# Patient Record
Sex: FEMALE
[De-identification: names, ages, dates, MRNs, and addresses within clinical notes are randomized per-mention and may not be internally consistent; named-entity substitution may affect disease eponyms.]

---

## 2019-12-19 ENCOUNTER — HOSPITAL ENCOUNTER (EMERGENCY)
Dept: HOSPITAL 97 - ER | Age: 64
Discharge: HOME | End: 2019-12-19
Payer: COMMERCIAL

## 2019-12-19 VITALS — DIASTOLIC BLOOD PRESSURE: 90 MMHG | SYSTOLIC BLOOD PRESSURE: 181 MMHG | OXYGEN SATURATION: 98 %

## 2019-12-19 VITALS — TEMPERATURE: 97.6 F

## 2019-12-19 DIAGNOSIS — I10: ICD-10-CM

## 2019-12-19 DIAGNOSIS — F17.210: ICD-10-CM

## 2019-12-19 DIAGNOSIS — R55: Primary | ICD-10-CM

## 2019-12-19 LAB
ALBUMIN SERPL BCP-MCNC: 4 G/DL (ref 3.4–5)
ALP SERPL-CCNC: 77 U/L (ref 45–117)
ALT SERPL W P-5'-P-CCNC: 34 U/L (ref 12–78)
AST SERPL W P-5'-P-CCNC: 31 U/L (ref 15–37)
BUN BLD-MCNC: 12 MG/DL (ref 7–18)
CKMB CREATINE KINASE MB: 2 NG/ML (ref 0.3–3.6)
GLUCOSE SERPLBLD-MCNC: 96 MG/DL (ref 74–106)
HCT VFR BLD CALC: 44.7 % (ref 36–45)
LIPASE SERPL-CCNC: 158 U/L (ref 73–393)
LYMPHOCYTES # SPEC AUTO: 2.3 K/UL (ref 0.7–4.9)
MAGNESIUM SERPL-MCNC: 2.3 MG/DL (ref 1.8–2.4)
PMV BLD: 7.6 FL (ref 7.6–11.3)
POTASSIUM SERPL-SCNC: 3.5 MMOL/L (ref 3.5–5.1)
RBC # BLD: 4.94 M/UL (ref 3.86–4.86)
TROPONIN (EMERG DEPT USE ONLY): < 0.02 NG/ML (ref 0–0.04)

## 2019-12-19 PROCEDURE — 80048 BASIC METABOLIC PNL TOTAL CA: CPT

## 2019-12-19 PROCEDURE — 36415 COLL VENOUS BLD VENIPUNCTURE: CPT

## 2019-12-19 PROCEDURE — 84484 ASSAY OF TROPONIN QUANT: CPT

## 2019-12-19 PROCEDURE — 85025 COMPLETE CBC W/AUTO DIFF WBC: CPT

## 2019-12-19 PROCEDURE — 82550 ASSAY OF CK (CPK): CPT

## 2019-12-19 PROCEDURE — 83735 ASSAY OF MAGNESIUM: CPT

## 2019-12-19 PROCEDURE — 83690 ASSAY OF LIPASE: CPT

## 2019-12-19 PROCEDURE — 70450 CT HEAD/BRAIN W/O DYE: CPT

## 2019-12-19 PROCEDURE — 80076 HEPATIC FUNCTION PANEL: CPT

## 2019-12-19 PROCEDURE — 82553 CREATINE MB FRACTION: CPT

## 2019-12-19 PROCEDURE — 99284 EMERGENCY DEPT VISIT MOD MDM: CPT

## 2019-12-19 PROCEDURE — 93005 ELECTROCARDIOGRAM TRACING: CPT

## 2019-12-19 NOTE — XMS REPORT
Patient Summary Document

 Created on:2019



Patient:SUKHI ZELAYA

Sex:Female

:1955

External Reference #:[id number]





Demographics







 Address  [address] [place] [address]

 

 Home Phone  [phone]

 

 Work Phone  [phone]

 

 Email Address  [email or web address]

 

 Preferred Language  Unknown

 

 Marital Status  Unknown

 

 Rastafarian Affiliation  Unknown

 

 Race  Unknown

 

 Ethnic Group  Unknown









Author







 Organization  [facility]

 

 Address  [address] [place] [address] [address]



   [place] [address]

 

 Phone  [phone]









Care Team Providers







 Name  Role  Phone

 

 Unavailable  Unavailable  Unavailable









Problems

This patient has no known problems.



Allergies, Adverse Reactions, Alerts

This patient has no known allergies or adverse reactions.



Medications

This patient has no known medications.



Results







 Test Description  Test Time  Test Comments  Text Results  Atomic Results  
Result Comments









 Thyroid Stimulating Hormone  2019 22:27:55    









   Test Item  Value  Reference Range  Comments









 TSH (test code=TSH)  0.445 mIU/mL  0.270-4.200  



Erythrocyte Sedimentation Rate [id number] 22:20:13





 Test Item  Value  Reference Range  Comments

 

 ESR STAT (test code=ESR STAT)  8 mm/hr  0-20  



Comprehensive Metabolic [id number] 21:59:49





 Test Item  Value  Reference Range  Comments

 

 Sodium Level (test code=Sodium Level)  139.0 mmol/L  135.0-145.0  

 

 Potassium Level (test code=Potassium Level)  3.7 mmol/L  3.5-5.1  

 

 Chloride Level (test code=Chloride Level)  98 mmol/L    

 

 CO2 (test code=CO2)  26 mmol/L  22-29  

 

 Anion Gap (test code=Anion Gap)  15 mmol/L  7-16  

 

 BUN (test code=BUN)  14.70 mg/dL  8.00-23.00  

 

 Creatinine Level (test code=Creatinine Level)  0.70 mg/dL  0.50-0.90  

 

 BUN/Creat Ratio (test code=BUN/Creat Ratio)  21    

 

 Glucose Level (test code=Glucose Level)  121 mg/dL    

 

 Calcium Level (test code=Calcium Level)  9.8 mg/dL  8.3-10.5  

 

 Alk Phos (test code=Alk Phos)  98 U/L    

 

 Bilirubin Total (test code=Bilirubin Total)  0.4 mg/dL  0.1-0.9  

 

 Albumin Level (test code=Albumin Level)  4.6 g/dL  3.5-5.2  

 

 Protein Total (test code=Protein Total)  7.4 g/dL  6.4-8.3  

 

 ALT (test code=ALT)  22 U/L  1-33  

 

 AST (test code=AST)  24 U/L  1-32  

 

 Globulin (test code=Globulin)  2.8 g/dL  2.9-3.1  

 

 A/G Ratio (test code=A/G Ratio)  1.6 ratio    



Comprehensive Metabolic [id number] 21:59:49





 Test Item  Value  Reference Range  Comments

 

 Sodium Level (test  139.0 mmol/L  135.0-145.0  



 code=Sodium Level)      

 

 Potassium Level (test  3.7 mmol/L  3.5-5.1  



 code=Potassium Level)      

 

 Chloride Level (test  98 mmol/L    



 code=Chloride Level)      

 

 CO2 (test code=CO2)  26 mmol/L  22-29  

 

 Anion Gap (test  15 mmol/L  7-16  



 code=Anion Gap)      

 

 BUN (test code=BUN)  14.70 mg/dL  8.00-23.00  

 

 Creatinine Level (test  0.70 mg/dL  0.50-0.90  



 code=Creatinine Level)      

 

 BUN/Creat Ratio (test  21    



 code=BUN/Creat Ratio)      

 

 Glucose Level (test  121 mg/dL    



 code=Glucose Level)      

 

 Calcium Level (test  9.8 mg/dL  8.3-10.5  



 code=Calcium Level)      

 

 Alk Phos (test code=Alk  98 U/L    



 Phos)      

 

 Bilirubin Total (test  0.4 mg/dL  0.1-0.9  



 code=Bilirubin Total)      

 

 Albumin Level (test  4.6 g/dL  3.5-5.2  



 code=Albumin Level)      

 

 Protein Total (test  7.4 g/dL  6.4-8.3  



 code=Protein Total)      

 

 ALT (test code=ALT)  22 U/L  1-33  

 

 AST (test code=AST)  24 U/L  1-32  

 

 Globulin (test  2.8 g/dL  2.9-3.1  



 code=Globulin)      

 

 A/G Ratio (test code=A/G  1.6 ratio    



 Ratio)      

 

 eGFR AA (test code=eGFR  >60 mL/min/1.73 m2    eGFR (estimated



 AA)      Glomerular Filtration



       Rate) is an estimated



       value, calculated from



       the patient's serum



       creatinine using the MDRD



       equation. It is NOT the



       patient's actual GFR. The



       eGFR provides a more



       clinically useful measure



       of kidney disease than



       serum creatinine



       alone.***This calculation



       takes sex and race into



       account, if the



       information is provided.



       If the race is not



       provided, and the patient



       is -American,



       multiply by 1.212. If sex



       is not provided, and the



       patient is female,



       multiply by 0.742.



       Results for patients <18



       years of age have not



       been validated by the



       MDRD study and should be



       interpreted with caution.



       eGFR Result



       Interpretation:eGFR >



       or=60 is in the Normal



       RangeeGFR &lt; 60 may



       mean kidney diseaseeGFR <



       15 may mean kidney



       failure*** Ranges



       recommended by the



       National Kidney



       Foundation,



       http://nkdep.nih.gov



Lipid [id number] 21:59:49





 Test Item  Value  Reference Range  Comments

 

 Cholesterol Total (test  294 mg/dL  0-200  RISK OF HEART



 code=Cholesterol Total)      DISEASEPublished by



       American Heart Association



       Analyte  Optimal Borderline



       Increased RiskCHOL  <200



       200-239 >240TRIG  <150



       150-199 >200HDL Male  >60



       <40HDL Female  >60  <50LDL



       <100 130-159 >160LDL  Near



       optimal is 100-129

 

 Triglycerides (test  616 mg/dL  9-200  



 code=Triglycerides)      

 

 HDL (test code=HDL)  52 mg/dL  50-60  

 

 LDL (test code=LDL)  N/A Trig >400  0-130  LDL calculation is



   mg/dL    unreliable when



       Triglyceride is greater



       than 400.

 

 VLDL (test code=VLDL)  N/A Trig >400  5-40  VLDL calculation is



   mg/dL    unreliable when



       Triglyceride is greater



       than 400.

 

 Chol/HDL (test  5.7 ratio  0.0-4.4  



 code=Chol/HDL)      

 

 LDL/HDL Ratio (test  N/A Trig >400    LDL/HDL Ratio calculation



 code=LDL/HDL Ratio)      is unreliable when



       Triglyceride is greater



       than 400.



Comprehensive Metabolic [id number] 21:59:49





 Test Item  Value  Reference Range  Comments

 

 Sodium Level (test  139.0 mmol/L  135.0-145.0  



 code=Sodium Level)      

 

 Potassium Level (test  3.7 mmol/L  3.5-5.1  



 code=Potassium Level)      

 

 Chloride Level (test  98 mmol/L    



 code=Chloride Level)      

 

 CO2 (test code=CO2)  26 mmol/L  22-29  

 

 Anion Gap (test  15 mmol/L  7-16  



 code=Anion Gap)      

 

 BUN (test code=BUN)  14.70 mg/dL  8.00-23.00  

 

 Creatinine Level (test  0.70 mg/dL  0.50-0.90  



 code=Creatinine Level)      

 

 BUN/Creat Ratio (test  21    



 code=BUN/Creat Ratio)      

 

 Glucose Level (test  121 mg/dL    



 code=Glucose Level)      

 

 Calcium Level (test  9.8 mg/dL  8.3-10.5  



 code=Calcium Level)      

 

 Alk Phos (test code=Alk  98 U/L    



 Phos)      

 

 Bilirubin Total (test  0.4 mg/dL  0.1-0.9  



 code=Bilirubin Total)      

 

 Albumin Level (test  4.6 g/dL  3.5-5.2  



 code=Albumin Level)      

 

 Protein Total (test  7.4 g/dL  6.4-8.3  



 code=Protein Total)      

 

 ALT (test code=ALT)  22 U/L  1-33  

 

 AST (test code=AST)  24 U/L  1-32  

 

 Globulin (test  2.8 g/dL  2.9-3.1  



 code=Globulin)      

 

 A/G Ratio (test code=A/G  1.6 ratio    



 Ratio)      

 

 eGFR AA (test code=eGFR  >60 mL/min/1.73 m2    eGFR (estimated



 AA)      Glomerular Filtration



       Rate) is an estimated



       value, calculated from



       the patient's serum



       creatinine using the MDRD



       equation. It is NOT the



       patient's actual GFR. The



       eGFR provides a more



       clinically useful measure



       of kidney disease than



       serum creatinine



       alone.***This calculation



       takes sex and race into



       account, if the



       information is provided.



       If the race is not



       provided, and the patient



       is -American,



       multiply by 1.212. If sex



       is not provided, and the



       patient is female,



       multiply by 0.742.



       Results for patients <18



       years of age have not



       been validated by the



       MDRD study and should be



       interpreted with caution.



       eGFR Result



       Interpretation:eGFR >



       or=60 is in the Normal



       RangeeGFR &lt; 60 may



       mean kidney diseaseeGFR <



       15 may mean kidney



       failure*** Ranges



       recommended by the



       National Kidney



       Foundation,



       http://nkdep.nih.gov

 

 eGFR Non-AA (test  >60.00 mL/min/1.73    eGFR (estimated



 code=eGFR Non-AA)  m2    Glomerular Filtration



       Rate) is an estimated



       value, calculated from



       the patient's serum



       creatinine using the MDRD



       equation. It is NOT the



       patient's actual GFR. The



       eGFR provides a more



       clinically useful measure



       of kidney disease than



       serum creatinine



       alone.***This calculation



       takes sex and race into



       account, if the



       information is provided.



       If the race is not



       provided, and the patient



       is -American,



       multiply by 1.212. If sex



       is not provided, and the



       patient is female,



       multiply by 0.742.



       Results for patients <18



       years of age have not



       been validated by the



       MDRD study and should be



       interpreted with caution.



       eGFR Result



       Interpretation:eGFR >



       or=60 is in the Normal



       RangeeGFR &lt; 60 may



       mean kidney diseaseeGFR <



       15 may mean kidney



       failure*** Ranges



       recommended by the



       National Kidney



       Foundation,



       http://nkdep.nih.gov



Complete Blood Count with [id number] 21:52:58





 Test Item  Value  Reference Range  Comments

 

 WBC (test code=WBC)  9.0 x10  4.4-10.5  

 

 RBC (test code=RBC)  4.76 x10  3.75-5.20  

 

 Hgb (test code=Hgb)  14.4 g/dL  12.2-14.8  

 

 Hct (test code=Hct)  42.4 %  36.5-44.4  

 

 MCV (test code=MCV)  89.10 fL  80..00  

 

 MCHC (test code=MCHC)  34.00 g/dL  32.00-37.50  

 

 MCH (test code=MCH)  30.3 pg  27.0-32.5  

 

 RDW CV (test code=RDW CV)  12.6 %  11.5-14.5  

 

 Platelets (test  340.0 x10  140.0-440.0  



 code=Platelets)      

 

 MPV (test code=MPV)  10.1 fL    

 

 Slide Review (test code=Slide  Auto  Auto  Result created by



 Review)      [id number]

 

 nRBC (test code=nRBC)  0    

 

 NRBC Abs (test code=NRBC Abs)  0.00 x10    

 

 IPF (test code=IPF)  0 %    



Automated [id number] 21:52:58





 Test Item  Value  Reference Range  Comments

 

 Neutro Auto (test code=Neutro Auto)  62.7 %  36.0-70.0  

 

 Lymph Auto (test code=Lymph Auto)  28.3 %  12.0-44.0  

 

 Mono Auto (test code=Mono Auto)  7.6 %  0.0-11.0  

 

 Eos, Auto (test code=Eos, Auto)  0.7 %  0.0-7.0  

 

 Basophil Auto (test code=Basophil Auto)  0.3 %  0.0-2.0  

 

 Neutro Absolute (test code=Neutro Absolute)  5.7 x10  1.6-7.4  

 

 Lymph Absolute (test code=Lymph Absolute)  2.56 x10  .50-4.60  

 

 Mono Absolute (test code=Mono Absolute)  .69 x10  .00-1.20  

 

 Eos Absolute (test code=Eos Absolute)  0.06 x10  0.00-0.74  

 

 Baso Absolute (test code=Baso Absolute)  0.03 x10  0.00-0.21  



Pro B Natriuretic [id number] 21:52:58





 Test Item  Value  Reference Range  Comments

 

 NT-proBNP (test code=NT-proBNP)  80 pg/mL  0-124  



IG [id number] 21:52:58





 Test Item  Value  Reference Range  Comments

 

 IG (test code=IG)  0.4 %  0.0-5.0  

 

 IG Abs (test code=IG Abs)  0 x10

## 2019-12-19 NOTE — EDPHYS
Physician Documentation                                                                           

 [facility]                                                                 

Name: Meaghan Tavares                                                                            

Age: 64 yrs                                                                                       

Sex: Female                                                                                       

: 1955                                                                                   

MRN: [id number]                                                                                   

Arrival Date: 2019                                                                          

Time: 21:18                                                                                       

Account#: [id number]                                                                            

Bed 27                                                                                            

Private MD:                                                                                       

ED Physician Elias Franco                                                                     

HPI:                                                                                              

                                                                                             

21:47 This 64 yrs old  Female presents to ER via Ambulatory with complaints of        kb  

      Probable Seizure.                                                                           

21:47 The patient has experienced syncope, became unresponsive. Onset: The symptoms/episode   kb  

      began/occurred today. Duration: This was a single episode. Context: the episode(s) was      

      witnessed, . Associated injury: The patient did not suffer any apparent         

      associated injury. Associated signs and symptoms: The patient has no apparent               

      associated signs or symptoms. Current symptoms: Currently, the patient is not               

      experiencing any symptoms, the patient feels back to baseline, no decreased level of        

      consciousness, no confusion, no dysphasia, no headache, no paralysis, no visual             

      changes. The patient has experienced a previous episode, many years ago. The patient        

      has not recently seen a physician. Pt reports she was getting her hair done, felt hot       

      and nauseous, then blacked out. Person doing her hair reports she shook a little like       

      she may have had a seizure. EMS were called to the scene. Pt reports she felt fine when     

      she woke up and feels fine now. States this happened once before but she didn't get         

      checked out at the time. Reports the only thing that was consistent on both occasions       

      was that she was drinking alcohol. .                                                        

                                                                                                  

Historical:                                                                                       

- Allergies:                                                                                      

21:34 No Known Allergies;                                                                     aa1 

- Home Meds:                                                                                      

21:34 lisinopril 10 mg Oral tab 1 tab once daily [Active];                                    aa1 

- PMHx:                                                                                           

21:34 Hypertension;                                                                           aa1 

- PSHx:                                                                                           

21:34 None;                                                                                   aa1 

                                                                                                  

- Immunization history:: Flu vaccine is not up to date.                                           

- Social history:: Smoking status: Patient uses tobacco products, smokes one pack                 

  cigarettes per day.                                                                             

- Ebola Screening: : No symptoms or risks identified at this time.                                

                                                                                                  

                                                                                                  

ROS:                                                                                              

21:46 Constitutional: Negative for fever, chills, and weight loss, ENT: Negative for injury,  kb  

      pain, and discharge, Neck: Negative for injury, pain, and swelling, Cardiovascular:         

      Negative for chest pain, palpitations, and edema, Respiratory: Negative for shortness       

      of breath, cough, wheezing, and pleuritic chest pain, Abdomen/GI: Negative for              

      abdominal pain, nausea, vomiting, diarrhea, and constipation, Back: Negative for injury     

      and pain, MS/Extremity: Negative for injury and deformity, Skin: Negative for injury,       

      rash, and discoloration.                                                                    

21:46 Neuro: Positive for seizure activity, syncope.                                              

                                                                                                  

Exam:                                                                                             

21:46 Constitutional:  This is a well developed, well nourished patient who is awake, alert,  kb  

      and in no acute distress. Head/Face:  Normocephalic, atraumatic. ENT:  Nares patent. No     

      nasal discharge, no septal abnormalities noted.  Tympanic membranes are normal and          

      external auditory canals are clear.  Oropharynx with no redness, swelling, or masses,       

      exudates, or evidence of obstruction, uvula midline.  Mucous membranes moist. Neck:         

      Trachea midline, no thyromegaly or masses palpated, and no cervical lymphadenopathy.        

      Supple, full range of motion without nuchal rigidity, or vertebral point tenderness.        

      No Meningismus. Chest/axilla:  Normal chest wall appearance and motion.  Nontender with     

      no deformity.  No lesions are appreciated. Cardiovascular:  Regular rate and rhythm         

      with a normal S1 and S2.  No gallops, murmurs, or rubs.  Normal PMI, no JVD.  No pulse      

      deficits. Respiratory:  Lungs have equal breath sounds bilaterally, clear to                

      auscultation and percussion.  No rales, rhonchi or wheezes noted.  No increased work of     

      breathing, no retractions or nasal flaring. Abdomen/GI:  Soft, non-tender, with normal      

      bowel sounds.  No distension or tympany.  No guarding or rebound.  No evidence of           

      tenderness throughout. Skin:  Warm, dry with normal turgor.  Normal color with no           

      rashes, no lesions, and no evidence of cellulitis. MS/ Extremity:  Pulses equal, no         

      cyanosis.  Neurovascular intact.  Full, normal range of motion. Neuro:  Awake and           

      alert, GCS 15, oriented to person, place, time, and situation.  Cranial nerves II-XII       

      grossly intact.  Motor strength 5/5 in all extremities.  Sensory grossly intact.            

      Cerebellar exam normal.  Normal gait.                                                       

                                                                                                  

Vital Signs:                                                                                      

21:34  / 97; Pulse 75; Resp 16; Temp 97.6; Pulse Ox 100% on R/A; Weight 67.59 kg;       aa1 

      Height 5 ft. 2 in. (157.48 cm); Pain 0/10;                                                  

22:00  / 91; Pulse 75; Resp 18; Pulse Ox 100% ;                                         tr5 

22:30  / 92; Pulse 80; Resp 16; Pulse Ox 99% on R/A;                                    tr5 

23:00  / 90; Pulse 75; Resp 16; Pulse Ox 98% ;                                          tr5 

21:34 Body Mass Index 27.25 (67.59 kg, 157.48 cm)                                             aa1 

                                                                                                  

[place] Coma Score:                                                                               

21:34 Eye Response: spontaneous(4). Verbal Response: oriented(5). Motor Response: obeys       aa1 

      commands(6). Total: 15.                                                                     

                                                                                                  

MDM:                                                                                              

21:24 Patient medically screened.                                                             kb  

21:46 Data reviewed: vital signs, nurses notes. Data interpreted: Pulse oximetry: on room air kb  

      is 100 %. Interpretation: normal.                                                           

23:12 Counseling: I had a detailed discussion with the patient and/or guardian regarding: the kb  

      historical points, exam findings, and any diagnostic results supporting the                 

      discharge/admit diagnosis, lab results, radiology results, the need for outpatient          

      follow up, a family practitioner, to return to the emergency department if symptoms         

      worsen or persist or if there are any questions or concerns that arise at home.             

                                                                                                  

                                                                                             

21:32 Order name: Basic Metabolic Panel; Complete Time: 22:53                                 kb  

                                                                                             

21:32 Order name: CBC with Diff; Complete Time: 22:36                                         kb  

                                                                                             

21:32 Order name: Ckmb; Complete Time: 22:53                                                  kb  

                                                                                             

21:32 Order name: CPK; Complete Time: 22:53                                                   kb  

                                                                                             

21:32 Order name: Hepatic Function; Complete Time: 22:53                                      kb  

                                                                                             

21:32 Order name: Lipase; Complete Time: 22:53                                                kb  

                                                                                             

21:32 Order name: EKG; Complete Time: 21:32                                                   kb  

                                                                                             

21:32 Order name: EKG - Nurse/Tech; Complete Time: 22:34                                      kb  

                                                                                             

21:32 Order name: CT Head Brain wo Cont                                                       kb  

                                                                                             

21:32 Order name: Magnesium; Complete Time: 22:53                                             kb  

                                                                                             

21:32 Order name: Troponin (emerg Dept Use Only); Complete Time: 22:53                        kb  

                                                                                             

21:32 Order name: Cardiac monitoring; Complete Time: 22:34                                    kb  

                                                                                             

21:32 Order name: IV Saline Lock; Complete Time: 22:01                                        kb  

                                                                                             

21:32 Order name: Labs collected and sent; Complete Time: 22:01                               kb  

                                                                                             

21:32 Order name: NPO; Complete Time: 21:38                                                   kb  

                                                                                             

21:32 Order name: O2 Per Protocol; Complete Time: 21:38                                       kb  

                                                                                             

21:32 Order name: O2 Sat Monitoring; Complete Time: 21:38                                     kb  

                                                                                                  

Administered Medications:                                                                         

21:41 Drug: Lisinopril 10 mg Route: PO;                                                       tr5 

                                                                                                  

                                                                                                  

Disposition:                                                                                      

                                                                                             

06:53 Co-signature as Attending Physician, Elias Franco MD I agree with the assessment and tw4 

      plan of care.                                                                               

                                                                                                  

Disposition:                                                                                      

19 23:12 Discharged to Home. Impression: Syncope and collapse.                              

- Condition is Stable.                                                                            

- Discharge Instructions: Syncope, Easy-to-Read.                                                  

                                                                                                  

- Medication Reconciliation Form, Thank You Letter, Antibiotic Education, Prescription            

  Opioid Use form.                                                                                

- Follow up: Emergency Department; When: As needed; Reason: Worsening of condition.               

  Follow up: Private Physician; When: 2 - 3 days; Reason: Recheck today's complaints,             

  Continuance of care, Re-evaluation by your physician.                                           

                                                                                                  

                                                                                                  

                                                                                                  

Signatures:                                                                                       

Dispatcher [employer]                           EDMS                                                 

Mary Tyler, ARUTROP-C                 FNP-CkParis Mejia, RN                  RN   aa1                                                  

Elias Franco MD MD   tw4                                                  

Daniel Gonzalez RN                   RN   tr5                                                  

                                                                                                  

Corrections: (The following items were deleted from the chart)                                    

                                                                                             

23:24 23:12 2019 23:12 Discharged to Home. Impression: Syncope and collapse. Condition  tr5 

      is Stable. Forms are Medication Reconciliation Form, Thank You Letter, Antibiotic           

      Education, Prescription Opioid Use. Follow up: Emergency Department; When: As needed;       

      Reason: Worsening of condition. Follow up: Private Physician; When: 2 - 3 days; Reason:     

      Recheck today's complaints, Continuance of care, Re-evaluation by your physician. kb        

                                                                                                  

**************************************************************************************************

## 2019-12-19 NOTE — ER
Nurse's Notes                                                                                     

 [facility]                                                                 

Name: Meaghan Tavares                                                                            

Age: 64 yrs                                                                                       

Sex: Female                                                                                       

: 1955                                                                                   

MRN: [id number]                                                                                   

Arrival Date: 2019                                                                          

Time: 21:18                                                                                       

Account#: [id number]                                                                            

Bed 27                                                                                            

Private MD:                                                                                       

Diagnosis: Syncope and collapse                                                                   

                                                                                                  

Presentation:                                                                                     

                                                                                             

21:28 Presenting complaint: Patient states: about 30-40 mins PTA she was getting her hair     aa1 

      done and she started to feel lightheaded like she was going to be sick and she blacked      

      out. Her stylist reports she was shaking as if she was having a seizure and was             

      unresponsive for a couple mins. Pt states at this time she feels completely normal and      

      has no symptoms. Reports this has happened once before many years ago but she was not       

      evaluated for it. Transition of care: patient was not received from another setting of      

      care. Onset of symptoms was 2019. Risk Assessment: Do you want to hurt         

      yourself or someone else? Patient reports no desire to harm self or others. Initial         

      Sepsis Screen: Does the patient meet any 2 criteria? No. Patient's initial sepsis           

      screen is negative. Does the patient have a suspected source of infection? No.              

      Patient's initial sepsis screen is negative. Care prior to arrival: None.                   

21:28 Method Of Arrival: Ambulatory                                                           aa1 

21:28 Acuity: ALVIN 3                                                                           aa1 

                                                                                                  

Triage Assessment:                                                                                

21:34 General: Appears in no apparent distress. comfortable, Behavior is calm, cooperative,   aa1 

      appropriate for age. Pain: Denies pain.                                                     

                                                                                                  

Historical:                                                                                       

- Allergies:                                                                                      

21:34 No Known Allergies;                                                                     aa1 

- Home Meds:                                                                                      

21:34 lisinopril 10 mg Oral tab 1 tab once daily [Active];                                    aa1 

- PMHx:                                                                                           

21:34 Hypertension;                                                                           aa1 

- PSHx:                                                                                           

21:34 None;                                                                                   aa1 

                                                                                                  

- Immunization history:: Flu vaccine is not up to date.                                           

- Social history:: Smoking status: Patient uses tobacco products, smokes one pack                 

  cigarettes per day.                                                                             

- Ebola Screening: : No symptoms or risks identified at this time.                                

                                                                                                  

                                                                                                  

Screenin:30 Abuse screen: Denies threats or abuse. Nutritional screening: No deficits noted.        tr5 

      Tuberculosis screening: No symptoms or risk factors identified. Fall Risk None              

      identified.                                                                                 

                                                                                                  

Assessment:                                                                                       

21:30 General: Appears uncomfortable, Behavior is calm, cooperative, appropriate for age.     tr5 

      Pain: Denies pain. Neuro: Seizure activity Seizure lasted approximately 2 minutes.          

      Patient is post-ictal at this time. Cardiovascular: Heart tones present Capillary           

      refill < 3 seconds Pulses are all present. Edema is absent. Respiratory: Airway is          

      patent Respiratory effort is even, unlabored, Respiratory pattern is regular,               

      symmetrical. GI: No signs and/or symptoms were reported involving the gastrointestinal      

      system. : No signs and/or symptoms were reported regarding the genitourinary system.      

      EENT: No signs and/or symptoms were reported regarding the EENT system. Derm: No signs      

      and/or symptoms reported regarding the dermatologic system. Musculoskeletal: No signs       

      and/or symptoms reported regarding the musculoskeletal system.                              

23:00 Reassessment: Patient appears in no apparent distress at this time. Patient and/or      tr5 

      family updated on plan of care and expected duration. Pain level reassessed. Patient is     

      alert, oriented x 3, equal unlabored respirations, skin warm/dry/pink.                      

                                                                                                  

Vital Signs:                                                                                      

21:34  / 97; Pulse 75; Resp 16; Temp 97.6; Pulse Ox 100% on R/A; Weight 67.59 kg;       aa1 

      Height 5 ft. 2 in. (157.48 cm); Pain 0/10;                                                  

22:00  / 91; Pulse 75; Resp 18; Pulse Ox 100% ;                                         tr5 

22:30  / 92; Pulse 80; Resp 16; Pulse Ox 99% on R/A;                                    tr5 

23:00  / 90; Pulse 75; Resp 16; Pulse Ox 98% ;                                          tr5 

21:34 Body Mass Index 27.25 (67.59 kg, 157.48 cm)                                             aa1 

                                                                                                  

Rom Coma Score:                                                                               

21:34 Eye Response: spontaneous(4). Verbal Response: oriented(5). Motor Response: obeys       aa1 

      commands(6). Total: 15.                                                                     

                                                                                                  

ED Course:                                                                                        

21:18 Patient arrived in ED.                                                                  cl3 

21:24 [facility]                                                        kb  

21:24 Elias Franco MD is Attending Physician.                                            kb  

21:30 Bed in low position. Call light in reach. Side rails up X 1.                            tr5 

21:30 Seizure precautions initiated.                                                          tr5 

21:31 Triage completed.                                                                       aa1 

21:34 Arm band placed on right wrist.                                                         aa1 

21:37 Carlos, Daniel, RN is Primary Nurse.                                                 tr5 

21:40 Initial lab(s) drawn, by me, sent to lab. Inserted saline lock: 20 gauge in right       tr5 

      antecubital area, using aseptic technique. Patient did not have IV access during this       

      emergency room visit.                                                                       

21:43 Patient moved to CT via stretcher.                                                      nj  

22:01 CT Head Brain wo Cont In Process Unspecified.                                           EDMS

22:15 EKG done, by ED staff, reviewed by Mary CARUSO.                               jp3 

23:19 No provider procedures requiring assistance completed.                                  tr5 

                                                                                                  

Administered Medications:                                                                         

21:41 Drug: Lisinopril 10 mg Route: PO;                                                       tr5 

                                                                                                  

                                                                                                  

Outcome:                                                                                          

23:12 Discharge ordered by MD.                                                                kirstie  

23:19 Discharged to home ambulatory, with family.                                             tr5 

23:19 Condition: stable                                                                           

23:19 Discharge instructions given to patient, family, Instructed on discharge instructions,      

      follow up and referral plans. Demonstrated understanding of instructions, follow-up         

      care.                                                                                       

23:24 Patient left the ED.                                                                    tr5 

                                                                                                  

Signatures:                                                                                       

Dispatcher [employer]                           EDMary Gutierrez, ZULY MORRISP-Paris Medina, RN                  RN   aa1                                                  

Schuyler Hope Jacob                              jp3                                                  

Daniel Gonzalez, RN                   RN   tr5                                                  

Nani Davidson                                3                                                  

                                                                                                  

**************************************************************************************************

## 2019-12-20 NOTE — RAD REPORT
EXAM DESCRIPTION:  CT - Head Brain Wo Cont - 12/20/2019 2:02 am

 

CLINICAL HISTORY:  64 years   Female   [place]

 

COMPARISON:  None

 

TECHNIQUE:  Images were obtained in axial, sagittal, and coronal planes.

This exam was performed according to our departmental dose-optimization program which includes use of
 Automated Exposure Control, adjustment of the mA and/or kV according to patient size and/or use of i
terative reconstruction technique.

 

FINDINGS:  Ventricular system appears mildly enlarged for the patient's age. Old lacunar infarcts bas
al ganglionic regions bilaterally left greater than right. Decreased attenuation adjacent to anterior
 right frontal horn consistent with more remote ischemic change. Moderate periventricular decreased a
ttenuation consistent with more remote microvascular ischemic change more pronounced on the left.

No abnormal areas of increased attenuation in. No extra-axial fluid collections noted.

No evidence for skull fracture. Symmetric aeration mastoid air cells bilaterally.

 

IMPRESSION:  No acute intracranial abnormality. No evidence for hemorrhage, mass lesion, or large acu
te infarction.

Findings consistent with more remote ischemic change.

 

Electronically signed by:   Cee Nichols MD   12/19/2019 10:20 PM CST Workstation: [id number]

 

 

 

 

Due to temporary technical issues with the PACS/Fluency reporting system, reports are being signed by
 the in house radiologist as a courtesy to ensure prompt reporting. The interpreting radiologist is f
ully responsible for the content of the report.

## 2019-12-20 NOTE — EKG
Test Date:    2019-12-19               Test Time:    22:24:29

Technician:   MARY ELLEN                                    

                                                     

MEASUREMENT RESULTS:                                       

Intervals:                                           

Rate:         76                                     

TX:           152                                    

QRSD:         86                                     

QT:           398                                    

QTc:          447                                    

Axis:                                                

P:            73                                     

TX:           152                                    

QRS:          29                                     

T:            29                                     

                                                     

INTERPRETIVE STATEMENTS:                                       

                                                     

Normal sinus rhythm

Normal ECG

Compared to ECG 09/15/2013 10:19:08

No significant changes



Electronically Signed On 12-20-19 16:34:03 CST by Cameron Farah

## 2021-01-01 ENCOUNTER — HOSPITAL ENCOUNTER (EMERGENCY)
Dept: HOSPITAL 97 - ER | Age: 66
Discharge: HOME | End: 2021-01-01
Payer: COMMERCIAL

## 2021-01-01 VITALS — OXYGEN SATURATION: 98 % | DIASTOLIC BLOOD PRESSURE: 92 MMHG | SYSTOLIC BLOOD PRESSURE: 175 MMHG

## 2021-01-01 VITALS — TEMPERATURE: 98.3 F
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
## 2021-01-01 DIAGNOSIS — I10: ICD-10-CM

## 2021-01-01 DIAGNOSIS — F17.210: ICD-10-CM

## 2021-01-01 DIAGNOSIS — S42.011A: Primary | ICD-10-CM
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
## 2021-01-01 PROCEDURE — 99284 EMERGENCY DEPT VISIT MOD MDM: CPT

## 2021-01-01 PROCEDURE — 72125 CT NECK SPINE W/O DYE: CPT

## 2021-01-01 PROCEDURE — 71045 X-RAY EXAM CHEST 1 VIEW: CPT

## 2021-01-01 NOTE — XMS REPORT
Continuity of Care Document

                           Created on:2021



Patient:SUKHI ZELAYA

Sex:Female

:1955

External Reference #:[id number]





Demographics







                          Address                   [address] [place] [address]

 

                          Home Phone                [phone]

 

                          Preferred Language        en-US

 

                          Marital Status            Unknown

 

                          Christianity Affiliation     Unknown

 

                          Race                      Unknown

 

                          Ethnic Group              Unknown









Author







                          Organization              [facility]









Care Team Providers







                    Name                Role                Phone

 

                    [facility] Unavailable         Un

available









Problems







          Problem   Status    Onset     Classification Date      Comments  Sourc

e



                              Date                Reported            



                                                                      



                                                                      



                                                                      

 

          SUB ACUTE MCA Active    09/15/20                                 [facility]

as



          STROKE              13                                      Medical



                                                                      Center



                                                                      



                                                                      

 

          TIA       Active    09/15/20                                [address]



                                                                      



                                                                      

 

          Arthritis Resolved            Problem   2013           [facility]



                                                                      



                                                                      

 

          Hyperlipidemia Resolved            Problem   2013           [facility] [place]



                                                                      



                                                                      

 

          LASIK     Resolved            Problem   2013           [facility]



                                                                      



                                                                      

 

          Arthritis Resolved            Problem   2020           [facility]



          (disorder)                                                   Neuro



                                                                      



                                                                      

 

          Hyperlipidemia Resolved            Problem   2020           [facility]

[facility]



          (disorder)                                                   Neuro



                                                                      



                                                                      

 

          Laser assisted in Resolved            Problem   2020           [facility]

[facility]



          situ                                                        Neuro



          keratomileusis                                                   



          (procedure)                                                   



                                                                      

 

          TRANS CEREB Active                                            [facility]



                                                                      



                                                                      







Medications







        Medication Details Route   Status  Patient Ordering Order   Source



                                        Instructions Provider Date    



                                                                



                                                                



                                                                

 

        atorvastatin 80 80 mg, 1 tab, PO      Active          [facility]    

 [place]



        mg oral tablet PO, Daily, 30                                 013     Med

ical



                tab,                                            Center



                Substitution                                         



                Allowed, TAB                                         



                                                                



                                                                

 

        aspirin 81 mg 81 mg, 1 tab, PO      Active          [facility]     [place]

[place]



        tablet, enteric PO, Daily, 30                                 013     Me

dical



        coated  tab,                                            Center



                Substitution                                         



                Allowed, ECTAB                                         



                                                                



                                                                

 

        atorvastatin 80 mg, 1 tab, PO      No              [facility]     [place]



                Route: PO, Drug         [place]                       Medical



                form: TAB,         Active                          Center



                Daily, Dosing                                         



                Weight 72.727,                                         



                kg, Start date:                                         



                13                                         



                9:00:00,                                         



                Duration: 30                                         



                day, Stop date:                                         



                10/15/13                                         



                9:00:00                                         



                                                                

 

        aspirin 81 mg, 1 tab, PO      No              [facility]to     [place]



                Route: PO, Drug         [facility]                  013     Medical



                form: ECTAB,         Active                          Center



                Daily, Dosing                                         



                Weight 72.727,                                         



                kg, Start date:                                         



                13                                         



                9:00:00,                                         



                Duration: 30                                         



                day, Stop date:                                         



                10/15/13                                         



                9:00:00                                         



                                                                

 

        heparin 5,000 unit, 1 SUB-Q   No              Jose Alberto     [place]



                mL, Route:         Long[facility]                  013     Medical



                SUB-Q, Drug         Active                          Center



                form: INJ, Q8H,                                         



                Dosing Weight                                         



                72.727, kg,                                         



                Start date:                                         



                13                                         



                8:00:00,                                         



                Duration: 30                                         



                day, Stop date:                                         



                10/16/13                                         



                0:00:00                                         



                                                                

 

        Saline Flush 5 ml, Route: IVP     No              [facility]   

[place]



        0.9%    IVP, Drug Form:         Longer          [facility]  013     Medical



                INJ, Dosing         Active                          Center



                Weight 72.727,                                         



                kg, Q12H, Start                                         



                date: 09/15/13                                         



                21:00:00,                                         



                Duration: 30                                         



                day, Stop date:                                         



                10/15/13                                         



                9:00:00                                         



                                                                

 

        Zantac  Substitution         Active                  09/15/2 [facility]



                Allowed                                 013     Medical



                                                                Center



                                                                



                                                                

 

        Multiple 1 cap, PO, PO      Active                  09/15/2  [place]



        Vitamins oral Daily, 30 cap,                                 [facility]     Med

ical



        capsule Substitution                                         Center



                Allowed,                                         



                Maintenance,                                         



                CAP                                             



                                                                

 

        Saline Flush 5 ml, Route: IVP     No              [facility] 09/15/2 [facility]



        0.9%    IVP, Drug Form:         Longer          treras  013     Medical



                INJ, Dosing         Active                          Center



                Weight 72.727,                                         



                kg, PRN, PRN                                         



                Line Flush,                                         



                Start date:                                         



                09/15/13                                         



                17:09:00,                                         



                Duration: 30                                         



                day, Stop date:                                         



                10/15/13                                         



                17:08:00                                         



                                                                

 

        labetalol 10 mg, 2 mL, IVP     No              [facility] 09/15/2  [facility]

as



                Route: IVP,         Longer          [facility] [place]     Medical



                Drug form: INJ,         Active                          Center



                Q10Min, Dosing                                         



                Weight 72.727,                                         



                kg, PRN                                         



                Hypertension,                                         



                Start date:                                         



                09/15/13                                         



                17:09:00,                                         



                Duration: 30                                         



                day, Stop date:                                         



                10/15/13                                         



                17:08:00, For                                         



                SBP > 180mmHg                                         



                and/or DBP >                                         



                105mmHg                                         



                                                                

 

        Sodium Chloride 1,000 mL, Rate: IV      No              [facility]   09/15/2 

 [place]



        0.9% IV 1,000 100 ml/hr,         [facility]                  013     Medical



        mL      Infuse over: 10         Active                          Center



                hr, Route: IV,                                         



                Dosing Weight                                         



                72.727 kg,                                         



                Total Volume:                                         



                1,000, Start                                         



                date: 09/15/13                                         



                17:09:00, Stop                                         



                date: 10/15/13                                         



                17:08:00                                         



                                                                

 

        Omnipaque 85 mL, Route: IVP     No              [place]    09/15/2 [facility]



        350mg/ml IVP, Drug Form:         Longer                  013     Medical



                SOLN, Dosing         Active                          Center



                Weight 72.727,                                         



                kg, ONCALL,                                         



                STAT, Start                                         



                date: 09/15/13                                         



                14:37:00,                                         



                Duration: 1                                         



                doses or times,                                         



                Dose =                                          



                2.2ml/kg,  Max                                         



                dose = 100ml --                                         



                "To be infused                                         



                by Radiology                                         



                Staff ONLY"Dose                                         



                = 2.2ml/kg,                                         



                Max dose =                                         



                100ml -- "To be                                         



                infused by                                         



                Radiology Staff                                         



                ONLY"                                           



                                                                







Allergies, Adverse Reactions, Alerts







        Substance Category Reaction Severity Reaction Status  Date    Comments S

ource



                                        type            Reported         



                                                                        



                                                                        



                                                                        

 

        No Known Assertion                 Drug                            [facility]

er



        Medication                         allergy                         Neuro



        Allergies                                                         



                                                                        



                                                                        







Immunizations







                                        No Data Provided for This Section







Results







        Order Name Results Value   Reference Date    Interpretation Comments [facility]

rce



                                Range                           



                                                                



                                                                



                                                                

 

        CHEMISTRY LDL Direct 144     <=99       HI               [place]



                                        /2013                   [facility]



                                                                



                                                                



                                                                

 

        CHEMISTRY AGAP    13.1    10.0 -     Normal          [facility]



                                20.0                       [facility]



                                                                



                                                                



                                                                

 

        CHEMISTRY eGFR    96                 NA      <sup>1</sup>Res [facility]

as



                                        /2013           ult Comment: Medical



                                                        The eGFR is Center



                                                        calculated 



                                                        using the 



                                                        CKD-EPI 



                                                        formula. In 



                                                        most young, 



                                                        healthy 



                                                        individuals the 



                                                        eGFR will be 



                                                        >90     



                                                        mL/min/1.73m2. 



                                                        The eGFR 



                                                        declines with 



                                                        age. An eGFR of 



                                                        60-89 may be 



                                                        normal in some 



                                                        populations, 



                                                        particularly 



                                                        the elderly, 



                                                        for whom the 



                                                        CKD-EPI formula 



                                                        has not been 



                                                        extensively 



                                                        validated. Use 



                                                        of the eGFR is 



                                                        not recommended 



                                                        in the  



                                                        following 



                                                        populations:&lt 



                                                        ;br/><br/>Indiv 



                                                        iduals with 



                                                        unstable 



                                                        creatinine 



                                                        concentrations, 



                                                        including 



                                                        pregnant 



                                                        patients and 



                                                        those with 



                                                        serious 



                                                        co-morbid 



                                                        conditions.<br/ 



                                                        ><br/>Patients 



                                                        with extremes 



                                                        in muscle mass 



                                                        or diet. 



                                                        <br/><br/>The 



                                                        data above are 



                                                        obtained from 



                                                        the National 



                                                        Kidney Disease 



                                                        Education 



                                                        Program (NKDEP) 



                                                        which   



                                                        additionally 



                                                        recommends that 



                                                        when the eGFR 



                                                        is used in 



                                                        patients with 



                                                        extremes of 



                                                        body mass index 



                                                        for purposes of 



                                                        drug dosing, 



                                                        the eGFR should 



                                                        be multiplied 



                                                        by the  



                                                        estimated BMI. 



                                                                



                                                                

 

        CHEMISTRY Creatinine 0.7     0.5 - 1.4    Normal          [facility]



                Lvl                     /                   [facility]



                                                                



                                                                



                                                                

 

        CHEMISTRY BUN     18      7 - 22     Normal           [place]



                                        /2013                   [facility]



                                                                



                                                                



                                                                

 

        CHEMISTRY Glucose Lvl 101     70 - 99    HI      <sup>3</sup>Int 

 [place]



                                        /2013           erpretive Data: Medical



                                                        Adult reference Center



                                                        range values 



                                                        reflect the 



                                                        clinical 



                                                        guidelines<br/> 



                                                        of the American 



                                                        Diabetes 



                                                        Association. 



                                                                



                                                                

 

        CHEMISTRY Sodium Lvl 142     135 - 145    Normal           [place]



                                        /2013                   [facility]



                                                                



                                                                



                                                                

 

        CHEMISTRY Calcium Lvl 8.3     8.5 - 10.5    LOW                                 [facility]



                                                                



                                                                



                                                                

 

        CHEMISTRY CO2     23      24 - 32    LOW              [place]



                                        /2013                   [facility]



                                                                



                                                                



                                                                

 

        CHEMISTRY Chloride Lvl 110     95 - 109    [facility] [place]



                                        /2013                   [facility]



                                                                



                                                                



                                                                

 

        CHEMISTRY Potassium Lvl 4.1     3.5 - 5.1    Normal           [place]

as



                                        /2013                   [facility]



                                                                



                                                                



                                                                

 

        CHEMISTRY LDL     See Note mg/dL 5 <=99       NA      <sup>5</sup>R

es [facility]



                        *NA*            /           ult Comment: Medical



                        (2013 03:00:00)                         LDL choles

[facility]



                                                        cannot be 



                                                        calculated due 



                                                        to very high 



                                                        triglycerides 



                                                        (>400 mg/dL). 



                                                        Recommend 



                                                        Direct LDL if 



                                                        clinically 



                                                        indicated. 



                                                                



                                                                

 

        CHEMISTRY CHD Risk 8.45    3.90 -     [facility]



                                5.80    /                   [facility]



                                                                



                                                                



                                                                

 

        CHEMISTRY HDL     29      >=61       LOW              [place]



                                        /2013                   [facility]



                                                                



                                                                



                                                                

 

        CHEMISTRY Trig    531     <=149      [facility] [place]



                                        /2013                   [facility]



                                                                



                                                                



                                                                

 

        CHEMISTRY Chol    245     <=199      [facility] [place]



                                        /2013                   [facility]



                                                                



                                                                



                                                                

 

        HEMATOLOGY Basophils 0.7     0.0 - 1.0    Normal           [place]



                                        /2013                   [facility]



                                                                



                                                                



                                                                

 

        HEMATOLOGY Segs-Bands # 3.6     1.5 - 8.1    Normal           [place]

as



                                        /2013                   [facility]



                                                                



                                                                



                                                                

 

        HEMATOLOGY Lymphocytes # 3.1     1.0 - 5.5    Normal           [place]

                   [facility]



                                                                



                                                                



                                                                

 

        HEMATOLOGY Monocytes # 0.6     0.0 - 0.8    Normal                              [facility]



                                                                



                                                                



                                                                

 

        HEMATOLOGY Eosinophils # 0.1     0.0 - 0.5    Normal           [place]

xa                   [facility]



                                                                



                                                                



                                                                

 

        HEMATOLOGY Basophils # 0.1     0.0 - 0.2    Normal                              [facility]



                                                                



                                                                



                                                                

 

        HEMATOLOGY Eosinophils 1.7     0.0 - 4.0    Normal                              [facility]



                                                                



                                                                



                                                                

 

        HEMATOLOGY Monocytes 8.0     2.0 - 12.0    Normal           [place]



                                        /2013                   [facility]



                                                                



                                                                



                                                                

 

        HEMATOLOGY Segs    48.0    45.0 -     [facility] [place]



                                75.0                       Medical



                                                                Center



                                                                



                                                                



                                                                

 

        HEMATOLOGY Lymphocytes 41.6    20.0 -     [facility] [place]



                                40.0                       Medical



                                                                Center



                                                                



                                                                



                                                                

 

        HEMATOLOGY RBC     3.87    4.20 -     LOW              [place]



                                5.40    /                   [facility]



                                                                



                                                                



                                                                

 

        HEMATOLOGY WBC     7.4     3.7 - 10.4    Normal           [place]



                                        /2013                   [facility]



                                                                



                                                                



                                                                

 

        HEMATOLOGY Hgb     11.9    12.0 -     [facility] [place]



                                16.0    /                   [facility]



                                                                



                                                                



                                                                

 

        HEMATOLOGY Hct     35.2    36.0 -     LOW              [place]



                                48.0    /                   [facility]



                                                                



                                                                



                                                                

 

        HEMATOLOGY MCV     90.8    81.0 -     Normal           [place]



                                99.0    /                   [facility]



                                                                



                                                                



                                                                

 

        HEMATOLOGY MCH     30.7    27.0 -  09   Normal           [place]



                                31.0    /                   [facility]



                                                                



                                                                



                                                                

 

        HEMATOLOGY RDW     13.3    11.5 -     Normal           [place]



                                14.5    /                   [facility]



                                                                



                                                                



                                                                

 

        HEMATOLOGY MCHC    33.9    32.0 -  09   [facility] [place]



                                36.0    /                   [facility]



                                                                



                                                                



                                                                

 

        HEMATOLOGY Platelet 279     133 - 450    [facility] [place]



                                        /2013                   [facility]



                                                                



                                                                



                                                                

 

        HEMATOLOGY MPV     7.5     7.4 - 10.4    Normal           [place]



                                        /2013                   [facility]



                                                                



                                                                



                                                                

 

        CHEMISTRY eGFR    96              09/15   NA      <sup>2</sup>[facility]

as



                                        /2013           ult Comment: Medical



                                                        The eGFR is Center



                                                        calculated 



                                                        using the 



                                                        CKD-EPI 



                                                        formula. In 



                                                        most young, 



                                                        healthy 



                                                        individuals the 



                                                        eGFR will be 



                                                        >90     



                                                        mL/min/1.73m2. 



                                                        The eGFR 



                                                        declines with 



                                                        age. An eGFR of 



                                                        60-89 may be 



                                                        normal in some 



                                                        populations, 



                                                        particularly 



                                                        the elderly, 



                                                        for whom the 



                                                        CKD-EPI formula 



                                                        has not been 



                                                        extensively 



                                                        validated. Use 



                                                        of the eGFR is 



                                                        not recommended 



                                                        in the  



                                                        following 



                                                        populations:&lt 



                                                        ;br/><br/>Indiv 



                                                        iduals with 



                                                        unstable 



                                                        creatinine 



                                                        concentrations, 



                                                        including 



                                                        pregnant 



                                                        patients and 



                                                        those with 



                                                        serious 



                                                        co-morbid 



                                                        conditions.<br/ 



                                                        ><br/>Patients 



                                                        with extremes 



                                                        in muscle mass 



                                                        or diet. 



                                                        <br/><br/>The 



                                                        data above are 



                                                        obtained from 



                                                        the National 



                                                        Kidney Disease 



                                                        Education 



                                                        Program (NKDEP) 



                                                        which   



                                                        additionally 



                                                        recommends that 



                                                        when the eGFR 



                                                        is used in 



                                                        patients with 



                                                        extremes of 



                                                        body mass index 



                                                        for purposes of 



                                                        drug dosing, 



                                                        the eGFR should 



                                                        be multiplied 



                                                        by the  



                                                        estimated BMI. 



                                                                



                                                                

 

        CHEMISTRY Sodium Lvl 140     135 - 145 09/15   Normal           [place]



                                        /2013                   [facility]



                                                                



                                                                



                                                                

 

        CHEMISTRY Potassium Lvl 3.9     3.5 - 5.1 09/15   Normal           [place]

as



                                        /2013                   [facility]



                                                                



                                                                



                                                                

 

        CHEMISTRY Chloride Lvl 105     95 - 109 09/15   Normal           [place]



                                        /2013                   [facility]



                                                                



                                                                



                                                                

 

        CHEMISTRY CO2     28      24 - 32 09/15   [facility] [place]



                                        /2013                   [facility]



                                                                



                                                                



                                                                

 

        CHEMISTRY Glucose Lvl 92      70 - 99 09/15   Normal  <sup>4</sup>Int 

 [place]



                                        /2013           erpretive Data: Medical



                                                        [facility]ult [facility]



                                                        range values 



                                                        reflect the 



                                                        clinical 



                                                        guidelines<br/> 



                                                        of the [employer] 



                                                        Diabetes 



                                                        Association. 



                                                                



                                                                

 

        CHEMISTRY BUN     14      7 - 22  09/15   Normal           [place]



                                        /2013                   [facility]



                                                                



                                                                



                                                                

 

        CHEMISTRY Creatinine 0.7     0.5 - 1.4 09/15   Normal          [facility]



                Lvl                                        [facility]



                                                                



                                                                



                                                                

 

        CHEMISTRY Calcium Lvl 9.1     8.5 - 10.5 09/15   Normal                              [facility]



                                                                



                                                                



                                                                

 

        CHEMISTRY AGAP    10.9    10.0 -  09/15   Normal           [place]



                                20.0                       [facility]



                                                                



                                                                



                                                                

 

        HEMATOLOGY Basophils # 0.1     0.0 - 0.2 09/15   Normal           [place]

                   [facility]



                                                                



                                                                



                                                                

 

        HEMATOLOGY Lymphocytes # 2.8     1.0 - 5.5 09/15   Normal           [place]

[place]



                                                           [facility]



                                                                



                                                                



                                                                

 

        HEMATOLOGY Eosinophils # 0.1     0.0 - 0.5 09/15   Normal           [place]

xa                   [facility]



                                                                



                                                                



                                                                

 

        HEMATOLOGY Monocytes # 0.7     0.0 - 0.8 09/15   Normal           [place]

                   [facility]



                                                                



                                                                



                                                                

 

        HEMATOLOGY Segs-Bands # 4.6     1.5 - 8.1 09/15   Normal           [place]

as



                                        /2013                   [facility]



                                                                



                                                                



                                                                

 

        HEMATOLOGY Basophils 1.0     0.0 - 1.0 09/15   Normal           [place]



                                        /2013                   [facility]



                                                                



                                                                



                                                                

 

        HEMATOLOGY Lymphocytes 34.0    20.0 -  09/15   Normal           [place]



                                40.0    /                   [facility]



                                                                



                                                                



                                                                

 

        HEMATOLOGY Eosinophils 1.3     0.0 - 4.0 09/15   Normal           a

                   [facility]



                                                                



                                                                



                                                                

 

        HEMATOLOGY Monocytes 8.4     2.0 - 12.0 09/15   Normal           [place]



                                        /2013                   [facility]



                                                                



                                                                



                                                                

 

        HEMATOLOGY Segs    55.3    45.0 -  09/15   Normal           [place]



                                75.0    /                   [facility]



                                                                



                                                                



                                                                

 

        HEMATOLOGY PTT     27.3    22.9 -  09/15   Normal  <sup>7</sup>Int  [place]

[place]



                                35.8    /           erpretive Data: Medical



                                                        [facility]



                                                        Therapeutic 



                                                        Range:  57 - 92 



                                                        Seconds 



                                                                

 

        HEMATOLOGY PT      13.3    12.0 -  09/15   Normal           [place]



                                14.7                       [facility]



                                                                



                                                                



                                                                

 

        HEMATOLOGY INR     1.02    0.85 -  09/15   Normal  <sup>6</sup>Int  [place]

[place]



                                1.17               erpretive Data: Medical



                                                        RECOMMENDED Center



                                                        RANGES FOR 



                                                        PROTIME 



                                                        INR:<br/> 



                                                        2.0-3.0 for 



                                                        most medical 



                                                        and surgical 



                                                        thromboembolic 



                                                        states.<br/> 



                                                        2.5-3.5 for 



                                                        artificial 



                                                        heart valves 



                                                        and recurrent 



                                                        embolism.<br/>< 



                                                        br/>INR SHOULD 



                                                        BE USED ONLY 



                                                        FOR PATIENTS ON 



                                                        STABLE  



                                                        ANTICOAGULANT 



                                                        THERAPY. 



                                                                

 

        HEMATOLOGY MPV     7.2     7.4 - 10.4 09/15   LOW              [place]



                                        /2013                   [facility]



                                                                



                                                                



                                                                

 

        HEMATOLOGY MCHC    33.5    32.0 -  09/15   Normal          [facility]



                                36.0                       [facility]



                                                                



                                                                



                                                                

 

        HEMATOLOGY MCH     29.8    27.0 -  09/15   Normal          [facility]



                                31.0                       [facility]



                                                                



                                                                



                                                                

 

        HEMATOLOGY Platelet 305     133 - 450 09/15   Normal           [place]



                                        /                   [facility]



                                                                



                                                                



                                                                

 

        HEMATOLOGY RDW     12.4    11.5 -  09/15   Normal          [facility]



                                14.5                       [facility]



                                                                



                                                                



                                                                

 

        HEMATOLOGY MCV     89.0    81.0 -  09/15   Normal          [facility]



                                99.0    /                   [facility]



                                                                



                                                                



                                                                

 

        HEMATOLOGY Hgb     13.3    12.0 -  09/15   Normal          [facility]



                                16.0                       [facility]



                                                                



                                                                



                                                                

 

        HEMATOLOGY RBC     4.45    4.20 -  09/15   Normal          [facility]



                                5.40    /                   [facility]



                                                                



                                                                



                                                                

 

        HEMATOLOGY Hct     39.6    36.0 -  09/15   Normal          [facility]



                                48.0    /                   [facility]



                                                                



                                                                



                                                                

 

        HEMATOLOGY WBC     8.3     3.7 - 10.4 09/15   Normal          [facility]



                                        /                   [facility]



                                                                



                                                                



                                                                







Pathology Reports







                                        No Data Provided for This Section







Diagnostic Reports







                Report          Value           Date            Source



                                                                

 

                Brain w contrast MRI EXAM: MRI BRAIN WITH AND WITHOUT CONTRAST 0

2013      [facility]



                                DATE: Sep 16, 2013 at 0940 hours                

 



                                                                



                                INDICATION:   Hemiparesis                 



                                                                



                                COMPARISON: MRI brain without contrast dated                  



                                                                



                                TECHNIQUE:                      



                                                                



                                                    Multiplanar imaging of the b

rain was obtained before and after intravenous 

administration of 14 mL of MultiHance gadolinium contrast.                      

     



                                                                



                                FINDINGS:                       



                                                                



                                                    No abnormal enhancement is i

dentified. Redemonstration of the T2 hyperintensity

in the left frontal subcortical white matter without perilesional edema or 
marked mass effect (but absence of volume loss as well).                        

   



                                                                



                                                    An area of diffusion restric

tion along the left periventricular white matter is

once again seen without enhancement, compatible with acute ischemia.            

               



                                                                



                                                    Remote bilateral lacunar isc

hemic changes are present. Chronic white matter 

disease is once again noted.                           



                                                                



                                IMPRESSION:                     



                                                                



                                                    1. Nonenhancing T2 hyperinte

nse lesion in the left frontal subcortical white 

matter. Given the lack of enhancement this is not felt to be a consequence of 
demyelinating disease. Absence of volume loss i                           



                                                    s concerning and the lesion 

differs from others present. The differential 

remains a subacute versus more remote infarct or a low grade neoplasm. Recommend
followup MRI brain with and without contrast in                           



                                                     3 to 6 months to document s

tability. A change in size/morphology in the 

interim could help discriminate between a more recent infarct and neoplasm.     

                      



                                                    2. Periventricular lesion do

es not enhance and, therefore, is consistent with 

recent infarct rather than demyelinating disease.                           



                                                                

 

                Brain wo contrast MRI EXAM: MRI BRAIN 09/15/2013      [facility]



                                DATE: Sep 15, 2013 dated 1839 hours             

    



                                                                



                                INDICATION: Right-sided weakness                

 



                                                                



                                COMPARISON: CT of the head dated 9/15/2013      

           



                                                                



                                                    TECHNIQUE: Multiplanar and m

ultisequence MRI of the brain was performed without

administration of intravenous contrast.                           



                                                                



                                                    FINDINGS: There is tiny area

 of diffusion restriction along the left 

periventricular white matter (series 302, image 120), likely related to recent 
ischemic change.  In a patient where MS is suspected,                           



                                a more acute lesion would possibly have this matilda

earance as well.                 



                                                                



                                                    There is a T2 hyperintense, 

T1 isointense lesion in the left frontal 

subcortical white matter without volume loss or perilesional edema which may 
represent subacute infarct, multiple sclerosis plaque, or low grade neoplasm.   

                        



                                                                



                                                    Central cystic changes in th

e caudate heads are most likely lacunar infarcts. 

Scattered areas of T2 FLAIR abnormality is compatible with advanced for age 
chronic white matter disease.                           



                                                                



                                                    There is no evidence of intr

aparenchymal or extra-axial hemorrhage, mass, mass 

effect, or hydrocephalus. Appropriate flow-voids are present in the vessels at 
the base of the brain.                              



                                                                



                                                    Incidental imaging of the or

bits, paranasal sinuses, mastoid air cells, and 

skull base are unremarkable.                           



                                                                



                                IMPRESSION:                     



                                                    1. Tiny area of diffusion re

striction along the left periventricular white 

matter (series 302, image 120), likely related to an acute subcortical ischemic 
event and less likely secondary to demyelinating disease.                       

    



                                                                



                                                    2. Left frontal white matter

 T2 hyperintense, T1 isointense lesion may 

represent subacute ischemic change, multiple sclerosis plaque, or low grade 
neoplasm. Contrast study may be indicated for further evaluation.               

            



                                                                

 

                Head/Neck CTA   CT ANGIOGRAM OF THE HEAD AND NECK 09/15/2013    

  [facility]



                                DATE: 12/15/2013 at 2:47 p.m.                 



                                                                



                                CLINICAL INFORMATION:  Aphasia, dysarthria.     

            



                                                                



                                                    TECHNIQUE:  Axial images wer

e obtained from the vertex through the upper thorax

at 1.5 mm intervals in the enhanced mode.  3-D maximum intensity projection, MIP
images were also created.                           



                                                                



                                FINDINGS:                       



                                                    There is normal contrast-enh

ancement of the aortic arch and proximal great 

vessels. The right innominate, left common carotid, and left subclavian arteries
arise in normal anatomic configuration. There are no proximal stenoses.         

                  



                                                                



                                                    Minimal calcified atheroscle

rotic plaque is noted within the bilateral carotid 

bulbs without stenosis. There is normal contrast-enhancement of the bilateral 
common, internal, and external carotid arteries.                           



                                                                



                                There is normal contrast-enhancement of the bila

teral vertebral arteries.                 



                                                                



                                                    Intracranially there is norm

al contrast-enhancement of the bilateral 

intracranial internal carotid arteries, the bilateral distal vertebral arteries,
and the basilar artery. There is normal contrast enh                           



                                                    ancement of the the bilatera

l anterior, middle, and posterior cerebral 

arteries. There is normal contrast-enhancement of the superior cerebellar, 
anterior/inferior cerebellar, and posterior inferior cer                        

   



                                ebellar arteries bilaterally. There are no intra

cranial branch occlusions.                 



                                                                



                                There is normal contrast-enhancement of the veno

us structures.                 



                                                                



                                                                



                                                                



                                IMPRESSION:                     



                                                    1. Minimal atherosclerotic p

laque of the carotid bifurcations without stenosis.

                                                    



                                                    2. Normal intracranial CT an

giogram. There are no intracranial branch 

occlusions.                                         



                                                                







Consultation Notes







                                        No Data Provided for This Section







Discharge Summaries







                                        No Data Provided for This Section







History and Physicals







                                        No Data Provided for This Section







Vital Signs







             Vital Sign   Value        Date         Comments     Source



                                                                 

 

             Diastolic (mm Hg) 77           2013                [facility]



                                                                 

 

             Heart Rate   72           2013                [facility]



                                                                 

 

             Respitory Rate 20           2013                [facility] [place]



                                                                 

 

             Temperature Oral (F) 97.0 F       2013                [facility]



                                                                 

 

             Systolic (mm Hg) 156          2013                [facility]



                                                                 

 

             Temperature Oral (F) 96.3 F       2013                [facility] [place] [facility]



                                                                 

 

             Heart Rate   68           2013                [facility]



                                                                 

 

             Systolic (mm Hg) 166          2013                [facility]



                                                                 

 

             Diastolic (mm Hg) 82           2013                [facility]



                                                                 

 

             Respitory Rate 20           2013                [facility] [place]



                                                                 

 

             Heart Rate   62           2013                [facility]



                                                                 

 

             Temperature Oral (F) 97.8 F       2013                [facility] [place] [facility]



                                                                 

 

             Systolic (mm Hg) 126          2013                [facility]



                                                                 

 

             Diastolic (mm Hg) 72           2013                [facility]



                                                                 

 

             Respitory Rate 18           2013                [facility] [place]



                                                                 

 

             Weight       72.727       09/15/2013                [facility]



                                                                 

 

             Height       157.48 cm    09/15/2013                [facility]



                                                                 







Encounters







       Location Location Encounter Encounter Reason Attending ADM    DC     Stat

us Source



              Details Type   Number For    Provider Date   Date          



                                   Visit                              



                                                                      



                                                                      



                                                                      

 

       [facility]        OU     [id number]        [facility] 09/15  09/16  [place]

rg [facility]     /2013  ed     Medical



       Center                                                         Center



                                                                      



                                                                      



                                                                      

 

                     Outpatient [id number]        Sahil           Active 

[facility]



                                          [place]                [place]



                                                                      



                                                                      



                                                                      



                                                                      

 

       [facility]           Ambulatory [id number]        Sahil           

[facility]



       Neurology        Pre-Reg               [place]  /2020         Neuro



       [place]                                                         



                                                                      



                                                                      



                                                                      







Procedures







           Procedure  Code       Date       Perfomer   Comments   Source



                                                                  



                                                                  

 

           LASIK                                                  [facility]



                                                                  

 

           LASIK      [id number]                                   [facility]



                                                                  



                                                                  







Assessment and Plan







                                        No Data Provided for This Section







Plan of Care







                                        No Data Provided for This Section







Social History







                    Social History      Date                Source



                                                            

 

                    No data available for this 2020          [facility]



                    section                                 







Family History







                                        No Data Provided for This Section







Advance Directives







                                        No Data Provided for This Section







Functional Status







                                        No Data Provided for This Section

## 2021-01-01 NOTE — EDPHYS
Physician Documentation                                                                           

 [facility]                                                                 

Name: Meaghan Tavares                                                                            

Age: 65 yrs                                                                                       

Sex: Female                                                                                       

: 1955                                                                                   

MRN: [id number]                                                                                   

Arrival Date: 2021                                                                          

Time: 19:06                                                                                       

Account#: [id number]                                                                            

Bed 8                                                                                             

Private MD:                                                                                       

ED Physician Stan Dominguez                                                                             

HPI:                                                                                              

                                                                                             

21:29 This 65 yrs old  Female presents to ER via Ambulatory with complaints of Fall   kb  

      Injury.                                                                                     

21:29 Details of fall: The patient fell from an upright position, while walking. Onset: The   kb  

      symptoms/episode began/occurred just prior to arrival. Associated injuries: The patient     

      sustained right sternocleidomastoid and right clavicle. Severity of symptoms: At their      

      worst the symptoms were moderate, in the emergency department the symptoms are              

      unchanged. The patient has not experienced similar symptoms in the past. The patient        

      has not recently seen a physician. Pt reports she was walking and slipped on dog's bed      

      causing her to fall onto right side. Reports pain to right clavicle/neck area only.         

      Denies loc.                                                                                 

                                                                                                  

Historical:                                                                                       

- Allergies:                                                                                      

19:19 No Known Allergies;                                                                     ca1 

- Home Meds:                                                                                      

19:19 lisinopril 10 mg Oral tab 1 tab once daily [Active];                                    ca1 

- PMHx:                                                                                           

19:19 Hypertension;                                                                           ca1 

- PSHx:                                                                                           

19:19 None;                                                                                   ca1 

                                                                                                  

- Immunization history:: Flu vaccine is not up to date.                                           

- Social history:: Smoking status: Patient reports the use of cigarette tobacco                   

  products, smokes two packs cigarettes per day.                                                  

- Immunization history: Last tetanus immunization: unknown.                                       

                                                                                                  

                                                                                                  

ROS:                                                                                              

21:32 Constitutional: Negative for fever, chills, and weight loss, Cardiovascular: Negative   kb  

      for chest pain, palpitations, and edema, Respiratory: Negative for shortness of breath,     

      cough, wheezing, and pleuritic chest pain, Abdomen/GI: Negative for abdominal pain,         

      nausea, vomiting, diarrhea, and constipation, Skin: Negative for injury, rash, and          

      discoloration, Neuro: Negative for headache, weakness, numbness, tingling, and seizure.     

21:32 MS/extremity: Positive for injury or acute deformity, decreased range of motion, pain,      

      swelling, tenderness, of the right clavicle.                                                

                                                                                                  

Exam:                                                                                             

21:30 Constitutional:  This is a well developed, well nourished patient who is awake, alert,  kb  

      and in no acute distress. Head/Face:  Normocephalic, atraumatic. Chest/axilla:  Normal      

      chest wall appearance and motion.  Nontender with no deformity.  No lesions are             

      appreciated. Cardiovascular:  Regular rate and rhythm with a normal S1 and S2.  No          

      gallops, murmurs, or rubs.  Normal PMI, no JVD.  No pulse deficits. Respiratory:  Lungs     

      have equal breath sounds bilaterally, clear to auscultation and percussion.  No rales,      

      rhonchi or wheezes noted.  No increased work of breathing, no retractions or nasal          

      flaring. Abdomen/GI:  Soft, non-tender, with normal bowel sounds.  No distension or         

      tympany.  No guarding or rebound.  No evidence of tenderness throughout. Skin:  Warm,       

      dry with normal turgor.  Normal color with no rashes, no lesions, and no evidence of        

      cellulitis. Neuro:  Awake and alert, GCS 15, oriented to person, place, time, and           

      situation.  Cranial nerves II-XII grossly intact.  Motor strength 5/5 in all                

      extremities.  Sensory grossly intact.  Cerebellar exam normal.  Normal gait.                

21:30 Neck: External neck: swelling, that is moderate, of the  right sternocleidomastoid.         

21:30 Musculoskeletal/extremity: Extremities: grossly normal except: noted in the right           

      clavicle: decreased ROM, deformity, pain, swelling, tenderness, ROM: limited active         

      range of motion due to pain, in the right arm, Circulation is intact in all                 

      extremities. Sensation intact.                                                              

                                                                                                  

Vital Signs:                                                                                      

19:16  / 94; Pulse 95; Resp 16 S; Temp 98.3(TE); Pulse Ox 99% on R/A; Weight 69.4 kg    ca1 

      (R); Height 5 ft. 2 in. (157.48 cm) (R); Pain 5/10;                                         

19:50  / 92; Pulse 90; Resp 17; Pulse Ox 99% ;                                          rr5 

20:20  / 135; Pulse 89; Resp 15; Pulse Ox 99% ;                                         rr5 

20:28  / 92;                                                                            mg2 

21:19  / 92; Pulse 80; Resp 17; Pulse Ox 98% ;                                          rr5 

19:16 Body Mass Index 27.98 (69.40 kg, 157.48 cm)                                             ca1 

                                                                                                  

[place] Coma Score:                                                                               

20:27 Eye Response: spontaneous(4). Verbal Response: oriented(5). Motor Response: obeys       mg2 

      commands(6). Total: 15.                                                                     

21:19 Eye Response: spontaneous(4). Verbal Response: oriented(5). Motor Response: obeys       rr5 

      commands(6). Total: 15.                                                                     

                                                                                                  

Trauma Score (Adult):                                                                             

20:27 Eye Response: spontaneous(1); Verbal Response: oriented(1); Motor Response: obeys       mg2 

      commands(2); Systolic BP: > 89 mm Hg(4); Respiratory Rate: 10 to 29 per min(4); [place]     

      Score: 15; Trauma Score: 12                                                                 

21:19 Eye Response: spontaneous(1); Verbal Response: oriented(1); Motor Response: obeys       rr5 

      commands(2); Systolic BP: > 89 mm Hg(4); Respiratory Rate: 10 to 29 per min(4); Rom     

      Score: 15; Trauma Score: 12                                                                 

                                                                                                  

MDM:                                                                                              

19:21 Patient medically screened.                                                             kb  

21:06 Data reviewed: vital signs, nurses notes. Data interpreted: Pulse oximetry: on room air kb  

      is 99 %. Interpretation: normal. Counseling: I had a detailed discussion with the           

      patient and/or guardian regarding: the historical points, exam findings, and any            

      diagnostic results supporting the discharge/admit diagnosis, radiology results, the         

      need for outpatient follow up, a orthopedic surgeon, to return to the emergency             

      department if symptoms worsen or persist or if there are any questions or concerns that     

      arise at home.                                                                              

                                                                                                  

                                                                                             

19:27 Order name: Chest Single View XRAY; Complete Time: 20:22                                kb  

                                                                                             

20:23 Order name: CT C Spine; Complete Time: 21:16                                            kb  

                                                                                             

19:27 Order name: Ice pack; Complete Time: 19:31                                              kb  

                                                                                             

21:07 Order name: Sling; Complete Time: 21:17                                                 kb  

                                                                                                  

Administered Medications:                                                                         

19:31 Drug: Norco (7.5 mg-325 mg) 1 tabs {Note: rass 0.} Route: PO;                           rr5 

20:26 Follow up: Response: No adverse reaction                                                mg2 

                                                                                                  

                                                                                                  

Disposition:                                                                                      

22:01 Co-signature as Attending Physician, Stan Dominguez MD.                                        pkl 

                                                                                                  

Disposition:                                                                                      

21 21:07 Discharged to Home. Impression: Fracture of clavicle, Fall on same level from      

  slipping, tripping and stumbling.                                                               

- Condition is Stable.                                                                            

- Discharge Instructions: Clavicle Fracture, Easy-to-Read.                                        

- Prescriptions for Tylenol- Codeine #3 300-30 mg Oral Tablet - take 2 tablets by ORAL            

  route every 6 hours As needed; 16 tablet.                                                       

- Medication Reconciliation Form, Thank You Letter, Antibiotic Education, Prescription            

  Opioid Use form.                                                                                

- Follow up: Emergency Department; When: As needed; Reason: Worsening of condition.               

  Follow up: Private Physician; When: 2 - 3 days; Reason: Recheck today's complaints,             

  Continuance of care, Re-evaluation by your physician.                                           

                                                                                                  

                                                                                                  

                                                                                                  

Signatures:                                                                                       

Dispatcher [employer]                           EDMS                                                 

Mary Tyler, ZULY FRENCH-Stan Galvez MD MD   pkLars Sims RN                      RN   rr5                                                  

Urban, AJAY Gagnon                        RN   ca1                                                  

Goldy Wilcox RN   mg2                                                  

                                                                                                  

Corrections: (The following items were deleted from the chart)                                    

21:20 21:07 2021 21:07 Discharged to Home. Impression: Fracture of clavicle; Fall on    rr5 

      same level from slipping, tripping and stumbling. Condition is Stable. Forms are            

      Medication Reconciliation Form, Thank You Letter, Antibiotic Education, Prescription        

      Opioid Use. Follow up: Emergency Department; When: As needed; Reason: Worsening of          

      condition. Follow up: Private Physician; When: 2 - 3 days; Reason: Recheck today's          

      complaints, Continuance of care, Re-evaluation by your physician. kb                        

                                                                                                  

**************************************************************************************************

## 2021-01-01 NOTE — RAD REPORT
EXAM DESCRIPTION:  CT - C Spine Wo Con - 1/1/2021 8:34 pm

 

CLINICAL HISTORY:  Fall, right-sided chest pain, right-sided neck pain

 

COMPARISON:  Portable chest same date

 

TECHNIQUE:  Axial 2 mm thick images of the cervical spine were obtained with sagittal and coronal rec
onstruction images generated and reviewed.

 

All CT scans are performed using dose optimization technique as appropriate and may include automated
 exposure control or mA/KV adjustment according to patient size.

 

FINDINGS:  Cervical body height and alignment are normal. No disk space narrowing. No fracture or acu
te bony abnormality.

 

No paraspinal mass or hematoma.

 

Central canal detail is inherently limited on CT imaging.

 

Right clavicle is fractured near the sternoclavicular joint. The clavicle at the AC joint is not disl
ocated. There is anterior displacement of the medial shaft of the clavicle creating bowing deformity 
into the anterior right chest soft tissues. Small amount of hemorrhage or edema surrounds the fractur
e site.

 

IMPRESSION:  Fracture of the right clavicle near the sternoclavicular joint. Anterior displacement of
 the shaft of the clavicle creates an anterior bone deformity to the chest soft tissues. Sternoclavic
ular joint is intact.

 

No cervical spine fracture or acute cervical spine finding.

## 2021-01-01 NOTE — ER
Nurse's Notes                                                                                     

 [facility]                                                                 

Name: Meaghan Tavares                                                                            

Age: 65 yrs                                                                                       

Sex: Female                                                                                       

: 1955                                                                                   

MRN: [id number]                                                                                   

Arrival Date: 2021                                                                          

Time: 19:06                                                                                       

Account#: [id number]                                                                            

Bed 8                                                                                             

Private MD:                                                                                       

Diagnosis: Fracture of clavicle;Fall on same level from slipping, tripping and stumbling          

                                                                                                  

Presentation:                                                                                     

                                                                                             

19:16 Chief complaint: Patient states: Stepped on a puppy pillow and fell sideways on the R   ca1 

      side. reports R shoulder pain. Swelling on R side of the clavicle. Denies difficulty        

      breathing. Coronavirus screen: Client denies travel out of the [place] in the last 14          

      days. At this time, the client does not indicate any symptoms associated with               

      coronavirus-19. Ebola Screen: Patient negative for fever greater than or equal to 101.5     

      degrees Fahrenheit, and additional compatible Ebola Virus Disease symptoms Patient          

      denies exposure to infectious person. Patient denies travel to an Ebola-affected area       

      in the 21 days before illness onset. No symptoms or risks identified at this time.          

      Initial Sepsis Screen: Does the patient meet any 2 criteria? No. Patient's initial          

      sepsis screen is negative. Does the patient have a suspected source of infection? No.       

      Patient's initial sepsis screen is negative. Risk Assessment: Do you want to hurt           

      yourself or someone else? Patient reports no desire to harm self or others. Onset of        

      symptoms was 2021.                                                              

19:16 Method Of Arrival: Ambulatory                                                           ca1 

19:16 Acuity: ALVIN 4                                                                           ca1 

20:27 Care prior to arrival: None. Mechanism of Injury: Fall from standing position. Trauma   mg2 

      event details: Injury occurred in the [facility] [place].                                   

                                                                                                  

Trauma Activation: Not Applicable                                                                 

 Physician: ED Physician; Name: ; Notified At: ; Arrived At:                                      

 Physician: General Surgeon; Name: ; Notified At: ; Arrived At:                                   

 Physician: Radiology; Name: ; Notified At: ; Arrived At:                                         

 Physician: Respiratory; Name: ; Notified At: ; Arrived At:                                       

 Physician: Lab; Name: ; Notified At: ; Arrived At:                                               

                                                                                                  

Historical:                                                                                       

- Allergies:                                                                                      

19:19 No Known Allergies;                                                                     ca1 

- Home Meds:                                                                                      

19:19 lisinopril 10 mg Oral tab 1 tab once daily [Active];                                    ca1 

- PMHx:                                                                                           

19:19 Hypertension;                                                                           ca1 

- PSHx:                                                                                           

19:19 None;                                                                                   ca1 

                                                                                                  

- Immunization history:: Flu vaccine is not up to date.                                           

- Social history:: Smoking status: Patient reports the use of cigarette tobacco                   

  products, smokes two packs cigarettes per day.                                                  

- Immunization history: Last tetanus immunization: unknown.                                       

                                                                                                  

                                                                                                  

Screenin:26 Abuse screen: Denies threats or abuse. Denies injuries from another. Nutritional        mg2 

      screening: No deficits noted. Tuberculosis screening: No symptoms or risk factors           

      identified. Fall risk At risk due to prior history of falls.                                

20:28 Fall Risk Fall in past 12 months (25 points).                                           mg2 

                                                                                                  

Primary Survey:                                                                                   

20:27 NO uncontrolled hemorrhage observed. A: The patient is alert. Breathing/Chest:          mg2 

      Respiratory pattern: regular, Respiratory effort: spontaneous, unlabored. Circulation:      

      Skin color: pink. Disability Alert. Exposure/Environment: All clothing and personal         

      items were removed. Forensic evidence collection is not deemed to be indicated at this      

      time. Items placed in patient belonging bag.                                                

20:50 Reassessment Airway Airway Patent Breathing/Chest Respiratory pattern Regular           rr5 

      Circulation Pulses Palpable.                                                                

                                                                                                  

Assessment:                                                                                       

19:50 General: Appears in no apparent distress. uncomfortable, Behavior is calm, cooperative, rr5 

      appropriate for age. Pain: Complains of pain in right clavicle Pain currently is 5 out      

      of 10 on a pain scale. Quality of pain is described as aching, Pain began suddenly, Is      

      intermittent. Neuro: Level of Consciousness is awake, alert, obeys commands, Oriented       

      to person, place, time, situation. Cardiovascular: Capillary refill < 3 seconds             

      Patient's skin is warm and dry. Respiratory: Airway is patent Respiratory effort is         

      even, unlabored, Respiratory pattern is regular, symmetrical. GI: No signs and/or           

      symptoms were reported involving the gastrointestinal system. : No signs and/or           

      symptoms were reported regarding the genitourinary system. EENT: No signs and/or            

      symptoms were reported regarding the EENT system. Derm: Skin is intact, is healthy with     

      good turgor, Skin temperature is warm. Musculoskeletal: Capillary refill < 3 seconds,       

      Swelling present in right clavicle Reports pain in right clavicle.                          

20:27 Reassessment: Patient appears in no apparent distress at this time. Patient is alert,   rr5 

      oriented x 3, equal unlabored respirations, skin warm/dry/pink. reassess by ED provider     

      ordered for CTscan.                                                                         

21:17 Reassessment: Patient appears in no apparent distress at this time. Patient is alert,   rr5 

      oriented x 3, equal unlabored respirations, skin warm/dry/pink. discharge instruction       

      given and explained without complaints made.                                                

                                                                                                  

Vital Signs:                                                                                      

19:16  / 94; Pulse 95; Resp 16 S; Temp 98.3(TE); Pulse Ox 99% on R/A; Weight 69.4 kg    ca1 

      (R); Height 5 ft. 2 in. (157.48 cm) (R); Pain 5/10;                                         

19:50  / 92; Pulse 90; Resp 17; Pulse Ox 99% ;                                          rr5 

20:20  / 135; Pulse 89; Resp 15; Pulse Ox 99% ;                                         rr5 

20:28  / 92;                                                                            mg2 

21:19  / 92; Pulse 80; Resp 17; Pulse Ox 98% ;                                          rr5 

19:16 Body Mass Index 27.98 (69.40 kg, 157.48 cm)                                             ca1 

                                                                                                  

[place] Coma Score:                                                                               

20:27 Eye Response: spontaneous(4). Verbal Response: oriented(5). Motor Response: obeys       mg2 

      commands(6). Total: 15.                                                                     

21:19 Eye Response: spontaneous(4). Verbal Response: oriented(5). Motor Response: obeys       rr5 

      commands(6). Total: 15.                                                                     

                                                                                                  

Trauma Score (Adult):                                                                             

20:27 Eye Response: spontaneous(1); Verbal Response: oriented(1); Motor Response: obeys       mg2 

      commands(2); Systolic BP: > 89 mm Hg(4); Respiratory Rate: 10 to 29 per min(4); Rom     

      Score: 15; Trauma Score: 12                                                                 

21:19 Eye Response: spontaneous(1); Verbal Response: oriented(1); Motor Response: obeys       rr5 

      commands(2); Systolic BP: > 89 mm Hg(4); Respiratory Rate: 10 to 29 per min(4); Rom     

      Score: 15; Trauma Score: 12                                                                 

                                                                                                  

ED Course:                                                                                        

19:06 Patient arrived in ED.                                                                  es  

19:19 Triage completed.                                                                       ca1 

19:19 Arm band placed on right wrist.                                                         ca1 

19:21 [facility]                                                        kb  

19:21 Stan Dominguez MD is Attending Physician.                                                    kb  

19:30 Lars Wells, RN is Primary Nurse.                                                    rr5 

19:44 Chest Single View XRAY In Process Unspecified.                                          EDMS

19:45 Ice pack to injury.                                                                     rr5 

19:50 Patient has correct armband on for positive identification. Bed in low position. Call   rr5 

      light in reach. Pulse ox on. NIBP on.                                                       

20:26 No provider procedures requiring assistance completed. Patient did not have IV access   mg2 

      during this emergency room visit.                                                           

20:27 Patient has correct armband on for positive identification.                             mg2 

20:28 Patient maintains SpO2 saturation greater than 95% on room air. Thermoregulation: warm  mg2 

      blanket given to patient.                                                                   

20:34 CT C Spine In Process Unspecified.                                                      EDMS

21:17 Clavicle/Shoulder strap applied on right clavicle/shoulder.                             rr5 

                                                                                                  

Administered Medications:                                                                         

19:31 Drug: Norco (7.5 mg-325 mg) 1 tabs {Note: rass 0.} Route: PO;                           rr5 

20:26 Follow up: Response: No adverse reaction                                                mg2 

                                                                                                  

                                                                                                  

Intake:                                                                                           

20:27 PO: 0ml; Total: 0ml.                                                                    mg2 

                                                                                                  

Outcome:                                                                                          

21:07 Discharge ordered by MD.                                                                kb  

21:17 Discharged to home ambulatory.                                                          rr5 

21:17 Condition: stable                                                                           

21:17 Discharge instructions given to patient, Instructed on discharge instructions, follow       

      up and referral plans. medication usage, Demonstrated understanding of instructions,        

      follow-up care, medications, Prescriptions given X 1.                                       

21:19 Patient's length of stay was not longer than 2 hours.                                   rr5 

21:20 Patient left the ED.                                                                    rr5 

                                                                                                  

Signatures:                                                                                       

Dispatcher [employer]                           EDMS                                                 

Mary Tyler, FNP-C                 FNP-Consuelo Kim Michele, RN RN   mg2                                                  

Lars Wells RN                      RN   rr5                                                  

Chelsi Duggan RN                        RN   ca1                                                  

                                                                                                  

**************************************************************************************************

## 2021-01-01 NOTE — RAD REPORT
EXAM DESCRIPTION:  RAD - Chest Single View - 1/1/2021 7:49 pm

 

CLINICAL HISTORY:  fall, clavicle injury, chest pain

 

COMPARISON:  September 2013

 

TECHNIQUE:  AP portable chest image was obtained 1/1/2021 7:49 pm .

 

FINDINGS:  Lung volumes are low. No pulmonary contusion, pneumothorax or acute lung parenchymal proce
ss. Heart and vasculature are normal. No measurable pleural effusion and no pneumothorax. No acute rock
ne finding. Clavicle as a normal appearance on single-view imaging. No acute aortic findings suspecte
d.

 

IMPRESSION:  No clavicle fracture or other acute finding.

## 2021-01-01 NOTE — XMS REPORT
Continuity of Care Document

                           Created on:2021



Patient:SUKHI ZELAYA

Sex:Female

:1955

External Reference #:[id number]





Demographics







                          Address                   [address] [place] [address]

 

                          Home Phone                [phone]

 

                          Work Phone                [phone]

 

                          Mobile Phone              [phone]

 

                          Email Address             [email or web address]

 

                          Preferred Language        English

 

                          Marital Status            Unknown

 

                          Latter-day Affiliation     Unknown

 

                          Race                      Unknown

 

                          Additional Race(s)        Unavailable



                                                    White



                                                    Other



                                                    Unavailable



                                                    Other

 

                          Ethnic Group              Unknown









Author







                          Organization              [facility]

t

 

                          Address                   [address]



                                                    [place] [address]

 

                          Phone                     [phone]









Support







                Name            Relationship    Address         Phone

 

                Aurora Dillard  Other           Unavailable     [phone]









Care Team Providers







                    Name                Role                Phone

 

                    Lab, Adc Fam Pob I  Attending Clinician Unavailable

 

                    Jorge Jones     Attending Clinician [phone]









Problems







       Condition Condition Condition Status Onset  Resolution Last   Treating Co

mments 

Source



       Name   Details Category        Date   Date   Treatment Clinician        



                                                 Date                 

 

       SUB ACUTE        Diagnosis Active 2013-0        2013-09-15               

[facility]



       MCA STROKE                      9-15          18:02:00               l



                SUB                00:00:                             Eduardo



              ACUTE MCA               00                                 



              STROKE                                                  



                                                                      



                                                                      



              Active                                                  



                                                                      



                                                                      



              09/15/2013                                                  



                                                                      



                                                                      



                                                                      



                                                                      



                                                                      



                                                                      



                                                                      



                                                                      



                     [facility]                                                  



                                                                      



                                                                      

 

       TIA           Diagnosis Active 2013               Mem

oria



                                   9-15          12:05:00               l



                TIA                00:00:                             Eduardo



                                   00                                 



                                                                      



              Active                                                  



                                                                      



                                                                      



              09/15/2013                                                  



                                                                      



                                                                      



                                                                      



                                                                      



                                                                      



                                                                      



                                                                      



                                                                      



                     [facility]                                                  



                                                                      



                                                                      

 

       Arthritis        Problem Resolve               2013               M

[facility]



                            d                    21:14:51               l



                                                                      Robbins



              Arthritis                                                  



                                                                      



                                                                      



              Resolved                                                  



                                                                      



                                                                      



                                                                      



                                                                      



               Problem                                                  



                                                                      



                                                                      



              2013                                                  



                                                                      



                                                                      



                                                                      



                                                                      



                 [facility]                                                  



                                                                      



                                                                      

 

       Hyperlipid        Problem Resolve               2013               

[facility]



       emia                 d                    21:14:51               l



                                                                      Eduardo



              Hyperlipid                                                  



              emia                                                    



                                                                      



                                                                      



              Resolved                                                  



                                                                      



                                                                      



                                                                      



                                                                      



               Problem                                                  



                                                                      



                                                                      



              2013                                                  



                                                                      



                                                                      



                                                                      



                                                                      



                 [facility]                                                  



                                                                      



                                                                      

 

       LASIK         Problem Resolve               2013               Ariel

vashti



                            d                    21:14:51               l



                LASIK                                                  Eduardo



                                                                      



                                                                      



              Resolved                                                  



                                                                      



                                                                      



                                                                      



                                                                      



               Problem                                                  



                                                                      



                                                                      



              2013                                                  



                                                                      



                                                                      



                                                                      



                                                                      



                 [facility]                                                  



                                                                      



                                                                      

 

       Arthritis        Problem Resolve               2020               M

[facility]



       (disorder)               d                    22:45:28               l



                                                                      Eduardo



              Arthritis                                                  



              (disorder)                                                  



                                                                      



                                                                      



               Resolved                                                  



                                                                      



                                                                      



                                                                      



                                                                      



                Problem                                                  



                                                                      



                                                                      



              2020                                                  



                                                                      



                                                                      



                                                                      



                                                                      



                 [facility]                                                  



              Neuro                                                   



                                                                      



                                                                      

 

       Hyperlipid        Problem Resolve               2020               

[facility]



       emia                 d                    22:45:28               l



       (disorder)                                                         Romaine

n



              Hyperlipid                                                  



              emia                                                    



              (disorder)                                                  



                                                                      



                                                                      



               Resolved                                                  



                                                                      



                                                                      



                                                                      



                                                                      



                Problem                                                  



                                                                      



                                                                      



              2020                                                  



                                                                      



                                                                      



                                                                      



                                                                      



                 [facility]                                                  



              Neuro                                                   



                                                                      



                                                                      

 

       Laser         Problem Resolve               2020               Ariel

vashti



       assisted               d                    22:45:28               l



       in situ   Laser                                                  [facility]bins



       keratomile assisted                                                  



       usis   in situ                                                  



       (procedure keratomile                                                  



       )      usis                                                    



              (procedure                                                  



              )                                                       



                                                                      



                                                                      



              Resolved                                                  



                                                                      



                                                                      



                                                                      



                                                                      



               Problem                                                  



                                                                      



                                                                      



              2020                                                  



                                                                      



                                                                      



                                                                      



                                                                      



                 [facility]                                                  



              Neuro                                                   



                                                                      



                                                                      

 

       TRANS         Diagnosis Active               2013               [facility]

[facility]



       CEREB                                     12:05:00               l



       ISCHEMIA   TRANS                                                  [facility]



       NEC    CEREB                                                   



              ISCHEMIA                                                  



              NEC                                                     



                                                                      



                                                                      



              Active                                                  



                                                                      



                                                                      



                                                                      



                                                                      



                                                                      



                                                                      



                                                                      



                                                                      



                                                                      



                                                                      



                   [facility]                                                  



                                                                      



                                                                      







Allergies, Adverse Reactions, Alerts







       Allergy Allergy Status Severity Reaction(s) Onset  Inactive Treating Comm

ents 

Source



       Name   Type                        Date   Date   Clinician        

 

       No Known No Known Active                                           [facility]

a



       Medicati Medicati                                                  l



       on     on                                                      Eduardo



       Allergie Allergie                                                  



       s      s                                                       







Social History







           Social Habit Start Date Stop Date  Quantity   Comments   Source

 

           Social History 2020                       [facility]



                      19:45:00   19:45:00                         







Medications







       Ordered Filled Start  Stop   Current Ordering Indication Dosage Frequency

 Signature

                    Comments            Components          Source



     Medication Medication Date Date Medication? Clinician                (SIG) 

          



     Name Name                                                   

 

     atorvastati            Yes  Mary                80 mg, 1          

 [facility]



     n 80 mg                 Camryn                tab, PO,           l



     oral tablet      18:23:           Brown                Daily, 30           

[facility]



               46                                 tab,           



                                                  Substituti           



                                                  on             



                                                  Allowed,           



                                                  TAB            

 

     aspirin 81            Yes  Mary                81 mg, 1           

[facility]



     mg tablet,                 Camryn                tab, PO,           l



     enteric      18:23:           Brown                Daily, 30           [facility]

marco antonio



     coated      38                                 tab,           



                                                  Substituti           



                                                  on             



                                                  Allowed,           



                                                  ECTAB           

 

     atorvastati            No   Kimberly                80 mg, 1           M

[facility]



     n         -16           Deni                tab,           l



               14:00:           Jose Alberto                Route: PO,           [facility]

n



               00                                 Drug form:           



                                                  TAB,           



                                                  Daily,           



                                                  Dosing           



                                                  Weight           



                                                  72.727,           



                                                  kg, Start           



                                                  date:           



                                                  13           



                                                  9:00:00,           



                                                  Duration:           



                                                  30 day,           



                                                  Stop date:           



                                                  10/15/13           



                                                  9:00:00           

 

     aspirin            No   Kimberly                81 mg, 1           [facility]

ia



               -16           Deni                tab,           l



               14:00:           Jose Alberto                Route: PO,           [facility]

n



               00                                 Drug form:           



                                                  ECTAB,           



                                                  Daily,           



                                                  Dosing           



                                                  Weight           



                                                  72.727,           



                                                  kg, Start           



                                                  date:           



                                                  13           



                                                  9:00:00,           



                                                  Duration:           



                                                  30 day,           



                                                  Stop date:           



                                                  10/15/13           



                                                  9:00:00           

 

     heparin            No   Kimberly                5,000           [facility]



               -16           Deni                unit, 1           l



               13:00:           Jose Alberto                mL, Route:           [facility]

n



               00                                 SUB-Q,           



                                                  Drug form:           



                                                  INJ, Q8H,           



                                                  Dosing           



                                                  Weight           



                                                  72.727,           



                                                  kg, Start           



                                                  date:           



                                                  13           



                                                  8:00:00,           



                                                  Duration:           



                                                  30 day,           



                                                  Stop date:           



                                                  10/16/13           



                                                  0:00:00           

 

     Saline      -      No                   5 ml,           [facility]



     Flush 0.9%                 Yoshii-Con                Route:           l



               02:00:           treras                IVP, Drug           Romaine

n



               00                                 Form: INJ,           



                                                  Dosing           



                                                  Weight           



                                                  72.727,           



                                                  kg, Q12H,           



                                                  Start           



                                                  date:           



                                                  09/15/13           



                                                  21:00:00,           



                                                  Duration:           



                                                  30 day,           



                                                  Stop date:           



                                                  10/15/13           



                                                  9:00:00           

 

     Zantac            Yes                      Substituti           [facility]

ia



               9-15                               on Allowed           l



               22:49:                                              [place]



               00                                                

 

     Multiple            Yes                      1 cap, PO,           [facility]

[facility]



     Vitamins      9-15                               Daily, 30           l



     oral      22:48:                               cap,           [facility]



     capsule      47                                 Substituti           



                                                  on             



                                                  Allowed,           



                                                  Maintenanc           



                                                  e, CAP           

 

     Saline            No                   5 ml,           [facility]



     Flush 0.9%      9-15           Yoshii-Con                Route:           l



               22:09:           treras                IVP, Drug           [facility]

n



               00                                 Form: INJ,           



                                                  Dosing           



                                                  Weight           



                                                  72.727,           



                                                  kg, PRN,           



                                                  PRN Line           



                                                  Flush,           



                                                  Start           



                                                  date:           



                                                  09/15/13           



                                                  17:09:00,           



                                                  Duration:           



                                                  30 day,           



                                                  Stop date:           



                                                  10/15/13           



                                                  17:08:00           

 

     labetalol            No                   10 mg, 2           [facility]

vashti



               -15           Yoshii-Con                mL, Route:           l



               22:09:           treras                IVP, Drug           [facility]

n



               00                                 form: INJ,           



                                                  Q10Min,           



                                                  Dosing           



                                                  Weight           



                                                  72.727,           



                                                  kg, PRN           



                                                  Hypertensi           



                                                  on, Start           



                                                  date:           



                                                  09/15/13           



                                                  17:09:00,           



                                                  Duration:           



                                                  30 day,           



                                                  Stop date:           



                                                  10/15/13           



                                                  17:08:00,           



                                                  For SBP >           



                                                  180mmHg           



                                                  and/or DBP           



                                                  > 105mmHg           

 

     Sodium            No   Mary                1,000 mL,           [facility]

[facility]



     Chloride      9-15           Camryn                Rate: 100           l



     0.9% IV      22:09:           Brown                ml/hr,           [facility]



     1,000 mL      00                                 Infuse           



                                                  over: 10           



                                                  hr, Route:           



                                                  IV, Dosing           



                                                  Weight           



                                                  72.727 kg,           



                                                  Total           



                                                  Volume:           



                                                  1,000,           



                                                  Start           



                                                  date:           



                                                  09/15/13           



                                                  17:09:00,           



                                                  Stop date:           



                                                  10/15/13           



                                                  17:08:00           

 

     Omnipaque      -      No   Jennifer                85 mL,           [facility]

vashti



     350mg/ml      9-15           Jefferson                Route:           l



               19:37:                               IVP, Drug           [facility]



               00                                 Form:           



                                                  SOLN,           



                                                  Dosing           



                                                  Weight           



                                                  72.727,           



                                                  kg,            



                                                  ONCALL,           



                                                  STAT,           



                                                  Start           



                                                  date:           



                                                  09/15/13           



                                                  14:37:00,           



                                                  Duration:           



                                                  1 doses or           



                                                  times,           



                                                  Dose =           



                                                  2.2ml/kg,           



                                                  Max dose =           



                                                  100ml --           



                                                  "To be           



                                                  infused by           



                                                  Radiology           



                                                  Staff           



                                                  ONLY"Dose           



                                                  =              



                                                  2.2ml/kg,           



                                                  Max dose =           



                                                  100ml --           



                                                  "To be           



                                                  infused by           



                                                  Radiology           



                                                  Staff           



                                                  ONLY"           







Vital Signs







             Vital Name   Observation Time Observation Value Comments     Source

 

             Diastolic (mm Hg) 2013 17:00:00                           [facility]

[facility]

 

             Heart Rate   2013 17:00:00                           [facility]

 [facility]

 

             Respitory Rate 2013 17:00:00                           [facility]

al [facility]

 

             Temperature Oral (F) 2013 17:00:00 97.0 F                    

[facility]

 

             Systolic (mm Hg) 2013 17:00:00                           [facility]

[facility]

 

             Temperature Oral (F) 2013 12:00:00 96.3 F                    

[facility]

 

             Heart Rate   2013 12:00:00                           [facility]

 [facility]

 

             Systolic (mm Hg) 2013 12:00:00                           [facility]

[facility]

 

             Diastolic (mm Hg) 2013 12:00:00                           [facility]

[facility]

 

             Respitory Rate 2013 12:00:00                           [facility]

[facility] [place]

 

             Heart Rate   2013 08:51:00                           [facility]

 [facility]

 

             Temperature Oral (F) 2013 08:51:00 97.8 F                    

[facility]

 

             Systolic (mm Hg) 2013 08:51:00                           [facility]

[facility] [place]

 

             Diastolic (mm Hg) 2013 08:51:00                           [facility]

[facility]

 

             Respitory Rate 2013 00:41:00                           [facility]

[facility]

 

             Weight       2013-09-15 17:49:00                           [facility]

 [place]

 

             Height       2013-09-15 17:49:00 157.48 cm                 [facility]

 [facility]







Procedures







                Procedure       Date / Time Performed Performing Clinician Sourc

e

 

                LASIK                                           [facility] anton[facility] PENALOZA                                           [facility]







Encounters







        Start   End     Encounter Admission Attending Care    Care    Encounter 

Source



        Date/Time Date/Time Type    Type    Clinicians Facility Department ID   

   

 

        2020 Laboratory         Lab, [facility]    [id number]

[id number] 



        09:23:20 09:43:20 Only            [facility]  [id number]         



                                                [place] [id number]         



                                                [place] [phone]         



                                                nal     [address]             



                                                Office                  



                                                Building                 



                                                One                     

 

        2020 [id number]

[id number] 



        13:45:00 13:45:00                 Sahil                   00      



                                        Jorge                         







Results







           Test Description Test Time  Test Comments Results    Result Comments 

Source









                    Thyroid Stimulating Hormone 2019 22:27:55 









                      Test Item  Value      Reference Range Interpretation Comme

nts









             TSH (test code = TSH) 0.445 mIU/mL 0.270-4.200               



Erythrocyte Sedimentation Rate [id number] 22:20:13





             Test Item    Value        Reference Range Interpretation Comments

 

             ESR STAT (test code = ESR STAT) 8 mm/hr      0-20                  

    



Comprehensive Metabolic [id number] 21:59:49





             Test Item    Value        Reference Range Interpretation Comments

 

             Sodium Level (test code = Sodium 139.0 mmol/L 135.0-145.0          

     



             Level)                                              

 

             Potassium Level (test code = 3.7 mmol/L   3.5-5.1                  

 



             Potassium Level)                                        

 

             Chloride Level (test code = 98 mmol/L                        



             Chloride Level)                                        

 

             CO2 (test code = CO2) 26 mmol/L    22-29                     

 

             Anion Gap (test code = Anion 15 mmol/L    7-16                     

 



             Gap)                                                

 

             BUN (test code = BUN) 14.70 mg/dL  8.00-23.00                

 

             Creatinine Level (test code = 0.70 mg/dL   0.50-0.90               

  



             Creatinine Level)                                        

 

             BUN/Creat Ratio (test code = 21                        N           

 



             BUN/Creat Ratio)                                        

 

             Glucose Level (test code = 121 mg/dL           H            



             Glucose Level)                                        

 

             Calcium Level (test code = 9.8 mg/dL    8.3-10.5                  



             Calcium Level)                                        

 

             Alk Phos (test code = Alk Phos) 98 U/L                       

    

 

             Bilirubin Total (test code = 0.4 mg/dL    0.1-0.9                  

 



             Bilirubin Total)                                        

 

             Albumin Level (test code = 4.6 g/dL     3.5-5.2                   



             Albumin Level)                                        

 

             Protein Total (test code = 7.4 g/dL     6.4-8.3                   



             Protein Total)                                        

 

             ALT (test code = ALT) 22 U/L       1-33                      

 

             AST (test code = AST) 24 U/L       1-32                      

 

             Globulin (test code = Globulin) 2.8 g/dL     2.9-3.1      L        

    

 

             A/G Ratio (test code = A/G 1.6 ratio                 N            



             Ratio)                                              



Comprehensive Metabolic [id number] 21:59:49





             Test Item    Value        Reference Range Interpretation Comments

 

             Sodium Level (test 139.0 mmol/L 135.0-145.0               



             code = Sodium Level)                                        

 

             Potassium Level 3.7 mmol/L   3.5-5.1                   



             (test code =                                        



             Potassium Level)                                        

 

             Chloride Level (test 98 mmol/L                        



             code = Chloride                                        



             Level)                                              

 

             CO2 (test code = 26 mmol/L    22-29                     



             CO2)                                                

 

             Anion Gap (test code 15 mmol/L    7-16                      



             = Anion Gap)                                        

 

             BUN (test code = 14.70 mg/dL  8.00-23.00                



             BUN)                                                

 

             Creatinine Level 0.70 mg/dL   0.50-0.90                 



             (test code =                                        



             Creatinine Level)                                        

 

             BUN/Creat Ratio 21                        N            



             (test code =                                        



             BUN/Creat Ratio)                                        

 

             Glucose Level (test 121 mg/dL           H            



             code = Glucose                                        



             Level)                                              

 

             Calcium Level (test 9.8 mg/dL    8.3-10.5                  



             code = Calcium                                        



             Level)                                              

 

             Alk Phos (test code 98 U/L                           



             = Alk Phos)                                         

 

             Bilirubin Total 0.4 mg/dL    0.1-0.9                   



             (test code =                                        



             Bilirubin Total)                                        

 

             Albumin Level (test 4.6 g/dL     3.5-5.2                   



             code = Albumin                                        



             Level)                                              

 

             Protein Total (test 7.4 g/dL     6.4-8.3                   



             code = Protein                                        



             Total)                                              

 

             ALT (test code = 22 U/L       1-33                      



             ALT)                                                

 

             AST (test code = 24 U/L       1-32                      



             AST)                                                

 

             Globulin (test code 2.8 g/dL     2.9-3.1      L            



             = Globulin)                                         

 

             A/G Ratio (test code 1.6 ratio                 N            



             = A/G Ratio)                                        

 

             eGFR AA (test code = >60                       N            eGFR (e

stimated



             eGFR AA)     mL/min/1.73 m2                           Glomerular



                                                                 Filtration Rate

) is



                                                                 an estimated va

lue,



                                                                 calculated from

 the



                                                                 patient's serum



                                                                 creatinine usin

g the



                                                                 MDRD equation. 

It is



                                                                 NOT the patient

's



                                                                 actual GFR. The

 eGFR



                                                                 provides a more



                                                                 clinically usef

ul



                                                                 measure of kidn

ey



                                                                 disease than se

rum



                                                                 creatinine



                                                                 alone.***This



                                                                 calculation neelima

es



                                                                 sex and race in

to



                                                                 account, if the



                                                                 information is



                                                                 provided. If th

e



                                                                 race is not



                                                                 provided, and t

he



                                                                 patient is



                                                                 -Carole

n,



                                                                 multiply by 1.2

12.



                                                                 If sex is not



                                                                 provided, and t

he



                                                                 patient is fema

le,



                                                                 multiply by 0.7

42.



                                                                 Results for pat

ients



                                                                 <18 years of ag

e



                                                                 have not been



                                                                 validated by th

e



                                                                 MDRD study and



                                                                 should be



                                                                 interpreted wit

h



                                                                 caution. eGFR R

esult



                                                                 Interpretation:

eGFR



                                                                 > or = 60 is in

 the



                                                                 Normal RangeeGF

R <



                                                                 60 may mean kid

eneru



                                                                 diseaseeGFR < 1

5 may



                                                                 mean kidney



                                                                 failure*** Rang

es



                                                                 recommended by 

the



                                                                 [employer]



                                                                 Foundation,



                                                                 http://nkdep.ni

h.gov



Lipid [id number] 21:59:49





             Test Item    Value        Reference Range Interpretation Comments

 

             Cholesterol Total 294 mg/dL    0-200        H            RISK OF HE

ART



             (test code =                                        DISEASEPublishe

d by



             Cholesterol Total)                                        American 

Heart



                                                                 Association Harriet

lyte



                                                                 Optimal Borderl

ine



                                                                 Increased RiskC

HOL



                                                                 <200 200-239 >2

40TRIG



                                                                 <150 150-199 >2

00HDL



                                                                 Male  >60  <40H

DL



                                                                 Female  >60  <5

0LDL



                                                                 <100 130-159 >1

60LDL



                                                                 Near optimal is



                                                                 100-129

 

             Triglycerides (test 616 mg/dL    9-200        H            



             code =                                              



             Triglycerides)                                        

 

             HDL (test code = 52 mg/dL     50-60                     



             HDL)                                                

 

             LDL (test code = N/A Trig     0-130        N            LDL calcula

tion is



             LDL)         >400 mg/dL                             unreliable when



                                                                 Triglyceride is



                                                                 greater than 40

0.

 

             VLDL (test code = N/A Trig     5-40         N            VLDL calcu

lation is



             VLDL)        >400 mg/dL                             unreliable when



                                                                 Triglyceride is



                                                                 greater than 40

0.

 

             Chol/HDL (test code 5.7 ratio    0.0-4.4      H            



             = Chol/HDL)                                         

 

             LDL/HDL Ratio (test N/A Trig                  N            LDL/HDL 

Ratio



             code = LDL/HDL >400                                   calculation i

s



             Ratio)                                              unreliable when



                                                                 Triglyceride is



                                                                 greater than 40

0.



Comprehensive Metabolic [id number] 21:59:49





             Test Item    Value        Reference Range Interpretation Comments

 

             Sodium Level (test 139.0 mmol/L 135.0-145.0               



             code = Sodium Level)                                        

 

             Potassium Level 3.7 mmol/L   3.5-5.1                   



             (test code =                                        



             Potassium Level)                                        

 

             Chloride Level (test 98 mmol/L                        



             code = Chloride                                        



             Level)                                              

 

             CO2 (test code = 26 mmol/L    22-29                     



             CO2)                                                

 

             Anion Gap (test code 15 mmol/L    7-16                      



             = Anion Gap)                                        

 

             BUN (test code = 14.70 mg/dL  8.00-23.00                



             BUN)                                                

 

             Creatinine Level 0.70 mg/dL   0.50-0.90                 



             (test code =                                        



             Creatinine Level)                                        

 

             BUN/Creat Ratio 21                        N            



             (test code =                                        



             BUN/Creat Ratio)                                        

 

             Glucose Level (test 121 mg/dL           H            



             code = Glucose                                        



             Level)                                              

 

             Calcium Level (test 9.8 mg/dL    8.3-10.5                  



             code = Calcium                                        



             Level)                                              

 

             Alk Phos (test code 98 U/L                           



             = Alk Phos)                                         

 

             Bilirubin Total 0.4 mg/dL    0.1-0.9                   



             (test code =                                        



             Bilirubin Total)                                        

 

             Albumin Level (test 4.6 g/dL     3.5-5.2                   



             code = Albumin                                        



             Level)                                              

 

             Protein Total (test 7.4 g/dL     6.4-8.3                   



             code = Protein                                        



             Total)                                              

 

             ALT (test code = 22 U/L       1-33                      



             ALT)                                                

 

             AST (test code = 24 U/L       1-32                      



             AST)                                                

 

             Globulin (test code 2.8 g/dL     2.9-3.1      L            



             = Globulin)                                         

 

             A/G Ratio (test code 1.6 ratio                 N            



             = A/G Ratio)                                        

 

             eGFR AA (test code = >60                       N            eGFR (e

stimated



             eGFR AA)     mL/min/1.73 m2                           Glomerular



                                                                 Filtration Rate

) is



                                                                 an estimated va

lue,



                                                                 calculated from

 the



                                                                 patient's serum



                                                                 creatinine usin

g the



                                                                 MDRD equation. 

It is



                                                                 NOT the patient

's



                                                                 actual GFR. The

 eGFR



                                                                 provides a more



                                                                 clinically usef

ul



                                                                 measure of kidn

ey



                                                                 disease than se

rum



                                                                 creatinine



                                                                 alone.***This



                                                                 calculation neelima

es



                                                                 sex and race in

to



                                                                 account, if the



                                                                 information is



                                                                 provided. If th

e



                                                                 race is not



                                                                 provided, and t

he



                                                                 patient is



                                                                 -Carole

n,



                                                                 multiply by 1.2

12.



                                                                 If sex is not



                                                                 provided, and t

he



                                                                 patient is fema

le,



                                                                 multiply by 0.7

42.



                                                                 Results for pat

ients



                                                                 <18 years of ag

e



                                                                 have not been



                                                                 validated by th

e



                                                                 MDRD study and



                                                                 should be



                                                                 interpreted wit

h



                                                                 caution. eGFR R

esult



                                                                 Interpretation:

eGFR



                                                                 > or = 60 is in

 the



                                                                 Normal RangeeGF

R <



                                                                 60 may mean kid

neeru



                                                                 diseaseeGFR < 1

5 may



                                                                 mean kidney



                                                                 failure*** Rang

es



                                                                 recommended by 

the



                                                                 [employer]



                                                                 Foundation,



                                                                 http://nkdep.ni

h.gov

 

             eGFR Non-AA (test >60.00                    N            eGFR (hailey

mated



             code = eGFR Non-AA) mL/min/1.73 m2                           Glomer

ular



                                                                 Filtration Rate

) is



                                                                 an estimated va

lue,



                                                                 calculated from

 the



                                                                 patient's serum



                                                                 creatinine usin

g the



                                                                 MDRD equation. 

It is



                                                                 NOT the patient

's



                                                                 actual GFR. The

 eGFR



                                                                 provides a more



                                                                 clinically usef

ul



                                                                 measure of kidn

ey



                                                                 disease than se

rum



                                                                 creatinine



                                                                 alone.***This



                                                                 calculation neelima

es



                                                                 sex and race in

to



                                                                 account, if the



                                                                 information is



                                                                 provided. If th

e



                                                                 race is not



                                                                 provided, and t

he



                                                                 patient is



                                                                 -Carole

n,



                                                                 multiply by 1.2

12.



                                                                 If sex is not



                                                                 provided, and t

he



                                                                 patient is fema

le,



                                                                 multiply by 0.7

42.



                                                                 Results for pat

ients



                                                                 <18 years of ag

e



                                                                 have not been



                                                                 validated by th

e



                                                                 MDRD study and



                                                                 should be



                                                                 interpreted wit

h



                                                                 caution. eGFR R

esult



                                                                 Interpretation:

eGFR



                                                                 > or = 60 is in

 the



                                                                 Normal RangeeGF

R <



                                                                 60 may mean kid

neeru



                                                                 diseaseeGFR < 1

5 may



                                                                 mean kidney



                                                                 failure*** Rang

es



                                                                 recommended by 

the



                                                                 [employer]



                                                                 Foundation,



                                                                 http://nkdep.ni

h.gov



Complete Blood Count with Indyvydedhev0314-06-30 21:52:58





             Test Item    Value        Reference Range Interpretation Comments

 

             WBC (test code = WBC) 9.0 x10      4.4-10.5                  

 

             RBC (test code = RBC) 4.76 x10     3.75-5.20                 

 

             Hgb (test code = Hgb) 14.4 g/dL    12.2-14.8                 

 

             Hct (test code = Hct) 42.4 %       36.5-44.4                 

 

             MCV (test code = MCV) 89.10 fL     80..00              

 

             MCHC (test code = 34.00 g/dL   32.00-37.50               



             MCHC)                                               

 

             MCH (test code = MCH) 30.3 pg      27.0-32.5                 

 

             RDW CV (test code = 12.6 %       11.5-14.5                 



             RDW CV)                                             

 

             Platelets (test code = 340.0 x10    140.0-440.0               



             Platelets)                                          

 

             MPV (test code = MPV) 10.1 fL                   N            

 

             Slide Review (test Auto         Auto                      Result cr

eated by



             code = Slide Review)                                        [id number]_

SLIDE_REV_AUTO

 

             nRBC (test code = 0                         N            



             nRBC)                                               

 

             NRBC Abs (test code = 0.00 x10                  N            



             NRBC Abs)                                           

 

             IPF (test code = IPF) 0 %                       N            



Automated Xmnpqswzojxv0890-06-51 21:52:58





             Test Item    Value        Reference Range Interpretation Comments

 

             Neutro Auto (test code = Neutro 62.7 %       36.0-70.0             

    



             Auto)                                               

 

             Lymph Auto (test code = Lymph Auto) 28.3 %       12.0-44.0         

        

 

             Mono Auto (test code = Mono Auto) 7.6 %        0.0-11.0            

      

 

             Eos, Auto (test code = Eos, Auto) 0.7 %        0.0-7.0             

      

 

             Basophil Auto (test code = Basophil 0.3 %        0.0-2.0           

        



             Auto)                                               

 

             Neutro Absolute (test code = Neutro 5.7 x10      1.6-7.4           

        



             Absolute)                                           

 

             Lymph Absolute (test code = Lymph 2.56 x10     .50-4.60            

      



             Absolute)                                           

 

             Mono Absolute (test code = Mono .69 x10      .00-1.20              

    



             Absolute)                                           

 

             Eos Absolute (test code = Eos 0.06 x10     0.00-0.74               

  



             Absolute)                                           

 

             Baso Absolute (test code = Baso 0.03 x10     0.00-0.21             

    



             Absolute)                                           



Pro B Natriuretic Deqoqgq5005-35-89 21:52:58





             Test Item    Value        Reference Range Interpretation Comments

 

             NT-proBNP (test code = NT-proBNP) 80 pg/mL     0-124               

      



IG Eqztg0795-62-84 21:52:58





             Test Item    Value        Reference Range Interpretation Comments

 

             IG (test code = IG) 0.4 %        0.0-5.0                   

 

             IG Abs (test code = IG Abs) 0 x10                     N            



GKKJMNFYQ5509-14-62 12:35:24886Dcmssbdw RvftwasMOILMXGWE9029-53-89 08:00:0013.1
[facility] PuyabubMUHHVINNJ7246-74-11 08:00:0096[facility]
2013 08:00:000.7[facility] PbhbasbAZCLJWLZU0347-65-25 08:00:0018[facility] 
JasxmgcVSSAAENJG8764-65-09 08:00:85832Lihhsard IqhpkmgTBMMAPTSZ0004-75-15 
08:00:72490Gumppefj AjxvomyBMOAQJORM7331-66-95 08:00:008.3[facility]
EDYYHNKVA0580-19-44 08:00:0023[facility] LqnuctkMYGEYAPDI0470-18-90 08:00:68758
[facility] RbosofzQQPYZQXDY2006-57-53 08:00:004.1[facility]ann[facility]
2013 08:00:00See Note mg/dL 5*NA*(2013 03:00:00)[facility]
OYZFRUXAV0283-79-27 08:00:008.45[facility] PwwqvqgFLVHUKISR3115-51-49 08:00:0029
[facility] HokqjfwIURWDWDUF2419-85-60 08:00:07920Ogijjvwi HermannCHEMISTRY
2013 08:00:08929Cfernwrf ZbpwoshDHVPWVZSWI7666-81-92 08:00:000.7[facility] 
RclrbilGUPXWHXOXW4868-43-42 08:00:003.6[facility] QptwvriJHBVCFXCMZ7510-52-62 
08:00:003.1[facility] BknwpjqVBLYTEYRXQ8461-07-81 08:00:000.6[facility]
OCCFJTIZCJ5555-27-09 08:00:000.1[facility] LstwgoxLFEXGIPDAN5144-87-04 08:00:000.1
[facility] WwcvkffSPOROLACND9313-42-25 08:00:001.7[facility]ann[facility]MATOLOG[facility]
2013 08:00:008.0[facility] YxtwwqrKOCYSZONIW7822-31-87 08:00:0048.0[facility] 
UbjyheyJKIIGQNAUU7029-98-87 08:00:0041.6[facility] ImeqylcYZQUYSEKKS7311-88-08 
08:00:003.87[facility] YziqpycXALZUOCCGH5966-05-62 08:00:007.4[facility]
HNZVFYUGVZ7629-01-02 08:00:0011.9[facility] AvtusjgOHOURIAQRS4681-73-23 08:00:00
35.2[facility] KhkkggfIAHBAGBXDC3363-25-89 08:00:0090.8[facility]
2013 08:00:00





             Test Item    Value        Reference Range Interpretation Comments

 

             MCH (test code = MCH) 30.7 pg      27.0-31.0    N            



[facility] SanfvouAZIFJXXUFJ1501-80-92 08:00:0013.3[facility]annHEMATOLOGY
2013 08:00:0033.9[facility] LmjhgeiUJDTDKRSSS0314-53-84 08:00:39771Lyffmxci 
ZfwuspxYHLEBONEVD9262-76-64 08:00:007.5[facility] UtowvxpXEJOBGDAW8641-49-58 
18:53:0096[facility] EmojmllAVOXBCKBS4124-74-10 18:53:53469Hcwwbkcb [facility]bins
GZDOLHDNC5535-76-39 18:53:003.9[facility] VyiohefYOCRKQQNV5308-38-46 18:53:24831
[facility] YnnknddZBMAZABJK5470-80-72 18:53:0028[facility]ann[facility]MI[facility]
2013-09-15 18:53:0092[facility] MrbbanqLRENMWQKY9889-09-19 18:53:0014[facility] 
LrnsmstCHVVQSKOX7496-17-32 18:53:000.7[facility] ZluralwKIVMSTLZW9806-76-93 
18:53:009.1[facility] OwgherxWMKFEBTIU5463-34-25 18:53:0010.9[facility]
UGTJGGZSSB6950-11-36 18:53:000.1[facility]annHEMATOLOGY2013-09-15 18:53:002.8
[facility] OhrnxjqZSZQURFPRR5776-21-88 18:53:000.1[facility]
2013-09-15 18:53:000.7[facility] YsdejzvFHFOUAQJQH7006-65-54 18:53:004.6[facility] 
KutupizPACSNVPMZX6880-29-99 18:53:001.0[facility] TbhuymbWTADEFWWDU0939-54-97 
18:53:0034.0[facility] TeyhyxbYEILGZAQAP8332-47-95 18:53:001.3[facility]
XTGEOCDTBR3572-61-97 18:53:008.4[facility] BtjiivzGMRQHPHRDA5043-43-70 18:53:00
55.3[facility] OenfefdJRXVKHNVPJ9823-46-71 18:53:00





             Test Item    Value        Reference Range Interpretation Comments

 

             PTT (test code = PTT) 27.3 s       22.9-35.8    N            



[facility]dd[facility]DFMGYWGSZI9284-93-69 18:53:00





             Test Item    Value        Reference Range Interpretation Comments

 

             PT (test code = PT) 13.3 s       12.0-14.7    N            



[facility] JgnihorGFOYPJLANT1148-54-52 18:53:001.02[facility]MAT[facility]
2013-09-15 18:53:007.2[facility] DiwytpgKXJNVPUMUV9271-51-90 18:53:0033.5[facility] 
BnvbumwDYPYRFEKOF3653-67-50 18:53:00





             Test Item    Value        Reference Range Interpretation Comments

 

             MCH (test code = MCH) 29.8 pg      27.0-31.0    N            



[facility] HospitalSdpiislMBCSMAHTXI3606-36-80 18:53:04734Yqbthycy HermannHEMATOLOGY
2013-09-15 18:53:0012.4[facility] SyylofrAHRNDVPQGF2396-67-44 18:53:0089.0[facility]
MejbrdsNCXXZUHOZN2030-66-06 18:53:0013.3[facility] WiientmMPXRSDKWFE3587-16-72 
18:53:004.45[facility] MctmqxtZAZWQZXMPC5096-11-39 18:53:0039.6[facility]
RSAMNWMUXZ6723-74-01 18:53:008.3[facility]

## 2022-05-03 ENCOUNTER — HOSPITAL ENCOUNTER (INPATIENT)
Dept: HOSPITAL 97 - ER | Age: 67
LOS: 2 days | Discharge: NURSING, REHAB OR LONG-TERM CARE | DRG: 66 | End: 2022-05-05
Attending: HOSPITALIST | Admitting: HOSPITALIST
Payer: COMMERCIAL

## 2022-05-03 VITALS — BODY MASS INDEX: 26.9 KG/M2

## 2022-05-03 DIAGNOSIS — Z82.0: ICD-10-CM

## 2022-05-03 DIAGNOSIS — Z20.822: ICD-10-CM

## 2022-05-03 DIAGNOSIS — Z83.3: ICD-10-CM

## 2022-05-03 DIAGNOSIS — I10: ICD-10-CM

## 2022-05-03 DIAGNOSIS — F32.A: ICD-10-CM

## 2022-05-03 DIAGNOSIS — E78.00: ICD-10-CM

## 2022-05-03 DIAGNOSIS — R26.81: ICD-10-CM

## 2022-05-03 DIAGNOSIS — I63.9: Primary | ICD-10-CM

## 2022-05-03 DIAGNOSIS — Z87.891: ICD-10-CM

## 2022-05-03 DIAGNOSIS — Z80.0: ICD-10-CM

## 2022-05-03 LAB
ALBUMIN SERPL BCP-MCNC: 3.8 G/DL (ref 3.4–5)
ALP SERPL-CCNC: 100 U/L (ref 45–117)
ALT SERPL W P-5'-P-CCNC: 26 U/L (ref 12–78)
AST SERPL W P-5'-P-CCNC: 19 U/L (ref 15–37)
BUN BLD-MCNC: 16 MG/DL (ref 7–18)
GLUCOSE SERPLBLD-MCNC: 103 MG/DL (ref 74–106)
HCT VFR BLD CALC: 42.5 % (ref 36–45)
INR BLD: 1.03
LYMPHOCYTES # SPEC AUTO: 1.9 K/UL (ref 0.7–4.9)
MAGNESIUM SERPL-MCNC: 2.2 MG/DL (ref 1.8–2.4)
PMV BLD: 7.3 FL (ref 7.6–11.3)
POTASSIUM SERPL-SCNC: 4 MMOL/L (ref 3.5–5.1)
RBC # BLD: 4.92 M/UL (ref 3.86–4.86)

## 2022-05-03 PROCEDURE — 86146 BETA-2 GLYCOPROTEIN ANTIBODY: CPT

## 2022-05-03 PROCEDURE — 85652 RBC SED RATE AUTOMATED: CPT

## 2022-05-03 PROCEDURE — 84165 PROTEIN E-PHORESIS SERUM: CPT

## 2022-05-03 PROCEDURE — 86021 WBC ANTIBODY IDENTIFICATION: CPT

## 2022-05-03 PROCEDURE — 83090 ASSAY OF HOMOCYSTEINE: CPT

## 2022-05-03 PROCEDURE — 93005 ELECTROCARDIOGRAM TRACING: CPT

## 2022-05-03 PROCEDURE — 36415 COLL VENOUS BLD VENIPUNCTURE: CPT

## 2022-05-03 PROCEDURE — 96365 THER/PROPH/DIAG IV INF INIT: CPT

## 2022-05-03 PROCEDURE — 85305 CLOT INHIBIT PROT S TOTAL: CPT

## 2022-05-03 PROCEDURE — 80061 LIPID PANEL: CPT

## 2022-05-03 PROCEDURE — 70450 CT HEAD/BRAIN W/O DYE: CPT

## 2022-05-03 PROCEDURE — 81240 F2 GENE: CPT

## 2022-05-03 PROCEDURE — 81015 MICROSCOPIC EXAM OF URINE: CPT

## 2022-05-03 PROCEDURE — 85302 CLOT INHIBIT PROT C ANTIGEN: CPT

## 2022-05-03 PROCEDURE — 84100 ASSAY OF PHOSPHORUS: CPT

## 2022-05-03 PROCEDURE — 80048 BASIC METABOLIC PNL TOTAL CA: CPT

## 2022-05-03 PROCEDURE — 81241 F5 GENE: CPT

## 2022-05-03 PROCEDURE — 86148 ANTI-PHOSPHOLIPID ANTIBODY: CPT

## 2022-05-03 PROCEDURE — 86592 SYPHILIS TEST NON-TREP QUAL: CPT

## 2022-05-03 PROCEDURE — 85300 ANTITHROMBIN III ACTIVITY: CPT

## 2022-05-03 PROCEDURE — 85730 THROMBOPLASTIN TIME PARTIAL: CPT

## 2022-05-03 PROCEDURE — 82746 ASSAY OF FOLIC ACID SERUM: CPT

## 2022-05-03 PROCEDURE — 80053 COMPREHEN METABOLIC PANEL: CPT

## 2022-05-03 PROCEDURE — 85610 PROTHROMBIN TIME: CPT

## 2022-05-03 PROCEDURE — 85025 COMPLETE CBC W/AUTO DIFF WBC: CPT

## 2022-05-03 PROCEDURE — 97530 THERAPEUTIC ACTIVITIES: CPT

## 2022-05-03 PROCEDURE — 99285 EMERGENCY DEPT VISIT HI MDM: CPT

## 2022-05-03 PROCEDURE — 82607 VITAMIN B-12: CPT

## 2022-05-03 PROCEDURE — 72148 MRI LUMBAR SPINE W/O DYE: CPT

## 2022-05-03 PROCEDURE — 93306 TTE W/DOPPLER COMPLETE: CPT

## 2022-05-03 PROCEDURE — 81003 URINALYSIS AUTO W/O SCOPE: CPT

## 2022-05-03 PROCEDURE — 97116 GAIT TRAINING THERAPY: CPT

## 2022-05-03 PROCEDURE — 86147 CARDIOLIPIN ANTIBODY EA IG: CPT

## 2022-05-03 PROCEDURE — 85306 CLOT INHIBIT PROT S FREE: CPT

## 2022-05-03 PROCEDURE — 82306 VITAMIN D 25 HYDROXY: CPT

## 2022-05-03 PROCEDURE — 83735 ASSAY OF MAGNESIUM: CPT

## 2022-05-03 PROCEDURE — 84443 ASSAY THYROID STIM HORMONE: CPT

## 2022-05-03 PROCEDURE — 97161 PT EVAL LOW COMPLEX 20 MIN: CPT

## 2022-05-03 PROCEDURE — 71045 X-RAY EXAM CHEST 1 VIEW: CPT

## 2022-05-03 PROCEDURE — 70551 MRI BRAIN STEM W/O DYE: CPT

## 2022-05-03 RX ADMIN — Medication SCH ML: at 20:59

## 2022-05-03 RX ADMIN — ATORVASTATIN CALCIUM SCH MG: 40 TABLET, FILM COATED ORAL at 20:05

## 2022-05-03 RX ADMIN — SODIUM CHLORIDE SCH MLS: 0.9 INJECTION, SOLUTION INTRAVENOUS at 20:06

## 2022-05-03 NOTE — RAD REPORT
EXAM DESCRIPTION:  CT - Head Brain Wo Cont - 5/3/2022 10:42 am

 

CLINICAL HISTORY:  difficulty walking, bowel/bladder incontinence

Headache, drowsiness, CVA symptomology

 

COMPARISON:  Head Brain Wo Cont dated 12/19/2019; HEAD BRAIN W O CONTRAST dated 9/15/2013

 

TECHNIQUE:  All CT scans are performed using dose optimization technique as appropriate and may inclu
de automated exposure control or mA/KV adjustment according to patient size.

 

FINDINGS:  No intracranial hemorrhage, hydrocephalus or extra-axial fluid collection.Moderate periven
tricular and deep white matter chronic microvascular ischemic changes are present.No areas of brain e
leela or evidence of midline shift.

 

The paranasal sinuses and mastoids are clear. The calvarium is intact.

 

IMPRESSION:  No acute intracranial abnormality.

## 2022-05-03 NOTE — ER
Nurse's Notes                                                                                     

 [facility]                                                                 

Name: Meaghan Tavares                                                                            

Age: 66 yrs                                                                                       

Sex: Female                                                                                       

: 1955                                                                                   

MRN: [id number]                                                                                   

Arrival Date: 2022                                                                          

Time: 09:44                                                                                       

Account#: [id number]                                                                            

Bed 4                                                                                             

Private MD:                                                                                       

Diagnosis: Shuffling gait;Cerebral infarction, unspecified                                        

                                                                                                  

Presentation:                                                                                     

                                                                                             

09:50 Chief complaint: Patient states: Trouble walking (shuffling gait) getting progressively ll1 

      worse for 2 weeks. Symptoms got way worse yesterday. 3 episodes of loss of BM since         

      yesterday, abnormal for her. Coronavirus screen: Vaccine status: Patient reports being      

      unvaccinated. Client denies travel out of the [place] in the last 14 days. At this time,       

      the client does not indicate any symptoms associated with coronavirus-19. Ebola Screen:     

      Patient denies travel to an Ebola-affected area in the 21 days before illness onset.        

      Initial Sepsis Screen: Does the patient meet any 2 criteria? No. Patient's initial          

      sepsis screen is negative. Does the patient have a suspected source of infection? No.       

      Patient's initial sepsis screen is negative. Risk Assessment: Do you want to hurt           

      yourself or someone else? Patient reports no desire to harm self or others. Onset of        

      symptoms was 2022.                                                                

09:50 Method Of Arrival: Ambulatory                                                           ll1 

09:50 Acuity: ALVIN 3                                                                           ll1 

                                                                                                  

Historical:                                                                                       

- Allergies:                                                                                      

09:56 No Known Allergies;                                                                     ll1 

- PMHx:                                                                                           

09:56 Hypertension;                                                                           ll1 

- PSHx:                                                                                           

09:56 None;                                                                                   ll1 

                                                                                                  

- Immunization history:: Client reports having NOT received the Covid vaccine.                    

- Social history:: Smoking status: Patient reports the use of cigarette tobacco                   

  products, smokes one pack cigarettes per day.                                                   

- Family history:: not pertinent.                                                                 

- Hospitalizations: : No recent hospitalization is reported.                                      

                                                                                                  

                                                                                                  

Screening:                                                                                        

10:32 Abuse screen: Denies threats or abuse. Nutritional screening: No deficits noted.        vg1 

      Tuberculosis screening: No symptoms or risk factors identified. Fall Risk No fall in        

      past 12 months (0 pts). No secondary diagnosis (0 pts). IV access (20 points).              

      Ambulatory Aid- None/Bed Rest/Nurse Assist (0 pts). Gait- Normal/Bed Rest/Wheelchair (0     

      pts) Mental Status- Oriented to own ability (0 pts). Total Botello Fall Scale indicates       

      No Risk (0-24 pts).                                                                         

                                                                                                  

Assessment:                                                                                       

10:05 General: Appears in no apparent distress. uncomfortable, Behavior is calm, cooperative. vg1 

      Pain: Denies pain. Neuro: Malone Agitation-Sedation Scale (RASS): 0 - Alert and Calm      

      Level of Consciousness is awake, alert, obeys commands, Oriented to person, place,          

      time, situation, Denies paresthesias in right leg and left leg numbness.                    

      Cardiovascular: Patient's skin is warm and dry. Respiratory: Airway is patent               

      Respiratory effort is even, unlabored. GI: Reports diarrhea, incontinence, Patient          

      currently denies nausea, vomiting. : Reports incontinence. EENT: Derm: Skin is            

      intact, is healthy with good turgor. Musculoskeletal: Circulation, motion, and              

      sensation intact.                                                                           

10:33 Reassessment: Pt transported to CT/MRI via wheelchair.                                  vg1 

11:52 Reassessment: Patient appears in no apparent distress at this time. No changes from     vg1 

      previously documented assessment. Patient and/or family updated on plan of care and         

      expected duration. Pain level reassessed. Patient is alert, oriented x 3, equal             

      unlabored respirations, skin warm/dry/pink.                                                 

13:30 Reassessment: Patient appears in no apparent distress at this time. No changes from     vg1 

      previously documented assessment. Patient and/or family updated on plan of care and         

      expected duration. Pain level reassessed. Patient is alert, oriented x 3, equal             

      unlabored respirations, skin warm/dry/pink.                                                 

14:30 Reassessment: Patient appears in no apparent distress at this time. Patient and/or      vg1 

      family updated on plan of care and expected duration. Pain level reassessed. Patient is     

      alert, oriented x 3, equal unlabored respirations, skin warm/dry/pink.                      

15:30 Reassessment: pt resting with eyes closed.                                              vg1 

16:30 Reassessment: Patient appears in no apparent distress at this time. Patient and/or      vg1 

      family updated on plan of care and expected duration. Pain level reassessed. Patient is     

      alert, oriented x 3, equal unlabored respirations, skin warm/dry/pink. Patient denies       

      pain at this time.                                                                          

17:55 Reassessment: attempted to call report.                                                 vg1 

18:05 Reassessment: Patient appears in no apparent distress at this time. No changes from     vg1 

      previously documented assessment. Patient and/or family updated on plan of care and         

      expected duration. Pain level reassessed. Patient is alert, oriented x 3, equal             

      unlabored respirations, skin warm/dry/pink.                                                 

                                                                                                  

Vital Signs:                                                                                      

09:50  / 85; Pulse 83; Resp 17; Temp 98.2; Pulse Ox 100% ; Weight 66.68 kg; Height 5    ll1 

      ft. 2 in. (157.48 cm); Pain 0/10;                                                           

10:15  / 93; Pulse 90; Resp 16; Pulse Ox 100% on R/A;                                   vg1 

11:00  / 88; Pulse 86; Resp 18; Pulse Ox 99% on R/A;                                    vg1 

12:00  / 100; Pulse 86; Resp 19; Pulse Ox 98% on R/A;                                   vg1 

13:20  / 75; Pulse 97; Resp 20; Pulse Ox 99% on R/A;                                    vg1 

14:30  / 78; Pulse 99; Resp 16; Pulse Ox 98% on R/A;                                    vg1 

15:30  / 87; Pulse 88; Resp 16; Pulse Ox 97% on R/A;                                    vg1 

16:30  / 103; Pulse 100; Resp 16; Pulse Ox 100% on R/A;                                 vg1 

17:30  / 92; Pulse 99; Resp 20; Pulse Ox 97% on R/A;                                    vg1 

09:50 Body Mass Index 26.89 (66.68 kg, 157.48 cm)                                             ll1 

                                                                                                  

ED Course:                                                                                        

09:44 Patient arrived in ED.                                                                  ds1 

09:56 Triage completed.                                                                       ll1 

09:57 Arm band placed on Patient placed in an exam room, on a stretcher.                      ll1 

09:58 Jc Gomez MD is Attending Physician.                                                rn  

10:00 Gem Jimenez, AJAY is Primary Nurse.                                                  vg1 

10:15 Inserted saline lock: 20 gauge in right antecubital area, using aseptic technique.      vg1 

      ,using aseptic technique. completed by  ED Tech Blood collected.                          

10:32 Patient has correct armband on for positive identification. Placed in gown. Bed in low  vg1 

      position. Call light in reach. Side rails up X 1. Adult w/ patient. Client placed on        

      continuous cardiac and pulse oximetry monitoring. NIBP monitoring applied. Cardiac          

      monitor on.                                                                                 

10:44 Head Brain Wo Cont In Process Unspecified.                                              EDMS

11:17 Brain Wo Cont MRI In Process Unspecified.                                               EDMS

11:17 MRI Lumbar Spine wo Con In Process Unspecified.                                         EDMS

11:54 Art De La Torre is Hospitalizing Provider.                                               rn  

18:33 No provider procedures requiring assistance completed. Patient admitted, IV remains in  vg1 

      place.                                                                                      

                                                                                                  

Administered Medications:                                                                         

12:05 Drug: Aspirin 325 mg Route: PO;                                                         vg1 

12:52 Follow up: Response: No adverse reaction                                                vg1 

12:05 Drug: foLIC Acid 1 mg Route: IVPB; Site: right antecubital;                             vg1 

12:52 Follow up: Response: No adverse reaction; IV Status: Completed infusion                 vg1 

12:50 Drug: Nicoderm CQ Patch 21 mg/24 hr 21 mg {Note: Right upper arm.} Route: Transdermal;  vg1 

      Site: affected area;                                                                        

14:00 Follow up: Response: No adverse reaction                                                vg1 

                                                                                                  

                                                                                                  

Outcome:                                                                                          

11:55 Decision to Hospitalize by Provider.                                                    rn  

18:32 Admitted to Tele accompanied by tech, via wheelchair, room 207, with chart, Report      vg1 

      called to  Ele MOSS                                                                           

18:32 Condition: good                                                                             

18:32 Instructed on the need for admit.                                                           

18:33 Patient left the ED.                                                                    vg1 

                                                                                                  

Signatures:                                                                                       

Dispatcher [employer]                           EDMS                                                 

Laura Chavira                                ds1                                                  

Jc Gomez MD MD rn Garcia, Victoria RN                    RN   vg1                                                  

Thuan Davidson, AJAY                       RN   ll1                                                  

                                                                                                  

Corrections: (The following items were deleted from the chart)                                    

09:58 09:50 Pulse 83bpm; Resp 17bpm; Pulse Ox 100%; Temp 98.2F; 66.68 kg; Height 5 ft. 2 in.; ll1 

      BMI: 26.8; Pain 0/10; ll1                                                                   

                                                                                                  

**************************************************************************************************

## 2022-05-03 NOTE — RAD REPORT
EXAM DESCRIPTION:  RAD - Chest Single View - 5/3/2022 7:35 pm

 

CLINICAL HISTORY:  Strokeprotocol chest film

 

COMPARISON:  Portable 01/01/2021

 

TECHNIQUE:  AP portable chest image was obtained 5/3/2022 7:35 pm .

 

FINDINGS:  Lung volumes are low. No acute lung parenchymal process. Heart and vasculature are normal.
 No measurable pleural effusion and no pneumothorax. No acute bony abnormality seen. No acute aortic 
findings suspected.

 

IMPRESSION:  No acute cardiopulmonary process.

 

 No significant change from comparison study.

## 2022-05-03 NOTE — RAD REPORT
EXAM DESCRIPTION:  MRI - Lumbar Spine Wo Con- 5/3/2022 11:25 am

 

CLINICAL HISTORY:  bowel or bladder incontinence

Back pain radiculopathy.

 

COMPARISON:  No comparisons

 

FINDINGS:  Vertebral body heights are within normal limits.

 

No aggressive marrow pattern is observed. No fracture is suspected.

 

The conus medullaris terminates at a normal level. No thickening of the cauda equina or clumping of n
erve roots seen.

 

L1-2 level: Asymmetric bulge of disc material to the left is seen with moderate left-sided facet hype
rtrophy. There is slight attenuation left lateral recess.

 

L2-3 level: Minimal posterior disc bulge is present.

 

L3-4 level: Mild posterior disc bulge with mild facet and ligamentum flavum hypertrophy.

 

L4-5 level: Mild to moderate posterior disc bulge with moderate facet and ligamentum flavum hypertrop
hy. Moderate central canal stenosis.

 

L5-S1 level: No significant findings.

 

 

IMPRESSION:  Moderate spondylosis L4-5 is noted.

## 2022-05-03 NOTE — P.HP
Certification for Inpatient


Patient admitted to: Observation


With expected LOS: <2 Midnights


Practitioner: I am a practitioner with admitting privileges, knowledge of 

patient current condition, hospital course, and medical plan of care.


Services: Services provided to patient in accordance with Admission requirements

found in Title 42 Section 412.3 of the Code of Federal Regulations





Patient History


Date of Service: 05/03/22


Reason for admission: Unsteady gait


History of Present Illness: 


66-year-old man with a history of hypertension presented to the emergency 

department with a complaint of unsteady gait of several weeks duration which 

became worse over the last 4 days.  She saw her PCP who prescribed her Viibryd 

for possible depression.  Patient reports worsening wobbly gait denies any 

facial deviation or speech problem or visual problems.  She also denied any 

tremors.  There was concern for acute CVA.  CT head negative, MRI of the brain 

showed acute pontine CVA.  Patient unsteady gait likely secondary to acute 

stroke.  She is hospitalized for further management.





Allergies





No Known Allergies Allergy (Unverified 05/03/22 10:14)


   





Home medications list reviewed: Yes





- Past Medical/Surgical History


Diabetic: No


-: Hypertension





- Family History


  ** Father


-: Other (see notes) (Parkinson's disease, diabetes)





  ** Mother


-: Cancer (Stomach cancer), Other (see notes)





- Social History


Smoking Status: Former smoker


Alcohol use: No


Place of Residence: Home





Review of Systems


Other: 


Except as documented, all other systems reviewed and negative.








Physical Examination





- Physical Exam


General: Alert, In no apparent distress, Oriented x3


HEENT: Atraumatic, Normocephalic, PERRLA, Mucous membr. moist/pink, EOMI, 

Sclerae nonicteric


Neck: Supple, 2+ carotid pulse no bruit, JVD not distended, No Thyromegaly


Respiratory: Clear to auscultation bilaterally, Normal air movement


Cardiovascular: No edema, Regular rate/rhythm, Normal S1 S2, No murmurs


Capillary refill: <2 Seconds


Gastrointestinal: Normal bowel sounds, Soft and benign, Non-distended, No 

tenderness


Musculoskeletal: No swelling, No tenderness


Integumentary: No rashes, No erythema, No cyanosis


Neurological: Normal strength at 5/5 x4 extr, Cranial nerves 3-12 intact, Other 

(Coordination intact.)


Lymphatics: No axilla or inguinal lymphadenopathy





- Studies


Laboratory Data (last 24 hrs)





05/03/22 10:19: Sodium 138, Potassium 4.0, BUN 16, Creatinine 0.90, Glucose 103,

Magnesium 2.2, Total Bilirubin 0.3, AST 19, ALT 26, Alkaline Phosphatase 100


05/03/22 10:19: PT 11.3, INR 1.03, APTT 31.0


05/03/22 10:19: WBC 8.1, Hgb 14.5, Hct 42.5, Plt Count 289








Assessment and Plan





- Problems (Diagnosis)


(1) Acute CVA (cerebrovascular accident)


Current Visit: Yes   Status: Acute   





(2) Hypertension


Current Visit: Yes   Status: Acute   





(3) Unsteady gait


Current Visit: Yes   Status: Acute   





- Plan


Place patient under observation.


Continue stroke protocol initiated in the ED.


We will start aspirin and Plavix.


Obtain carotid Doppler, echocardiogram


Start statins.


Check lipid profile.


PT and OT evaluation


Speech consult.


Permissive hypertension, keep systolic blood pressure between 160-1 80.


Neurology consult.








- Advance Directives


Does patient have a Living Will: No


Does patient have a Durable POA for Healthcare: No

## 2022-05-03 NOTE — EDPHYS
Physician Documentation                                                                           

 [facility]                                                                 

Name: Meaghan Tavares                                                                            

Age: 66 yrs                                                                                       

Sex: Female                                                                                       

: 1955                                                                                   

MRN: [id number]                                                                                   

Arrival Date: 2022                                                                          

Time: 09:44                                                                                       

Account#: [id number]                                                                            

Bed 4                                                                                             

Private MD:                                                                                       

ED Physician Jc Gomez                                                                         

HPI:                                                                                              

                                                                                             

10:30 This 66 yrs old  Female presents to ER via Ambulatory with complaints of Diff   rn  

      Walking.                                                                                    

10:30 The patient presents to the emergency department with difficult walking, Difficulty     rn  

      walking, bowel and bladder incontinence. Onset: The symptoms/episode began/occurred 1       

      month(s) ago. Context: occurred at an unknown location, occurred while the patient was      

      at rest.                                                                                    

11:50 Associated signs and symptoms: Pertinent positives: shuffling gait, difficulty          rn  

      controlling/bowel/bladder, Pertinent negatives: altered mental status, fever, headache,     

      neck stiffness, seizure, syncope, blurred vision, double vision, visual field changes,      

      loss of vision. Severity of symptoms: At their worst the symptoms were moderate in the      

      emergency department the symptoms are unchanged. Current symptoms: difficulty walking.      

      The patient has not experienced similar symptoms in the past. The patient has not           

      recently seen a physician. Partner reports patient having difficulty walking with           

      shuffling gait but in the last week developed worsening symptoms, a feeling is worse,       

      and now reports incontinence of bowel and bladder but cannot specify if due to length       

      of time to arrive in bathroom or true incontinence. No trauma. Seen by PCP and MRIs         

      ordered but not obtained yet. Last known normal was last month but partner states           

      noticed a change in symptoms last week. Patient active smoker.                              

                                                                                                  

Historical:                                                                                       

- Allergies:                                                                                      

09:56 No Known Allergies;                                                                     ll1 

- PMHx:                                                                                           

09:56 Hypertension;                                                                           ll1 

- PSHx:                                                                                           

09:56 None;                                                                                   ll1 

                                                                                                  

- Immunization history:: Client reports having NOT received the Covid vaccine.                    

- Social history:: Smoking status: Patient reports the use of cigarette tobacco                   

  products, smokes one pack cigarettes per day.                                                   

- Family history:: not pertinent.                                                                 

- Hospitalizations: : No recent hospitalization is reported.                                      

                                                                                                  

                                                                                                  

ROS:                                                                                              

11:50 Constitutional: Negative for fever, chills, and weight loss, Eyes: Negative for injury, rn  

      pain, redness, and discharge, ENT: Negative for injury, pain, and discharge, Neck:          

      Negative for injury, pain, and swelling, Cardiovascular: Negative for chest pain,           

      palpitations, and edema, Respiratory: Negative for shortness of breath, cough,              

      wheezing, and pleuritic chest pain, Abdomen/GI: Negative for abdominal pain, nausea,        

      vomiting, diarrhea, and constipation, Back: Negative for injury and pain, : Negative      

      for injury, bleeding, discharge, and swelling, MS/Extremity: Negative for injury and        

      deformity, Skin: Negative for injury, rash, and discoloration, Neuro: Negative for          

      headache, weakness, numbness, tingling, and seizure.                                        

                                                                                                  

Exam:                                                                                             

11:50 Constitutional:  This is a well developed, well nourished patient who is awake, alert,  rn  

      and in no acute distress.  Ambulatory to room with a shuffling slow gait Head/Face:         

      Normocephalic, atraumatic. Eyes:  Pupils equal round and reactive to light,                 

      extra-ocular motions intact.  Lids and lashes normal.  Conjunctiva and sclera are           

      non-icteric and not injected.  Cornea within normal limits.  Periorbital areas with no      

      swelling, redness, or edema. Neck:  Trachea midline, no thyromegaly or masses palpated,     

      and no cervical lymphadenopathy.  Supple, full range of motion without nuchal rigidity,     

      or vertebral point tenderness.  No Meningismus. Cardiovascular:  Regular rate and           

      rhythm.  No pulse deficits. Respiratory:  No increased work of breathing, no                

      retractions or nasal flaring. Abdomen/GI:  Soft, non-tender Back:  No spinal                

      tenderness.  No costovertebral tenderness.  Full range of motion. Skin:  Warm, dry with     

      normal turgor.  Normal color with no rashes, no lesions, and no evidence of cellulitis.     

      MS/ Extremity:  Pulses equal, no cyanosis.  Neurovascular intact.  Full, normal range       

      of motion.  Equal circumference. Neuro:  Awake and alert, GCS 15, oriented to person,       

      place, time, and situation.  Cranial nerves II-XII grossly intact.  Motor strength 5/5      

      in all extremities.  Sensory grossly intact.  Shuffling gait                                

                                                                                                  

Vital Signs:                                                                                      

09:50  / 85; Pulse 83; Resp 17; Temp 98.2; Pulse Ox 100% ; Weight 66.68 kg; Height 5    ll1 

      ft. 2 in. (157.48 cm); Pain 0/10;                                                           

10:15  / 93; Pulse 90; Resp 16; Pulse Ox 100% on R/A;                                   vg1 

11:00  / 88; Pulse 86; Resp 18; Pulse Ox 99% on R/A;                                    vg1 

12:00  / 100; Pulse 86; Resp 19; Pulse Ox 98% on R/A;                                   vg1 

13:20  / 75; Pulse 97; Resp 20; Pulse Ox 99% on R/A;                                    vg1 

14:30  / 78; Pulse 99; Resp 16; Pulse Ox 98% on R/A;                                    vg1 

15:30  / 87; Pulse 88; Resp 16; Pulse Ox 97% on R/A;                                    vg1 

16:30  / 103; Pulse 100; Resp 16; Pulse Ox 100% on R/A;                                 vg1 

17:30  / 92; Pulse 99; Resp 20; Pulse Ox 97% on R/A;                                    vg1 

09:50 Body Mass Index 26.89 (66.68 kg, 157.48 cm)                                             ll1 

                                                                                                  

MDM:                                                                                              

09:58 Patient medically screened.                                                             rn  

10:44 ED course: Pt ambulatory to bathroom, tech in bathroom with her, no incontinence and    rn  

      able to urinate on her own in specimen cup without difficulty. MRI department unable to     

      fit in all MRIs ordered so canceled thoracic and cspine, kept brain and lumbar to rule      

      out emergency..                                                                             

11:50 Data reviewed: vital signs, nurses notes, lab test result(s), EKG, radiologic studies,  rn  

      CT scan, MRI, and as a result, I will admit patient. Counseling: I had a detailed           

      discussion with the patient and/or guardian regarding: the historical points, exam          

      findings, and any diagnostic results supporting the discharge/admit diagnosis, lab          

      results, radiology results, the need for further work-up and treatment in the hospital.     

      Response to treatment: There is no appreciated change of the patient's symptoms at this     

      time, and as a result, I will admit patient. Admission orders: after a detailed             

      discussion of the patient's condition and case, the admit orders are written by me.         

                                                                                                  

                                                                                             

10:10 Order name: CBC with Diff; Complete Time: 10:42                                         rn  

                                                                                             

10:10 Order name: Protime (+inr); Complete Time: 10:42                                        rn  

                                                                                             

10:10 Order name: Ptt, Activated; Complete Time: 10:42                                        rn  

                                                                                             

10:10 Order name: Urine Microscopic Only; Complete Time: 11:36                                rn  

                                                                                             

10:10 Order name: Magnesium; Complete Time: 11:36                                             rn  

                                                                                             

10:10 Order name: CMP; Complete Time: 11:36                                                   rn  

                                                                                             

10:10 Order name: CT Head Brain wo Cont                                                       rn  

                                                                                             

10:10 Order name: Brain Wo Cont MRI; Complete Time: 11:36                                     rn  

                                                                                             

10:10 Order name: MRI Lumbar Spine wo Con; Complete Time: 11:55                               rn  

                                                                                             

10:14 Order name: Head Brain Wo Cont; Complete Time: 11:36                                    EDMS

                                                                                             

10:38 Order name: Urine Dipstick-Ancillary; Complete Time: 10:42                              EDMS

                                                                                             

11:56 Order name: COVID-19 SARS RT PCR (Document "Date of Onset" if Symptomatic)              bd  

                                                                                             

10:10 Order name: IV Start; Complete Time: 10:28                                              rn  

                                                                                             

10:10 Order name: Urine Dipstick-Ancillary (obtain specimen); Complete Time: 12:30            rn  

                                                                                                  

Administered Medications:                                                                         

12:05 Drug: Aspirin 325 mg Route: PO;                                                         vg1 

12:52 Follow up: Response: No adverse reaction                                                vg1 

12:05 Drug: foLIC Acid 1 mg Route: IVPB; Site: right antecubital;                             vg1 

12:52 Follow up: Response: No adverse reaction; IV Status: Completed infusion                 vg1 

12:50 Drug: Nicoderm CQ Patch 21 mg/24 hr 21 mg {Note: Right upper arm.} Route: Transdermal;  vg1 

      Site: affected area;                                                                        

14:00 Follow up: Response: No adverse reaction                                                vg1 

                                                                                                  

                                                                                                  

Disposition Summary:                                                                              

22 11:55                                                                                    

Hospitalization Ordered                                                                           

      Hospitalization Status: Inpatient Admission                                             rn  

      Provider: Art De La Torre                                                                rn  

      Condition: Stable                                                                       rn  

      Problem: new                                                                            rn  

      Symptoms: are unchanged                                                                 rn  

      Bed/Room Type: Standard                                                                 rn  

      Location: Telemetry/MedSurg (Inpatient)(22 17:10)                                 bd  

      Room Assignment: 207(22 17:10)                                                    bd  

      Diagnosis                                                                                   

        - Shuffling gait                                                                      rn  

        - Cerebral infarction, unspecified                                                    rn  

      Forms:                                                                                      

        - Medication Reconciliation Form                                                      rn  

        - SBAR form                                                                           rn  

Signatures:                                                                                       

Dispatcher [employer]                           EDMS                                                 

Rut Rivera                              bd                                                   

Jc Gomez MD MD rn Garcia, Victoria, RN RN   vg1                                                  

Thuan Davidson RN                       RN   1                                                  

                                                                                                  

Corrections: (The following items were deleted from the chart)                                    

10:44 10:20 CT-CERVICAL SPINE W/O CONTRAST ordered. EDMS                                      EDMS

10:44 10:33 CT-CERVICAL SPINE W/O CONTRAST ordered. EDMS                                      EDMS

11:48 10:20 Thoracic Spine Wo Contr ordered. EDMS                                             EDMS

:48 10:30 C Spine Wo Cont ordered. EDMS                                                     EDMS

16:38 11:55 Telemetry/MedSurg (Inpatient) rn                                                  bd  

16:38 11:55 rn                                                                                bd  

17:10 16:38 [facility] ER HOLD bd                                                                   bd  

17:10 16:38 ERHOLD- bd                                                                        bd  

                                                                                                  

**************************************************************************************************

## 2022-05-03 NOTE — XMS REPORT
Continuity of Care Document

                             Created on:May 3, 2022



Patient:SUKHI ZELAYA

Sex:Female

:1955

External Reference #:[id number]





Demographics







                          Address                   [address] [place] [address]

 

                          Home Phone                [phone]

 

                          Work Phone                [phone]

 

                          Mobile Phone              [phone]

 

                          Email Address             [email or web address]

 

                          Preferred Language        English

 

                          Marital Status            Unknown

 

                          Congregation Affiliation     Unknown

 

                          Race                      Unknown

 

                          Additional Race(s)        White



                                                    Unavailable

 

                          Ethnic Group              Unknown









Author







                          Organization              [facility]

t

 

                          Address                   [address]



                                                    [place] [address]

 

                          Phone                     [phone]









Support







                Name            Relationship    Address         Phone

 

                Aurora Dillard  Other           Unavailable     [phone]









Care Team Providers







                    Name                Role                Phone

 

                    Lab, [facility]  Attending Clinician Unavailable

 

                    Mellissa MORRISP           Attending Clinician [phone]









Payers







           Payer Name Policy Type Policy Number Effective Date Expiration Date S

ource







Problems

This patient has no known problems.



Allergies, Adverse Reactions, Alerts







       Allergy Allergy Status Severity Reaction(s) Onset  Inactive Treating Comm

ents 

Source



       Name   Type                        Date   Date   Clinician        

 

       NO KNOWN Drug   Active                                           [facility]



       ALLERGIE Class                                                   ity of



       S                                                              [facility]







Social History







           Social Habit Start Date Stop Date  Quantity   Comments   Source

 

           Sex Assigned At                                             [facility]

y of



           Birth                                                  [facility]

 

           Exposure to                       Yes                   [facility] of



           SARS-CoV-2                                             [facility]



           (event)                                                Branch

 

           Alcohol intake 2015 Current               University

 of



                      00:00:00   00:00:00   non-drinker of            [place] [facility]

song



                                            alcohol               Branch



                                            (finding)             









                Smoking Status  Start Date      Stop Date       Source

 

                Current every day smoker 2015 00:00:00                 Uni

versity of [facility]







Medications







       Ordered Filled Start  Stop   Current Ordering Indication Dosage Frequency

 Signature

                    Comments            Components          Source



     Medication Medication Date Date Medication? Clinician                (SIG) 

          



     Name Name                                                   

 

     CRESTOR 2015      Yes            20mg      Take 20 mg           U

nivers



     mg tablet      0-26                               by mouth           ity of



               00:00:                               at             [place]



               00                                 bedtime.           Medical



                                                                 Branch







Procedures

This patient has no known procedures.



Encounters







        Start   End     Encounter Admission Attending Care    Care    Encounter 

Source



        Date/Time Date/Time Type    Type    Clinicians Facility Department ID   

   

 

        2020 Laboratory         Lab, [facility]    [id number]

[id number] 



        09:23:20 09:43:20 Only            [facility]  [id number]         



                                                [place] [id number]         



                                                [facility] [phone]         



                                                nal     [facility]             



                                                Office                  



                                                Building                 



                                                One                     

 

        2020 Laboratory         Lab, [facility]    1.2.

[id number] 

Univers



        09:23:20 09:43:20 Only            [facility], [facility]  [id number]    

     ity of



                                                [place] [id number]         [place]

as



                                                [facility] [phone]         Me

dical



                                                [place] [id number]             Branch



                                                Office                  



                                                Building                 



                                                One                     

 

        2020 Outpatient R               [facility]    [id number]

[id number] [facility]



        09:00:00 09:00:00                                                 [facility]







Results







           Test Description Test Time  Test Comments Results    Result Comments 

Source









                    Thyroid Stimulating Hormone 2019 22:27:55 









                      Test Item  Value      Reference Range Interpretation Comme

nts









             TSH (test code = TSH) 0.445 mIU/mL 0.270-4.200               



Erythrocyte Sedimentation Rate [id number] 22:20:13





             Test Item    Value        Reference Range Interpretation Comments

 

             ESR STAT (test code = ESR STAT) 8 mm/hr      0-20                  

    



Comprehensive Metabolic [id number] 21:59:49





             Test Item    Value        Reference Range Interpretation Comments

 

             Sodium Level (test code = Sodium 139.0 mmol/L 135.0-145.0          

     



             Level)                                              

 

             Potassium Level (test code = 3.7 mmol/L   3.5-5.1                  

 



             Potassium Level)                                        

 

             Chloride Level (test code = 98 mmol/L                        



             Chloride Level)                                        

 

             CO2 (test code = CO2) 26 mmol/L    22-29                     

 

             Anion Gap (test code = Anion 15 mmol/L    7-16                     

 



             Gap)                                                

 

             BUN (test code = BUN) 14.70 mg/dL  8.00-23.00                

 

             Creatinine Level (test code = 0.70 mg/dL   0.50-0.90               

  



             Creatinine Level)                                        

 

             BUN/Creat Ratio (test code = 21                        N           

 



             BUN/Creat Ratio)                                        

 

             Glucose Level (test code = 121 mg/dL           H            



             Glucose Level)                                        

 

             Calcium Level (test code = 9.8 mg/dL    8.3-10.5                  



             Calcium Level)                                        

 

             Alk Phos (test code = Alk Phos) 98 U/L                       

    

 

             Bilirubin Total (test code = 0.4 mg/dL    0.1-0.9                  

 



             Bilirubin Total)                                        

 

             Albumin Level (test code = 4.6 g/dL     3.5-5.2                   



             Albumin Level)                                        

 

             Protein Total (test code = 7.4 g/dL     6.4-8.3                   



             Protein Total)                                        

 

             ALT (test code = ALT) 22 U/L       1-33                      

 

             AST (test code = AST) 24 U/L       1-32                      

 

             Globulin (test code = Globulin) 2.8 g/dL     2.9-3.1      L        

    

 

             A/G Ratio (test code = A/G 1.6 ratio                 N            



             Ratio)                                              



Comprehensive Metabolic [id number] 21:59:49





             Test Item    Value        Reference Range Interpretation Comments

 

             Sodium Level (test 139.0 mmol/L 135.0-145.0               



             code = Sodium Level)                                        

 

             Potassium Level 3.7 mmol/L   3.5-5.1                   



             (test code =                                        



             Potassium Level)                                        

 

             Chloride Level (test 98 mmol/L                        



             code = Chloride                                        



             Level)                                              

 

             CO2 (test code = 26 mmol/L    22-29                     



             CO2)                                                

 

             Anion Gap (test code 15 mmol/L    7-16                      



             = Anion Gap)                                        

 

             BUN (test code = 14.70 mg/dL  8.00-23.00                



             BUN)                                                

 

             Creatinine Level 0.70 mg/dL   0.50-0.90                 



             (test code =                                        



             Creatinine Level)                                        

 

             BUN/Creat Ratio 21                        N            



             (test code =                                        



             BUN/Creat Ratio)                                        

 

             Glucose Level (test 121 mg/dL           H            



             code = Glucose                                        



             Level)                                              

 

             Calcium Level (test 9.8 mg/dL    8.3-10.5                  



             code = Calcium                                        



             Level)                                              

 

             Alk Phos (test code 98 U/L                           



             = Alk Phos)                                         

 

             Bilirubin Total 0.4 mg/dL    0.1-0.9                   



             (test code =                                        



             Bilirubin Total)                                        

 

             Albumin Level (test 4.6 g/dL     3.5-5.2                   



             code = Albumin                                        



             Level)                                              

 

             Protein Total (test 7.4 g/dL     6.4-8.3                   



             code = Protein                                        



             Total)                                              

 

             ALT (test code = 22 U/L       1-33                      



             ALT)                                                

 

             AST (test code = 24 U/L       1-32                      



             AST)                                                

 

             Globulin (test code 2.8 g/dL     2.9-3.1      L            



             = Globulin)                                         

 

             A/G Ratio (test code 1.6 ratio                 N            



             = A/G Ratio)                                        

 

             eGFR AA (test code = >60                       N            eGFR (e

stimated



             eGFR AA)     mL/min/1.73 m2                           Glomerular



                                                                 Filtration Rate

) is



                                                                 an estimated va

lue,



                                                                 calculated from

 the



                                                                 patient's serum



                                                                 creatinine usin

g the



                                                                 MDRD equation. 

It is



                                                                 NOT the patient

's



                                                                 actual GFR. The

 eGFR



                                                                 provides a more



                                                                 clinically usef

ul



                                                                 measure of kidn

ey



                                                                 disease than se

rum



                                                                 creatinine



                                                                 alone.***This



                                                                 calculation neelima

es



                                                                 sex and race in

to



                                                                 account, if the



                                                                 information is



                                                                 provided. If th

e



                                                                 race is not



                                                                 provided, and t

he



                                                                 patient is



                                                                 -Carole

n,



                                                                 multiply by 1.2

12.



                                                                 If sex is not



                                                                 provided, and t

he



                                                                 patient is fema

le,



                                                                 multiply by 0.7

42.



                                                                 Results for pat

ients



                                                                 <18 years of ag

e



                                                                 have not been



                                                                 validated by th

e



                                                                 MDRD study and



                                                                 should be



                                                                 interpreted wit

h



                                                                 caution. eGFR R

esult



                                                                 Interpretation:

eGFR



                                                                 > or = 60 is in

 the



                                                                 Normal RangeeGF

R <



                                                                 60 may mean kid

neeru



                                                                 diseaseeGFR < 1

5 may



                                                                 mean kidney



                                                                 failure*** Rang

es



                                                                 recommended by 

the



                                                                 National Kid[employer]



                                                                 Foundation,



                                                                 http://nkdep.ni

h.gov



Lipid [id number] 21:59:49





             Test Item    Value        Reference Range Interpretation Comments

 

             Cholesterol Total 294 mg/dL    0-200        H            RISK OF HE

ART



             (test code =                                        DISEASEPublishe

d by



             Cholesterol Total)                                        American 

Heart



                                                                 Association Harriet

lyte



                                                                 Optimal Borderl

ine



                                                                 Increased RiskC

HOL



                                                                 <200 200-239 >2

40TRIG



                                                                 <150 150-199 >2

00HDL



                                                                 Male  >60  <40H

DL



                                                                 Female  >60  <5

0LDL



                                                                 <100 130-159 >1

60LDL



                                                                 Near optimal is



                                                                 100-129

 

             Triglycerides (test 616 mg/dL    9-200        H            



             code =                                              



             Triglycerides)                                        

 

             HDL (test code = 52 mg/dL     50-60                     



             HDL)                                                

 

             LDL (test code = N/A Trig     0-130        N            LDL calcula

tion is



             LDL)         >400 mg/dL                             unreliable when



                                                                 Triglyceride is



                                                                 greater than 40

0.

 

             VLDL (test code = N/A Trig     5-40         N            VLDL calcu

lation is



             VLDL)        >400 mg/dL                             unreliable when



                                                                 Triglyceride is



                                                                 greater than 40

0.

 

             Chol/HDL (test code 5.7 ratio    0.0-4.4      H            



             = Chol/HDL)                                         

 

             LDL/HDL Ratio (test N/A Trig                  N            LDL/HDL 

Ratio



             code = LDL/HDL >400                                   calculation i

s



             Ratio)                                              unreliable when



                                                                 Triglyceride is



                                                                 greater than 40

0.



Comprehensive Metabolic [id number] 21:59:49





             Test Item    Value        Reference Range Interpretation Comments

 

             Sodium Level (test 139.0 mmol/L 135.0-145.0               



             code = Sodium Level)                                        

 

             Potassium Level 3.7 mmol/L   3.5-5.1                   



             (test code =                                        



             Potassium Level)                                        

 

             Chloride Level (test 98 mmol/L                        



             code = Chloride                                        



             Level)                                              

 

             CO2 (test code = 26 mmol/L    22-29                     



             CO2)                                                

 

             Anion Gap (test code 15 mmol/L    7-16                      



             = Anion Gap)                                        

 

             BUN (test code = 14.70 mg/dL  8.00-23.00                



             BUN)                                                

 

             Creatinine Level 0.70 mg/dL   0.50-0.90                 



             (test code =                                        



             Creatinine Level)                                        

 

             BUN/Creat Ratio 21                        N            



             (test code =                                        



             BUN/Creat Ratio)                                        

 

             Glucose Level (test 121 mg/dL           H            



             code = Glucose                                        



             Level)                                              

 

             Calcium Level (test 9.8 mg/dL    8.3-10.5                  



             code = Calcium                                        



             Level)                                              

 

             Alk Phos (test code 98 U/L                           



             = Alk Phos)                                         

 

             Bilirubin Total 0.4 mg/dL    0.1-0.9                   



             (test code =                                        



             Bilirubin Total)                                        

 

             Albumin Level (test 4.6 g/dL     3.5-5.2                   



             code = Albumin                                        



             Level)                                              

 

             Protein Total (test 7.4 g/dL     6.4-8.3                   



             code = Protein                                        



             Total)                                              

 

             ALT (test code = 22 U/L       1-33                      



             ALT)                                                

 

             AST (test code = 24 U/L       1-32                      



             AST)                                                

 

             Globulin (test code 2.8 g/dL     2.9-3.1      L            



             = Globulin)                                         

 

             A/G Ratio (test code 1.6 ratio                 N            



             = A/G Ratio)                                        

 

             eGFR AA (test code = >60                       N            eGFR (e

stimated



             eGFR AA)     mL/min/1.73 m2                           Glomerular



                                                                 Filtration Rate

) is



                                                                 an estimated va

lue,



                                                                 calculated from

 the



                                                                 patient's serum



                                                                 creatinine usin

g the



                                                                 MDRD equation. 

It is



                                                                 NOT the patient

's



                                                                 actual GFR. The

 eGFR



                                                                 provides a more



                                                                 clinically usef

ul



                                                                 measure of kidn

ey



                                                                 disease than se

rum



                                                                 creatinine



                                                                 alone.***This



                                                                 calculation neelima

es



                                                                 sex and race in

to



                                                                 account, if the



                                                                 information is



                                                                 provided. If th

e



                                                                 race is not



                                                                 provided, and t

he



                                                                 patient is



                                                                 -Carole

n,



                                                                 multiply by 1.2

12.



                                                                 If sex is not



                                                                 provided, and t

he



                                                                 patient is fema

le,



                                                                 multiply by 0.7

42.



                                                                 Results for pat

ients



                                                                 <18 years of ag

e



                                                                 have not been



                                                                 validated by [facility]



                                                                 MDRD study and



                                                                 should be



                                                                 interpreted wit

h



                                                                 caution. eGFR R

esult



                                                                 Interpretation:

eGFR



                                                                 > or = 60 is in

 the



                                                                 Normal RangeeGF

R <



                                                                 60 may mean kid

neeru



                                                                 diseaseeGFR < 1

5 may



                                                                 mean kidney



                                                                 failure*** Rang

es



                                                                 recommended by 

the



                                                                 [employer]



                                                                 Foundation,



                                                                 http://nkdep.ni

h.gov

 

             eGFR Non-AA (test >60.00                    N            eGFR (hailey

mated



             code = eGFR Non-AA) mL/min/1.73 m2                           Glomer

ular



                                                                 Filtration Rate

) is



                                                                 an estimated va

lue,



                                                                 calculated from

 the



                                                                 patient's serum



                                                                 creatinine usin

g the



                                                                 MDRD equation. 

It is



                                                                 NOT the patient

's



                                                                 actual GFR. The

 eGFR



                                                                 provides a more



                                                                 clinically usef

ul



                                                                 measure of kidn

ey



                                                                 disease than se

rum



                                                                 creatinine



                                                                 alone.***This



                                                                 calculation neelima

es



                                                                 sex and race in

to



                                                                 account, if the



                                                                 information is



                                                                 provided. If th

e



                                                                 race is not



                                                                 provided, and t

he



                                                                 patient is



                                                                 -Carole

n,



                                                                 multiply by 1.2

12.



                                                                 If sex is not



                                                                 provided, and t

he



                                                                 patient is fema

le,



                                                                 multiply by 0.7

42.



                                                                 Results for pat

ients



                                                                 <18 years of ag

e



                                                                 have not been



                                                                 validated by [facility]



                                                                 MDRD study and



                                                                 should be



                                                                 interpreted wit

h



                                                                 caution. eGFR R

esult



                                                                 Interpretation:

eGFR



                                                                 > or = 60 is in

 the



                                                                 Normal RangeeGF

R <



                                                                 60 may mean kid

neeru



                                                                 diseaseeGFR < 1

5 may



                                                                 mean kidney



                                                                 failure*** Rang

es



                                                                 recommended by 

the



                                                                 [employer]



                                                                 Foundation,



                                                                 http://nkdep.ni

h.gov



Complete Blood Count with [id number] 21:52:58





             Test Item    Value        Reference Range Interpretation Comments

 

             WBC (test code = WBC) 9.0 x10      4.4-10.5                  

 

             RBC (test code = RBC) 4.76 x10     3.75-5.20                 

 

             Hgb (test code = Hgb) 14.4 g/dL    12.2-14.8                 

 

             Hct (test code = Hct) 42.4 %       36.5-44.4                 

 

             MCV (test code = MCV) 89.10 fL     80..00              

 

             MCHC (test code = 34.00 g/dL   32.00-37.50               



             MCHC)                                               

 

             MCH (test code = MCH) 30.3 pg      27.0-32.5                 

 

             RDW CV (test code = 12.6 %       11.5-14.5                 



             RDW CV)                                             

 

             Platelets (test code = 340.0 x10    140.0-440.0               



             Platelets)                                          

 

             MPV (test code = MPV) 10.1 fL                   N            

 

             Slide Review (test Auto         Auto                      Result cr

eated by



             code = Slide Review)                                        GL_SJM_

SLIDE_REV_AUTO

 

             nRBC (test code = 0                         N            



             nRBC)                                               

 

             NRBC Abs (test code = 0.00 x10                  N            



             NRBC Abs)                                           

 

             IPF (test code = IPF) 0 %                       N            



Automated [id number] 21:52:58





             Test Item    Value        Reference Range Interpretation Comments

 

             Neutro Auto (test code = Neutro 62.7 %       36.0-70.0             

    



             Auto)                                               

 

             Lymph Auto (test code = Lymph Auto) 28.3 %       12.0-44.0         

        

 

             Mono Auto (test code = Mono Auto) 7.6 %        0.0-11.0            

      

 

             Eos, Auto (test code = Eos, Auto) 0.7 %        0.0-7.0             

      

 

             Basophil Auto (test code = Basophil 0.3 %        0.0-2.0           

        



             Auto)                                               

 

             Neutro Absolute (test code = Neutro 5.7 x10      1.6-7.4           

        



             Absolute)                                           

 

             Lymph Absolute (test code = Lymph 2.56 x10     .50-4.60            

      



             Absolute)                                           

 

             Mono Absolute (test code = Mono .69 x10      .00-1.20              

    



             Absolute)                                           

 

             Eos Absolute (test code = Eos 0.06 x10     0.00-0.74               

  



             Absolute)                                           

 

             Baso Absolute (test code = Baso 0.03 x10     0.00-0.21             

    



             Absolute)                                           



Pro B Natriuretic [id number] 21:52:58





             Test Item    Value        Reference Range Interpretation Comments

 

             NT-proBNP (test code = NT-proBNP) 80 pg/mL     0-124               

      



IG [id number] 21:52:58





             Test Item    Value        Reference Range Interpretation Comments

 

             IG (test code = IG) 0.4 %        0.0-5.0                   

 

             IG Abs (test code = IG Abs) 0 x10                     N

## 2022-05-03 NOTE — RAD REPORT
EXAM DESCRIPTION:  MRI - Brain Wo Cont - 5/3/2022 11:20 am

 

CLINICAL HISTORY:  bowel/bladder incontinence

Headache, drowsiness

 

COMPARISON:  Head Brain Wo Cont dated 5/3/2022

 

TECHNIQUE:  Multi-sequence, multiplanar MR imaging of the brain was performed without contrast.

 

FINDINGS:  No intracranial hemorrhage, hydrocephalus or extra-axial fluid collections.Moderate perive
ntricular and deep white matter chronic microvascular ischemic changes. No edema or shift of midline 
structures. No findings to suspect brain mass. 5 mm acute CVA is seen right aspect of the sandee. No he
morrhage is present.

 

Midline structures are normally formed.

 

Mastoid air cells and paranasal sinuses are clear.

 

IMPRESSION:  5 mm acute right-sided pontine CVA.

## 2022-05-04 VITALS — OXYGEN SATURATION: 98 %

## 2022-05-04 LAB
BUN BLD-MCNC: 18 MG/DL (ref 7–18)
GLUCOSE SERPLBLD-MCNC: 126 MG/DL (ref 74–106)
HCT VFR BLD CALC: 38.8 % (ref 36–45)
HDLC SERPL-MCNC: 50 MG/DL (ref 40–60)
LDLC SERPL CALC-MCNC: 153 MG/DL (ref ?–130)
LYMPHOCYTES # SPEC AUTO: 1.3 K/UL (ref 0.7–4.9)
MAGNESIUM SERPL-MCNC: 2 MG/DL (ref 1.8–2.4)
PMV BLD: 7.1 FL (ref 7.6–11.3)
POTASSIUM SERPL-SCNC: 3.7 MMOL/L (ref 3.5–5.1)
RBC # BLD: 4.58 M/UL (ref 3.86–4.86)
TSH SERPL DL<=0.05 MIU/L-ACNC: 0.28 UIU/ML (ref 0.36–3.74)

## 2022-05-04 RX ADMIN — ENOXAPARIN SODIUM SCH MG: 40 INJECTION SUBCUTANEOUS at 08:01

## 2022-05-04 RX ADMIN — POTASSIUM & SODIUM PHOSPHATES POWDER PACK 280-160-250 MG SCH PKT: 280-160-250 PACK at 06:31

## 2022-05-04 RX ADMIN — ASPIRIN SCH MG: 81 TABLET, COATED ORAL at 08:01

## 2022-05-04 RX ADMIN — NICOTINE SCH MG: 21 PATCH TRANSDERMAL at 18:02

## 2022-05-04 RX ADMIN — ONDANSETRON PRN MG: 2 INJECTION INTRAMUSCULAR; INTRAVENOUS at 02:06

## 2022-05-04 RX ADMIN — Medication SCH: at 08:02

## 2022-05-04 RX ADMIN — CLOPIDOGREL BISULFATE SCH MG: 75 TABLET, FILM COATED ORAL at 08:02

## 2022-05-04 RX ADMIN — ATORVASTATIN CALCIUM SCH MG: 40 TABLET, FILM COATED ORAL at 20:15

## 2022-05-04 RX ADMIN — POTASSIUM & SODIUM PHOSPHATES POWDER PACK 280-160-250 MG SCH PKT: 280-160-250 PACK at 08:01

## 2022-05-04 RX ADMIN — HYDRALAZINE HYDROCHLORIDE PRN MG: 20 INJECTION INTRAMUSCULAR; INTRAVENOUS at 00:24

## 2022-05-04 RX ADMIN — SODIUM CHLORIDE SCH MLS: 0.9 INJECTION, SOLUTION INTRAVENOUS at 10:21

## 2022-05-04 RX ADMIN — POTASSIUM & SODIUM PHOSPHATES POWDER PACK 280-160-250 MG SCH PKT: 280-160-250 PACK at 07:02

## 2022-05-04 RX ADMIN — HYDRALAZINE HYDROCHLORIDE PRN MG: 20 INJECTION INTRAMUSCULAR; INTRAVENOUS at 20:15

## 2022-05-04 RX ADMIN — Medication SCH ML: at 20:15

## 2022-05-04 NOTE — ECHO
HEIGHT: 5 ft 2 in   WEIGHT: 147 lb 6 oz   DATE OF STUDY: 05/04/2022   REFER DR: 
junaid cuevas

2-DIMENSIONAL: YES

     M.MODE: YES

 DOPPLER: YES

COLOR FLOW: YES



                    TDS:  NO

PORTABLE: YES

 DEFINITY:  NO

BUBBLE STUDY: NO





DIAGNOSIS:  STROKE



CARDIAC HISTORY:  

CATHERIZATION: 

SURGERY: 

PROSTHETIC VALVE: 

PACEMAKER: 





MEASUREMENTS (cm)

    DIASTOLIC (NORMALS)                 SYSTOLIC (NORMALS)

IVSd                 0.9 (0.6-1.2)                    LA Diam 3.2 (1.9-4.0)     LVEF       
  55%  

LVIDd               3.1 (3.5-5.7)                        LVIDs      2.3 (2.0-3.5)     %FS  
        27%

LVPWd             1.0 (0.6-1.2)

Ao Diam           2.4 (2.0-3.7)



2 DIMENSIONAL ASSESSMENT:

RIGHT ATRIUM:                   NORMAL

LEFT ATRIUM:       NORMAL



RIGHT VENTRICLE:            NORMAL

LEFT VENTRICLE: NORMAL



TRICUSPID VALVE:             NORMAL

MITRAL VALVE:     NORMAL



PULMONIC VALVE:             NORMAL

AORTIC VALVE:     NORMAL



PERICARDIAL EFFUSION: NONE

AORTIC ROOT:      NORMAL





LEFT VENTRICULAR WALL MOTION:     NORMAL



DOPPLER/COLOR FLOW:     NORMAL



COMMENTS:      NORMAL 2D ECHOCARDIOGRAM WITH DOPPLER. NO VEGETATION. NO THROMBUS.



TECHNOLOGIST:   MARIANGEL SCHWAB

## 2022-05-04 NOTE — EKG
Test Date:    2022-05-03               Test Time:    10:25:08

Technician:   TYESHA                                     

                                                     

MEASUREMENT RESULTS:                                       

Intervals:                                           

Rate:         87                                     

MI:           152                                    

QRSD:         92                                     

QT:           360                                    

QTc:          433                                    

Axis:                                                

P:            68                                     

MI:           152                                    

QRS:          22                                     

T:            30                                     

                                                     

INTERPRETIVE STATEMENTS:                                       

                                                     

Normal sinus rhythm

Normal ECG

Compared to ECG 12/19/2019 22:24:29

No significant changes



Electronically Signed On 05-04-22 12:52:19 CDT by Cameron Farah

## 2022-05-05 ENCOUNTER — HOSPITAL ENCOUNTER (INPATIENT)
Dept: HOSPITAL 97 - 5TH | Age: 67
LOS: 8 days | Discharge: HOME | DRG: 57 | End: 2022-05-13
Attending: PSYCHIATRY & NEUROLOGY | Admitting: PSYCHIATRY & NEUROLOGY
Payer: COMMERCIAL

## 2022-05-05 VITALS — DIASTOLIC BLOOD PRESSURE: 68 MMHG | SYSTOLIC BLOOD PRESSURE: 142 MMHG

## 2022-05-05 VITALS — TEMPERATURE: 98.2 F

## 2022-05-05 VITALS — BODY MASS INDEX: 27.1 KG/M2

## 2022-05-05 DIAGNOSIS — F32.A: ICD-10-CM

## 2022-05-05 DIAGNOSIS — Z91.81: ICD-10-CM

## 2022-05-05 DIAGNOSIS — I10: ICD-10-CM

## 2022-05-05 DIAGNOSIS — I69.354: Primary | ICD-10-CM

## 2022-05-05 DIAGNOSIS — Z20.822: ICD-10-CM

## 2022-05-05 PROCEDURE — 97110 THERAPEUTIC EXERCISES: CPT

## 2022-05-05 PROCEDURE — 92523 SPEECH SOUND LANG COMPREHEN: CPT

## 2022-05-05 PROCEDURE — 36415 COLL VENOUS BLD VENIPUNCTURE: CPT

## 2022-05-05 PROCEDURE — 97112 NEUROMUSCULAR REEDUCATION: CPT

## 2022-05-05 PROCEDURE — 82040 ASSAY OF SERUM ALBUMIN: CPT

## 2022-05-05 PROCEDURE — 97116 GAIT TRAINING THERAPY: CPT

## 2022-05-05 PROCEDURE — 97165 OT EVAL LOW COMPLEX 30 MIN: CPT

## 2022-05-05 PROCEDURE — 97530 THERAPEUTIC ACTIVITIES: CPT

## 2022-05-05 PROCEDURE — 84134 ASSAY OF PREALBUMIN: CPT

## 2022-05-05 PROCEDURE — 81015 MICROSCOPIC EXAM OF URINE: CPT

## 2022-05-05 PROCEDURE — 97542 WHEELCHAIR MNGMENT TRAINING: CPT

## 2022-05-05 PROCEDURE — 80048 BASIC METABOLIC PNL TOTAL CA: CPT

## 2022-05-05 PROCEDURE — 81003 URINALYSIS AUTO W/O SCOPE: CPT

## 2022-05-05 PROCEDURE — 97161 PT EVAL LOW COMPLEX 20 MIN: CPT

## 2022-05-05 PROCEDURE — 87088 URINE BACTERIA CULTURE: CPT

## 2022-05-05 PROCEDURE — 87086 URINE CULTURE/COLONY COUNT: CPT

## 2022-05-05 PROCEDURE — 83735 ASSAY OF MAGNESIUM: CPT

## 2022-05-05 PROCEDURE — 85025 COMPLETE CBC W/AUTO DIFF WBC: CPT

## 2022-05-05 RX ADMIN — ONDANSETRON PRN MG: 2 INJECTION INTRAMUSCULAR; INTRAVENOUS at 01:08

## 2022-05-05 RX ADMIN — ASPIRIN SCH MG: 81 TABLET, COATED ORAL at 11:18

## 2022-05-05 RX ADMIN — ATORVASTATIN CALCIUM SCH MG: 40 TABLET, FILM COATED ORAL at 20:06

## 2022-05-05 RX ADMIN — HYDRALAZINE HYDROCHLORIDE PRN MG: 20 INJECTION INTRAMUSCULAR; INTRAVENOUS at 11:18

## 2022-05-05 RX ADMIN — CLOPIDOGREL BISULFATE SCH MG: 75 TABLET, FILM COATED ORAL at 11:18

## 2022-05-05 RX ADMIN — SODIUM CHLORIDE SCH MLS: 0.9 INJECTION, SOLUTION INTRAVENOUS at 01:08

## 2022-05-05 RX ADMIN — ENOXAPARIN SODIUM SCH MG: 40 INJECTION SUBCUTANEOUS at 11:19

## 2022-05-05 RX ADMIN — NICOTINE SCH MG: 21 PATCH TRANSDERMAL at 11:19

## 2022-05-05 RX ADMIN — Medication SCH ML: at 11:20

## 2022-05-05 NOTE — PAPE
POST ADMISSION PHYSICIAN EVALUATION



PATIENT:



[facility] 



MR#



[id number] 



ACCT#



[id number] 



REFERRING DOCTOR



Art De La Torre



PRIMARY CARE PHYSICIAN



arthur Franco



EVALUATION DATE AND TIME



05/05/2022 18:16 (CDT) 



NAME



SUKHI ZELAYA 



DATE OF BIRTH



1955 



AGE



66 



PHONE



[phone] 



SSN#



[id number] 



GENDER



female 



EVALUATING PHYSICIAN



Dr. Cristhian Penaloza M.D. 



ADMISSION DIAGNOSIS:



Acute 5 mm right pontine CVA



ONSET DATE



05/01/2022



POST-ADMISSION FUNCTIONAL/MEDICAL STATUS:



- Bladder

Same accident frequency: 7-Ind - No accidents in the past 7 days

- Bowel

Same accident frequency: 7-Ind - No accidents in the past 7 days

- Walking

Same score based on distance walked: 3(>=150ft)

- Wheelchair

Same 



STATUS CHANGE EVALUATION:



No change in Functional or Medical Status is identified compared with Pre-Admission screening.



PATIENT NEEDS CLOSE MEDICAL SUPERVISION BY A REHABILITATION PHYSICIAN FOR:



Coordination of Treatment Team

DVT Management

Bowel and Bladder Management

Medical and Co-Morbidity Management

Pain Management



PATIENT REQUIRES 24X7 REHAB NURSING FOR MEDICAL AND FUNCTIONAL MGT. OF THE FOLLOWING DEFICITS:



Disease Management

Patient requires 24x7 Rehabilitation Nursing for: Pain Issues, Identifying and preventing risk factor
s, Monitoring and reporting current medical conditions, Assisting with ambulation and transfer, Shakeel
ting with all ADL-s, Teaching patients about disease process and medications, Family teaching, Provid
ing safe environment, Bowel and Bladder Issues, Skin Integrity, and Medication Management



PATIENT REQUIRES INTENSIVE, COORDINATED INTERDISCIPLINARY APPROACH TO REHAB:



Arranging Home Equipment/Services

Discharge Planning

Family Intervention/Training

Patient needs Dietary and Nutrition Services for: Adequate Nutrition, Nutritional Supplements, and Nu
tritional Education

Patient needs  and/or Case Management for: Discharge Planning, Arranging Home Equipmen
t or Services, and Family Interventions

/Case Management



LIST OF IDENTIFIED AND POTENTIAL PROBLEMS:



Alteration in leisure activities

Bladder, Incontinence

Bowel, Incontinence

Falls, Actual or Potential

Infection, Actual or Potential

Mobility Impaired

Pain, Alteration in Comfort

Self Care Deficit

Skin Integrity, Actual or Potential

Urinary Tract Infection (UTI), Actual or Potential



RISK FOR COMPLICATIONS



- Skin Breakdown

Encourage ambulation as tolerated. Repositioning q 2 hours. Use of pillows or foam wedges while in be
d. 



- DVT

Active and Passive ROM exercises. Assist patient with frequent position changes. Elevate BLE. 



- Falls

Maintain call light within patient reach for easy access to nursing assistance. Provide assistive dev
ices. Provide assistance getting out of bed and with ambulation. Assess for medication side effects. 




- Stroke

Monitor and maintain patient pain level. Monitor patient blood pressure. 



- Pain

Assist patient with frequent position changes at least every 2 hours. 



- CVA

pt has history of CVA.  Will monitor blood pressure and manage with medication. 



INTERVENTIONS



- Hypertension

Monitor and treat with prescribed medications to maintainsystolic blood pressure between 160-180 as p
er physician recommendation. Increase physical activity. 



- Depression

Monitor and treat. Administer medications as indicated. 



- CVA

Monitor Neuro Signs regularly. Provide aggressive OT, PT, and Speech services if warranted to address
 functional deficits. 



PATIENT COULD BE AT RISK FOR COMPLICATIONS FROM ADVERSE MEDICAL CONDITIONS DUE TO HIS/HER COMORBIDITI
ES AND THE RIGORS OF THE INTENSIVE REHABILLITATION PROGRAM. METHODS OR INTERVENTIONS TO AVOID COMPLIC
ATIONS INCLUDE:



- Deep Vein Thrombosis (DVT)

Prophylaxis therapy for prevention _________________________. Sequential Compression Device (SCD). TE
D Hose. 



- Bleeding

Stroke patients assessed for lethargy or change in status. 



- Infection

Clinical staff to assess and manage the signs and symptoms of infection including fever, redness, war
mth, etc. 



- Urinary Tract Infection





- Aspiration

Clinical staff will assess and manage coughing, drooling, congestion. 



- Falls

Patient will be evaluated for Fall Precautions and will be placed on Fall Precautions as indicated pe
r protocol. 



- Skin Breakdown

Nursing will assess skin daily using assessment tool and will place on Skin Breakdown Precautions as 
indicated per protocol. 



- Pain

Clinical staff may employ non-medication methods such as massage, distraction, decrease stimulus, etc
. as needed. Clinical staff will assess patient's pain level every shift per protocol to assess and e
nsure pain management effectiveness. Medications will be given and the pain level re-assessed. 



PRELIMINARY PLAN OF CARE:



- Physical Therapy

Patient needs Physical Therapy for a daily minimum of 1.5 hours at least 5 out of 7 days, to improve:
 Mobility, Strengthening, Transfers, Stretching, ROM, Endurance, Ability to manage stairs, Gait, and 
Balance. 



- Speech Therapy

Patient needs Speech Therapy for a daily minimum of 0.5 hours at least 5 out of 7 days, to improve: S
wallowing, Cognition, Language Skills, and Compensatory Strategies. 



- Rehabilitation Nursing

Patient requires 24x7 Rehabilitation Nursing for: Pain Issues, Identifying and preventing risk factor
s, Monitoring and reporting current medical conditions, Assisting with ambulation and transfer, Shakeel
ting with all ADL-s, Teaching patients about disease process and medications, Family teaching, Provid
ing safe environment, Bowel and Bladder Issues, Skin Integrity, and Medication Management. 





Patient needs  and/or Case Management for: Discharge Planning, Arranging Home Equipmen
t or Services, and Family Interventions. 



- Dietary and Nutrition Services

Patient needs Dietary and Nutrition Services for: Adequate Nutrition, Nutritional Supplements, and Nu
tritional Education. 



- Occupational Therapy

Patient needs Occupational Therapy for a daily minimum of 1.5 hours at least 5 out of 7 days, to impr
ove Activities of Daily Living, including: Eating, Grooming, Bathing, Dressing, Toileting, Toilet Tra
nsfers, Community Reintegration, Higher functional activities, Adaptive Equipment, Splinting, Househo
ld Tasks, and Other activities as determined. 



QI SCORES:



- Self-Care

A. Eating  05-Setup or clean-up assistance  

B. Oral hygiene  05-Setup or clean-up assistance  

C. Toileting hygiene  03-Partial/moderate assistance  

E. Shower/bathe self  03-Partial/moderate assistance  

F. Upper body dressing  04-Supervision or touching assistance  

G. Lower body dressing  03-Partial/moderate assistance  

H. Putting on/taking off footwear  03-Partial/moderate assistance  

- Mobility

A. Roll left and right  03-Partial/moderate assistance  

B. Sit to lying  03-Partial/moderate assistance  

C. Lying to sitting on side of bed  03-Partial/moderate assistance  

D. Sit to stand  03-Partial/moderate assistance  

E. Chair/bed-to-chair transfer  03-Partial/moderate assistance  

F. Toilet transfer  03-Partial/moderate assistance  

G. Car transfer  03-Partial/moderate assistance  

I. Walk 10 feet  03-Partial/moderate assistance  

J. Walk 50 feet with two turns  03-Partial/moderate assistance  

K. Walk 150 feet  88-Not attempted due to medical condition or safety concerns  

L. Walking 10 feet on uneven surfaces  88-Not attempted due to medical condition or safety concerns  


M. 1 step (curb)  88-Not attempted due to medical condition or safety concerns  

N. 4 steps  88-Not attempted due to medical condition or safety concerns  

O. 12 steps  88-Not attempted due to medical condition or safety concerns  

P. Picking up object  88-Not attempted due to medical condition or safety concerns  

R. Wheel 50 feet with two turns      

S. Wheel 150 feet      

- Bladder and Bowel

Bladder continence  2-Incontinent less than daily  

Bowel continence  0-Always continent  

- Endurance

Poor

- Balance

Poor

- Safety Awareness

Fair



POTENTIAL FUNCTIONAL GOALS FOR PATIENT TO ACHIEVE BY DISCHARGE:



- Safety Precaution

Patient will remain free from falls or injury at time of discharge. 



- Bed Mobility

Patient will perform bed mobility at 4-Nany level of assistance. 



- Transfers

Patient will complete transfers from bed to chair at 4-Nany level of assistance. 



- Mobility

Patient will ambulate 150 ft with 4-Nany level of assistance with RW. 



PATIENT REHAB POTENTIAL



RACHEL ZELAYA is able and expected to receive 3 hours of individualized therapy daily on at least 5 of e
very 7 days

RACHEL Flores prognosis for significant practical improvement within a reasonable period of time appea
rs Good

Expected level of measurable improvement will be of a practical value to RACHEL ZELAYA's functional capa
city or adaptations to impairments

Has a viable Discharge Plan

Medically appropriate; condition is sufficiently stable to participate in intensive rehab program



DISCHARGE PLAN:



- Estimated Length of Stay (days)

17. 



- Consensus on plan

Discharge plan has been discussed with primary caregiver. Patient/Family is in agreement with the khushi
n. Primary caregiver is in agreement with the plan. 



- Patient/Family Goals

Return home independently. 



- Planned Living Setting Upon Discharge

Home, to live with Other. Transitional Living. 



CONCLUSION ON REHABILITATION NECESSITY:



I have evaluated patient's pre-admission functional status and, comparing it to the patient's post-ad
mission functional status now, I conclude that the pre-admission assessment was accurate. Patient's c
ondition on admission supports the medical necessity of admission to IRF. It is safe to proceed with 
patient's therapy program.



SIGNATURE PANEL:



Electronically signed by Dr. Cristhian Penaloza M.D. on 05/05/2022 at 18:17 (CDT)

## 2022-05-05 NOTE — R.HP
HISTORY AND PHYSICAL



FACILITY:



[facility]



ENCOUNTER DATE AND TIME:



05/05/2022 18:11 (CDT)



MR#:



[id number]



ACCT#:



[id number]



NAME



USKHI ZELAYA



ADDRESS:



[address] [place] [address]:



[place]



ZIP



[id number]



PHONE:



[phone]



YOB: 1955



AGE:



66



SSN#



[id number]



GENDER:



Female



DEXTERITY



Unknown dexterity



MARITAL STATUS



With a Domestic Partner



RACE



Unknown race



PRE-HOSPITAL LIVING SETTING



01 - Home (private home/apt. board/care, assisted living, group home, transitional living) 



PRE-HOSPITAL LIVING WITH



Other 



ENCOUNTER PHYSICIAN:



Dr. Cristhian Penaloza M.D.



REFERRING DOCTOR:



Art De La Torre



DATE OF ADMISSION:



05/05/2022 18:12 (CDT)



REFERRING FACILITY



[facility] 



PRIMARY CARE PHYSICIAN



Jorge Franco 



HOME TYPE AND DETAILS:



Type of home: single family house

# of levels in the residence: 1

# of steps within the residence: 0

# of steps to enter the residence: 1



ONSET DATE:



05/01/2022



PRIMARY DIAGNOSIS-RELATED SURGERIES:



No surgeries related to the primary diagnosis were performed.



HISTORY OF PRESENT ILLNESS (HPI):



Pt. is a 65 yo female of unknown race.

On 05/03/2022 she was admitted to [facility] with diagnosis Acute 5 mm right pontine CVA.

Her impairment category is Stroke 01 -  Other Stroke (01.9).

Pre-morbidly, Pt. was independent/mod-I in Self-Care, Communication, and Locomotion; and she had good
 Balance, Transfers Control, Endurance, and Safety Awareness.

Currently, she has deficits of Locomotion, Safety Awareness, Transfers Control, Endurance, and Self-C
are.

Pt. is now referred to [facility] for acute in-patient rehabilitation in order
 to maximize patient's functional independence in activities of daily living, strength, ROM, and mobi
lity.

Patient has realistic goal of being discharged at assistance level 7-Ind to reside at Home with  Ot
r.





MEDICATION ALLERGIES:



No Known Drug Allergies (NKDA)



ENVIRONMENTAL ALLERGIES:



- Substance Allergies

None Known

- Other Allergies

None Known



PAST MEDICAL HISTORY:



Hypertension

Depression

Falls



PAST SURGICAL HISTORY:



None



REVIEW OF SYSTEMS:



- Gen

No Chills

Fatigue

No Fever

- Eyes

No Double Vision

No itchiness

- ENMT

Difficulty Swallowing

- CVS

No Chest Discomfort

No Chest Pain

Fatigue

No Weight Gain

- Resp

No Cough

No Shortness of Breath

- GI

Continent

No Abdominal Pain

No Constipation

No Diarrhea

- 

Continent

No Kidney Pain

No Painful Urination

No Urinary Urgency

- MSK

No Joint Pain

Muscle Cramps

Stiffness

- Skin

No Itching

No Rash

No Suspicious Lesions

- Neuro

Coordination Difficulty

No Difficulty with Concentration

No Memory Loss

No Seizures

Weakness

- Psych

No Anxiety

No Depression



No HIV Exposure

No Persistent Infections

No Seasonal Allergies

- Endo

No Cold/Heat Intolerance

No Excessive Hunger

No Excessive Thirst

No Excessive Urination



PHYSICAL EXAM



- Gen

Alert and awake

Lying in bed

No apparent distress

Oriented to: person, time, and place

- Skin

No skin breakdown.



No abnormalities

- Eyes

No abnormalities

- ENMT

No abnormalities

- Neck

No abnormalities



No cervical adenopathy

- CVS

RRR

- Chest

No abnormalities

- Resp

Clear to auscultation

- Abd

+ bowel sounds

- GI

Soft



Deferred

- 

No abnormalities

- Ext

Mild left lower extremity edema.

- MSK

4+/5 weakness in left upper and lower extremity

- Neuro

4/5 strength left upper and lower extremities.

- Psych

Mild depression.



VITAL SIGNS



Temperature: 97.8 F

SBP/DBP: 159/76

Pulse: 100

Resp: 16



NURSING:



- Shower

allowing shower

- Bladder

care per protocol

- Skin

care per protocol



PRECAUTIONS:



- Fall Precaution

Bed alarm

TABS alarm

Wheel chair alarm

- DVT Risk

due to restricted mobility and age

- Skin Breakdown Risk

due to restricted mobility and age



ACTIVITIES



OOB only with supervision



QI SCORES:



- Self-Care

A. Eating  05-Setup or clean-up assistance  

B. Oral hygiene  05-Setup or clean-up assistance  

C. Toileting hygiene  03-Partial/moderate assistance  

E. Shower/bathe self  03-Partial/moderate assistance  

F. Upper body dressing  04-Supervision or touching assistance  

G. Lower body dressing  03-Partial/moderate assistance  

H. Putting on/taking off footwear  03-Partial/moderate assistance  

- Mobility

A. Roll left and right  03-Partial/moderate assistance  

B. Sit to lying  03-Partial/moderate assistance  

C. Lying to sitting on side of bed  03-Partial/moderate assistance  

D. Sit to stand  03-Partial/moderate assistance  

E. Chair/bed-to-chair transfer  03-Partial/moderate assistance  

F. Toilet transfer  03-Partial/moderate assistance  

G. Car transfer  03-Partial/moderate assistance  

I. Walk 10 feet  03-Partial/moderate assistance  

J. Walk 50 feet with two turns  03-Partial/moderate assistance  

K. Walk 150 feet  88-Not attempted due to medical condition or safety concerns  

L. Walking 10 feet on uneven surfaces  88-Not attempted due to medical condition or safety concerns  


M. 1 step (curb)  88-Not attempted due to medical condition or safety concerns  

N. 4 steps  88-Not attempted due to medical condition or safety concerns  

O. 12 steps  88-Not attempted due to medical condition or safety concerns  

P. Picking up object  88-Not attempted due to medical condition or safety concerns  

R. Wheel 50 feet with two turns      

S. Wheel 150 feet      

- Bladder and Bowel

Bladder continence  2-Incontinent less than daily  

Bowel continence  0-Always continent  

- Endurance

Poor

- Balance

Poor

- Safety Awareness

Fair



CURRENT FUNC. DEFICITS:



Self-Care, Mobility, Endurance, Balance, and Safety Awareness



MEDICATIONS:



- Other

See attached MAR (Medication Administration Record)



ASSESSMENT:



Pt. is a 65 yo female of unknown race.On 05/03/2022 she was admitted to [facility] with di
agnosis Acute 5 mm right pontine CVA.Her impairment category is Stroke 01 -  Other Stroke (01.9).Pre-
morbidly, Pt. was independent/mod-I in Self-Care, Communication, and Locomotion; and she had good Bal
ance, Transfers Control, Endurance, and Safety Awareness.Currently, she has deficits of Locomotion, S
afety Awareness, Transfers Control, Endurance, and Self-Care.Pt. is now referred to [facility] for acute in-patient rehabilitation in order to maximize patient's functional indepe
ndence in activities of daily living, strength, ROM, and mobility.- Rehab Goal

Patient has realistic goal of being discharged at assistance level 7-Ind to reside at Home with  Othe
r.







for Dementia, TBI, Stroke, or others

- Physical Therapy

 Weakness - to improve, our physical therapists will perform initial evaluation of pt's status upon a
dmission and devise an individualized program for Aquatic Therapy, Neuromuscular Reeducation, and Str
engthening

 Inability to transfer - to improve, our physical therapists will perform initial evaluation of pt's 
status upon admission and devise an individualized program for Bed mobility

 Need in caregiver upon discharge - to improve, our physical therapists will perform initial evaluati
on of pt's status upon admission and devise an individualized program for Caregiver Training

 Poor endurance - to improve, our physical therapists will perform initial evaluation of pt's status 
upon admission and devise an individualized program for Endurance Training

 Gait dysfunction - to improve, our physical therapists will perform initial evaluation of pt's statu
s upon admission and devise an individualized program for Gait Training, and Wheel Chair mobility

 Need for home safety evaluation - to improve, our physical therapists will perform initial evaluatio
n of pt's status upon admission and devise an individualized program for Home Evaluation

 New precaution - to improve, our physical therapists will perform initial evaluation of pt's status 
upon admission and devise an individualized program for Patient precaution education

 Edema - to improve, our physical therapists will perform initial evaluation of pt's status upon admi
ssion and devise an individualized program for Elevation Training, and Lymphedema Therapy

- Occupational Therapy

 Weakness - to improve, our occupation therapists will perform initial evaluation of pt's status upon
 admission and devise an individualized program for Aquatic Therapy, Balance, Endurance, UE ROM, and 
UE strengthening

 ADL deficits - to improve, our occupation therapists will perform initial evaluation of pt's status 
upon admission and devise an individualized program for Bathing, Bed mobility, Community Reintegratio
n, Cooking, Dressing, Eating, Fine Motor Skills, Grooming, Homemaking, Kitchen Mobility, Laundry, Pat
ient Education, Safety Awareness, Splinting - Positioning, Transfers(Toilet, Tub, Shower), and Wheel 
Chair Management

 Need for care giver - to improve, our occupation therapists will perform initial evaluation of pt's 
status upon admission and devise an individualized program for Caregiver Training

- Balance

for Weakness

- Bed mobility

for ADL deficits

- Cognition - orientation

for Dementia

- Dressing

Status: 

for ADL deficits

- Eating

for ADL deficits

- Grooming

Status: 

for ADL deficits

- Toilet Transfer

for ADL deficits

Status: 

- Bed to Chair Transfer squat pivot transfer

Status: 

for ADL deficits

- Tub Transfer

for ADL deficits

Status: 

- Hygiene

for ADL deficits

- Shower Transfer

for ADL deficits

Status: 

- Wheel Chair to Bed Transfer

Status: 

for ADL deficits



MEDICAL PLAN:



- Diet Type

Start Regular

- Diet - Liquid Texture

Start Regular

- Tube Feed

Start N/A

- Bladder

care per protocol

- DVT Risk

 due to restricted mobility and age

- Skin Breakdown Risk

 due to restricted mobility and age

- Fall Precaution

Bed alarm

TABS alarm

Wheel chair alarm

- Skin

care per protocol

- Other

See attached MAR (Medication Administration Record)

- Diet - Solid Texture

Regular

- Shower

 shower



DISCHARGE PLAN:



- Estimated Length of Stay (days)

17. 



- Consensus on plan

Discharge plan has been discussed with primary caregiver. Patient/Family is in agreement with the khushi
n. Primary caregiver is in agreement with the plan. 



- Patient/Family Goals

Return home independently. 



- Planned Living Setting Upon Discharge

Home, to live with Other. Transitional Living. 



SIGNATURE PANEL:



Electronically signed by Dr. Cristhian Penaloza M.D. on 05/05/2022 at 18:16 (CDT)

## 2022-05-05 NOTE — XMS REPORT
Continuity of Care Document

                             Created on:May 5, 2022



Patient:SUKHI ZELAYA

Sex:Female

:1955

External Reference #:[id number]





Demographics







                          Address                   [address] [place] [address]

 

                          Home Phone                [phone]

 

                          Work Phone                [phone]

 

                          Mobile Phone              [phone]

 

                          Email Address             [email or web address]

 

                          Preferred Language        English

 

                          Marital Status            Unknown

 

                          Jew Affiliation     Unknown

 

                          Race                      Unknown

 

                          Additional Race(s)        White



                                                    Unavailable

 

                          Ethnic Group              Unknown









Author







                          Organization              [facility]

t

 

                          Address                   [address]



                                                    [place] [address]

 

                          Phone                     [phone]









Support







                Name            Relationship    Address         Phone

 

                Aurora iDllard  Other           Unavailable     [phone]









Care Team Providers







                    Name                Role                Phone

 

                    Lab, [facility]  Attending Clinician Unavailable

 

                    Mellissa MORRISP           Attending Clinician [phone]









Payers







           Payer Name Policy Type Policy Number Effective Date Expiration Date S

ource







Problems

This patient has no known problems.



Allergies, Adverse Reactions, Alerts







       Allergy Allergy Status Severity Reaction(s) Onset  Inactive Treating Comm

ents 

Source



       Name   Type                        Date   Date   Clinician        

 

       NO KNOWN Drug   Active                                           [facility]



       ALLERGIE Class                                                   ity of



       S                                                              [facility]







Social History







           Social Habit Start Date Stop Date  Quantity   Comments   Source

 

           Sex Assigned At                                             [facility]

y of



           Birth                                                  [facility]

 

           Exposure to                       Yes                   [facility] of



           SARS-CoV-2                                             [facility]



           (event)                                                Branch

 

           Alcohol intake 2015 Current               University

 of



                      00:00:00   00:00:00   non-drinker of            [place] [facility]

song



                                            alcohol               Branch



                                            (finding)             









                Smoking Status  Start Date      Stop Date       Source

 

                Current every day smoker 2015 00:00:00                 Uni

versity of [facility]







Medications







       Ordered Filled Start  Stop   Current Ordering Indication Dosage Frequency

 Signature

                    Comments            Components          Source



     Medication Medication Date Date Medication? Clinician                (SIG) 

          



     Name Name                                                   

 

     CRESTOR 2015      Yes            20mg      Take 20 mg           U

nivers



     mg tablet      0-26                               by mouth           ity of



               00:00:                               at             [place]



               00                                 bedtime.           Medical



                                                                 Branch







Procedures

This patient has no known procedures.



Encounters







        Start   End     Encounter Admission Attending Care    Care    Encounter 

Source



        Date/Time Date/Time Type    Type    Clinicians Facility Department ID   

   

 

        2020 Laboratory         Lab, [facility]    [id number]

[id number] 



        09:23:20 09:43:20 Only            [facility]  [id number]         



                                                [place] [id number]         



                                                [facility] [phone]         



                                                nal     [facility]             



                                                Office                  



                                                Building                 



                                                One                     

 

        2020 Laboratory         Lab, [facility]    1.2.

[id number] 

Univers



        09:23:20 09:43:20 Only            [facility], [facility]  [id number]    

     ity of



                                                [place] [id number]         [place]

as



                                                [facility] [phone]         Me

dical



                                                [place] [address]             Branch



                                                Office                  



                                                Building                 



                                                One                     

 

        2020 Outpatient R               [facility]    [id number]

[id number] [facility]



        09:00:00 09:00:00                                                 [facility]







Results







           Test Description Test Time  Test Comments Results    Result Comments 

Source









                    Thyroid Stimulating Hormone 2019 22:27:55 









                      Test Item  Value      Reference Range Interpretation Comme

nts









             TSH (test code = TSH) 0.445 mIU/mL 0.270-4.200               



Erythrocyte Sedimentation Rate [id number] 22:20:13





             Test Item    Value        Reference Range Interpretation Comments

 

             ESR STAT (test code = ESR STAT) 8 mm/hr      0-20                  

    



Comprehensive Metabolic [id number] 21:59:49





             Test Item    Value        Reference Range Interpretation Comments

 

             Sodium Level (test code = Sodium 139.0 mmol/L 135.0-145.0          

     



             Level)                                              

 

             Potassium Level (test code = 3.7 mmol/L   3.5-5.1                  

 



             Potassium Level)                                        

 

             Chloride Level (test code = 98 mmol/L                        



             Chloride Level)                                        

 

             CO2 (test code = CO2) 26 mmol/L    22-29                     

 

             Anion Gap (test code = Anion 15 mmol/L    7-16                     

 



             Gap)                                                

 

             BUN (test code = BUN) 14.70 mg/dL  8.00-23.00                

 

             Creatinine Level (test code = 0.70 mg/dL   0.50-0.90               

  



             Creatinine Level)                                        

 

             BUN/Creat Ratio (test code = 21                        N           

 



             BUN/Creat Ratio)                                        

 

             Glucose Level (test code = 121 mg/dL           H            



             Glucose Level)                                        

 

             Calcium Level (test code = 9.8 mg/dL    8.3-10.5                  



             Calcium Level)                                        

 

             Alk Phos (test code = Alk Phos) 98 U/L                       

    

 

             Bilirubin Total (test code = 0.4 mg/dL    0.1-0.9                  

 



             Bilirubin Total)                                        

 

             Albumin Level (test code = 4.6 g/dL     3.5-5.2                   



             Albumin Level)                                        

 

             Protein Total (test code = 7.4 g/dL     6.4-8.3                   



             Protein Total)                                        

 

             ALT (test code = ALT) 22 U/L       1-33                      

 

             AST (test code = AST) 24 U/L       1-32                      

 

             Globulin (test code = Globulin) 2.8 g/dL     2.9-3.1      L        

    

 

             A/G Ratio (test code = A/G 1.6 ratio                 N            



             Ratio)                                              



Comprehensive Metabolic [id number] 21:59:49





             Test Item    Value        Reference Range Interpretation Comments

 

             Sodium Level (test 139.0 mmol/L 135.0-145.0               



             code = Sodium Level)                                        

 

             Potassium Level 3.7 mmol/L   3.5-5.1                   



             (test code =                                        



             Potassium Level)                                        

 

             Chloride Level (test 98 mmol/L                        



             code = Chloride                                        



             Level)                                              

 

             CO2 (test code = 26 mmol/L    22-29                     



             CO2)                                                

 

             Anion Gap (test code 15 mmol/L    7-16                      



             = Anion Gap)                                        

 

             BUN (test code = 14.70 mg/dL  8.00-23.00                



             BUN)                                                

 

             Creatinine Level 0.70 mg/dL   0.50-0.90                 



             (test code =                                        



             Creatinine Level)                                        

 

             BUN/Creat Ratio 21                        N            



             (test code =                                        



             BUN/Creat Ratio)                                        

 

             Glucose Level (test 121 mg/dL           H            



             code = Glucose                                        



             Level)                                              

 

             Calcium Level (test 9.8 mg/dL    8.3-10.5                  



             code = Calcium                                        



             Level)                                              

 

             Alk Phos (test code 98 U/L                           



             = Alk Phos)                                         

 

             Bilirubin Total 0.4 mg/dL    0.1-0.9                   



             (test code =                                        



             Bilirubin Total)                                        

 

             Albumin Level (test 4.6 g/dL     3.5-5.2                   



             code = Albumin                                        



             Level)                                              

 

             Protein Total (test 7.4 g/dL     6.4-8.3                   



             code = Protein                                        



             Total)                                              

 

             ALT (test code = 22 U/L       1-33                      



             ALT)                                                

 

             AST (test code = 24 U/L       1-32                      



             AST)                                                

 

             Globulin (test code 2.8 g/dL     2.9-3.1      L            



             = Globulin)                                         

 

             A/G Ratio (test code 1.6 ratio                 N            



             = A/G Ratio)                                        

 

             eGFR AA (test code = >60                       N            eGFR (e

stimated



             eGFR AA)     mL/min/1.73 m2                           Glomerular



                                                                 Filtration Rate

) is



                                                                 an estimated va

lue,



                                                                 calculated from

 the



                                                                 patient's serum



                                                                 creatinine usin

g the



                                                                 MDRD equation. 

It is



                                                                 NOT the patient

's



                                                                 actual GFR. The

 eGFR



                                                                 provides a more



                                                                 clinically usef

ul



                                                                 measure of kidn

ey



                                                                 disease than se

rum



                                                                 creatinine



                                                                 alone.***This



                                                                 calculation neelima

es



                                                                 sex and race in

to



                                                                 account, if the



                                                                 information is



                                                                 provided. If th

e



                                                                 race is not



                                                                 provided, and t

he



                                                                 patient is



                                                                 -Carole

n,



                                                                 multiply by 1.2

12.



                                                                 If sex is not



                                                                 provided, and t

he



                                                                 patient is fema

le,



                                                                 multiply by 0.7

42.



                                                                 Results for pat

ients



                                                                 <18 years of ag

e



                                                                 have not been



                                                                 validated by th

e



                                                                 MDRD study and



                                                                 should be



                                                                 interpreted wit

h



                                                                 caution. eGFR R

esult



                                                                 Interpretation:

eGFR



                                                                 > or = 60 is in

 the



                                                                 Normal RangeeGF

R <



                                                                 60 may mean kid

neeru



                                                                 diseaseeGFR < 1

5 may



                                                                 mean kidney



                                                                 failure*** Rang

es



                                                                 recommended by 

the



                                                                 National Kid[employer]



                                                                 Foundation,



                                                                 http://nkdep.ni

h.gov



Lipid [id number] 21:59:49





             Test Item    Value        Reference Range Interpretation Comments

 

             Cholesterol Total 294 mg/dL    0-200        H            RISK OF HE

ART



             (test code =                                        DISEASEPublishe

d by



             Cholesterol Total)                                        American 

Heart



                                                                 Association Harriet

lyte



                                                                 Optimal Borderl

ine



                                                                 Increased RiskC

HOL



                                                                 <200 200-239 >2

40TRIG



                                                                 <150 150-199 >2

00HDL



                                                                 Male  >60  <40H

DL



                                                                 Female  >60  <5

0LDL



                                                                 <100 130-159 >1

60LDL



                                                                 Near optimal is



                                                                 100-129

 

             Triglycerides (test 616 mg/dL    9-200        H            



             code =                                              



             Triglycerides)                                        

 

             HDL (test code = 52 mg/dL     50-60                     



             HDL)                                                

 

             LDL (test code = N/A Trig     0-130        N            LDL calcula

tion is



             LDL)         >400 mg/dL                             unreliable when



                                                                 Triglyceride is



                                                                 greater than 40

0.

 

             VLDL (test code = N/A Trig     5-40         N            VLDL calcu

lation is



             VLDL)        >400 mg/dL                             unreliable when



                                                                 Triglyceride is



                                                                 greater than 40

0.

 

             Chol/HDL (test code 5.7 ratio    0.0-4.4      H            



             = Chol/HDL)                                         

 

             LDL/HDL Ratio (test N/A Trig                  N            LDL/HDL 

Ratio



             code = LDL/HDL >400                                   calculation i

s



             Ratio)                                              unreliable when



                                                                 Triglyceride is



                                                                 greater than 40

0.



Comprehensive Metabolic [id number] 21:59:49





             Test Item    Value        Reference Range Interpretation Comments

 

             Sodium Level (test 139.0 mmol/L 135.0-145.0               



             code = Sodium Level)                                        

 

             Potassium Level 3.7 mmol/L   3.5-5.1                   



             (test code =                                        



             Potassium Level)                                        

 

             Chloride Level (test 98 mmol/L                        



             code = Chloride                                        



             Level)                                              

 

             CO2 (test code = 26 mmol/L    22-29                     



             CO2)                                                

 

             Anion Gap (test code 15 mmol/L    7-16                      



             = Anion Gap)                                        

 

             BUN (test code = 14.70 mg/dL  8.00-23.00                



             BUN)                                                

 

             Creatinine Level 0.70 mg/dL   0.50-0.90                 



             (test code =                                        



             Creatinine Level)                                        

 

             BUN/Creat Ratio 21                        N            



             (test code =                                        



             BUN/Creat Ratio)                                        

 

             Glucose Level (test 121 mg/dL           H            



             code = Glucose                                        



             Level)                                              

 

             Calcium Level (test 9.8 mg/dL    8.3-10.5                  



             code = Calcium                                        



             Level)                                              

 

             Alk Phos (test code 98 U/L                           



             = Alk Phos)                                         

 

             Bilirubin Total 0.4 mg/dL    0.1-0.9                   



             (test code =                                        



             Bilirubin Total)                                        

 

             Albumin Level (test 4.6 g/dL     3.5-5.2                   



             code = Albumin                                        



             Level)                                              

 

             Protein Total (test 7.4 g/dL     6.4-8.3                   



             code = Protein                                        



             Total)                                              

 

             ALT (test code = 22 U/L       1-33                      



             ALT)                                                

 

             AST (test code = 24 U/L       1-32                      



             AST)                                                

 

             Globulin (test code 2.8 g/dL     2.9-3.1      L            



             = Globulin)                                         

 

             A/G Ratio (test code 1.6 ratio                 N            



             = A/G Ratio)                                        

 

             eGFR AA (test code = >60                       N            eGFR (e

stimated



             eGFR AA)     mL/min/1.73 m2                           Glomerular



                                                                 Filtration Rate

) is



                                                                 an estimated va

lue,



                                                                 calculated from

 the



                                                                 patient's serum



                                                                 creatinine usin

g the



                                                                 MDRD equation. 

It is



                                                                 NOT the patient

's



                                                                 actual GFR. The

 eGFR



                                                                 provides a more



                                                                 clinically usef

ul



                                                                 measure of kidn

ey



                                                                 disease than se

rum



                                                                 creatinine



                                                                 alone.***This



                                                                 calculation neelima

es



                                                                 sex and race in

to



                                                                 account, if the



                                                                 information is



                                                                 provided. If th

e



                                                                 race is not



                                                                 provided, and t

he



                                                                 patient is



                                                                 -Carole

n,



                                                                 multiply by 1.2

12.



                                                                 If sex is not



                                                                 provided, and t

he



                                                                 patient is fema

le,



                                                                 multiply by 0.7

42.



                                                                 Results for pat

ients



                                                                 <18 years of ag

e



                                                                 have not been



                                                                 validated by [facility]



                                                                 MDRD study and



                                                                 should be



                                                                 interpreted wit

h



                                                                 caution. eGFR R

esult



                                                                 Interpretation:

eGFR



                                                                 > or = 60 is in

 the



                                                                 Normal RangeeGF

R <



                                                                 60 may mean kid

neeru



                                                                 diseaseeGFR < 1

5 may



                                                                 mean kidney



                                                                 failure*** Rang

es



                                                                 recommended by 

the



                                                                 [employer]



                                                                 Foundation,



                                                                 http://nkdep.ni

h.gov

 

             eGFR Non-AA (test >60.00                    N            eGFR (hailey

mated



             code = eGFR Non-AA) mL/min/1.73 m2                           Glomer

ular



                                                                 Filtration Rate

) is



                                                                 an estimated va

lue,



                                                                 calculated from

 the



                                                                 patient's serum



                                                                 creatinine usin

g the



                                                                 MDRD equation. 

It is



                                                                 NOT the patient

's



                                                                 actual GFR. The

 eGFR



                                                                 provides a more



                                                                 clinically usef

ul



                                                                 measure of kidn

ey



                                                                 disease than se

rum



                                                                 creatinine



                                                                 alone.***This



                                                                 calculation neelima

es



                                                                 sex and race in

to



                                                                 account, if the



                                                                 information is



                                                                 provided. If th

e



                                                                 race is not



                                                                 provided, and t

he



                                                                 patient is



                                                                 -Carole

n,



                                                                 multiply by 1.2

12.



                                                                 If sex is not



                                                                 provided, and t

he



                                                                 patient is fema

le,



                                                                 multiply by 0.7

42.



                                                                 Results for pat

ients



                                                                 <18 years of ag

e



                                                                 have not been



                                                                 validated by [facility]



                                                                 MDRD study and



                                                                 should be



                                                                 interpreted wit

h



                                                                 caution. eGFR R

esult



                                                                 Interpretation:

eGFR



                                                                 > or = 60 is in

 the



                                                                 Normal RangeeGF

R <



                                                                 60 may mean kid

neeru



                                                                 diseaseeGFR < 1

5 may



                                                                 mean kidney



                                                                 failure*** Rang

es



                                                                 recommended by 

the



                                                                 [employer]



                                                                 Foundation,



                                                                 http://nkdep.ni

h.gov



Complete Blood Count with [id number] 21:52:58





             Test Item    Value        Reference Range Interpretation Comments

 

             WBC (test code = WBC) 9.0 x10      4.4-10.5                  

 

             RBC (test code = RBC) 4.76 x10     3.75-5.20                 

 

             Hgb (test code = Hgb) 14.4 g/dL    12.2-14.8                 

 

             Hct (test code = Hct) 42.4 %       36.5-44.4                 

 

             MCV (test code = MCV) 89.10 fL     80..00              

 

             MCHC (test code = 34.00 g/dL   32.00-37.50               



             MCHC)                                               

 

             MCH (test code = MCH) 30.3 pg      27.0-32.5                 

 

             RDW CV (test code = 12.6 %       11.5-14.5                 



             RDW CV)                                             

 

             Platelets (test code = 340.0 x10    140.0-440.0               



             Platelets)                                          

 

             MPV (test code = MPV) 10.1 fL                   N            

 

             Slide Review (test Auto         Auto                      Result cr

eated by



             code = Slide Review)                                        [email or web address]_

SLIDE_REV_AUTO

 

             nRBC (test code = 0                         N            



             nRBC)                                               

 

             NRBC Abs (test code = 0.00 x10                  N            



             NRBC Abs)                                           

 

             IPF (test code = IPF) 0 %                       N            



Automated [id number] 21:52:58





             Test Item    Value        Reference Range Interpretation Comments

 

             Neutro Auto (test code = Neutro 62.7 %       36.0-70.0             

    



             Auto)                                               

 

             Lymph Auto (test code = Lymph Auto) 28.3 %       12.0-44.0         

        

 

             Mono Auto (test code = Mono Auto) 7.6 %        0.0-11.0            

      

 

             Eos, Auto (test code = Eos, Auto) 0.7 %        0.0-7.0             

      

 

             Basophil Auto (test code = Basophil 0.3 %        0.0-2.0           

        



             Auto)                                               

 

             Neutro Absolute (test code = Neutro 5.7 x10      1.6-7.4           

        



             Absolute)                                           

 

             Lymph Absolute (test code = Lymph 2.56 x10     .50-4.60            

      



             Absolute)                                           

 

             Mono Absolute (test code = Mono .69 x10      .00-1.20              

    



             Absolute)                                           

 

             Eos Absolute (test code = Eos 0.06 x10     0.00-0.74               

  



             Absolute)                                           

 

             Baso Absolute (test code = Baso 0.03 x10     0.00-0.21             

    



             Absolute)                                           



Pro B Natriuretic [id number] 21:52:58





             Test Item    Value        Reference Range Interpretation Comments

 

             NT-proBNP (test code = NT-proBNP) 80 pg/mL     0-124               

      



IG [id number] 21:52:58





             Test Item    Value        Reference Range Interpretation Comments

 

             IG (test code = IG) 0.4 %        0.0-5.0                   

 

             IG Abs (test code = IG Abs) 0 x10                     N

## 2022-05-05 NOTE — CON
Reason For Consultation:  Consultation called because of stroke.



History Of Present Illness:  Ms. Tavares is a 66-year-old patient with hypertension who comes to [facility] with worsening gait, perhaps of 2-3 weeks' duration, but she still ambulated well unt
il Sunday when she had a significant change with much more left leg and arm weakness and could not am
bulate.  Her gait was described as "wobbly."  She had no difficulty with face movements, speech, visi
on, or other issues.  Head CT scan was negative.  She is not a candidate for tPA as she did not arriv
e within 4.5 hours and had a complete workup prior to the decision to be made for tPA.  Brain MRI did
 show an acute 5 mm infarct in the right sandee and she was admitted for further evaluation and managem
ent.  Her echocardiogram showed an ejection fraction of 55% and was a normal study without vegetation
.  Her electrocardiogram showed a normal sinus rhythm and is a normal study.  Her chest x-ray showed 
no acute cardiothoracic processes.  She did have a lumbar spine MRI entertaining the possibility of a
 nerve compression of the perhaps L4-L5 region.  The study did show moderate central canal stenosis, 
however, no mention of any nerve root stenosis.  The patient was evaluated by Physical Therapy and wa
s able to ambulate 100 feet once then 60 feet and then 40 feet with minimum assistance, taking severa
l rest breaks.  Maximum verbal cues were required for the patient to steer her foot and not trip and 
fall.



Past Medical History:  As noted.



Allergies:  NO KNOWN DRUG ALLERGIES.



Family History:  Parkinson's and diabetes in father.  Cancer in mother.



Social History:  Smoked in the past.  No recent tobacco use.  No alcohol use.  The patient lives at [facility] with family.



Review of Systems:

Aside from mentioned where she has the gait difficulties for a few weeks and poor balance and incoord
ination, now more left-sided weakness along with the right-sided weakness, she has no nausea or vomit
ing.  No fevers or chills.  No myalgias or arthralgias.  No rash.  No headaches.  No weight changes.



Physical Examination:

Vital Signs:  Blood pressure 136 up to 161/74-84, pulse 73-89, temperature 98.3, oxygen saturation 99
% on room air.  Weight 147 pounds, height 5 feet 2 inches, BMI 27. 

General:  Ms. Tavares is resting in bed.  She is in no significant distress. 

HEENT:  She is normocephalic, atraumatic.  Sclerae anicteric.  Oropharynx is moist. 

Neck:  Supple. 

Chest:  Clear. 

Heart:  Regular. 

Extremities:  No significant edema, cyanosis, or clubbing.  

Neurologic:  Cranial nerves show no focal deficits.  The left lower extremity has some mild-to-modera
te weakness around 3 to 4/5 proximally and distally in the left upper extremity, 4+/5 weakness.  Righ
t upper and lower extremity, 5-/5 weakness.  Sensory examination slightly decreased to light touch an
d temperature in the left upper and lower extremity compared to the right side.  Reflexes are symmetr
ic.  Coordination intact in upper and lower extremities.  Gait, she does require moderate assistance 
using a 2 wheeled walker.



Laboratory Studies:  Complete blood count with differential is normal.  INR 1.03.  She has a pending 
stroke workup including protein C, protein S, antithrombin 3, Factor V Leiden.  Her cholesterol panel
 shows elevated cholesterol of 239, LDL elevated at 153.  Vitamin D level is very low at 16.8.  TSH v
aaliyah low at 0.277.  Liver function studies normal.  Chemistry is unremarkable except slightly elevated
 chloride of 110.  Her magnesium is normal at 2.0.  Glucose ranged from 103-126.  Urinalysis is unrem
arkable.  She also has pending studies including antiphospholipid antibody, anticardiolipin antibody,
 antiproteinase 3, antimyeloperoxidase antibody.  RPR test is pending.  COVID-19 test is negative.



Assessment:  Ms. 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
ion and lipohyalinosis.  She has had difficulty with coordination predating her more recent stroke.  
She has a stroke workup pending.  Also she is on aspirin, Plavix, Lipitor, deep vein thrombosis proph
ylaxis with Lovenox and blood pressure management with apresoline.  Those medications are to be skylar
nued.  She is currently being evaluated for inpatient rehabilitation transfer for aggressive physical
, occupational, and speech therapy.  The patient was informed that she would recover best after her s
troke, if she has aggressive therapy immediately following the stroke, which is what the patient agre
ed to.





HAMMAD/KACEY

DD:  05/04/2022 18:13:42Voice ID:  [id number]

DT:  05/04/2022 23:57:22Report ID:  [id number]

## 2022-05-05 NOTE — P.DS
Admission Date: 05/04/22


Discharge Date: 05/05/22


Disposition: TRANSFER TO INPATIENT REHAB


Discharge Condition: FAIR


Reason for Admission: Unsteady gait





- Problems


(1) Acute CVA (cerebrovascular accident)


Current Visit: Yes   Status: Acute   





(2) Hypertension


Current Visit: Yes   Status: Resolved   





(3) Unsteady gait


Current Visit: Yes   Status: Acute   


Brief History of Present Illness: 


66-year-old man with a history of hypertension presented to the emergency 

department with a complaint of unsteady gait of several weeks duration which 

became worse over the last 4 days.  She saw her PCP who prescribed her Viibryd 

for possible depression.  Patient reports worsening wobbly gait denies any 

facial deviation or speech problem or visual problems.  She also denied any 

tremors.  There was concern for acute CVA.  CT head negative, MRI of the brain 

showed acute pontine CVA.  Patient unsteady gait likely secondary to acute 

stroke.  She was hospitalized for further management.





Hospital Course: 


Admitted to the medical floor.  Stroke work-up initiated in the ED was 

continued.  Echocardiogram done unremarkable.  Patient placed on aspirin and 

Plavix, seen by neurology, lipid profile checked which showed elevated LDL.  

Patient started on high-dose statin.  She continues to have unsteady gait.  She 

had no trouble with swallowing or speech. She was seen by physical therapy.  

Neurology and physical therapy recommending inpatient rehab.  Patient has been 

accepted to rehab.  She is deemed stable for discharge.





Vital Signs/Physical Exam: 














Temp Pulse Resp BP Pulse Ox


 


 97.0 F   83   18   180/88 H  98 


 


 05/05/22 08:00  05/05/22 08:00  05/05/22 08:00  05/05/22 08:00  05/05/22 08:00








General: Alert, In no apparent distress, Oriented x3


HEENT: Mucous membr. moist/pink


Neck: JVD not distended


Respiratory: Clear to auscultation bilaterally, Normal air movement


Cardiovascular: No edema, Regular rate/rhythm, No murmurs


Gastrointestinal: Soft and benign, Non-distended, No tenderness


Musculoskeletal: No swelling


Integumentary: No rashes


Neurological: Normal strength at 5/5 x4 extr, Cranial nerves 3-12 intact


Laboratory Data at Discharge: 














WBC  7.9 K/uL (4.3-10.9)   05/04/22  03:46    


 


Hgb  13.5 g/dL (12.0-15.0)   05/04/22  03:46    


 


Hct  38.8 % (36.0-45.0)   05/04/22  03:46    


 


Plt Count  284 K/uL (152-406)   05/04/22  03:46    


 


PT  11.3 SECONDS (9.5-12.5)   05/03/22  10:19    


 


INR  1.03   05/03/22  10:19    


 


APTT  31.0 SECONDS (24.3-36.9)   05/03/22  10:19    


 


Sodium  139 mmol/L (136-145)   05/04/22  03:46    


 


Potassium  3.7 mmol/L (3.5-5.1)   05/04/22  03:46    


 


BUN  18 mg/dL (7-18)   05/04/22  03:46    


 


Creatinine  0.88 mg/dL (0.55-1.3)   05/04/22  03:46    


 


Glucose  126 mg/dL ()  H  05/04/22  03:46    


 


Phosphorus  1.6 mg/dL (2.5-4.9)  L  05/04/22  03:46    


 


Magnesium  2.0 mg/dL (1.8-2.4)   05/04/22  03:46    


 


Total Bilirubin  0.3 mg/dL (0.2-1.0)   05/03/22  10:19    


 


AST  19 U/L (15-37)   05/03/22  10:19    


 


ALT  26 U/L (12-78)   05/03/22  10:19    


 


Alkaline Phosphatase  100 U/L ()   05/03/22  10:19    


 


Triglycerides  280 mg/dL (<150)  H  05/04/22  03:46    


 


Cholesterol  259 mg/dL (<200)  H  05/04/22  03:46    


 


HDL Cholesterol  50 mg/dL (40-60)   05/04/22  03:46    


 


Cholesterol/HDL Ratio  5.18   05/04/22  03:46    








Home Medications: 








Lisinopril [Zestril] 1 tab PO DAILY 05/03/22 


Vilazodone HCl [Viibryd] 1 each PO DAILY 05/03/22 


Aspirin [Aspirin EC 81 MG] 81 mg PO DAILY #30 tablet. 05/05/22 


Atorvastatin Calcium [Lipitor] 40 mg PO BEDTIME  tab 05/05/22 


Clopidogrel Bisulfate [Plavix*] 75 mg PO DAILY  tablet 05/05/22 


Nicotine [Nicoderm*] 21 mg TD DAILY  patch.td24 05/05/22 





New Medications: 


Aspirin [Aspirin EC 81 MG] 81 mg PO DAILY #30 tablet.


Diet: AHA


Activity: Fall precautions


Followup: 


Jorge Franco MD [Primary Care Provider] -  (Call to schedule appointment)


Time spent managing pt's care (in minutes): 34

## 2022-05-05 NOTE — R.PREADM
PRE-ADMISSION SCREENING FORM



SCREENING DATE AND TIME



2022 09:59 (CDT) 



ANTICIPATED REHAB ADMISSION DATE



2022 



REFERRING FACILITY



[facility] 



REFERRAL DATE AND TIME



2022 09:59 (CDT) 



REFERRAL ROOM#



207/A 



ACUTE ADMIT DATE



2022 





Previous Rehabilitation(s):





No.



ACUTE /DC PLANNER



Robyn Gomez 



REFERRING PHYSICIAN



Art De La Torre 



PRIMARY CARE PHYSICIAN



Jorge Franco



REHAB FACILITY



[facility] 



CLINICAL LIAISON



Maria Guadalupe Hood 



PHYSICIAN REVIEWER



Dr. Cristhian Penaloza M.D. 



MR#



[id number] 



ACCT#



[id number] 



NAME



SUKHI ZELAYA 



ADDRESS



[address] [place] [address] 



PHONE



[phone] 



[facility]



[id number] 



DATE OF BIRTH



1955 



AGE



66 



SSN#



[id number] 



GENDER



female 



MARITAL STATUS



with a Domestic Partner 



RACE



unknown race 



ADMIT FROM



02 - [facility] 



PRE-HOSPITAL LIVING SETTING



01 - Home (private home/apt. board/care, assisted living, group home, transitional living) 



HOME TYPE AND DETAILS



Type of home: single family house

# of levels in the residence: 1

# of steps within the residence: 0

# of steps to enter the residence: 1



PRE-HOSPITAL LIVING WITH



Other 



FAMILY SUPPORT



Yes 



PRIMARY FAMILY CONTACT NAME



Aurora Dillard 



PRIMARY FAMILY CONTACT PHONE



[phone] 



PRIMARY FAMILY CONTACT RELATIONSHIP



Partner 



PHONE PRIMARY FAMILY CONTACT ON ADM.?



no 



IS PRIMARY FAMILY CONTACT AUTH. REP.?



no 



1ST EMERGENCY CONTACT



Aurora Dillard 



1ST CONTACT PHONE



[phone] 



1ST CONTACT RELATIONSHIP



Partner 



PHONE 1ST CONTACT ON ADM.



no 



IS 1ST CONTACT AUTH. REP.?



no 



PHONE 2ND CONTACT ON ADM.?



no 



PATIENT EMPLOYMENT STATUS



Retired (for age) 



PATIENT EMPLOYER



[employer] Lafle[employer] 



PAYOR INFORMATION:



1ST PAYOR NAME



[employer] 



1ST PAYOR PHONE



[phone] 



1ST PAYOR POLICY ID



[id number] 



INJURY/ILLNESS DUE TO ACCIDENT?



No 



ANOTHER PARTY RESPONSIBLE?



No 



PRIMARY REHAB/ACUTE DIAGNOSIS:



Acute Pontine CVA



ONSET DATE



2022



REHAB IMPAIRMENT CATEGORY (JUAN ALBERTO):



01 Stroke (STR) MEETS 60% rule



PRIMARY DIAGNOSIS-RELATED SURGERIES:



No surgeries related to the primary diagnosis were performed.



INTERVENTIONS:



- Hypertension

Monitor and treat with prescribed medications to maintainsystolic blood pressure between 160-180 as p
er physician recommendation

Increase physical activity

- Depression

Monitor and treat

Administer medications as indicated

- CVA

Monitor Neuro Signs regularly

Provide aggressive OT, PT, and Speech services if warranted to address functional deficits



RISK FOR COMPLICATIONS:



- Skin Breakdown

Encourage ambulation as tolerated

Repositioning q 2 hours

Use of pillows or foam wedges while in bed

- DVT

Active and Passive ROM exercises

Assist patient with frequent position changes

Elevate BLE

- Falls

Maintain call light within patient reach for easy access to nursing assistance

Provide assistive devices

Provide assistance getting out of bed and with ambulation

Assess for medication side effects

- Stroke

Monitor and maintain patient pain level

Monitor patient blood pressure

- Pain

Assist patient with frequent position changes at least every 2 hours

- CVA

pt has history of CVA.  Will monitor blood pressure and manage with medication.



SUMMARY OF ACUTE HOSPITALIZATION:



Pt. is a 65 yo female of unknown race.

On 2022 she was admitted to [facility] with diagnosis Acute Pontine CVA.

Her impairment category is Stroke 01 -  Other Stroke (01.9).

Pre-morbidly, Pt. was independent/mod-I in Self-Care, Communication, and Locomotion; and she had good
 Balance, Transfers Control, Endurance, and Safety Awareness.

Currently, she has deficits of Locomotion, Safety Awareness, Transfers Control, Endurance, and Self-C
are.

Pt. is now referred to [facility] for acute in-patient rehabilitation in order
 to maximize patient's functional independence in activities of daily living, strength, ROM, and mobi
lity.

Patient has realistic goal of being discharged at assistance level 7-Ind to reside at Home with  jerri
rShantel





PAST MEDICAL HISTORY



Hypertension

Depression

Falls



PAST SURGICAL HISTORY:



None



MEDICATION ALLERGIES:



No Known Drug Allergies (NKDA)



ENVIRONMENTAL ALLERGIES:



- Substance Allergies

None Known

- Other Allergies

None Known



CODE STATUS:



Full code



WEIGHT/HEIGHT/BMI:



WEIGHT



147 lbs 



HEIGHT



5' 2" 



BMI



26.9 



DIET:



- Diet Type

Regular

- Diet - Solid Texture

Regular

- Diet - Liquid Texture

Regular

- Tube Feed

N/A







none



REVIEW OF SYSTEMS:



- Gen

Alert and awake

Lying in bed

No apparent distress

Oriented to: person, time, and place

- Vital Signs

Temperature: 98.0 F

SBP/DBP: 166/74

Pulse: 98

Resp: 18

Vital signs stable, afebrile

- CVS

RRR



VITAL SIGNS



Temperature: 98.0 F

SBP/DBP: 166/74

Pulse: 98

Resp: 18

Vital signs stable, afebrile



MEDICATIONS/TREATMENT:



Other-  See attached MAR (Medication Administration Record).



CURRENT SPHINCTER CONTROL:



Pre-hospital bladder status: unspecified

# of bladder accidents in the last 7 days prior to screenin

Pre-hospital bowel status: unspecified

# of bowel accidents in the last 7 days prior to screenin

Last Bowel Movement Date: 2022



CURRENT LOCOMOTION STATUS:



distance walked 100  feet



DETAILED CURRENT FUNCTIONAL STATUS:



- Bladder

accident frequency: 7-Ind - No accidents in the past 7 days

- Bowel

accident frequency: 7-Ind - No accidents in the past 7 days

- Walking

score based on distance walked: 3(>=150ft)



QI SCORES:



- Self-Care

A. Eating  05-Setup or clean-up assistance  

B. Oral hygiene  05-Setup or clean-up assistance  

C. Toileting hygiene  03-Partial/moderate assistance  

E. Shower/bathe self  03-Partial/moderate assistance  

F. Upper body dressing  04-Supervision or touching assistance  

G. Lower body dressing  03-Partial/moderate assistance  

H. Putting on/taking off footwear  03-Partial/moderate assistance  

- Mobility

A. Roll left and right  03-Partial/moderate assistance  

B. Sit to lying  03-Partial/moderate assistance  

C. Lying to sitting on side of bed  03-Partial/moderate assistance  

D. Sit to stand  03-Partial/moderate assistance  

E. Chair/bed-to-chair transfer  03-Partial/moderate assistance  

F. Toilet transfer  03-Partial/moderate assistance  

G. Car transfer  03-Partial/moderate assistance  

I. Walk 10 feet  03-Partial/moderate assistance  

J. Walk 50 feet with two turns  03-Partial/moderate assistance  

K. Walk 150 feet  88-Not attempted due to medical condition or safety concerns  

L. Walking 10 feet on uneven surfaces  88-Not attempted due to medical condition or safety concerns  


M. 1 step (curb)  88-Not attempted due to medical condition or safety concerns  

N. 4 steps  88-Not attempted due to medical condition or safety concerns  

O. 12 steps  88-Not attempted due to medical condition or safety concerns  

P. Picking up object  88-Not attempted due to medical condition or safety concerns  

R. Wheel 50 feet with two turns      

S. Wheel 150 feet      

- Bladder and Bowel

Bladder continence  2-Incontinent less than daily  

Bowel continence  0-Always continent  

- Endurance

Poor

- Balance

Poor

- Safety Awareness

Fair



CURRENT FUNC. DEFICITS:



Self-Care, Mobility, Endurance, Balance, and Safety Awareness



CURRENT / PREVIOUS ASSISTIVE DEVICES:



Rolling Walker





HISTORY OF FALLS. HAS THE PATIENT HAD TWO OR MORE FALLS IN THE PAST YEAR OR ANY FALL WITH INJURY IN T
HE PAST YEAR?:



Yes 



PRIOR SURGERY. DID THE PATIENT HAVE MAJOR SURGERY DURING  DAYS PRIOR TO ADMISSION?:



No 



THERAPY NOTES FROM ACUTE CARE:



Attached.



SPECIAL NEEDS:



- Safety Concerns

Skin breakdown and Fall precautions needed due to skin breakdown risk, Poor balance, Fall history, an
d High fall risk



PRECAUTIONS:



- Fall Precaution

Bed alarm

TABS alarm

Wheel chair alarm

- DVT Risk

due to restricted mobility and age

- Skin Breakdown Risk

due to restricted mobility and age



PATIENT NEEDS ACTIVE AND ONGOING THERAPEUTIC INTERVENTION OF MULTIPLE THERAPY DISCIPLINES, INCLUDING:




- Dietary and Nutrition

Adequate Nutrition. Nutritional Education. Nutritional Supplements. 



- Occupational Therapy

Cognitive Retraining. Evaluate and Treat. Patient needs Occupational Therapy for a daily minimum of 1
.5 hours at least 5 out of 7 days, to improve Activities of Daily Living, including: Bathing, Toilet 
Transfers, Community Reintegration, Higher functional activities, Adaptive Equipment, Household Tasks
, and Other activities as determined. Visual Perceptual Training. 



- Speech Therapy

Cognitive Training. Evaluate and Treat. Expressive Language Skills. Memory Strategies. Receptive Lang
uage Skills. Speech Intelligibility Training. 



- Physical Therapy

Patient needs Physical Therapy for a daily minimum of 1.5 hours at least 5 out of 7 days, to improve:
 Mobility, Strengthening, Transfers, Stretching, ROM, Endurance, Ability to manage stairs, Gait, and 
Balance. Safety Awareness. Medical Equipment Assessment and Evaluation. 



PATIENT NEEDS CLOSE MEDICAL SUPERVISION BY A REHABILITATION PHYSICIAN FOR:



Coordination of Treatment Team

DVT Management

Bowel and Bladder Management

Medical and Co-Morbidity Management

Pain Management



PATIENT REQUIRES 24X7 REHAB NURSING FOR MEDICAL AND FUNCTIONAL MGT. OF THE FOLLOWING DEFICITS:



Disease Management

Patient requires 24x7 Rehabilitation Nursing for: Pain Issues, Identifying and preventing risk factor
s, Monitoring and reporting current medical conditions, Assisting with ambulation and transfer, Shakeel
ting with all ADL-s, Teaching patients about disease process and medications, Family teaching, Provid
ing safe environment, Bowel and Bladder Issues, Skin Integrity, and Medication Management



PATIENT REQUIRES INTENSIVE, COORDINATED INTERDISCIPLINARY APPROACH TO REHAB:



Arranging Home Equipment/Services

Discharge Planning

Family Intervention/Training

Patient needs Dietary and Nutrition Services for: Adequate Nutrition, Nutritional Supplements, and Nu
tritional Education

Patient needs  and/or Case Management for: Discharge Planning, Arranging Home Equipmen
t or Services, and Family Interventions

/Case Management



PATIENT REHAB POTENTIAL:



RACHEL ZELAYA is able and expected to receive 3 hours of individualized therapy daily on at least 5 of e
very 7 days

RACHEL ZELAYA's prognosis for significant practical improvement within a reasonable period of time appea
rs Good

Expected level of measurable improvement will be of a practical value to RACHEL ZELAYA's functional capa
city or adaptations to impairments

Has a viable Discharge Plan

Medically appropriate; condition is sufficiently stable to participate in intensive rehab program



DISCHARGE PLAN:



- Estimated Length of Stay (days)

17. 



- Consensus on plan

Discharge plan has been discussed with primary caregiver. Patient/Family is in agreement with the khushi
n. Primary caregiver is in agreement with the plan. 



- Patient/Family Goals

Return home independently. 



- Planned Living Setting Upon Discharge

Home, to live with Other. Transitional Living. 



RECOMMENDED CARE LEVEL:



IRF



RECOMMENDATION DETAILS:



Recommended Admission to Comprehensive Rehabilitation Program to Increase Functional [place]



SCREENER'S COMPLETENESS CONFIRMATION:



- Screening Confirmation

The patient data collection on this preadmission screening form is finished



PHYSICIANS REVIEW AND ADMISSION DETERMINATION



Admit - Based on my review of the Pre-Admission Screening results, in my medical judgment and experie
nce, I concur with the findings and recommend admission to [facility], as this
 patient requires an IRF level of care.



SIGNATURE PANEL:



Clinical Liaison - [electronically] signed by Maria Guadalupe Hood on 2022 at 10:33 (CDT)

 - [electronically] signed by Héctor Santiago PT on 2022 at 10:52 (CDT)

Physician Reviewer - [electronically] signed by Dr. Cristhian Penaloza M.D. on 2022 at 12:03 (CDT
)

## 2022-05-06 LAB
ALBUMIN SERPL BCP-MCNC: 3 G/DL (ref 3.4–5)
BUN BLD-MCNC: 18 MG/DL (ref 7–18)
GLUCOSE SERPLBLD-MCNC: 103 MG/DL (ref 74–106)
HCT VFR BLD CALC: 36.4 % (ref 36–45)
LYMPHOCYTES # SPEC AUTO: 2.7 K/UL (ref 0.7–4.9)
MAGNESIUM SERPL-MCNC: 2.1 MG/DL (ref 1.8–2.4)
PMV BLD: 7.2 FL (ref 7.6–11.3)
POTASSIUM SERPL-SCNC: 4.1 MMOL/L (ref 3.5–5.1)
PREALB SERPL-MCNC: 18.5 MG/DL (ref 20–40)
RBC # BLD: 4.16 M/UL (ref 3.86–4.86)
RPR SER QL: (no result)
UA DIPSTICK W REFLEX MICRO PNL UR: (no result)

## 2022-05-06 RX ADMIN — ATORVASTATIN CALCIUM SCH MG: 40 TABLET, FILM COATED ORAL at 19:28

## 2022-05-06 RX ADMIN — ENOXAPARIN SODIUM SCH MG: 40 INJECTION SUBCUTANEOUS at 07:29

## 2022-05-06 RX ADMIN — SENNOSIDES AND DOCUSATE SODIUM SCH TAB: 8.6; 5 TABLET ORAL at 14:53

## 2022-05-06 RX ADMIN — ASPIRIN SCH MG: 81 TABLET, COATED ORAL at 07:31

## 2022-05-06 RX ADMIN — NICOTINE SCH MG: 21 PATCH TRANSDERMAL at 07:29

## 2022-05-06 RX ADMIN — SENNOSIDES AND DOCUSATE SODIUM SCH TAB: 8.6; 5 TABLET ORAL at 19:29

## 2022-05-06 RX ADMIN — CLOPIDOGREL BISULFATE SCH MG: 75 TABLET, FILM COATED ORAL at 07:31

## 2022-05-06 RX ADMIN — Medication SCH MG: at 07:32

## 2022-05-06 NOTE — P.RH.PN
Estimated Length of Stay: 14


Expected Discharge Date: 05/19/22


Discharge Disposition Plan: Home


Family Support: Yes


Long Term Goal: Mobility, Transfers, Self Care


Vital Signs: 


                                Last Vital Signs











Temp  97.8 F   05/06/22 07:32


 


Pulse  79   05/06/22 07:32


 


Resp  18   05/06/22 07:32


 


BP  130/68   05/06/22 07:32


 


Pulse Ox  95   05/06/22 07:32











Laboratory: 


                             Laboratory Last Values











WBC  7.2 K/uL (4.3-10.9)   05/06/22  04:22    


 


RBC  4.16 M/uL (3.86-4.86)   05/06/22  04:22    


 


Hgb  12.4 g/dL (12.0-15.0)   05/06/22  04:22    


 


Hct  36.4 % (36.0-45.0)   05/06/22  04:22    


 


MCV  87.6 fL ()   05/06/22  04:22    


 


MCH  29.9 pg (27.0-35.0)   05/06/22  04:22    


 


MCHC  34.1 g/dL (32.0-36.0)   05/06/22  04:22    


 


RDW  13.9 % (12.1-15.2)   05/06/22  04:22    


 


Plt Count  259 K/uL (152-406)   05/06/22  04:22    


 


MPV  7.2 fL (7.6-11.3)  L  05/06/22  04:22    


 


Neutrophils %  49.9 % (41.7-73.7)   05/06/22  04:22    


 


Lymphocytes %  37.6 % (15.3-44.8)   05/06/22  04:22    


 


Monocytes %  10.7 % (3.3-12.3)   05/06/22  04:22    


 


Eosinophils %  1.3 % (0-4.4)   05/06/22  04:22    


 


Basophils %  0.5 % (0-1.3)   05/06/22  04:22    


 


Absolute Neutrophils  3.6 K/uL (1.8-8.0)   05/06/22  04:22    


 


Absolute Lymphocytes  2.7 K/uL (0.7-4.9)   05/06/22  04:22    


 


Absolute Monocytes  0.8 K/uL (0.1-1.3)   05/06/22  04:22    


 


Absolute Eosinophils  0.1 K/uL (0-0.5)   05/06/22  04:22    


 


Absolute Basophils  0.0 K/uL (0-0.5)   05/06/22  04:22    


 


Sodium  137 mmol/L (136-145)   05/06/22  04:22    


 


Potassium  4.1 mmol/L (3.5-5.1)   05/06/22  04:22    


 


Chloride  111 mmol/L ()  H  05/06/22  04:22    


 


Carbon Dioxide  21 mmol/L (21-32)   05/06/22  04:22    


 


Anion Gap  9.1 mEq/L (5.0-15.0)  D 05/06/22  04:22    


 


BUN  18 mg/dL (7-18)   05/06/22  04:22    


 


Creatinine  0.83 mg/dL (0.55-1.3)   05/06/22  04:22    


 


Estimated GFR  69 mL/min (=/>90)  L  05/06/22  04:22    


 


Glucose  103 mg/dL ()   05/06/22  04:22    


 


Calcium  8.8 mg/dL (8.5-10.1)   05/06/22  04:22    


 


Magnesium  2.1 mg/dL (1.8-2.4)   05/06/22  04:22    


 


Albumin  3.0 g/dL (3.4-5.0)  L  05/06/22  04:22    


 


Prealbumin  18.5 mg/dL (20-40)  L  05/06/22  04:22    











Weight: 148 lb 1.6 oz


Physician Update: Bed mobility supervision, transfers at CGA, gait 150' with 

queing crossing her leg. Fatigues easily. Not coordinated to use a single prong 

cane. May have features of Parkinsonism. CGA to min assistance for transfers, 

bathing. Standby for grooming. Poor right hand dexterity.


Comment: No skin breakdown


Summary: Patient's care plan and long term goals have been reviewed and revised 

as necessary. Please see the Rehabilitation Signature page for all necessary 

signatures.

## 2022-05-07 LAB
ALBUMIN SERPL ELPH-MCNC: 4 G/DL (ref 3.8–4.8)
PROT PATTERN SERPL ELPH-IMP: (no result)

## 2022-05-07 RX ADMIN — ATORVASTATIN CALCIUM SCH MG: 40 TABLET, FILM COATED ORAL at 19:32

## 2022-05-07 RX ADMIN — CLOPIDOGREL BISULFATE SCH MG: 75 TABLET, FILM COATED ORAL at 07:42

## 2022-05-07 RX ADMIN — NICOTINE SCH MG: 21 PATCH TRANSDERMAL at 07:42

## 2022-05-07 RX ADMIN — ASPIRIN SCH MG: 81 TABLET, COATED ORAL at 07:42

## 2022-05-07 RX ADMIN — Medication SCH MG: at 09:51

## 2022-05-07 RX ADMIN — ENOXAPARIN SODIUM SCH MG: 40 INJECTION SUBCUTANEOUS at 07:16

## 2022-05-07 NOTE — R.PN
PROGRESS NOTES



ENCOUNTER DATE AND TIME:



05/07/2022 16:44 (CDT)



NAME



SUKHI ZELAYA



YOB: 1955



DATE OF ADMISSION:



05/05/2022 18:12 (CDT)



Acute 5 mm right pontine CVACHIEF COMPLAINT:



Left sided weakness after right pontine stroke.



SUBJECTIVE:



Pt denied any depression.

Pt denied any Shortness of Breath.

CBC with differential is normal, prealbumin 18.5.

Ambulated 750' with a rolling walker and contact guard assistance.



VITAL SIGNS



Temperature: 97.8 F

SBP/DBP: 159/76

Pulse: 100

Resp: 16



MEDICATION ALLERGIES:



No Known Drug Allergies (NKDA)



ENVIRONMENTAL ALLERGIES:



- Substance Allergies

None Known

- Other Allergies

None Known



NURSING:



- Shower

allowing shower

- Bladder

care per protocol

- Skin

care per protocol



PRECAUTIONS:



- Fall Precaution

Bed alarm

TABS alarm

Wheel chair alarm

- DVT Risk

due to restricted mobility and age

- Skin Breakdown Risk

due to restricted mobility and age



ACTIVITIES



OOB only with supervision



THERAPIES:



- Dietary and Nutrition

Adequate Nutrition. Nutritional Education. Nutritional Supplements. 



- Occupational Therapy

Cognitive Retraining. Evaluate and Treat. Patient needs Occupational Therapy for a daily minimum of 1
.5 hours at least 5 out of 7 days, to improve Activities of Daily Living, including: Bathing, Toilet 
Transfers, Community Reintegration, Higher functional activities, Adaptive Equipment, Household Tasks
, and Other activities as determined. Visual Perceptual Training. 



- Speech Therapy

Cognitive Training. Evaluate and Treat. Expressive Language Skills. Memory Strategies. Receptive Lang
uage Skills. Speech Intelligibility Training. 



- Physical Therapy

Patient needs Physical Therapy for a daily minimum of 1.5 hours at least 5 out of 7 days, to improve:
 Mobility, Strengthening, Transfers, Stretching, ROM, Endurance, Ability to manage stairs, Gait, and 
Balance. Safety Awareness. Medical Equipment Assessment and Evaluation. 



PHYSICAL EXAM



- Gen

Alert and awake

Lying in bed

No apparent distress

Oriented to: person, time, and place

- Skin

No skin breakdown.



No abnormalities

- Eyes

No abnormalities

- ENMT

No abnormalities

- Neck

No abnormalities



No cervical adenopathy

- CVS

RRR

- Chest

No abnormalities

- Resp

Clear to auscultation

- Abd

+ bowel sounds

- GI

Soft



Deferred

- 

No abnormalities

- Ext

Mild left lower extremity edema.

- MSK

4+/5 weakness in left upper and lower extremity

- Neuro

4/5 strength left upper and lower extremities.

- Psych

Mild depression.



ASSESSMENT:



Pt. is a 65 yo female of unknown race.On 05/03/2022 she was admitted to [facility] with di
agnosis Acute 5 mm right pontine CVA.Her impairment category is Stroke 01 -  Other Stroke (01.9).Pre-
morbidly, Pt. was independent/mod-I in Self-Care, Communication, and Locomotion; and she had good Bal
ance, Transfers Control, Endurance, and Safety Awareness.Currently, she has deficits of Locomotion, S
afety Awareness, Transfers Control, Endurance, and Self-Care.Pt. is now referred to [facility] for acute in-patient rehabilitation in order to maximize patient's functional indepe
ndence in activities of daily living, strength, ROM, and mobility.- Rehab Goal

Patient has realistic goal of being discharged at assistance level 7-Ind to reside at Home with  Renny
dylon

MDM/PLAN:



- Physical Therapy

Weakness - to improve, our physical therapists will perform initial evaluation of pt's status upon ad
mission and devise an individualized program for Aquatic Therapy, Neuromuscular Reeducation, and Stre
ngthening

Inability to transfer - to improve, our physical therapists will perform initial evaluation of pt's s
tatus upon admission and devise an individualized program for Bed mobility

Need in caregiver upon discharge - to improve, our physical therapists will perform initial evaluatio
n of pt's status upon admission and devise an individualized program for Caregiver Training

Poor endurance - to improve, our physical therapists will perform initial evaluation of pt's status u
chanell admission and devise an individualized program for Endurance Training

Gait dysfunction - to improve, our physical therapists will perform initial evaluation of pt's status
 upon admission and devise an individualized program for Gait Training, and Wheel Chair mobility

Need for home safety evaluation - to improve, our physical therapists will perform initial evaluation
 of pt's status upon admission and devise an individualized program for Home Evaluation

New precaution - to improve, our physical therapists will perform initial evaluation of pt's status u
chanell admission and devise an individualized program for Patient precaution education

Edema - to improve, our physical therapists will perform initial evaluation of pt's status upon admis
brian and devise an individualized program for Elevation Training, and Lymphedema Therapy

- Occupational Therapy

Weakness - to improve, our occupation therapists will perform initial evaluation of pt's status upon 
admission and devise an individualized program for Aquatic Therapy, Balance, Endurance, UE ROM, and U
E strengthening

ADL deficits - to improve, our occupation therapists will perform initial evaluation of pt's status u
chanell admission and devise an individualized program for Bathing, Bed mobility, Community Reintegration
, Cooking, Dressing, Eating, Fine Motor Skills, Grooming, Homemaking, Kitchen Mobility, Laundry, Liya
ent Education, Safety Awareness, Splinting - Positioning, Transfers(Toilet, Tub, Shower), and Wheel C
hair Management

Need for care giver - to improve, our occupation therapists will perform initial evaluation of pt's s
tatus upon admission and devise an individualized program for Caregiver Training

- Other

 See attached MAR (Medication Administration Record)

- Diet Type

Continue Regular

- Diet - Liquid Texture

Continue Regular

- Tube Feed

Continue N/A

- Bladder

 care per protocol

- DVT Risk

due to restricted mobility and age

- Skin Breakdown Risk

due to restricted mobility and age

- Fall Precaution

 Bed alarm

 TABS alarm

 Wheel chair alarm

- Skin

 care per protocol

- Diet - Solid Texture

Continue Regular

- Shower

 allowing shower



 for Dementia, TBI, Stroke, or others

- Balance

 for Weakness

- Bed mobility

 for ADL deficits

- Cognition - orientation

 for Dementia

- Dressing

 Status: indep

 for ADL deficits

- Eating

 for ADL deficits

- Grooming

 Status: indep

 for ADL deficits

- Toilet Transfer

 for ADL deficits

 Status: indep

- Bed to Chair Transfer squat pivot transfer

 Status: indep

 for ADL deficits

- Tub Transfer

 for ADL deficits

 Status: indep

- Hygiene

 for ADL deficits

- Shower Transfer

 for ADL deficits

 Status: indep

- Wheel Chair to Bed Transfer

 Status: indep

 for ADL deficits



FUNCTIONAL STATUS: UPDATED AT WEEKLY TEAM CONFERENCE



- Bladder

Same accident frequency: 7-Ind - No accidents in the past 7 days

- Bowel

Same accident frequency: 7-Ind - No accidents in the past 7 days

- Walking

Same score based on distance walked: 3(>=150ft)

- Wheelchair

Same 



FUNCTIONAL STATUS:



- Self-Care

A. Eating    Ind   

B. Grooming    sup   

C. Bathing    sup   

D. Dressing - Upper     sup   

E. Dressing - Lower     sup   

F. Toileting    Marisa   

- Sphincter Control

G. Bladder control     Marisa   

H. Bowel control     Marisa   

- Transfers Control

I. Bed/Chair/Wheelchair     sup   

J. Toilet    Ind   

K. Tub/Shower    Nany   

- Locomotion

L. Walk/Wheelchair (B)     Nany   

M. Stairs    maxA   

- Communication

N. Comprehension (B)     Marisa   

O. Expression (B)     Marisa   

- Social Cognition

P. Social Interaction    Marisa   

Q. Problem Solving    Marisa   

R. Memory    Ind   

- Endurance

Good

- Balance

Good

- Safety Awareness

Good



QI SCORES:



- Self-Care

A. Eating  05-Setup or clean-up assistance  

B. Oral hygiene  05-Setup or clean-up assistance  

C. Toileting hygiene  03-Partial/moderate assistance  

E. Shower/bathe self  03-Partial/moderate assistance  

F. Upper body dressing  04-Supervision or touching assistance  

G. Lower body dressing  03-Partial/moderate assistance  

H. Putting on/taking off footwear  03-Partial/moderate assistance  

- Mobility

A. Roll left and right  03-Partial/moderate assistance  

B. Sit to lying  03-Partial/moderate assistance  

C. Lying to sitting on side of bed  03-Partial/moderate assistance  

D. Sit to stand  03-Partial/moderate assistance  

E. Chair/bed-to-chair transfer  03-Partial/moderate assistance  

F. Toilet transfer  03-Partial/moderate assistance  

G. Car transfer  03-Partial/moderate assistance  

I. Walk 10 feet  03-Partial/moderate assistance  

J. Walk 50 feet with two turns  03-Partial/moderate assistance  

K. Walk 150 feet  88-Not attempted due to medical condition or safety concerns  

L. Walking 10 feet on uneven surfaces  88-Not attempted due to medical condition or safety concerns  


M. 1 step (curb)  88-Not attempted due to medical condition or safety concerns  

N. 4 steps  88-Not attempted due to medical condition or safety concerns  

O. 12 steps  88-Not attempted due to medical condition or safety concerns  

P. Picking up object  88-Not attempted due to medical condition or safety concerns  

R. Wheel 50 feet with two turns      

S. Wheel 150 feet      

- Bladder and Bowel

Bladder continence  2-Incontinent less than daily  

Bowel continence  0-Always continent  

- Endurance

Poor

- Balance

Poor

- Safety Awareness

Fair



CURRENT FUNC. DEFICITS:



Self-Care, Mobility, Endurance, Balance, and Safety Awareness



SIGNATURE PANEL:



Electronically signed by Dr. Cristhian Penaloza M.D. on 05/07/2022 at 16:53 (CDT)

## 2022-05-08 LAB
CARDIOLIPIN IGA SER IA-ACNC: <2 APL-U/ML (ref ?–20)
Lab: 13 U/ML (ref ?–10)
Lab: <20 U/ML (ref ?–20)
Lab: <25 U/ML (ref ?–25)

## 2022-05-08 RX ADMIN — CLOPIDOGREL BISULFATE SCH MG: 75 TABLET, FILM COATED ORAL at 08:23

## 2022-05-08 RX ADMIN — SENNOSIDES AND DOCUSATE SODIUM SCH: 8.6; 5 TABLET ORAL at 19:59

## 2022-05-08 RX ADMIN — ATORVASTATIN CALCIUM SCH MG: 40 TABLET, FILM COATED ORAL at 19:54

## 2022-05-08 RX ADMIN — ENOXAPARIN SODIUM SCH MG: 40 INJECTION SUBCUTANEOUS at 07:38

## 2022-05-08 RX ADMIN — Medication SCH MG: at 08:18

## 2022-05-08 RX ADMIN — NICOTINE SCH MG: 21 PATCH TRANSDERMAL at 08:23

## 2022-05-08 RX ADMIN — SENNOSIDES AND DOCUSATE SODIUM SCH TAB: 8.6; 5 TABLET ORAL at 19:54

## 2022-05-08 RX ADMIN — ASPIRIN SCH MG: 81 TABLET, COATED ORAL at 08:22

## 2022-05-09 RX ADMIN — CLOPIDOGREL BISULFATE SCH MG: 75 TABLET, FILM COATED ORAL at 07:41

## 2022-05-09 RX ADMIN — NICOTINE SCH MG: 21 PATCH TRANSDERMAL at 07:41

## 2022-05-09 RX ADMIN — ENOXAPARIN SODIUM SCH MG: 40 INJECTION SUBCUTANEOUS at 07:41

## 2022-05-09 RX ADMIN — ASPIRIN SCH MG: 81 TABLET, COATED ORAL at 07:41

## 2022-05-09 RX ADMIN — SENNOSIDES AND DOCUSATE SODIUM SCH: 8.6; 5 TABLET ORAL at 21:00

## 2022-05-09 RX ADMIN — ATORVASTATIN CALCIUM SCH MG: 40 TABLET, FILM COATED ORAL at 21:04

## 2022-05-09 RX ADMIN — Medication SCH MG: at 07:41

## 2022-05-09 NOTE — R.PN
PROGRESS NOTES



ENCOUNTER DATE AND TIME:



05/09/2022 18:54 (CDT)



NAME



SUKHI ZELAAY



YOB: 1955



DATE OF ADMISSION:



05/05/2022 18:12 (CDT)



Acute 5 mm right pontine CVACHIEF COMPLAINT:



Left sided weakness after right pontine stroke.



SUBJECTIVE:



Pt denied any depression.

Pt denied any Shortness of Breath.

CBC with differential is normal, prealbumin 18.5.

Ambulated without an assistive device with contact guard assistance.



VITAL SIGNS



Temperature: 97.2 F

SBP/DBP: 170/81

Pulse: 89

Resp: 16



MEDICATION ALLERGIES:



No Known Drug Allergies (NKDA)



ENVIRONMENTAL ALLERGIES:



- Substance Allergies

None Known

- Other Allergies

None Known



NURSING:



- Shower

allowing shower

- Bladder

care per protocol

- Skin

care per protocol



PRECAUTIONS:



- Fall Precaution

Bed alarm

TABS alarm

Wheel chair alarm

- DVT Risk

due to restricted mobility and age

- Skin Breakdown Risk

due to restricted mobility and age



ACTIVITIES



OOB only with supervision



THERAPIES:



- Dietary and Nutrition

Adequate Nutrition. Nutritional Education. Nutritional Supplements. 



- Occupational Therapy

Cognitive Retraining. Evaluate and Treat. Patient needs Occupational Therapy for a daily minimum of 1
.5 hours at least 5 out of 7 days, to improve Activities of Daily Living, including: Bathing, Toilet 
Transfers, Community Reintegration, Higher functional activities, Adaptive Equipment, Household Tasks
, and Other activities as determined. Visual Perceptual Training. 



- Speech Therapy

Cognitive Training. Evaluate and Treat. Expressive Language Skills. Memory Strategies. Receptive Lang
uage Skills. Speech Intelligibility Training. 



- Physical Therapy

Patient needs Physical Therapy for a daily minimum of 1.5 hours at least 5 out of 7 days, to improve:
 Mobility, Strengthening, Transfers, Stretching, ROM, Endurance, Ability to manage stairs, Gait, and 
Balance. Safety Awareness. Medical Equipment Assessment and Evaluation. 



PHYSICAL EXAM



- Gen

Alert and awake

Lying in bed

No apparent distress

Oriented to: person, time, and place

- Skin

No skin breakdown.



No abnormalities

- Eyes

No abnormalities

- ENMT

No abnormalities

- Neck

No abnormalities



No cervical adenopathy

- CVS

RRR

- Chest

No abnormalities

- Resp

Clear to auscultation

- Abd

+ bowel sounds

- GI

Soft



Deferred

- 

No abnormalities

- Ext

Mild left lower extremity edema.

- MSK

4+/5 weakness in left upper and lower extremity

- Neuro

4/5 strength left upper and lower extremities.

- Psych

Mild depression.



ASSESSMENT:



Pt. is a 65 yo female of unknown race.On 05/03/2022 she was admitted to [facility] with di
agnosis Acute 5 mm right pontine CVA.Her impairment category is Stroke 01 -  Other Stroke (01.9).Pre-
morbidly, Pt. was independent/mod-I in Self-Care, Communication, and Locomotion; and she had good Bal
ance, Transfers Control, Endurance, and Safety Awareness.Currently, she has deficits of Locomotion, S
afety Awareness, Transfers Control, Endurance, and Self-Care.Pt. is now referred to [facility] for acute in-patient rehabilitation in order to maximize patient's functional indepe
ndence in activities of daily living, strength, ROM, and mobility.- Rehab Goal

Patient has realistic goal of being discharged at assistance level 7-Ind to reside at Home with  Renny matthewsShantel

MDM/PLAN:



- Physical Therapy

 Weakness - to improve, our physical therapists will perform initial evaluation of pt's status upon a
dmission and devise an individualized program for Aquatic Therapy, Neuromuscular Reeducation, and Str
engthening

 Inability to transfer - to improve, our physical therapists will perform initial evaluation of pt's 
status upon admission and devise an individualized program for Bed mobility

 Need in caregiver upon discharge - to improve, our physical therapists will perform initial evaluati
on of pt's status upon admission and devise an individualized program for Caregiver Training

 Poor endurance - to improve, our physical therapists will perform initial evaluation of pt's status 
upon admission and devise an individualized program for Endurance Training

 Gait dysfunction - to improve, our physical therapists will perform initial evaluation of pt's statu
s upon admission and devise an individualized program for Gait Training, and Wheel Chair mobility

 Need for home safety evaluation - to improve, our physical therapists will perform initial evaluatio
n of pt's status upon admission and devise an individualized program for Home Evaluation

 New precaution - to improve, our physical therapists will perform initial evaluation of pt's status 
upon admission and devise an individualized program for Patient precaution education

 Edema - to improve, our physical therapists will perform initial evaluation of pt's status upon admi
ssion and devise an individualized program for Elevation Training, and Lymphedema Therapy

- Occupational Therapy

 Weakness - to improve, our occupation therapists will perform initial evaluation of pt's status upon
 admission and devise an individualized program for Aquatic Therapy, Balance, Endurance, UE ROM, and 
UE strengthening

 ADL deficits - to improve, our occupation therapists will perform initial evaluation of pt's status 
upon admission and devise an individualized program for Bathing, Bed mobility, Community Reintegratio
n, Cooking, Dressing, Eating, Fine Motor Skills, Grooming, Homemaking, Kitchen Mobility, Laundry, Pat
ient Education, Safety Awareness, Splinting - Positioning, Transfers(Toilet, Tub, Shower), and Wheel 
Chair Management

 Need for care giver - to improve, our occupation therapists will perform initial evaluation of pt's 
status upon admission and devise an individualized program for Caregiver Training

- Other

 See attached MAR (Medication Administration Record)

- Diet Type

Continue Regular

- Diet - Liquid Texture

Continue Regular

- Tube Feed

Continue N/A

- Bladder

 care per protocol

- DVT Risk

 due to restricted mobility and age

- Skin Breakdown Risk

 due to restricted mobility and age

- Fall Precaution

 Bed alarm

 TABS alarm

 Wheel chair alarm

- Skin

 care per protocol

- Diet - Solid Texture

Continue Regular

- Shower

 allowing shower



 for Dementia, TBI, Stroke, or others

- Balance

 for Weakness

- Bed mobility

 for ADL deficits

- Cognition - orientation

 for Dementia

- Dressing

 Status: indep

 for ADL deficits

- Eating

 for ADL deficits

- Grooming

 Status: indep

 for ADL deficits

- Toilet Transfer

 for ADL deficits

 Status: indep

- Bed to Chair Transfer squat pivot transfer

 Status: indep

 for ADL deficits

- Tub Transfer

 for ADL deficits

 Status: indep

- Hygiene

 for ADL deficits

- Shower Transfer

 for ADL deficits

 Status: indep

- Wheel Chair to Bed Transfer

 Status: indep

 for ADL deficits



FUNCTIONAL STATUS: UPDATED AT WEEKLY TEAM CONFERENCE



- Bladder

Same accident frequency: 7-Ind - No accidents in the past 7 days

- Bowel

Same accident frequency: 7-Ind - No accidents in the past 7 days

- Walking

Same score based on distance walked: 3(>=150ft)

- Wheelchair

Same 



FUNCTIONAL STATUS:



- Self-Care

A. Eating    Ind   

B. Grooming    sup   

C. Bathing    sup   

D. Dressing - Upper     sup   

E. Dressing - Lower     sup   

F. Toileting    Marisa   

- Sphincter Control

G. Bladder control     Marisa   

H. Bowel control     Marisa   

- Transfers Control

I. Bed/Chair/Wheelchair     sup   

J. Toilet    Ind   

K. Tub/Shower    Nany   

- Locomotion

L. Walk/Wheelchair (B)     Nany   

M. Stairs    maxA   

- Communication

N. Comprehension (B)     Marisa   

O. Expression (B)     Marisa   

- Social Cognition

P. Social Interaction    Marisa   

Q. Problem Solving    Marisa   

R. Memory    Ind   

- Endurance

Good

- Balance

Good

- Safety Awareness

Good



QI SCORES:



- Self-Care

A. Eating  05-Setup or clean-up assistance  

B. Oral hygiene  05-Setup or clean-up assistance  

C. Toileting hygiene  03-Partial/moderate assistance  

E. Shower/bathe self  03-Partial/moderate assistance  

F. Upper body dressing  04-Supervision or touching assistance  

G. Lower body dressing  03-Partial/moderate assistance  

H. Putting on/taking off footwear  03-Partial/moderate assistance  

- Mobility

A. Roll left and right  03-Partial/moderate assistance  

B. Sit to lying  03-Partial/moderate assistance  

C. Lying to sitting on side of bed  03-Partial/moderate assistance  

D. Sit to stand  03-Partial/moderate assistance  

E. Chair/bed-to-chair transfer  03-Partial/moderate assistance  

F. Toilet transfer  03-Partial/moderate assistance  

G. Car transfer  03-Partial/moderate assistance  

I. Walk 10 feet  03-Partial/moderate assistance  

J. Walk 50 feet with two turns  03-Partial/moderate assistance  

K. Walk 150 feet  88-Not attempted due to medical condition or safety concerns  

L. Walking 10 feet on uneven surfaces  88-Not attempted due to medical condition or safety concerns  


M. 1 step (curb)  88-Not attempted due to medical condition or safety concerns  

N. 4 steps  88-Not attempted due to medical condition or safety concerns  

O. 12 steps  88-Not attempted due to medical condition or safety concerns  

P. Picking up object  88-Not attempted due to medical condition or safety concerns  

R. Wheel 50 feet with two turns      

S. Wheel 150 feet      

- Bladder and Bowel

Bladder continence  2-Incontinent less than daily  

Bowel continence  0-Always continent  

- Endurance

Poor

- Balance

Poor

- Safety Awareness

Fair



CURRENT FUNC. DEFICITS:



Self-Care, Mobility, Endurance, Balance, and Safety Awareness



SIGNATURE PANEL:



Electronically signed by Dr. Cristhian Penaloza M.D. on 05/09/2022 at 18:56 (CDT)

## 2022-05-10 RX ADMIN — ASPIRIN SCH MG: 81 TABLET, COATED ORAL at 07:23

## 2022-05-10 RX ADMIN — NICOTINE SCH MG: 21 PATCH TRANSDERMAL at 07:24

## 2022-05-10 RX ADMIN — Medication SCH MG: at 07:23

## 2022-05-10 RX ADMIN — CLOPIDOGREL BISULFATE SCH MG: 75 TABLET, FILM COATED ORAL at 07:23

## 2022-05-10 RX ADMIN — SENNOSIDES AND DOCUSATE SODIUM SCH: 8.6; 5 TABLET ORAL at 19:41

## 2022-05-10 RX ADMIN — ENOXAPARIN SODIUM SCH MG: 40 INJECTION SUBCUTANEOUS at 07:24

## 2022-05-10 RX ADMIN — ATORVASTATIN CALCIUM SCH MG: 40 TABLET, FILM COATED ORAL at 19:41

## 2022-05-10 NOTE — R.PN
PROGRESS NOTES



ENCOUNTER DATE AND TIME:



05/10/2022 16:44 (CDT)



NAME



SUKHI ZELAYA



YOB: 1955



DATE OF ADMISSION:



05/05/2022 18:12 (CDT)



Acute 5 mm right pontine CVACHIEF COMPLAINT:



Left sided weakness after right pontine stroke.



SUBJECTIVE:



Pt denied any depression.

Pt denied any Shortness of Breath.

CBC with differential is normal, prealbumin 18.5.

Ambulated 250' with a SPC cane and contact guard assistance. Ambulated 1000' with a rolling walker an
d supervision.

Covid-19 test is pending.



VITAL SIGNS



Temperature: 97.9 F

SBP/DBP: 168/84

Pulse: 84

Resp: 16



MEDICATION ALLERGIES:



No Known Drug Allergies (NKDA)



ENVIRONMENTAL ALLERGIES:



- Substance Allergies

None Known

- Other Allergies

None Known



NURSING:



- Shower

allowing shower

- Bladder

care per protocol

- Skin

care per protocol



PRECAUTIONS:



- Fall Precaution

Bed alarm

TABS alarm

Wheel chair alarm

- DVT Risk

due to restricted mobility and age

- Skin Breakdown Risk

due to restricted mobility and age



ACTIVITIES



OOB only with supervision



THERAPIES:



- Dietary and Nutrition

Adequate Nutrition. Nutritional Education. Nutritional Supplements. 



- Occupational Therapy

Cognitive Retraining. Evaluate and Treat. Patient needs Occupational Therapy for a daily minimum of 1
.5 hours at least 5 out of 7 days, to improve Activities of Daily Living, including: Bathing, Toilet 
Transfers, Community Reintegration, Higher functional activities, Adaptive Equipment, Household Tasks
, and Other activities as determined. Visual Perceptual Training. 



- Speech Therapy

Cognitive Training. Evaluate and Treat. Expressive Language Skills. Memory Strategies. Receptive Lang
uage Skills. Speech Intelligibility Training. 



- Physical Therapy

Patient needs Physical Therapy for a daily minimum of 1.5 hours at least 5 out of 7 days, to improve:
 Mobility, Strengthening, Transfers, Stretching, ROM, Endurance, Ability to manage stairs, Gait, and 
Balance. Safety Awareness. Medical Equipment Assessment and Evaluation. 



PHYSICAL EXAM



- Gen

Alert and awake

Lying in bed

No apparent distress

Oriented to: person, time, and place

- Skin

No skin breakdown.



No abnormalities

- Eyes

No abnormalities

- ENMT

No abnormalities

- Neck

No abnormalities



No cervical adenopathy

- CVS

RRR

- Chest

No abnormalities

- Resp

Clear to auscultation

- Abd

+ bowel sounds

- GI

Soft



Deferred

- 

No abnormalities

- Ext

Mild left lower extremity edema.

- MSK

4+/5 weakness in left upper and lower extremity

- Neuro

4/5 strength left upper and lower extremities.

- Psych

Mild depression.



ASSESSMENT:



Pt. is a 67 yo female of unknown race.On 05/03/2022 she was admitted to [facility] with di
agnosis Acute 5 mm right pontine CVA.Her impairment category is Stroke 01 -  Other Stroke (01.9).Pre-
morbidly, Pt. was independent/mod-I in Self-Care, Communication, and Locomotion; and she had good Bal
ance, Transfers Control, Endurance, and Safety Awareness.Currently, she has deficits of Locomotion, S
afety Awareness, Transfers Control, Endurance, and Self-Care.Pt. is now referred to [facility] for acute in-patient rehabilitation in order to maximize patient's functional indepe
ndence in activities of daily living, strength, ROM, and mobility.- Rehab Goal

Patient has realistic goal of being discharged at assistance level 7-Ind to reside at Home with  Renny osborne

MDM/PLAN:



- Physical Therapy

 Weakness - to improve, our physical therapists will perform initial evaluation of pt's status upon a
dmission and devise an individualized program for Aquatic Therapy, Neuromuscular Reeducation, and Str
engthening

 Inability to transfer - to improve, our physical therapists will perform initial evaluation of pt's 
status upon admission and devise an individualized program for Bed mobility

 Need in caregiver upon discharge - to improve, our physical therapists will perform initial evaluati
on of pt's status upon admission and devise an individualized program for Caregiver Training

 Poor endurance - to improve, our physical therapists will perform initial evaluation of pt's status 
upon admission and devise an individualized program for Endurance Training

 Gait dysfunction - to improve, our physical therapists will perform initial evaluation of pt's statu
s upon admission and devise an individualized program for Gait Training, and Wheel Chair mobility

 Need for home safety evaluation - to improve, our physical therapists will perform initial evaluatio
n of pt's status upon admission and devise an individualized program for Home Evaluation

 New precaution - to improve, our physical therapists will perform initial evaluation of pt's status 
upon admission and devise an individualized program for Patient precaution education

 Edema - to improve, our physical therapists will perform initial evaluation of pt's status upon admi
ssion and devise an individualized program for Elevation Training, and Lymphedema Therapy

- Occupational Therapy

 Weakness - to improve, our occupation therapists will perform initial evaluation of pt's status upon
 admission and devise an individualized program for Aquatic Therapy, Balance, Endurance, UE ROM, and 
UE strengthening

 ADL deficits - to improve, our occupation therapists will perform initial evaluation of pt's status 
upon admission and devise an individualized program for Bathing, Bed mobility, Community Reintegratio
n, Cooking, Dressing, Eating, Fine Motor Skills, Grooming, Homemaking, Kitchen Mobility, Laundry, Pat
ient Education, Safety Awareness, Splinting - Positioning, Transfers(Toilet, Tub, Shower), and Wheel 
Chair Management

 Need for care giver - to improve, our occupation therapists will perform initial evaluation of pt's 
status upon admission and devise an individualized program for Caregiver Training

- Other

 See attached MAR (Medication Administration Record)

- Diet Type

Continue Regular

- Diet - Liquid Texture

Continue Regular

- Tube Feed

Continue N/A

- Bladder

 care per protocol

- DVT Risk

 due to restricted mobility and age

- Skin Breakdown Risk

 due to restricted mobility and age

- Fall Precaution

 Bed alarm

 TABS alarm

 Wheel chair alarm

- Skin

 care per protocol

- Diet - Solid Texture

Continue Regular

- Shower

 allowing shower



 for Dementia, TBI, Stroke, or others

- Balance

 for Weakness

- Bed mobility

 for ADL deficits

- Cognition - orientation

 for Dementia

- Dressing

 Status: indep

 for ADL deficits

- Eating

 for ADL deficits

- Grooming

 Status: indep

 for ADL deficits

- Toilet Transfer

 for ADL deficits

 Status: indep

- Bed to Chair Transfer squat pivot transfer

 Status: indep

 for ADL deficits

- Tub Transfer

 for ADL deficits

 Status: indep

- Hygiene

 for ADL deficits

- Shower Transfer

 for ADL deficits

 Status: indep

- Wheel Chair to Bed Transfer

 Status: indep

 for ADL deficits



FUNCTIONAL STATUS: UPDATED AT WEEKLY TEAM CONFERENCE



- Bladder

Same accident frequency: 7-Ind - No accidents in the past 7 days

- Bowel

Same accident frequency: 7-Ind - No accidents in the past 7 days

- Walking

Same score based on distance walked: 3(>=150ft)

- Wheelchair

Same 



FUNCTIONAL STATUS:



- Self-Care

A. Eating    Ind   

B. Grooming    sup   

C. Bathing    sup   

D. Dressing - Upper     sup   

E. Dressing - Lower     sup   

F. Toileting    Marisa   

- Sphincter Control

G. Bladder control     Marisa   

H. Bowel control     Marisa   

- Transfers Control

I. Bed/Chair/Wheelchair     sup   

J. Toilet    Ind   

K. Tub/Shower    Nany   

- Locomotion

L. Walk/Wheelchair (B)     Nany   

M. Stairs    maxA   

- Communication

N. Comprehension (B)     Marisa   

O. Expression (B)     Marisa   

- Social Cognition

P. Social Interaction    Marisa   

Q. Problem Solving    Marisa   

R. Memory    Ind   

- Endurance

Good

- Balance

Good

- Safety Awareness

Good



QI SCORES:



- Self-Care

A. Eating  05-Setup or clean-up assistance  

B. Oral hygiene  05-Setup or clean-up assistance  

C. Toileting hygiene  03-Partial/moderate assistance  

E. Shower/bathe self  03-Partial/moderate assistance  

F. Upper body dressing  04-Supervision or touching assistance  

G. Lower body dressing  03-Partial/moderate assistance  

H. Putting on/taking off footwear  03-Partial/moderate assistance  

- Mobility

A. Roll left and right  03-Partial/moderate assistance  

B. Sit to lying  03-Partial/moderate assistance  

C. Lying to sitting on side of bed  03-Partial/moderate assistance  

D. Sit to stand  03-Partial/moderate assistance  

E. Chair/bed-to-chair transfer  03-Partial/moderate assistance  

F. Toilet transfer  03-Partial/moderate assistance  

G. Car transfer  03-Partial/moderate assistance  

I. Walk 10 feet  03-Partial/moderate assistance  

J. Walk 50 feet with two turns  03-Partial/moderate assistance  

K. Walk 150 feet  88-Not attempted due to medical condition or safety concerns  

L. Walking 10 feet on uneven surfaces  88-Not attempted due to medical condition or safety concerns  


M. 1 step (curb)  88-Not attempted due to medical condition or safety concerns  

N. 4 steps  88-Not attempted due to medical condition or safety concerns  

O. 12 steps  88-Not attempted due to medical condition or safety concerns  

P. Picking up object  88-Not attempted due to medical condition or safety concerns  

R. Wheel 50 feet with two turns      

S. Wheel 150 feet      

- Bladder and Bowel

Bladder continence  2-Incontinent less than daily  

Bowel continence  0-Always continent  

- Endurance

Poor

- Balance

Poor

- Safety Awareness

Fair



CURRENT FUNC. DEFICITS:



Self-Care, Mobility, Endurance, Balance, and Safety Awareness



SIGNATURE PANEL:



Electronically signed by Dr. Cristhian Penaloza M.D. on 05/10/2022 at 16:56 (CDT)

## 2022-05-11 RX ADMIN — APIXABAN SCH MG: 2.5 TABLET, FILM COATED ORAL at 19:09

## 2022-05-11 RX ADMIN — CLOPIDOGREL BISULFATE SCH MG: 75 TABLET, FILM COATED ORAL at 08:22

## 2022-05-11 RX ADMIN — SENNOSIDES AND DOCUSATE SODIUM SCH TAB: 8.6; 5 TABLET ORAL at 19:09

## 2022-05-11 RX ADMIN — ASPIRIN SCH MG: 81 TABLET, COATED ORAL at 08:22

## 2022-05-11 RX ADMIN — ATORVASTATIN CALCIUM SCH MG: 40 TABLET, FILM COATED ORAL at 19:09

## 2022-05-11 RX ADMIN — Medication SCH MG: at 08:23

## 2022-05-11 RX ADMIN — ENOXAPARIN SODIUM SCH MG: 40 INJECTION SUBCUTANEOUS at 06:59

## 2022-05-11 RX ADMIN — NICOTINE SCH MG: 21 PATCH TRANSDERMAL at 14:23

## 2022-05-11 NOTE — R.PN
PROGRESS NOTES



ENCOUNTER DATE AND TIME:



05/11/2022 17:12 (CDT)



NAME



SUKHI ZELAYA



YOB: 1955



DATE OF ADMISSION:



05/05/2022 18:12 (CDT)



Acute 5 mm right pontine CVACHIEF COMPLAINT:



Left sided weakness after right pontine stroke.



SUBJECTIVE:



Pt denied any depression.

Pt denied any Shortness of Breath.

CBC with differential is normal, prealbumin 18.5.

Ambulated 520' with a rolling walker and standby assistance. Ambulated 260' with no assistive device 
nad contact guard assistance.

Covid-19 test is pending.



VITAL SIGNS



Temperature: 97.5 F

SBP/DBP: 171/79

Pulse: 82

Resp: 16



MEDICATION ALLERGIES:



No Known Drug Allergies (NKDA)



ENVIRONMENTAL ALLERGIES:



- Substance Allergies

None Known

- Other Allergies

None Known



NURSING:



- Shower

allowing shower

- Bladder

care per protocol

- Skin

care per protocol



PRECAUTIONS:



- Fall Precaution

Bed alarm

TABS alarm

Wheel chair alarm

- DVT Risk

due to restricted mobility and age

- Skin Breakdown Risk

due to restricted mobility and age



ACTIVITIES



OOB only with supervision



THERAPIES:



- Dietary and Nutrition

Adequate Nutrition. Nutritional Education. Nutritional Supplements. 



- Occupational Therapy

Cognitive Retraining. Evaluate and Treat. Patient needs Occupational Therapy for a daily minimum of 1
.5 hours at least 5 out of 7 days, to improve Activities of Daily Living, including: Bathing, Toilet 
Transfers, Community Reintegration, Higher functional activities, Adaptive Equipment, Household Tasks
, and Other activities as determined. Visual Perceptual Training. 



- Speech Therapy

Cognitive Training. Evaluate and Treat. Expressive Language Skills. Memory Strategies. Receptive Lang
uage Skills. Speech Intelligibility Training. 



- Physical Therapy

Patient needs Physical Therapy for a daily minimum of 1.5 hours at least 5 out of 7 days, to improve:
 Mobility, Strengthening, Transfers, Stretching, ROM, Endurance, Ability to manage stairs, Gait, and 
Balance. Safety Awareness. Medical Equipment Assessment and Evaluation. 



PHYSICAL EXAM



- Gen

Alert and awake

Lying in bed

No apparent distress

Oriented to: person, time, and place

- Skin

No skin breakdown.



No abnormalities

- Eyes

No abnormalities

- ENMT

No abnormalities

- Neck

No abnormalities



No cervical adenopathy

- CVS

RRR

- Chest

No abnormalities

- Resp

Clear to auscultation

- Abd

+ bowel sounds

- GI

Soft



Deferred

- 

No abnormalities

- Ext

Mild left lower extremity edema.

- MSK

4+/5 weakness in left upper and lower extremity

- Neuro

4/5 strength left upper and lower extremities.

- Psych

Mild depression.



ASSESSMENT:



Pt. is a 65 yo female of unknown race.On 05/03/2022 she was admitted to [facility] with di
agnosis Acute 5 mm right pontine CVA.Her impairment category is Stroke 01 -  Other Stroke (01.9).Pre-
morbidly, Pt. was independent/mod-I in Self-Care, Communication, and Locomotion; and she had good Bal
ance, Transfers Control, Endurance, and Safety Awareness.Currently, she has deficits of Locomotion, S
afety Awareness, Transfers Control, Endurance, and Self-Care.Pt. is now referred to [facility] for acute in-patient rehabilitation in order to maximize patient's functional indepe
ndence in activities of daily living, strength, ROM, and mobility.- Rehab Goal

Patient has realistic goal of being discharged at assistance level 7-Ind to reside at Home with  Renny osborne

MDM/PLAN:



- Physical Therapy

 Weakness - to improve, our physical therapists will perform initial evaluation of pt's status upon a
dmission and devise an individualized program for Aquatic Therapy, Neuromuscular Reeducation, and Str
engthening

 Inability to transfer - to improve, our physical therapists will perform initial evaluation of pt's 
status upon admission and devise an individualized program for Bed mobility

 Need in caregiver upon discharge - to improve, our physical therapists will perform initial evaluati
on of pt's status upon admission and devise an individualized program for Caregiver Training

 Poor endurance - to improve, our physical therapists will perform initial evaluation of pt's status 
upon admission and devise an individualized program for Endurance Training

 Gait dysfunction - to improve, our physical therapists will perform initial evaluation of pt's statu
s upon admission and devise an individualized program for Gait Training, and Wheel Chair mobility

 Need for home safety evaluation - to improve, our physical therapists will perform initial evaluatio
n of pt's status upon admission and devise an individualized program for Home Evaluation

 New precaution - to improve, our physical therapists will perform initial evaluation of pt's status 
upon admission and devise an individualized program for Patient precaution education

 Edema - to improve, our physical therapists will perform initial evaluation of pt's status upon admi
ssion and devise an individualized program for Elevation Training, and Lymphedema Therapy

- Occupational Therapy

 Weakness - to improve, our occupation therapists will perform initial evaluation of pt's status upon
 admission and devise an individualized program for Aquatic Therapy, Balance, Endurance, UE ROM, and 
UE strengthening

 ADL deficits - to improve, our occupation therapists will perform initial evaluation of pt's status 
upon admission and devise an individualized program for Bathing, Bed mobility, Community Reintegratio
n, Cooking, Dressing, Eating, Fine Motor Skills, Grooming, Homemaking, Kitchen Mobility, Laundry, Pat
ient Education, Safety Awareness, Splinting - Positioning, Transfers(Toilet, Tub, Shower), and Wheel 
Chair Management

 Need for care giver - to improve, our occupation therapists will perform initial evaluation of pt's 
status upon admission and devise an individualized program for Caregiver Training

- Other

 See attached MAR (Medication Administration Record)

- Diet Type

Continue Regular

- Diet - Liquid Texture

Continue Regular

- Tube Feed

Continue N/A

- Bladder

 care per protocol

- DVT Risk

 due to restricted mobility and age

- Skin Breakdown Risk

 due to restricted mobility and age

- Fall Precaution

 Bed alarm

 TABS alarm

 Wheel chair alarm

- Skin

 care per protocol

- Diet - Solid Texture

Continue Regular

- Shower

 allowing shower



 for Dementia, TBI, Stroke, or others

- Balance

 for Weakness

- Bed mobility

 for ADL deficits

- Cognition - orientation

 for Dementia

- Dressing

 Status: indep

 for ADL deficits

- Eating

 for ADL deficits

- Grooming

 Status: indep

 for ADL deficits

- Toilet Transfer

 for ADL deficits

 Status: indep

- Bed to Chair Transfer squat pivot transfer

 Status: indep

 for ADL deficits

- Tub Transfer

 for ADL deficits

 Status: indep

- Hygiene

 for ADL deficits

- Shower Transfer

 for ADL deficits

 Status: indep

- Wheel Chair to Bed Transfer

 Status: indep

 for ADL deficits



FUNCTIONAL STATUS: UPDATED AT WEEKLY TEAM CONFERENCE



- Bladder

Same accident frequency: 7-Ind - No accidents in the past 7 days

- Bowel

Same accident frequency: 7-Ind - No accidents in the past 7 days

- Walking

Same score based on distance walked: 3(>=150ft)

- Wheelchair

Same 



FUNCTIONAL STATUS:



- Self-Care

A. Eating    Ind   

B. Grooming    sup   

C. Bathing    sup   

D. Dressing - Upper     sup   

E. Dressing - Lower     sup   

F. Toileting    Marisa   

- Sphincter Control

G. Bladder control     Marisa   

H. Bowel control     Marisa   

- Transfers Control

I. Bed/Chair/Wheelchair     sup   

J. Toilet    Ind   

K. Tub/Shower    Nany   

- Locomotion

L. Walk/Wheelchair (B)     Nany   

M. Stairs    maxA   

- Communication

N. Comprehension (B)     Mraisa   

O. Expression (B)     Marisa   

- Social Cognition

P. Social Interaction    Marisa   

Q. Problem Solving    Marisa   

R. Memory    Ind   

- Endurance

Good

- Balance

Good

- Safety Awareness

Good



QI SCORES:



- Self-Care

A. Eating  05-Setup or clean-up assistance  

B. Oral hygiene  05-Setup or clean-up assistance  

C. Toileting hygiene  03-Partial/moderate assistance  

E. Shower/bathe self  03-Partial/moderate assistance  

F. Upper body dressing  04-Supervision or touching assistance  

G. Lower body dressing  03-Partial/moderate assistance  

H. Putting on/taking off footwear  03-Partial/moderate assistance  

- Mobility

A. Roll left and right  03-Partial/moderate assistance  

B. Sit to lying  03-Partial/moderate assistance  

C. Lying to sitting on side of bed  03-Partial/moderate assistance  

D. Sit to stand  03-Partial/moderate assistance  

E. Chair/bed-to-chair transfer  03-Partial/moderate assistance  

F. Toilet transfer  03-Partial/moderate assistance  

G. Car transfer  03-Partial/moderate assistance  

I. Walk 10 feet  03-Partial/moderate assistance  

J. Walk 50 feet with two turns  03-Partial/moderate assistance  

K. Walk 150 feet  88-Not attempted due to medical condition or safety concerns  

L. Walking 10 feet on uneven surfaces  88-Not attempted due to medical condition or safety concerns  


M. 1 step (curb)  88-Not attempted due to medical condition or safety concerns  

N. 4 steps  88-Not attempted due to medical condition or safety concerns  

O. 12 steps  88-Not attempted due to medical condition or safety concerns  

P. Picking up object  88-Not attempted due to medical condition or safety concerns  

R. Wheel 50 feet with two turns      

S. Wheel 150 feet      

- Bladder and Bowel

Bladder continence  2-Incontinent less than daily  

Bowel continence  0-Always continent  

- Endurance

Poor

- Balance

Poor

- Safety Awareness

Fair



CURRENT FUNC. DEFICITS:



Self-Care, Mobility, Endurance, Balance, and Safety Awareness



SIGNATURE PANEL:



Electronically signed by Dr. Cristhian Penaloza M.D. on 05/11/2022 at 17:15 (CDT)

## 2022-05-12 LAB
ALBUMIN SERPL BCP-MCNC: 3.3 G/DL (ref 3.4–5)
BUN BLD-MCNC: 14 MG/DL (ref 7–18)
GLUCOSE SERPLBLD-MCNC: 100 MG/DL (ref 74–106)
HCT VFR BLD CALC: 38.5 % (ref 36–45)
LYMPHOCYTES # SPEC AUTO: 2.2 K/UL (ref 0.7–4.9)
MAGNESIUM SERPL-MCNC: 2.1 MG/DL (ref 1.8–2.4)
PMV BLD: 7.3 FL (ref 7.6–11.3)
POTASSIUM SERPL-SCNC: 3.8 MMOL/L (ref 3.5–5.1)
PREALB SERPL-MCNC: 27.8 MG/DL (ref 20–40)
RBC # BLD: 4.43 M/UL (ref 3.86–4.86)

## 2022-05-12 RX ADMIN — NICOTINE SCH MG: 21 PATCH TRANSDERMAL at 08:05

## 2022-05-12 RX ADMIN — SENNOSIDES AND DOCUSATE SODIUM SCH TAB: 8.6; 5 TABLET ORAL at 20:04

## 2022-05-12 RX ADMIN — Medication SCH MG: at 08:05

## 2022-05-12 RX ADMIN — ATORVASTATIN CALCIUM SCH MG: 40 TABLET, FILM COATED ORAL at 20:04

## 2022-05-12 RX ADMIN — ASPIRIN SCH MG: 81 TABLET, COATED ORAL at 08:05

## 2022-05-12 RX ADMIN — CLOPIDOGREL BISULFATE SCH MG: 75 TABLET, FILM COATED ORAL at 08:05

## 2022-05-12 RX ADMIN — APIXABAN SCH MG: 2.5 TABLET, FILM COATED ORAL at 20:04

## 2022-05-12 RX ADMIN — APIXABAN SCH MG: 2.5 TABLET, FILM COATED ORAL at 08:05

## 2022-05-12 RX ADMIN — SENNOSIDES AND DOCUSATE SODIUM SCH: 8.6; 5 TABLET ORAL at 21:00

## 2022-05-12 NOTE — R.PN
PROGRESS NOTES



ENCOUNTER DATE AND TIME:



05/12/2022 18:00 (CDT)



NAME



SUKHI ZELAYA



YOB: 1955



DATE OF ADMISSION:



05/05/2022 18:12 (CDT)



Acute 5 mm right pontine CVACHIEF COMPLAINT:



Left sided weakness after right pontine stroke.



SUBJECTIVE:



Pt denied any depression.

Pt denied any Shortness of Breath.

CBC with differential is normal, prealbumin 27.8

Ambulated 1000' with a rolling walker and modified independence. Up and down 15 steps with modified i
ndependence.

Covid-19 test is pending.



VITAL SIGNS



Temperature: 98.0 F

SBP/DBP: 123/70

Pulse: 76

Resp: 16



MEDICATION ALLERGIES:



No Known Drug Allergies (NKDA)



ENVIRONMENTAL ALLERGIES:



- Substance Allergies

None Known

- Other Allergies

None Known



NURSING:



- Shower

allowing shower

- Bladder

care per protocol

- Skin

care per protocol



PRECAUTIONS:



- Fall Precaution

Bed alarm

TABS alarm

Wheel chair alarm

- DVT Risk

due to restricted mobility and age

- Skin Breakdown Risk

due to restricted mobility and age



ACTIVITIES



OOB only with supervision



THERAPIES:



- Dietary and Nutrition

Adequate Nutrition. Nutritional Education. Nutritional Supplements. 



- Occupational Therapy

Cognitive Retraining. Evaluate and Treat. Patient needs Occupational Therapy for a daily minimum of 1
.5 hours at least 5 out of 7 days, to improve Activities of Daily Living, including: Bathing, Toilet 
Transfers, Community Reintegration, Higher functional activities, Adaptive Equipment, Household Tasks
, and Other activities as determined. Visual Perceptual Training. 



- Speech Therapy

Cognitive Training. Evaluate and Treat. Expressive Language Skills. Memory Strategies. Receptive Lang
uage Skills. Speech Intelligibility Training. 



- Physical Therapy

Patient needs Physical Therapy for a daily minimum of 1.5 hours at least 5 out of 7 days, to improve:
 Mobility, Strengthening, Transfers, Stretching, ROM, Endurance, Ability to manage stairs, Gait, and 
Balance. Safety Awareness. Medical Equipment Assessment and Evaluation. 



PHYSICAL EXAM



- Gen

Alert and awake

Lying in bed

No apparent distress

Oriented to: person, time, and place

- Skin

No skin breakdown.



No abnormalities

- Eyes

No abnormalities

- ENMT

No abnormalities

- Neck

No abnormalities



No cervical adenopathy

- CVS

RRR

- Chest

No abnormalities

- Resp

Clear to auscultation

- Abd

+ bowel sounds

- GI

Soft



Deferred

- 

No abnormalities

- Ext

Mild left lower extremity edema.

- MSK

4+/5 weakness in left upper and lower extremity

- Neuro

4/5 strength left upper and lower extremities.

- Psych

Mild depression.



ASSESSMENT:



Pt. is a 65 yo female of unknown race.On 05/03/2022 [facility]
agnosis Acute 5 mm right pontine CVA.Her impairment category is Stroke 01 -  Other Stroke (01.9).Pre-
morbidly, Pt. was independent/mod-I in Self-Care, Communication, and Locomotion; and she had good Bal
ance, Transfers Control, Endurance, and Safety Awareness.Currently, she has deficits of Locomotion, S
afety Awareness, Transfers Control, Endurance, and Self-Care.Pt. is now referred to [facility] for acute in-patient rehabilitation in order to maximize patient's functional indepe
ndence in activities of daily living, strength, ROM, and mobility.- Rehab Goal

Patient has realistic goal of being discharged at assistance level 7-Ind to reside at Home with  Renny osborne

MDM/PLAN:



- Physical Therapy

 Weakness - to improve, our physical therapists will perform initial evaluation of pt's status upon a
dmission and devise an individualized program for Aquatic Therapy, Neuromuscular Reeducation, and Str
engthening

 Inability to transfer - to improve, our physical therapists will perform initial evaluation of pt's 
status upon admission and devise an individualized program for Bed mobility

 Need in caregiver upon discharge - to improve, our physical therapists will perform initial evaluati
on of pt's status upon admission and devise an individualized program for Caregiver Training

 Poor endurance - to improve, our physical therapists will perform initial evaluation of pt's status 
upon admission and devise an individualized program for Endurance Training

 Gait dysfunction - to improve, our physical therapists will perform initial evaluation of pt's statu
s upon admission and devise an individualized program for Gait Training, and Wheel Chair mobility

 Need for home safety evaluation - to improve, our physical therapists will perform initial evaluatio
n of pt's status upon admission and devise an individualized program for Home Evaluation

 New precaution - to improve, our physical therapists will perform initial evaluation of pt's status 
upon admission and devise an individualized program for Patient precaution education

 Edema - to improve, our physical therapists will perform initial evaluation of pt's status upon admi
ssion and devise an individualized program for Elevation Training, and Lymphedema Therapy

- Occupational Therapy

 Weakness - to improve, our occupation therapists will perform initial evaluation of pt's status upon
 admission and devise an individualized program for Aquatic Therapy, Balance, Endurance, UE ROM, and 
UE strengthening

 ADL deficits - to improve, our occupation therapists will perform initial evaluation of pt's status 
upon admission and devise an individualized program for Bathing, Bed mobility, Community Reintegratio
n, Cooking, Dressing, Eating, Fine Motor Skills, Grooming, Homemaking, Kitchen Mobility, Laundry, Pat
ient Education, Safety Awareness, Splinting - Positioning, Transfers(Toilet, Tub, Shower), and Wheel 
Chair Management

 Need for care giver - to improve, our occupation therapists will perform initial evaluation of pt's 
status upon admission and devise an individualized program for Caregiver Training

- Other

 See attached MAR (Medication Administration Record)

- Diet Type

Continue Regular

- Diet - Liquid Texture

Continue Regular

- Tube Feed

Continue N/A

- Bladder

 care per protocol

- DVT Risk

 due to restricted mobility and age

- Skin Breakdown Risk

 due to restricted mobility and age

- Fall Precaution

 Bed alarm

 TABS alarm

 Wheel chair alarm

- Skin

 care per protocol

- Diet - Solid Texture

Continue Regular

- Shower

 allowing shower



 for Dementia, TBI, Stroke, or others

- Balance

 for Weakness

- Bed mobility

 for ADL deficits

- Cognition - orientation

 for Dementia

- Dressing

 Status: indep

 for ADL deficits

- Eating

 for ADL deficits

- Grooming

 Status: indep

 for ADL deficits

- Toilet Transfer

 for ADL deficits

 Status: indep

- Bed to Chair Transfer squat pivot transfer

 Status: indep

 for ADL deficits

- Tub Transfer

 for ADL deficits

 Status: indep

- Hygiene

 for ADL deficits

- Shower Transfer

 for ADL deficits

 Status: indep

- Wheel Chair to Bed Transfer

 Status: indep

 for ADL deficits



FUNCTIONAL STATUS: UPDATED AT WEEKLY TEAM CONFERENCE



- Bladder

Same accident frequency: 7-Ind - No accidents in the past 7 days

- Bowel

Same accident frequency: 7-Ind - No accidents in the past 7 days

- Walking

Same score based on distance walked: 3(>=150ft)

- Wheelchair

Same 



FUNCTIONAL STATUS:



- Self-Care

A. Eating    Ind   

B. Grooming    sup   

C. Bathing    sup   

D. Dressing - Upper     sup   

E. Dressing - Lower     sup   

F. Toileting    Marisa   

- Sphincter Control

G. Bladder control     Marisa   

H. Bowel control     Marisa   

- Transfers Control

I. Bed/Chair/Wheelchair     sup   

J. Toilet    Ind   

K. Tub/Shower    Nany   

- Locomotion

L. Walk/Wheelchair (B)     Nany   

M. Stairs    maxA   

- Communication

N. Comprehension (B)     Marisa   

O. Expression (B)     Marisa   

- Social Cognition

P. Social Interaction    Marisa   

Q. Problem Solving    Marisa   

R. Memory    Ind   

- Endurance

Good

- Balance

Good

- Safety Awareness

Good



QI SCORES:



- Self-Care

A. Eating  05-Setup or clean-up assistance  

B. Oral hygiene  05-Setup or clean-up assistance  

C. Toileting hygiene  03-Partial/moderate assistance  

E. Shower/bathe self  03-Partial/moderate assistance  

F. Upper body dressing  04-Supervision or touching assistance  

G. Lower body dressing  03-Partial/moderate assistance  

H. Putting on/taking off footwear  03-Partial/moderate assistance  

- Mobility

A. Roll left and right  03-Partial/moderate assistance  

B. Sit to lying  03-Partial/moderate assistance  

C. Lying to sitting on side of bed  03-Partial/moderate assistance  

D. Sit to stand  03-Partial/moderate assistance  

E. Chair/bed-to-chair transfer  03-Partial/moderate assistance  

F. Toilet transfer  03-Partial/moderate assistance  

G. Car transfer  03-Partial/moderate assistance  

I. Walk 10 feet  03-Partial/moderate assistance  

J. Walk 50 feet with two turns  03-Partial/moderate assistance  

K. Walk 150 feet  88-Not attempted due to medical condition or safety concerns  

L. Walking 10 feet on uneven surfaces  88-Not attempted due to medical condition or safety concerns  


M. 1 step (curb)  88-Not attempted due to medical condition or safety concerns  

N. 4 steps  88-Not attempted due to medical condition or safety concerns  

O. 12 steps  88-Not attempted due to medical condition or safety concerns  

P. Picking up object  88-Not attempted due to medical condition or safety concerns  

R. Wheel 50 feet with two turns      

S. Wheel 150 feet      

- Bladder and Bowel

Bladder continence  2-Incontinent less than daily  

Bowel continence  0-Always continent  

- Endurance

Poor

- Balance

Poor

- Safety Awareness

Fair



CURRENT FUNC. DEFICITS:



Self-Care, Mobility, Endurance, Balance, and Safety Awareness



SIGNATURE PANEL:



Electronically signed by Dr. Cristhian Penaloza M.D. on 05/12/2022 at 18:05 (CDT)

## 2022-05-13 VITALS — DIASTOLIC BLOOD PRESSURE: 72 MMHG | SYSTOLIC BLOOD PRESSURE: 149 MMHG

## 2022-05-13 VITALS — TEMPERATURE: 96.8 F

## 2022-05-13 RX ADMIN — AMLODIPINE BESYLATE SCH MG: 2.5 TABLET ORAL at 12:46

## 2022-05-13 RX ADMIN — AMLODIPINE BESYLATE SCH MG: 2.5 TABLET ORAL at 18:29

## 2022-05-13 RX ADMIN — APIXABAN SCH MG: 2.5 TABLET, FILM COATED ORAL at 18:29

## 2022-05-13 RX ADMIN — ASPIRIN SCH MG: 81 TABLET, COATED ORAL at 08:08

## 2022-05-13 RX ADMIN — CLOPIDOGREL BISULFATE SCH MG: 75 TABLET, FILM COATED ORAL at 08:09

## 2022-05-13 RX ADMIN — APIXABAN SCH MG: 2.5 TABLET, FILM COATED ORAL at 08:08

## 2022-05-13 RX ADMIN — NICOTINE SCH MG: 21 PATCH TRANSDERMAL at 12:48

## 2022-05-13 RX ADMIN — Medication SCH MG: at 08:08

## 2022-05-13 RX ADMIN — SENNOSIDES AND DOCUSATE SODIUM SCH: 8.6; 5 TABLET ORAL at 18:29

## 2022-05-13 RX ADMIN — ATORVASTATIN CALCIUM SCH MG: 40 TABLET, FILM COATED ORAL at 18:29

## 2022-05-13 NOTE — P.RH.PN
Estimated Length of Stay: 11


Expected Discharge Date: 05/15/22


Discharge Disposition Plan: Home


Vital Signs: 


                                Last Vital Signs











Temp  96.8 F   05/13/22 07:13


 


Pulse  90   05/13/22 08:14


 


Resp  20   05/13/22 07:13


 


BP  155/90 H  05/13/22 08:14


 


Pulse Ox  97   05/13/22 07:13











Laboratory: 


                             Laboratory Last Values











WBC  7.4 K/uL (4.3-10.9)   05/12/22  05:35    


 


RBC  4.43 M/uL (3.86-4.86)   05/12/22  05:35    


 


Hgb  12.9 g/dL (12.0-15.0)   05/12/22  05:35    


 


Hct  38.5 % (36.0-45.0)   05/12/22  05:35    


 


MCV  86.9 fL ()   05/12/22  05:35    


 


MCH  29.2 pg (27.0-35.0)   05/12/22  05:35    


 


MCHC  33.6 g/dL (32.0-36.0)   05/12/22  05:35    


 


RDW  13.3 % (12.1-15.2)   05/12/22  05:35    


 


Plt Count  282 K/uL (152-406)   05/12/22  05:35    


 


MPV  7.3 fL (7.6-11.3)  L  05/12/22  05:35    


 


Neutrophils %  57.8 % (41.7-73.7)   05/12/22  05:35    


 


Lymphocytes %  30.4 % (15.3-44.8)   05/12/22  05:35    


 


Monocytes %  9.6 % (3.3-12.3)   05/12/22  05:35    


 


Eosinophils %  1.7 % (0-4.4)   05/12/22  05:35    


 


Basophils %  0.5 % (0-1.3)   05/12/22  05:35    


 


Absolute Neutrophils  4.3 K/uL (1.8-8.0)   05/12/22  05:35    


 


Absolute Lymphocytes  2.2 K/uL (0.7-4.9)   05/12/22  05:35    


 


Absolute Monocytes  0.7 K/uL (0.1-1.3)   05/12/22  05:35    


 


Absolute Eosinophils  0.1 K/uL (0-0.5)   05/12/22  05:35    


 


Absolute Basophils  0.0 K/uL (0-0.5)   05/12/22  05:35    


 


Sodium  138 mmol/L (136-145)   05/12/22  05:35    


 


Potassium  3.8 mmol/L (3.5-5.1)   05/12/22  05:35    


 


Chloride  109 mmol/L ()  H  05/12/22  05:35    


 


Carbon Dioxide  22 mmol/L (21-32)   05/12/22  05:35    


 


Anion Gap  10.8 mEq/L (5.0-15.0)   05/12/22  05:35    


 


BUN  14 mg/dL (7-18)   05/12/22  05:35    


 


Creatinine  0.78 mg/dL (0.55-1.3)   05/12/22  05:35    


 


Estimated GFR  69 mL/min (=/>90)  L  05/06/22  04:22    


 


Est GFR (CKD-EPI)  84 ml/min (=/>90)  L  05/12/22  05:35    


 


Glucose  100 mg/dL ()   05/12/22  05:35    


 


Calcium  8.9 mg/dL (8.5-10.1)   05/12/22  05:35    


 


Magnesium  2.1 mg/dL (1.8-2.4)   05/12/22  05:35    


 


Albumin  3.3 g/dL (3.4-5.0)  L  05/12/22  05:35    


 


Prealbumin  27.8 mg/dL (20-40)   05/12/22  05:35    


 


Urine Color  Yellow  (Yellow)   05/06/22  17:40    


 


Urine Appearance  Clear  (Clear)   05/06/22  17:40    


 


Urine pH  5.5  (5.0-7.0)   05/06/22  17:40    


 


Ur Specific Gravity  1.015  (1.005-1.030)   05/06/22  17:40    


 


Glucose (UA)(Auto)  Negative  (Negative)   05/06/22  17:40    


 


Urine Ketones  Negative  (Negative)   05/06/22  17:40    


 


Urine Blood  Negative  (Negative)   05/06/22  17:40    


 


Urine Nitrite  Negative  (Negative)   05/06/22  17:40    


 


Urine Bilirubin  Negative  (Negative)   05/06/22  17:40    


 


Urine Urobilinogen  0.2 mg/dL (0.2-1.0)   05/06/22  17:40    


 


Ur Leukocyte Esterase  Trace  (Negative)  H  05/06/22  17:40    


 


Urine RBC  <5 /HPF (NONE SEEN)   05/06/22  17:40    


 


Urine WBC  <5 /HPF (<5)   05/06/22  17:40    


 


Ur Squamous Epith Cells  <5 /HPF (NONE SEEN)   05/06/22  17:40    


 


Ur Urothelial Cells  Cancelled   05/05/22  00:30    


 


Calcium Oxalate Crystal  Cancelled   05/05/22  00:30    


 


Uric Acid Crystals  Cancelled   05/05/22  00:30    


 


Triple Phos Crystals  Cancelled   05/05/22  00:30    


 


Other Crystals  Cancelled   05/05/22  00:30    


 


Amorphous Sediment  Cancelled   05/05/22  00:30    


 


Glitter Cells  Cancelled   05/05/22  00:30    


 


Urine Bacteria  <20 /HPF (<20)   05/06/22  17:40    


 


Hyaline Casts  Cancelled   05/05/22  00:30    


 


Fine Granular Casts  Cancelled   05/05/22  00:30    


 


Coarse Granular Casts  Cancelled   05/05/22  00:30    


 


Waxy Casts  Cancelled   05/05/22  00:30    


 


RBC Casts  Cancelled   05/05/22  00:30    


 


WBC Casts  Cancelled   05/05/22  00:30    


 


Urine Mucus  Cancelled   05/05/22  00:30    


 


Urine Other  Cancelled   05/05/22  00:30    


 


Urine Trichomonas  Cancelled   05/05/22  00:30    


 


Urine Yeast  Cancelled   05/05/22  00:30    


 


Ur Yeast w Hyphae  Cancelled   05/05/22  00:30    


 


Urine Yeast (Budding)  Cancelled   05/05/22  00:30    


 


Urine Sperm  Cancelled   05/05/22  00:30    


 


Urine Culture Reflexed  Not needed   05/06/22  17:40    


 


Urine Total Volume  Cancelled   05/05/22  00:30    


 


Urine Total Protein  Negative  (Negative)   05/06/22  17:40    


 


SARS-CoV-2 Rap RNA(RT-PCR)  Negative  (NEGATIVE)   05/10/22  15:05    











Weight: 148 lb 1.6 oz


Wound Present: No


Closed Surgical Incision Present: No


Negative Pressure Wound Therapy Present: No


Physician Update: Independent to mod I with walker and standby assistance 

without an assistive device. Up and down 15 steps with mod I. Her BP is elevated

in am and pm. Will add amlodipine to lisinopril. Will DC with out patient 

physical therapy. She has walker and transfer tub bench for home.


Comment: No skin breakdown


Functional Improvement: Patient has met all short-term and long-term goals, w/ 

the exception of a car transfer.  Patient to complete car transfer tomorrow.


Summary: Patient's care plan and long term goals have been reviewed and revised 

as necessary. Please see the Rehabilitation Signature page for all necessary 

signatures.

## 2022-12-14 ENCOUNTER — HOSPITAL ENCOUNTER (EMERGENCY)
Dept: HOSPITAL 97 - ER | Age: 67
Discharge: HOME | End: 2022-12-14
Payer: COMMERCIAL

## 2022-12-14 VITALS — OXYGEN SATURATION: 100 % | TEMPERATURE: 97.8 F

## 2022-12-14 VITALS — SYSTOLIC BLOOD PRESSURE: 138 MMHG | DIASTOLIC BLOOD PRESSURE: 90 MMHG

## 2022-12-14 DIAGNOSIS — Z20.822: ICD-10-CM

## 2022-12-14 DIAGNOSIS — I10: ICD-10-CM

## 2022-12-14 DIAGNOSIS — F17.210: ICD-10-CM

## 2022-12-14 DIAGNOSIS — S22.080A: Primary | ICD-10-CM

## 2022-12-14 LAB
ALBUMIN SERPL BCP-MCNC: 4.1 G/DL (ref 3.4–5)
ALP SERPL-CCNC: 164 U/L (ref 45–117)
ALT SERPL W P-5'-P-CCNC: 30 U/L (ref 13–56)
AST SERPL W P-5'-P-CCNC: 26 U/L (ref 15–37)
BUN BLD-MCNC: 20 MG/DL (ref 7–18)
GLUCOSE SERPLBLD-MCNC: 87 MG/DL (ref 74–106)
HCT VFR BLD CALC: 43.3 % (ref 36–45)
INR BLD: 1.14
LIPASE SERPL-CCNC: 181 U/L (ref 73–393)
LYMPHOCYTES # SPEC AUTO: 2.1 K/UL (ref 0.7–4.9)
MAGNESIUM SERPL-MCNC: 2.3 MG/DL (ref 1.6–2.4)
MCV RBC: 87.7 FL (ref 80–100)
NT-PROBNP SERPL-MCNC: 43 PG/ML (ref ?–125)
PMV BLD: 7 FL (ref 7.6–11.3)
POTASSIUM SERPL-SCNC: 3.8 MMOL/L (ref 3.5–5.1)
RBC # BLD: 4.93 M/UL (ref 3.86–4.86)
SARS-COV-2 RNA RESP QL NAA+PROBE: NEGATIVE
TROPONIN I SERPL HS-MCNC: 8.3 PG/ML (ref ?–58.9)

## 2022-12-14 PROCEDURE — 74175 CTA ABDOMEN W/CONTRAST: CPT

## 2022-12-14 PROCEDURE — 84484 ASSAY OF TROPONIN QUANT: CPT

## 2022-12-14 PROCEDURE — 85610 PROTHROMBIN TIME: CPT

## 2022-12-14 PROCEDURE — 83735 ASSAY OF MAGNESIUM: CPT

## 2022-12-14 PROCEDURE — 80076 HEPATIC FUNCTION PANEL: CPT

## 2022-12-14 PROCEDURE — 83690 ASSAY OF LIPASE: CPT

## 2022-12-14 PROCEDURE — 83880 ASSAY OF NATRIURETIC PEPTIDE: CPT

## 2022-12-14 PROCEDURE — 80048 BASIC METABOLIC PNL TOTAL CA: CPT

## 2022-12-14 PROCEDURE — 93005 ELECTROCARDIOGRAM TRACING: CPT

## 2022-12-14 PROCEDURE — 81015 MICROSCOPIC EXAM OF URINE: CPT

## 2022-12-14 PROCEDURE — 36415 COLL VENOUS BLD VENIPUNCTURE: CPT

## 2022-12-14 PROCEDURE — 71275 CT ANGIOGRAPHY CHEST: CPT

## 2022-12-14 PROCEDURE — 70450 CT HEAD/BRAIN W/O DYE: CPT

## 2022-12-14 PROCEDURE — 81003 URINALYSIS AUTO W/O SCOPE: CPT

## 2022-12-14 PROCEDURE — 71045 X-RAY EXAM CHEST 1 VIEW: CPT

## 2022-12-14 PROCEDURE — 0240U: CPT

## 2022-12-14 PROCEDURE — 85025 COMPLETE CBC W/AUTO DIFF WBC: CPT

## 2022-12-14 NOTE — RAD REPORT
EXAM DESCRIPTION:  CT - Head Brain Wo Cont - 12/14/2022 8:28 pm

 

CLINICAL HISTORY:  Alteration of awareness/confusion

 

COMPARISON:  May 2022

 

TECHNIQUE:  Computed axial tomography of the head was obtained. IV contrast was not requested.

 

All CT scans are performed using dose optimization technique as appropriate and may include automated
 exposure control or mA/KV adjustment according to patient size.

 

FINDINGS:  An intracranial  bleed is not seen .

 

The ventricles are normal in caliber.

 

No extra-axial fluid collection is noted.

 

Old lacunar infarctions involve the basal ganglia and thalami

 

Mild to moderate low-density areas within periventricular, deep and subcortical white matter likely r
epresent ischemic changes secondary to small vessel disease.

 

Fluid within the sinuses/ mastoids is not seen.

 

IMPRESSION:  No acute intracranial abnormality is seen. If patient's symptoms persist  MRI of the bra
in would be recommended.

## 2022-12-14 NOTE — RAD REPORT
EXAM DESCRIPTION:  CT - Angio  Aorta For Dissection - 12/14/2022 8:28 pm

 

CLINICAL HISTORY:  . Chest and abd pain

 

COMPARISON:  None

 

TECHNIQUE:  Computed tomography angiography of the chest, abdomen pelvis were obtained. 100 cc Isovue
 370 was administered intravenously. Coronal and sagittal reconstruction were performed.

 

MIP 3D reconstruction was performed

 

All CT scans are performed using dose optimization technique as appropriate and may include automated
 exposure control or mA/KV adjustment according to patient size.

 

FINDINGS:   An aortic dissection is not seen. An aortic aneurysm is not displayed.

 

The celiac, SMA and KATHY are patent . Mild atherosclerotic disease

 

A lung consolidation is not present. A pericardial effusion is not seen. A pleural effusion is not no
gemini.

 

Mild fatty liver

 

Spleen, pancreas,adrenals and kidneys demonstrate no significant abnormality.

 

There no evidence diverticulitis.  Normal appendix

 

No adnexal mass

 

Mild compression deformity superior vertebral endplate T12 appears subacute

 

IMPRESSION:   Negative for an aortic dissection.

 

Mild compression deformity superior vertebral endplate T12 appears subacute. If clinically indicated 
further evaluation with MRI could be obtained

## 2022-12-14 NOTE — RAD REPORT
EXAM DESCRIPTION:  [place] Single View12/14/2022 7:35 pm

 

CLINICAL HISTORY:  Chest pain

 

COMPARISON:  May 2022

 

FINDINGS:   The lungs appear clear of acute infiltrate. The heart is normal size

 

IMPRESSION:   No acute abnormalities displayed

## 2022-12-14 NOTE — XMS REPORT
Continuity of Care Document

                          Created on:2022



Patient:SUKHI ZELAYA

Sex:Female

:1955

External Reference #:[id number]





Demographics







                          Address                   [address] [place] [address]

 

                          Home Phone                [phone]

 

                          Work Phone                [phone]

 

                          Mobile Phone              [phone]

 

                          Email Address             [email or web address]

 

                          Preferred Language        English

 

                          Marital Status            Unknown

 

                          Jain Affiliation     Unknown

 

                          Race                      Unknown

 

                          Additional Race(s)        White



                                                    Unavailable

 

                          Ethnic Group              Unknown









Author







                          Organization              [facility]

t

 

                          Address                   [address]



                                                    [place] [address]

 

                          Phone                     [phone]









Support







                Name            Relationship    Address         Phone

 

                Aurora Dillard  Other           Unavailable     [phone]









Care Team Providers







                    Name                Role                Phone

 

                    Lab, Adc Fam Pob I  Attending Clinician Unavailable

 

                    Nika Szymanski  Attending Clinician [phone]

 

                    Sahil Jones Attending Clinician [phone]









Payers







           Payer Name Policy Type Policy Number Effective Date Expiration Date S

ource







Problems







       Condition Condition Condition Status Onset  Resolution Last   Treating Co

mments 

Source



       Name   Details Category        Date   Date   Treatment Clinician        



                                                 Date                 

 

       TIA     TIA   Diagnosis Active 2013               [facility]

[facility]



              Active               9-15          12:05:00               l



              09/15/2013               00:00:                             Romaine adhikari



              [address]                                                  

 

       SUB ACUTE  SUB ACUTE Diagnosis Active 2013-0        2013-09-15           

    [facility]



       MCA STROKE MCA STROKE               9-15          18:02:00               

l



              Active               00:00:                             Eduardo



              09/15/2013               00                                 



               [facility]                                                  

 

       LASIK    LASIK Problem Resolve               2013               [facility]

[facility]



              Resolved        d                    21:14:51               l



              Problem                                                  [place]



              2013                                                  



               [facility]                                                  

 

       TRANS   TRANS Diagnosis Active               2013               [facility]

[facility]



       CEREB  CEREB                              12:05:00               l



       ISCHEMIA ISCHEMIA                                                  Romaine adhikari



       NEC    NEC Active                                                  



              [facility]                                                  

 

       Arthritis  Arthritis Problem Resolve               2020            

   [facility]



       (disorder) (disorder)        d                    22:45:28               

l



              Resolved                                                  [place]



              Problem                                                  



              2020                                                  



              [facility]                                                  



              Neuro                                                   

 

       Hyperlipid        Problem Resolve               2020               

[facility]



       emia   Hyperlipid        d                    22:45:28               l



       (disorder) dariel adhikari



              (disorder)                                                  



              Resolved                                                  



              Problem                                                  



              2020                                                  



               [facility]                                                  



              Neuro                                                   

 

       Laser   Laser Problem Resolve               2020               [facility]

vashti



       assisted assisted        d                    22:45:28               l



       in situ in situ                                                  [facility]



       keratomile keratomile                                                  



       usis   usis                                                    



       (procedure (procedure                                                  



       )      ) Resolved                                                  



              Problem                                                  



              2020                                                  



              [facility]                                                  



              Neuro                                                   

 

       Arthritis  Arthritis Problem Resolve               2013            

   [facility]



              Resolved        d                    21:14:51               l



              Problem                                                  [place]



              2013                                                  



              [facility]                                                  

 

       Hyperlipid  Hyperlipi Problem Resolve               2013           

    [facility]



       emia   demia         d                    21:14:51               l



              Resolved                                                  [place]



              Problem                                                  



              2013                                                  



               [facility]                                                  







Allergies, Adverse Reactions, Alerts







       Allergy Allergy Status Severity Reaction(s) Onset  Inactive Treating Comm

ents 

Source



       Name   Type                        Date   Date   Clinician        

 

       NO KNOWN Drug   Active                                           [facility]



       ALLERGIE Class                                                   ity of



       S                                                              [facility]







Social History







           Social Habit Start Date Stop Date  Quantity   Comments   Source

 

           Sex Assigned At                                             [facility]

y of



           Birth                                                  [facility]

 

           Exposure to                       Yes                   [facility]



           SARS-CoV-2                                             [facility]



           (event)                                                Branch

 

           Alcohol intake 2015 Current               University

 of



                      00:00:00   00:00:00   non-drinker of            [place] [facility]

song



                                            alcohol               [place]



                                            (finding)             









                Smoking Status  Start Date      Stop Date       Source

 

                Current every day smoker 2015 00:00:00                 Uni

versity [facility]







Medications







       Ordered Filled Start  Stop   Current Ordering Indication Dosage Frequency

 Signature

                    Comments            Components          Source



     Medication Medication Date Date Medication? Clinician                (SIG) 

          



     Name Name                                                   

 

     CRESTOR 20      2015      Yes            20mg      Take 20 mg           U

nivers



     mg tablet      0-26                               by mouth           ity of



               00:00:                               at             [place]



               00                                 bedtime.           Medical



                                                                 Branch

 

     atorvastati            Yes  Mary                80 mg, 1          

 [facility]



     n 80 mg                 [place]                tab, PO,           l



     oral tablet      18:23:           [place]                Daily, 30           

[place]



               46                                 tab,           



                                                  Substituti           



                                                  on             



                                                  Allowed,           



                                                  TAB            

 

     aspirin 81            Yes  Mary                81 mg, 1           

[facility]



     mg tablet,                 [place]                tab, PO,           l



     enteric      18:23:           Brown                Daily, 30           [facility]

marco antonio



     coated      38                                 tab,           



                                                  Substituti           



                                                  on             



                                                  Allowed,           



                                                  ECTAB           

 

     atorvastati            No   Kimberly                80 mg, 1           M

emoria



     n                    Deni                tab,           l



               14:00:           Jose Alberto                Route: PO,           Romaine

n



               00                                 Drug form:           



                                                  TAB,           



                                                  Daily,           



                                                  Dosing           



                                                  Weight           



                                                  72.727,           



                                                  kg, Start           



                                                  date:           



                                                  13           



                                                  9:00:00,           



                                                  Duration:           



                                                  30 day,           



                                                  Stop date:           



                                                  10/15/13           



                                                  9:00:00           

 

     aspirin      -      No   Kimberly                81 mg, 1           Mem[facility]

ia



               9-16           Deni                tab,           l



               14:00:           Jose Alberto                Route: PO,           Romaine

n



               00                                 Drug form:           



                                                  ECTAB,           



                                                  Daily,           



                                                  Dosing           



                                                  Weight           



                                                  72.727,           



                                                  kg, Start           



                                                  date:           



                                                  13           



                                                  9:00:00,           



                                                  Duration:           



                                                  30 day,           



                                                  Stop date:           



                                                  10/15/13           



                                                  9:00:00           

 

     heparin            No   Kimberly                5,000           [facility]



               9-16           Deni                unit, 1           l



               13:00:           Jose Alberto                mL, Route:           Romaine

n



               00                                 SUB-Q,           



                                                  Drug form:           



                                                  INJ, Q8H,           



                                                  Dosing           



                                                  Weight           



                                                  72.727,           



                                                  kg, Start           



                                                  date:           



                                                  13           



                                                  8:00:00,           



                                                  Duration:           



                                                  30 day,           



                                                  Stop date:           



                                                  10/16/13           



                                                  0:00:00           

 

     Saline            No                   5 ml,           [facility]



     Flush 0.9%      -16           Yoshii-Con                Route:           l



               02:00:           treras                IVP, Drug           Romaine

n



               00                                 Form: INJ,           



                                                  Dosing           



                                                  Weight           



                                                  72.727,           



                                                  kg, Q12H,           



                                                  Start           



                                                  date:           



                                                  09/15/13           



                                                  21:00:00,           



                                                  Duration:           



                                                  30 day,           



                                                  Stop date:           



                                                  10/15/13           



                                                  9:00:00           

 

     Zantac            Yes                      Substituti           Memor

ia



               9-15                               on Allowed           l



               22:49:                                              [place]



               00                                                

 

     Multiple            Yes                      1 cap, PO,           Mem

oria



     Vitamins      9-15                               Daily, 30           l



     oral      22:48:                               cap,           [facility]



     capsule      47                                 Substituti           



                                                  on             



                                                  Allowed,           



                                                  Maintenanc           



                                                  e, CAP           

 

     Saline            No                   5 ml,           [facility]



     Flush 0.9%      -15           Yoshii-Con                Route:           l



               22:09:           treras                IVP, Drug           Romaine

n



               00                                 Form: INJ,           



                                                  Dosing           



                                                  Weight           



                                                  72.727,           



                                                  kg, PRN,           



                                                  PRN Line           



                                                  Flush,           



                                                  Start           



                                                  date:           



                                                  09/15/13           



                                                  17:09:00,           



                                                  Duration:           



                                                  30 day,           



                                                  Stop date:           



                                                  10/15/13           



                                                  17:08:00           

 

     labetalol            No                   10 mg, 2           [facility]

vashti



               -15           Yoshii-Con                mL, Route:           l



               22:09:           treras                IVP, Drug           Romaine

n



               00                                 form: INJ,           



                                                  Q10Min,           



                                                  Dosing           



                                                  Weight           



                                                  72.727,           



                                                  kg, PRN           



                                                  Hypertensi           



                                                  on, Start           



                                                  date:           



                                                  09/15/13           



                                                  17:09:00,           



                                                  Duration:           



                                                  30 day,           



                                                  Stop date:           



                                                  10/15/13           



                                                  17:08:00,           



                                                  For SBP >           



                                                  180mmHg           



                                                  and/or DBP           



                                                  > 105mmHg           

 

     Sodium            No   Mary                1,000 mL,           Mem

oria



     Chloride      9-15           Camryn                Rate: 100           l



     0.9% IV      22:09:           Brown                ml/hr,           [facility]



     1,000 mL      00                                 Infuse           



                                                  over: 10           



                                                  hr, Route:           



                                                  IV, Dosing           



                                                  Weight           



                                                  72.727 kg,           



                                                  Total           



                                                  Volume:           



                                                  1,000,           



                                                  Start           



                                                  date:           



                                                  09/15/13           



                                                  17:09:00,           



                                                  Stop date:           



                                                  10/15/13           



                                                  17:08:00           

 

     Omnipaque            No   Jennifer                85 mL,           [facility]

vashti



     350mg/ml      9-15           Jefferson                Route:           l



               19:37:                               IVP, Drug           [facility]



               00                                 Form:           



                                                  SOLN,           



                                                  Dosing           



                                                  Weight           



                                                  72.727,           



                                                  kg,            



                                                  ONCALL,           



                                                  STAT,           



                                                  Start           



                                                  date:           



                                                  09/15/13           



                                                  14:37:00,           



                                                  Duration:           



                                                  1 doses or           



                                                  times,           



                                                  Dose =           



                                                  2.2ml/kg,           



                                                  Max dose =           



                                                  100ml --           



                                                  "To be           



                                                  infused by           



                                                  Radiology           



                                                  Staff           



                                                  ONLY"Dose           



                                                  =              



                                                  2.2ml/kg,           



                                                  Max dose =           



                                                  100ml --           



                                                  "To be           



                                                  infused by           



                                                  Radiology           



                                                  Staff           



                                                  ONLY"           







Vital Signs







             Vital Name   Observation Time Observation Value Comments     Source

 

             Diastolic (mm Hg) 2013 17:00:00                           [facility]

[facility] [place]

 

             Heart Rate   2013 17:00:00                           [facility]

 [place]

 

             Respitory Rate 2013 17:00:00                           [facility]

al [place]

 

             Temperature Oral (F) 2013 17:00:00 97.0 F                    

[facility] [place]

 

             Systolic (mm Hg) 2013 17:00:00                           [facility]

rial [facility]

 

             Temperature Oral (F) 2013 12:00:00 96.3 F                    

[facility]

 

             Heart Rate   2013 12:00:00                           [facility]

 [place]

 

             Systolic (mm Hg) 2013 12:00:00                           [facility]

rial [facility]

 

             Diastolic (mm Hg) 2013 12:00:00                           [facility]

 

             Respitory Rate 2013 12:00:00                           [facility]

[facility]

 

             Heart Rate   2013 08:51:00                           [facility]

 

             Temperature Oral (F) 2013 08:51:00 97.8 F                    

[facility]

 

             Systolic (mm Hg) 2013 08:51:00                           [facility]

 

             Diastolic (mm Hg) 2013 08:51:00                           [facility]

[facility]

 

             Respitory Rate 2013 00:41:00                           [facility]

[facility]

 

             Weight       2013-09-15 17:49:00                           [facility]

 [facility]

 

             Height       2013-09-15 17:49:00 157.48 cm                 [facility]

 [facility]







Procedures







                Procedure       Date / Time Performed Performing Clinician Sourc

e

 

                LASIK                                           [facility]

 

                LASIK                                           [facility]







Encounters







        Start   End     Encounter Admission Attending Care    Care    Encounter 

Source



        Date/Time Date/Time Type    Type    Clinicians Facility Department ID   

   

 

        2020 Laboratory         Lab, [facility]    [id number]

[id number] 



        09:23:20 09:43:20 Only            [facility]  [id number]         



                                                [place] [id number]         



                                                [facility] [phone]         



                                                [place] [address]             



                                                Office                  



                                                Building                 



                                                One                     

 

        2020 Laboratory         Lab, [facility]    1.2.

[id number] 

[facility]



        09:23:20 09:43:20 Only            [facility], [facility]  [id number]    

     ity of



                                                [place] [id number]         Asher

as



                                                [facility]fess[facility] [phone]         Me

dical



                                                [address]



                                                Office                  



                                                Building                 



                                                One                     

 

        2020 Outpatient R               [facility]    [id number]

[id number] [facility]



        09:00:00 09:00:00                                                 ity of



                                                                        [facility]

 

        2020 Ambulatory                 [facility]     [id number]

[id number] [facility]



        19:45:00 19:45:00 Pre-Reg                 r       Neurology 00      l



                                                        [place]         [facility]

 

        2020 Outpatient                 [facility]    [facility]    [id number]

[address] [facility]



        13:45:00 13:45:00                                         00      l



                                                                        [facility]

 

        2020 Outpatient         JERROD Jones [address]

[id number] 



        13:45:00 13:45:00                 Sahil                   Justino Patel                         

 

        2013-09-15 2013 OU                      nullFlavo [facility] [id number]

[address] [facility]



        17:09:00 15:00:00                         r       Medical 58      l



                                                        Center          [facility]







Results







           Test Description Test Time  Test Comments Results    Result Comments 

Source









                    Thyroid Stimulating Hormone 2019 22:27:55 









                      Test Item  Value      Reference Range Interpretation Comme

nts









             TSH (test code = TSH) 0.445 mIU/mL 0.270-4.200               



Erythrocyte Sedimentation Rate [id number] 22:20:13





             Test Item    Value        Reference Range Interpretation Comments

 

             ESR STAT (test code = ESR STAT) 8 mm/hr      0-20                  

    



Comprehensive Metabolic [id number] 21:59:49





             Test Item    Value        Reference Range Interpretation Comments

 

             Sodium Level (test code = Sodium 139.0 mmol/L 135.0-145.0          

     



             Level)                                              

 

             Potassium Level (test code = 3.7 mmol/L   3.5-5.1                  

 



             Potassium Level)                                        

 

             Chloride Level (test code = 98 mmol/L                        



             Chloride Level)                                        

 

             CO2 (test code = CO2) 26 mmol/L    22-29                     

 

             Anion Gap (test code = Anion 15 mmol/L    7-16                     

 



             Gap)                                                

 

             BUN (test code = BUN) 14.70 mg/dL  8.00-23.00                

 

             Creatinine Level (test code = 0.70 mg/dL   0.50-0.90               

  



             Creatinine Level)                                        

 

             BUN/Creat Ratio (test code = 21                        N           

 



             BUN/Creat Ratio)                                        

 

             Glucose Level (test code = 121 mg/dL           H            



             Glucose Level)                                        

 

             Calcium Level (test code = 9.8 mg/dL    8.3-10.5                  



             Calcium Level)                                        

 

             Alk Phos (test code = Alk Phos) 98 U/L                       

    

 

             Bilirubin Total (test code = 0.4 mg/dL    0.1-0.9                  

 



             Bilirubin Total)                                        

 

             Albumin Level (test code = 4.6 g/dL     3.5-5.2                   



             Albumin Level)                                        

 

             Protein Total (test code = 7.4 g/dL     6.4-8.3                   



             Protein Total)                                        

 

             ALT (test code = ALT) 22 U/L       1-33                      

 

             AST (test code = AST) 24 U/L       1-32                      

 

             Globulin (test code = Globulin) 2.8 g/dL     2.9-3.1      L        

    

 

             A/G Ratio (test code = A/G 1.6 ratio                 N            



             Ratio)                                              



Comprehensive Metabolic [id number] 21:59:49





             Test Item    Value        Reference Range Interpretation Comments

 

             Sodium Level (test 139.0 mmol/L 135.0-145.0               



             code = Sodium Level)                                        

 

             Potassium Level 3.7 mmol/L   3.5-5.1                   



             (test code =                                        



             Potassium Level)                                        

 

             Chloride Level (test 98 mmol/L                        



             code = Chloride                                        



             Level)                                              

 

             CO2 (test code = 26 mmol/L    22-29                     



             CO2)                                                

 

             Anion Gap (test code 15 mmol/L    7-16                      



             = Anion Gap)                                        

 

             BUN (test code = 14.70 mg/dL  8.00-23.00                



             BUN)                                                

 

             Creatinine Level 0.70 mg/dL   0.50-0.90                 



             (test code =                                        



             Creatinine Level)                                        

 

             BUN/Creat Ratio 21                        N            



             (test code =                                        



             BUN/Creat Ratio)                                        

 

             Glucose Level (test 121 mg/dL           H            



             code = Glucose                                        



             Level)                                              

 

             Calcium Level (test 9.8 mg/dL    8.3-10.5                  



             code = Calcium                                        



             Level)                                              

 

             Alk Phos (test code 98 U/L                           



             = Alk Phos)                                         

 

             Bilirubin Total 0.4 mg/dL    0.1-0.9                   



             (test code =                                        



             Bilirubin Total)                                        

 

             Albumin Level (test 4.6 g/dL     3.5-5.2                   



             code = Albumin                                        



             Level)                                              

 

             Protein Total (test 7.4 g/dL     6.4-8.3                   



             code = Protein                                        



             Total)                                              

 

             ALT (test code = 22 U/L       1-33                      



             ALT)                                                

 

             AST (test code = 24 U/L       1-32                      



             AST)                                                

 

             Globulin (test code 2.8 g/dL     2.9-3.1      L            



             = Globulin)                                         

 

             A/G Ratio (test code 1.6 ratio                 N            



             = A/G Ratio)                                        

 

             eGFR AA (test code = >60                       N            eGFR (e

stimated



             eGFR AA)     mL/min/1.73 m2                           Glomerular



                                                                 Filtration Rate

) is



                                                                 an estimated va

lue,



                                                                 calculated from

 the



                                                                 patient's serum



                                                                 creatinine usin

g the



                                                                 MDRD equation. 

It is



                                                                 NOT the patient

's



                                                                 actual GFR. The

 eGFR



                                                                 provides a more



                                                                 clinically usef

ul



                                                                 measure of kidn

ey



                                                                 disease than se

rum



                                                                 creatinine



                                                                 alone.***This



                                                                 calculation neelima

es



                                                                 sex and race in

to



                                                                 account, if the



                                                                 information is



                                                                 provided. If th

e



                                                                 race is not



                                                                 provided, and t

he



                                                                 patient is



                                                                 -Carole

n,



                                                                 multiply by 1.2

12.



                                                                 If sex is not



                                                                 provided, and t

he



                                                                 patient is fema

le,



                                                                 multiply by 0.7

42.



                                                                 Results for pat

ients



                                                                 <18 years of ag

e



                                                                 have not been



                                                                 validated by th

e



                                                                 MDRD study and



                                                                 should be



                                                                 interpreted wit

h



                                                                 caution. eGFR R

esult



                                                                 Interpretation:

eGFR



                                                                 > or = 60 is in

 the



                                                                 Normal RangeeGF

R <



                                                                 60 may mean kid

neeru



                                                                 diseaseeGFR < 1

5 may



                                                                 mean kidney



                                                                 failure*** Rang

es



                                                                 recommended by 

the



                                                                 [employer]



                                                                 Foundation,



                                                                 http://nkdep.ni

h.gov



Lipid [id number] 21:59:49





             Test Item    Value        Reference Range Interpretation Comments

 

             Cholesterol Total 294 mg/dL    0-200        H            RISK OF HE

ART



             (test code =                                        DISEASEPublishe

d by



             Cholesterol Total)                                        American 

Heart



                                                                 Association Harriet

lyte



                                                                 Optimal Borderl

ine



                                                                 Increased RiskC

HOL



                                                                 <200 200-239 >2

40TRIG



                                                                 <150 150-199 >2

00HDL



                                                                 Male >60 <40HDL

 Female



                                                                 >60 <50LDL <100



                                                                 130-159 >160LDL

 Near



                                                                 optimal is 100-

129

 

             Triglycerides (test 616 mg/dL    9-200        H            



             code =                                              



             Triglycerides)                                        

 

             HDL (test code = 52 mg/dL     50-60                     



             HDL)                                                

 

             LDL (test code = N/A Trig     0-130        N            LDL calcula

tion is



             LDL)         >400 mg/dL                             unreliable when



                                                                 Triglyceride is



                                                                 greater than 40

0.

 

             VLDL (test code = N/A Trig     5-40         N            VLDL calcu

lation is



             VLDL)        >400 mg/dL                             unreliable when



                                                                 Triglyceride is



                                                                 greater than 40

0.

 

             Chol/HDL (test code 5.7 ratio    0.0-4.4      H            



             = Chol/HDL)                                         

 

             LDL/HDL Ratio (test N/A Trig                  N            LDL/HDL 

Ratio



             code = LDL/HDL >400                                   calculation i

s



             Ratio)                                              unreliable when



                                                                 Triglyceride is



                                                                 greater than 40

0.



Comprehensive Metabolic [id number] 21:59:49





             Test Item    Value        Reference Range Interpretation Comments

 

             Sodium Level (test 139.0 mmol/L 135.0-145.0               



             code = Sodium Level)                                        

 

             Potassium Level 3.7 mmol/L   3.5-5.1                   



             (test code =                                        



             Potassium Level)                                        

 

             Chloride Level (test 98 mmol/L                        



             code = Chloride                                        



             Level)                                              

 

             CO2 (test code = 26 mmol/L    22-29                     



             CO2)                                                

 

             Anion Gap (test code 15 mmol/L    7-16                      



             = Anion Gap)                                        

 

             BUN (test code = 14.70 mg/dL  8.00-23.00                



             BUN)                                                

 

             Creatinine Level 0.70 mg/dL   0.50-0.90                 



             (test code =                                        



             Creatinine Level)                                        

 

             BUN/Creat Ratio 21                        N            



             (test code =                                        



             BUN/Creat Ratio)                                        

 

             Glucose Level (test 121 mg/dL           H            



             code = Glucose                                        



             Level)                                              

 

             Calcium Level (test 9.8 mg/dL    8.3-10.5                  



             code = Calcium                                        



             Level)                                              

 

             Alk Phos (test code 98 U/L                           



             = Alk Phos)                                         

 

             Bilirubin Total 0.4 mg/dL    0.1-0.9                   



             (test code =                                        



             Bilirubin Total)                                        

 

             Albumin Level (test 4.6 g/dL     3.5-5.2                   



             code = Albumin                                        



             Level)                                              

 

             Protein Total (test 7.4 g/dL     6.4-8.3                   



             code = Protein                                        



             Total)                                              

 

             ALT (test code = 22 U/L       1-33                      



             ALT)                                                

 

             AST (test code = 24 U/L       1-32                      



             AST)                                                

 

             Globulin (test code 2.8 g/dL     2.9-3.1      L            



             = Globulin)                                         

 

             A/G Ratio (test code 1.6 ratio                 N            



             = A/G Ratio)                                        

 

             eGFR AA (test code = >60                       N            eGFR (e

stimated



             eGFR AA)     mL/min/1.73 m2                           Glomerular



                                                                 Filtration Rate

) is



                                                                 an estimated va

lue,



                                                                 calculated from

 the



                                                                 patient's serum



                                                                 creatinine usin

g the



                                                                 MDRD equation. 

It is



                                                                 NOT the patient

's



                                                                 actual GFR. The

 eGFR



                                                                 provides a more



                                                                 clinically usef

ul



                                                                 measure of kidn

ey



                                                                 disease than se

rum



                                                                 creatinine



                                                                 alone.***This



                                                                 calculation neelima

es



                                                                 sex and race in

to



                                                                 account, if the



                                                                 information is



                                                                 provided. If th

e



                                                                 race is not



                                                                 provided, and t

he



                                                                 patient is



                                                                 -Carole

n,



                                                                 multiply by 1.2

12.



                                                                 If sex is not



                                                                 provided, and t

he



                                                                 patient is fema

le,



                                                                 multiply by 0.7

42.



                                                                 Results for pat

ients



                                                                 <18 years of ag

e



                                                                 have not been



                                                                 validated by th

e



                                                                 MDRD study and



                                                                 should be



                                                                 interpreted wit

h



                                                                 caution. eGFR R

esult



                                                                 Interpretation:

eGFR



                                                                 > or = 60 is in

 the



                                                                 Normal RangeeGF

R <



                                                                 60 may mean kid

neeru



                                                                 diseaseeGFR < 1

5 may



                                                                 mean kidney



                                                                 failure*** Rang

es



                                                                 recommended by 

the



                                                                 [employer]



                                                                 Foundation,



                                                                 http://nkdep.ni

h.gov

 

             eGFR Non-AA (test >60.00                    N            eGFR (hailey

mated



             code = eGFR Non-AA) mL/min/1.73 m2                           Glomer

ular



                                                                 Filtration Rate

) is



                                                                 an estimated va

lue,



                                                                 calculated from

 the



                                                                 patient's serum



                                                                 creatinine usin

g the



                                                                 MDRD equation. 

It is



                                                                 NOT the patient

's



                                                                 actual GFR. The

 eGFR



                                                                 provides a more



                                                                 clinically usef

ul



                                                                 measure of kidn

ey



                                                                 disease than se

rum



                                                                 creatinine



                                                                 alone.***This



                                                                 calculation neelima

es



                                                                 sex and race in

to



                                                                 account, if the



                                                                 information is



                                                                 provided. If th

e



                                                                 race is not



                                                                 provided, and t

he



                                                                 patient is



                                                                 -Carole

n,



                                                                 multiply by 1.2

12.



                                                                 If sex is not



                                                                 provided, and t

he



                                                                 patient is fema

le,



                                                                 multiply by 0.7

42.



                                                                 Results for pat

ients



                                                                 <18 years of ag

e



                                                                 have not been



                                                                 validated by th

e



                                                                 MDRD study and



                                                                 should be



                                                                 interpreted wit

h



                                                                 caution. eGFR R

esult



                                                                 Interpretation:

eGFR



                                                                 > or = 60 is in

 the



                                                                 Normal RangeeGF

R <



                                                                 60 may mean kid

neeru



                                                                 diseaseeGFR < 1

5 may



                                                                 mean kidney



                                                                 failure*** Rang

es



                                                                 recommended by 

the



                                                                 [employer]



                                                                 [employer],



                                                                 http://nkdep.ni

h.gov



Complete Blood Count with Grcmncadncjh1354-10-39 21:52:58





             Test Item    Value        Reference Range Interpretation Comments

 

             WBC (test code = WBC) 9.0 x10      4.4-10.5                  

 

             RBC (test code = RBC) 4.76 x10     3.75-5.20                 

 

             Hgb (test code = Hgb) 14.4 g/dL    12.2-14.8                 

 

             Hct (test code = Hct) 42.4 %       36.5-44.4                 

 

             MCV (test code = MCV) 89.10 fL     80..00              

 

             MCHC (test code = 34.00 g/dL   32.00-37.50               



             MCHC)                                               

 

             MCH (test code = MCH) 30.3 pg      27.0-32.5                 

 

             RDW CV (test code = 12.6 %       11.5-14.5                 



             RDW CV)                                             

 

             Platelets (test code = 340.0 x10    140.0-440.0               



             Platelets)                                          

 

             MPV (test code = MPV) 10.1 fL                   N            

 

             Slide Review (test Auto         Auto                      Result cr

eated by



             code = Slide Review)                                        [id number]_

SLIDE_REV_AUTO

 

             nRBC (test code = 0                         N            



             nRBC)                                               

 

             NRBC Abs (test code = 0.00 x10                  N            



             NRBC Abs)                                           

 

             IPF (test code = IPF) 0 %                       N            



Automated Qryotvfgbysi0596-47-84 21:52:58





             Test Item    Value        Reference Range Interpretation Comments

 

             Neutro Auto (test code = Neutro 62.7 %       36.0-70.0             

    



             Auto)                                               

 

             Lymph Auto (test code = Lymph Auto) 28.3 %       12.0-44.0         

        

 

             Mono Auto (test code = Mono Auto) 7.6 %        0.0-11.0            

      

 

             Eos, Auto (test code = Eos, Auto) 0.7 %        0.0-7.0             

      

 

             Basophil Auto (test code = Basophil 0.3 %        0.0-2.0           

        



             Auto)                                               

 

             Neutro Absolute (test code = Neutro 5.7 x10      1.6-7.4           

        



             Absolute)                                           

 

             Lymph Absolute (test code = Lymph 2.56 x10     .50-4.60            

      



             Absolute)                                           

 

             Mono Absolute (test code = Mono .69 x10      .00-1.20              

    



             Absolute)                                           

 

             Eos Absolute (test code = Eos 0.06 x10     0.00-0.74               

  



             Absolute)                                           

 

             Baso Absolute (test code = Baso 0.03 x10     0.00-0.21             

    



             Absolute)                                           



Pro B Natriuretic Uydykxb6930-01-11 21:52:58





             Test Item    Value        Reference Range Interpretation Comments

 

             NT-proBNP (test code = NT-proBNP) 80 pg/mL     0-124               

      



IG Shnvy4442-13-76 21:52:58





             Test Item    Value        Reference Range Interpretation Comments

 

             IG (test code = IG) 0.4 %        0.0-5.0                   

 

             IG Abs (test code = IG Abs) 0 x10                     N            



GEBDMHNTQ2586-95-52 12:35:11





             Test Item    Value        Reference Range Interpretation Comments

 

             LDL Direct (test code = LDL Direct) 144                       H    

        



[facility]





             Test Item    Value        Reference Range Interpretation Comments

 

             AGAP (test code = AGAP) 13.1         10.0-20.0    N            



[facility]AC0832[facility]





             Test Item    Value        Reference Range Interpretation Comments

 

             eGFR (test code = eGFR) 96                                     



[facility]DjzdqlqPODRBJZON6242-94-84 08:00:00





             Test Item    Value        Reference Range Interpretation Comments

 

             Creatinine Lvl (test code = Creatinine 0.7          0.5-1.4      N 

           



             Lvl)                                                



[facility]VfbiijxCKYEQVVON5629[facility]:00





             Test Item    Value        Reference Range Interpretation Comments

 

             BUN (test code = BUN) 18           7-22         N            



[facility]MgibthcYKAMHJNFY5052[facility]50-15 08:00:00





             Test Item    Value        Reference Range Interpretation Comments

 

             Glucose Lvl (test code = Glucose Lvl) 101          70-99        H  

          



[facility] HospitalUzjaqniPKZOBINZG6431-52-04 08:00:00





             Test Item    Value        Reference Range Interpretation Comments

 

             Sodium Lvl (test code = Sodium Lvl) 142          135-145      N    

        



[facility] HospitalZzkdvzdLKHXGUPFA0429-75-23 08:00:00





             Test Item    Value        Reference Range Interpretation Comments

 

             Calcium Lvl (test code = Calcium Lvl) 8.3          8.5-10.5     L  

          



[facility] HospitalVtqnfhyMXUEWLARP5103-15-58 08:00:00





             Test Item    Value        Reference Range Interpretation Comments

 

             CO2 (test code = CO2) 23           24-32        L            



[facility] HospitalDiruxqiNUOMYFHJJ1930-21-66 08:00:00





             Test Item    Value        Reference Range Interpretation Comments

 

             Chloride Lvl (test code = Chloride Lvl) 110                 H

            



[facility] HospitalKpetishMXMDHNFYJ2968-39-61 08:00:00





             Test Item    Value        Reference Range Interpretation Comments

 

             Potassium Lvl (test code = Potassium 4.1          3.5-5.1      N   

         



             Lvl)                                                



[facility] HospitalOeoezumHPLUEZJNE9429-59-55 08:00:00





             Test Item    Value        Reference Range Interpretation Comments

 

             LDL (test code = LDL) See Note mg/dL                           



                          5*NA*(2013                           



                          03:00:00)                              



[facility] HospitalFgjjtehFIHGUUTKO5535-77-99 08:00:00





             Test Item    Value        Reference Range Interpretation Comments

 

             CHD Risk (test code = CHD Risk) 8.45         3.90-5.80    H        

    



[facility] HospitalVanrgsmWKSTFXCMU8777-98-99 08:00:00





             Test Item    Value        Reference Range Interpretation Comments

 

             HDL (test code = HDL) 29                        L            



[facility] HospitalWlhzitaDVKYITEEU6533-76-18 08:00:00





             Test Item    Value        Reference Range Interpretation Comments

 

             Trig (test code = Trig) 531                       H            



[facility] HospitalMpynzqoBPYWCYRXL2150-13-17 08:00:00





             Test Item    Value        Reference Range Interpretation Comments

 

             Chol (test code = Chol) 245                       H            



[facility]MlnvhvjWCVWOBHLJY8516[facility]86-88 [facility]:00





             Test Item    Value        Reference Range Interpretation Comments

 

             Basophils (test code = 0.7          See_Comment  N             [Aut

omated message] The



             Basophils)                                          system which ge

nerated



                                                                 this result tra

nsmitted



                                                                 reference range

: <=1.0.



                                                                 The reference r

erna was



                                                                 not used to int

erpret



                                                                 this result as



                                                                 normal/abnormal

.



[facility] CenterJdjnivfLQSWNAYNTG6554-10-66 08:00:00





             Test Item    Value        Reference Range Interpretation Comments

 

             Segs-Bands # (test code = Segs-Bands #) 3.6          1.5-8.1      N

            



[facility] CenterVccyugrUHETADPPSW7796-29-14 08:00:00





             Test Item    Value        Reference Range Interpretation Comments

 

             Lymphocytes # (test code = Lymphocytes 3.1          1.0-5.5      N 

           



             #)                                                  



[facility] CenterRhgujraWCQVRBYWHC9627-14-15 08:00:00





             Test Item    Value        Reference Range Interpretation Comments

 

             Monocytes # (test code 0.6          See_Comment  N             [Aut

omated message] The



             = Monocytes #)                                        system which 

generated



                                                                 this result tra

nsmitted



                                                                 reference range

: <=0.8.



                                                                 The reference r

erna was



                                                                 not used to int

erpret



                                                                 this result as



                                                                 normal/abnormal

.



[facility] CenterDxbxdpjAJGBFMRLAL8696-23-96 08:00:00





             Test Item    Value        Reference Range Interpretation Comments

 

             Eosinophils # (test code 0.1          See_Comment  N             [A

utomated message] The



             = Eosinophils #)                                        system whic

h generated



                                                                 this result tra

nsmitted



                                                                 reference range

: <=0.5.



                                                                 The reference r

erna was



                                                                 not used to int

erpret



                                                                 this result as



                                                                 normal/abnormal

.



[facility] CenterXljagwkVXIBSMDXKG0852-76-99 08:00:00





             Test Item    Value        Reference Range Interpretation Comments

 

             Basophils # (test code 0.1          See_Comment  N             [Aut

omated message] The



             = Basophils #)                                        system which 

generated



                                                                 this result tra

nsmitted



                                                                 reference range

: <=0.2.



                                                                 The reference r

erna was



                                                                 not used to int

erpret



                                                                 this result as



                                                                 normal/abnormal

.



[facility] CenterLftzfvlYAFKKTGZQN0697-20-83 08:00:00





             Test Item    Value        Reference Range Interpretation Comments

 

             Eosinophils (test code = 1.7          See_Comment  N             [A

utomated message] The



             Eosinophils)                                        system which ge

nerated



                                                                 this result tra

nsmitted



                                                                 reference range

: <=4.0.



                                                                 The reference r

erna was



                                                                 not used to int

erpret



                                                                 this result as



                                                                 normal/abnormal

.



[facility] CenterGbyijsqIEVCDBQYEA6890-49-16 08:00:00





             Test Item    Value        Reference Range Interpretation Comments

 

             Monocytes (test code = Monocytes) 8.0          2.0-12.0     N      

      



[facility] CenterEhkzeirMXKYVEBXLK2348-96-66 08:00:00





             Test Item    Value        Reference Range Interpretation Comments

 

             Segs (test code = Segs) 48.0         45.0-75.0    N            



[facility] CenterDonwdxdRZHJJKIBLY9785-87-62 08:00:00





             Test Item    Value        Reference Range Interpretation Comments

 

             Lymphocytes (test code = Lymphocytes) 41.6         20.0-40.0    H  

          



[facility] CenterYlkddpuOJHICBZVAA7641-81-53 08:00:00





             Test Item    Value        Reference Range Interpretation Comments

 

             RBC (test code = RBC) 3.87         4.20-5.40    L            



[facility] CenterRcbdscbVLPDUZAVFD0749-09-32 08:00:00





             Test Item    Value        Reference Range Interpretation Comments

 

             WBC (test code = WBC) 7.4          3.7-10.4     N            



[facility] CenterYdrovjdMZARDPNVAH9953-04-98 08:00:00





             Test Item    Value        Reference Range Interpretation Comments

 

             Hgb (test code = Hgb) 11.9         12.0-16.0    L            



[facility] CenterWcgttgvVXVYCRYPPK2626-19-63 08:00:00





             Test Item    Value        Reference Range Interpretation Comments

 

             Hct (test code = Hct) 35.2         36.0-48.0    L            



[facility] CenterMzlkalwCLBVPGWKLD8540-90-77 08:00:00





             Test Item    Value        Reference Range Interpretation Comments

 

             MCV (test code = MCV) 90.8         81.0-99.0    N            



[facility] CenterBsnpeffJXDFBXVAQS0838-35-80 08:00:00





             Test Item    Value        Reference Range Interpretation Comments

 

             MCH (test code = MCH) 30.7 pg      27.0-31.0    N            



[facility] CenterFoazupuJZYGHWNWXQ2478-67-72 08:00:00





             Test Item    Value        Reference Range Interpretation Comments

 

             RDW (test code = RDW) 13.3         11.5-14.5    N            



[facility] CenterBjxrdqrKWEXONGZRN5727-67-84 08:00:00





             Test Item    Value        Reference Range Interpretation Comments

 

             MCHC (test code = MCHC) 33.9         32.0-36.0    N            



[facility] CenterBfznshhMSZREUJJIO4360-19-61 08:00:00





             Test Item    Value        Reference Range Interpretation Comments

 

             Platelet (test code = Platelet) 279          133-450      N        

    



[facility] CenterRbzumizVEVZQTVYBB4583-73-56 08:00:00





             Test Item    Value        Reference Range Interpretation Comments

 

             MPV (test code = MPV) 7.5          7.4-10.4     N            



[facility] CenterWpieibiSBHSWVBAHG6232-96-73 18:53:00





             Test Item    Value        Reference Range Interpretation Comments

 

             Segs (test code = Segs) 55.3         45.0-75.0    N            



[facility] CenterSdzkoygNISSSYHGLJ2511-07-77 18:53:00





             Test Item    Value        Reference Range Interpretation Comments

 

             PTT (test code = PTT) 27.3 s       22.9-35.8    N            



[facility] CenterFcowzdpCMWGOZILGL5652-99-72 18:53:00





             Test Item    Value        Reference Range Interpretation Comments

 

             PT (test code = PT) 13.3 s       12.0-14.7    N            



[facility] CenterMkkuwxxGJMSMSVGVF1737-77-03 18:53:00





             Test Item    Value        Reference Range Interpretation Comments

 

             INR (test code = INR) 1.02         0.85-1.17    N            



[facility] CenterKiirlekTAEWKMUCHL7247-83-52 18:53:00





             Test Item    Value        Reference Range Interpretation Comments

 

             MPV (test code = MPV) 7.2          7.4-10.4     L            



[facility] CenterIndqqwmPXXTXYZVTZ2737-59-93 18:53:00





             Test Item    Value        Reference Range Interpretation Comments

 

             MCHC (test code = MCHC) 33.5         32.0-36.0    N            



[facility] CenterUhplbmuGYCWPHZXWA5228-89-33 18:53:00





             Test Item    Value        Reference Range Interpretation Comments

 

             MCH (test code = MCH) 29.8 pg      27.0-31.0    N            



[facility] CenterOprfuebPMIZTECBXF2926-87-16 18:53:00





             Test Item    Value        Reference Range Interpretation Comments

 

             Platelet (test code = Platelet) 305          133-450      N        

    



[facility] CenterGsbujtpCTQMVEEVDC5937-82-23 18:53:00





             Test Item    Value        Reference Range Interpretation Comments

 

             RDW (test code = RDW) 12.4         11.5-14.5    N            



[facility] CenterOafzcomBXINIPHNZA5083-76-04 18:53:00





             Test Item    Value        Reference Range Interpretation Comments

 

             MCV (test code = MCV) 89.0         81.0-99.0    N            



[facility] CenterQlneydrYIETFSBQTT8163-98-30 18:53:00





             Test Item    Value        Reference Range Interpretation Comments

 

             Hgb (test code = Hgb) 13.3         12.0-16.0    N            



[facility] CenterEclajaoHPZFQZXWUO2226-99-71 18:53:00





             Test Item    Value        Reference Range Interpretation Comments

 

             RBC (test code = RBC) 4.45         4.20-5.40    N            



[facility] CenterBckisiePPHCDGLFNY5770-78-73 18:53:00





             Test Item    Value        Reference Range Interpretation Comments

 

             Hct (test code = Hct) 39.6         36.0-48.0    N            



[facility] CenterYhobvtyJASXRWDGFL8113-92-81 18:53:00





             Test Item    Value        Reference Range Interpretation Comments

 

             WBC (test code = WBC) 8.3          3.7-10.4     N            



[facility] HospitalQadbipnXSRXTMLMN5748-77-94 18:53:00





             Test Item    Value        Reference Range Interpretation Comments

 

             eGFR (test code = eGFR) 96                                     



[facility] HospitalMzabrziCHRAYOXME9587-81-05 18:53:00





             Test Item    Value        Reference Range Interpretation Comments

 

             Sodium Lvl (test code = Sodium Lvl) 140          135-145      N    

        



[facility] HospitalWauetapCQQURYMKK7070-71-84 18:53:00





             Test Item    Value        Reference Range Interpretation Comments

 

             Potassium Lvl (test code = Potassium 3.9          3.5-5.1      N   

         



             Lvl)                                                



[id number] [facility]53[facility]00





             Test Item    Value        Reference Range Interpretation Comments

 

             Chloride Lvl (test code = Chloride Lvl) 105                 N

            



[facility] HospitalUdihlhaENTNUZUPT9765-19-64 18:53:00





             Test Item    Value        Reference Range Interpretation Comments

 

             CO2 (test code = CO2) 28           24-32        N            



[facility] HospitalBhnryjeJOENGJBAU0947-03-35 18:53:00





             Test Item    Value        Reference Range Interpretation Comments

 

             Glucose Lvl (test code = Glucose Lvl) 92           70-99        N  

          



[facility] HospitalRvcdtvtDWXRZGXVS6092-94-63 18:53:00





             Test Item    Value        Reference Range Interpretation Comments

 

             BUN (test code = BUN) 14           7-22         N            



[facility] HospitalMfykcqzUBXYHZZQB3459-05-47 18:53:00





             Test Item    Value        Reference Range Interpretation Comments

 

             Creatinine Lvl (test code = Creatinine 0.7          0.5-1.4      N 

           



             Lvl)                                                



[facility] HospitalFjbmqhjRERHPQOLU3243-57-36 18:53:00





             Test Item    Value        Reference Range Interpretation Comments

 

             Calcium Lvl (test code = Calcium Lvl) 9.1          8.5-10.5     N  

          



[facility] HospitalWfyhffeEDDTMOAVM9809-05-10 18:53:00





             Test Item    Value        Reference Range Interpretation Comments

 

             AGAP (test code = AGAP) 10.9         10.0-20.0    N            



[facility]VjxgutmAAIAENYSWE2097[facility]53-40 [facility]:53:00





             Test Item    Value        Reference Range Interpretation Comments

 

             Basophils # (test code 0.1          See_Comment  N             [Aut

omated message] The



             = Basophils #)                                        system which 

generated



                                                                 this result tra

nsmitted



                                                                 reference range

: <=0.2.



                                                                 The reference r

erna was



                                                                 not used to int

erpret



                                                                 this result as



                                                                 normal/abnormal

.



[facility] CenterOsuktijLBINQASAVB3709-52-85 18:53:00





             Test Item    Value        Reference Range Interpretation Comments

 

             Lymphocytes # (test code = Lymphocytes 2.8          1.0-5.5      N 

           



             #)                                                  



[facility] CenterVxbfgclUANNZXKYHL9179-08-63 18:53:00





             Test Item    Value        Reference Range Interpretation Comments

 

             Eosinophils # (test code 0.1          See_Comment  N             [A

utomated message] The



             = Eosinophils #)                                        system wh

h generated



                                                                 this result tra

nsmitted



                                                                 reference range

: <=0.5.



                                                                 The reference r

erna was



                                                                 not used to int

erpret



                                                                 this result as



                                                                 normal/abnormal

.



[facility] CenterLuktdtcNVCOMOKNHK2002-83-11 18:53:00





             Test Item    Value        Reference Range Interpretation Comments

 

             Monocytes # (test code 0.7          See_Comment  N             [Aut

omated message] The



             = Monocytes #)                                        system which 

generated



                                                                 this result tra

nsmitted



                                                                 reference range

: <=0.8.



                                                                 The reference r

erna was



                                                                 not used to int

erpret



                                                                 this result as



                                                                 normal/abnormal

.



[facility] CenterHouzkddARVIJLPHCY1496-42-78 18:53:00





             Test Item    Value        Reference Range Interpretation Comments

 

             Segs-Bands # (test code = Segs-Bands #) 4.6          1.5-8.1      N

            



[facility] CenterKyqdmmoEOTMFAQBTZ5615-21-88 18:53:00





             Test Item    Value        Reference Range Interpretation Comments

 

             Basophils (test code = 1.0          See_Comment  N             [Aut

omated message] The



             Basophils)                                          system which ge

nerated



                                                                 this result tra

nsmitted



                                                                 reference range

: <=1.0.



                                                                 The reference r

erna was



                                                                 not used to int

erpret



                                                                 this result as



                                                                 normal/abnormal

.



[facility] CenterIinyvlaTAABSOTFSE4567-47-82 18:53:00





             Test Item    Value        Reference Range Interpretation Comments

 

             Lymphocytes (test code = Lymphocytes) 34.0         20.0-40.0    N  

          



[facility] CenterUdfqlnmGXAGJHYFCW9377-22-89 18:53:00





             Test Item    Value        Reference Range Interpretation Comments

 

             Eosinophils (test code = 1.3          See_Comment  N             [A

utomated message] The



             Eosinophils)                                        system which ge

nerated



                                                                 this result tra

nsmitted



                                                                 reference range

: <=4.0.



                                                                 The reference r

erna was



                                                                 not used to int

erpret



                                                                 this result as



                                                                 normal/abnormal

.



[facility] CenterXjyjyunAKSDZYISLI4109-58-64 18:53:00





             Test Item    Value        Reference Range Interpretation Comments

 

             Monocytes (test code = Monocytes) 8.4          2.0-12.0     N      

      



[facility]

## 2022-12-14 NOTE — EDPHYS
Physician Documentation                                                                           

 [facility]                                                                 

Name: Meaghan Tavares                                                                            

Age: 67 yrs                                                                                       

Sex: Female                                                                                       

: 1955                                                                                   

MRN: [id number]                                                                                   

Arrival Date: 2022                                                                          

Time: 18:31                                                                                       

Account#: [id number]                                                                            

Bed 26                                                                                            

Private MD: Jorge Franco E                                                                     

ED Physician Scott Miranda                                                                       

HPI:                                                                                              

                                                                                             

20:29 This 67 yrs old  Female presents to ER via Ambulatory with complaints of        kdr 

      Shortness Of Breath, Back Pain.                                                             

20:30 This 67 yrs old  Female presents to ER via Ambulatory with complaints of        kdr 

      Shortness Of Breath, Back Pain.                                                             

20:30 Patient has multiple complaints including dizziness, weakness, back pain and shortness  kdr 

      of breath. Patient states that she was sitting on the toilet feeling poorly about 2         

      weeks ago when she fell off the toilet. She denies any head injury at the time. Since       

      then she has had back pain generalized weakness and feeling poorly. She has had some        

      mild dizziness as well. Overall there is no focal complaint with the most significant       

      issue being her mid back pain. Onset: The symptoms/episode began/occurred gradually, 2      

      week(s) ago. Severity of symptoms: At their worst the symptoms were mild moderate just      

      prior to arrival, in the emergency department the symptoms have improved mildly. The        

      patient has not experienced similar symptoms in the past. The patient has not recently      

      seen a physician.                                                                           

                                                                                                  

Historical:                                                                                       

- Allergies:                                                                                      

18:48 No Known Allergies;                                                                     kr3 

- Home Meds:                                                                                      

19:02 lisinopril 10 mg Oral tab 1 tab once daily [Active];                                    em6 

- PMHx:                                                                                           

18:48 Hypertension;                                                                           kr3 

                                                                                                  

- Immunization history:: Adult Immunizations not up to date.                                      

- Social history:: Smoking status: Patient reports the use of cigarette tobacco                   

  products, smokes one-half pack cigarettes per day.                                              

                                                                                                  

                                                                                                  

ROS:                                                                                              

20:30 Constitutional: Negative for fever, chills, and weight loss, Eyes: Negative for injury, kdr 

      pain, redness, and discharge, ENT: Negative for injury, pain, and discharge, Neck:          

      Negative for injury, pain, and swelling, Cardiovascular: Negative for chest pain,           

      palpitations, and edema, Abdomen/GI: Negative for abdominal pain, nausea, vomiting,         

      diarrhea, and constipation, : Negative for injury, bleeding, discharge, and swelling,     

      MS/Extremity: Negative for injury and deformity, Skin: Negative for injury, rash, and       

      discoloration, Psych: Negative for depression, anxiety, suicide ideation, homicidal         

      ideation, and hallucinations, Allergy/Immunology: Negative for hives, rash, and             

      allergies, Endocrine: Negative for neck swelling, polydipsia, polyuria, polyphagia, and     

      marked weight changes, Hematologic/Lymphatic: Negative for swollen nodes, abnormal          

      bleeding, and unusual bruising.                                                             

20:30 Respiratory: Positive for dyspnea on exertion, shortness of breath, at rest. Negative       

      for hemoptysis, orthopnea, pleurisy, sputum production, wheezing.                           

20:30 Back: Positive for injury or acute deformity, decreased range of motion, pain at rest,      

      pain with movement, of the thoracic area, lumbar area and mid back area.                    

                                                                                                  

Exam:                                                                                             

20:30 Constitutional:  This is a well developed, well nourished patient who is awake, alert,  kdr 

      and in no acute distress. Head/Face:  Normocephalic, atraumatic. Eyes:  Pupils equal        

      round and reactive to light, extra-ocular motions intact.  Lids and lashes normal.          

      Conjunctiva and sclera are non-icteric and not injected.  Cornea within normal limits.      

      Periorbital areas with no swelling, redness, or edema. Neck:  Trachea midline, no           

      thyromegaly or masses palpated, and no cervical lymphadenopathy.  Supple, full range of     

      motion without nuchal rigidity, or vertebral point tenderness.  No Meningismus.             

      Chest/axilla:  Normal chest wall appearance and motion.  Nontender with no deformity.       

      No lesions are appreciated. Cardiovascular:  Regular rate and rhythm with a normal S1       

      and S2.  No gallops, murmurs, or rubs.  Normal PMI, no JVD.  No pulse deficits.             

      Respiratory:  Lungs have equal breath sounds bilaterally, clear to auscultation and         

      percussion.  No rales, rhonchi or wheezes noted.  No increased work of breathing, no        

      retractions or nasal flaring. Abdomen/GI:  Soft, non-tender, with normal bowel sounds.      

      No distension or tympany.  No guarding or rebound.  No evidence of tenderness               

      throughout. Skin:  Warm, dry with normal turgor.  Normal color with no rashes, no           

      lesions, and no evidence of cellulitis. MS/ Extremity:  Pulses equal, no cyanosis.          

      Neurovascular intact.  Full, normal range of motion. Neuro:  Awake and alert, GCS 15,       

      oriented to person, place, time, and situation.  Cranial nerves II-XII grossly intact.      

      Motor strength 5/5 in all extremities.  Sensory grossly intact.  Cerebellar exam            

      normal.  Normal gait. Psych:  Awake, alert, with orientation to person, place and time.     

       Behavior, mood, and affect are within normal limits.                                       

20:32 Back: pain, that is mild, of the  mid back area, ROM is painful, Minor discomfort.      kdr 

      normal spinal alignment noted, CVA tenderness, is absent, vertebral tenderness, is          

      appreciated at T9, T10 and T11.                                                             

                                                                                                  

Vital Signs:                                                                                      

18:45  / 115; Pulse 108; Resp 18; Temp 97.8; Pulse Ox 100% on R/A; Weight 68.04 kg;     kr3 

      Height 5 ft. 2 in. (157.48 cm);                                                             

20:30  / 83; Pulse 103; Resp 20; Pulse Ox 100% on R/A;                                  em6 

21:15  / 90; Pulse 98; Resp 18; Pulse Ox 100% on R/A;                                   em6 

18:45 Body Mass Index 27.44 (68.04 kg, 157.48 cm)                                             kr3 

                                                                                                  

MDM:                                                                                              

18:37 Patient medically screened.                                                             [facility] 

20:32 Data reviewed: vital signs, nurses notes, lab test result(s), radiologic studies.       kdr 

      Counseling: I had a detailed discussion with the patient and/or guardian regarding: the     

      historical points, exam findings, and any diagnostic results supporting the                 

      discharge/admit diagnosis, lab results, radiology results.                                  

                                                                                                  

                                                                                             

18:38 Order name: Basic Metabolic Panel; Complete Time: 20:51                                 jessica 

                                                                                             

18:38 Order name: CBC with Diff; Complete Time: 20:51                                         jessica 

                                                                                             

18:38 Order name: LFT's; Complete Time: 20:51                                                 jessica 

                                                                                             

18:38 Order name: Magnesium; Complete Time: 20:51                                             jessica 

                                                                                             

18:38 Order name: NT PRO-BNP; Complete Time: 20:51                                            jessica 

                                                                                             

18:38 Order name: PT-INR; Complete Time: 20:51                                                jessica 

                                                                                             

18:38 Order name: Troponin HS; Complete Time: 20:51                                           jessica 

                                                                                             

18:38 Order name: XRAY Chest (1 view); Complete Time: 21:33                                   jessica 

                                                                                             

18:38 Order name: Lipase; Complete Time: 20:51                                                jessica 

                                                                                             

18:38 Order name: Urine Microscopic Only; Complete Time: 20:51                                [facility] 

                                                                                             

18:38 Order name: COVID-19/FLU A+B; Complete Time: 20:51                                      [facility] 

                                                                                             

18:38 Order name: CT Aorta for Dissection; Complete Time: 21:33                               [facility] 

                                                                                             

19:28 Order name: Urine Dipstick-Ancillary; Complete Time: 20:51                              [facility]

                                                                                             

19:34 Order name: CT Head Brain wo Cont                                                       kdr 

                                                                                             

18:38 Order name: EKG; Complete Time: 18:39                                                   [facility] 

                                                                                             

18:38 Order name: Cardiac monitoring; Complete Time: 19:03                                    [facility] 

                                                                                             

18:38 Order name: EKG - Nurse/Tech; Complete Time: 19:03                                      [facility] 

                                                                                             

18:38 Order name: IV Saline Lock; Complete Time: 19:28                                        [facility] 

                                                                                             

18:38 Order name: Labs collected and sent; Complete Time: 19:28                               [facility] 

                                                                                             

18:38 Order name: O2 Per Protocol; Complete Time: 19:03                                       [facility] 

                                                                                             

18:38 Order name: O2 Sat Monitoring; Complete Time: 19:03                                     [facility] 

                                                                                             

18:38 Order name: Urine Dipstick-Ancillary (obtain specimen); Complete Time: 19:28            [facility] 

                                                                                             

19:38 Order name: Head Brain Wo Cont; Complete Time: 20:51                                    [facility]

                                                                                                  

Administered Medications:                                                                         

19:27 Drug: NS 0.9% 1000 ml Route: IV; Rate: 125 ml/hr; Site: right antecubital;              em6 

22:01 Follow up: Response: No adverse reaction; IV Status: Order to discontinue infusion; IV  em6 

      Intake: 375ml                                                                               

20:44 Drug: Norco (HYDROcodone-acetaminophen) (7.5 mg-325 mg) 1 tabs Route: PO;               em6 

21:20 Follow up: Response: No adverse reaction; RASS: Alert and Calm (0)                      em6 

                                                                                                  

                                                                                                  

Disposition Summary:                                                                              

22 21:58                                                                                    

Discharge Ordered                                                                                 

      Location: Home                                                                          kdr 

      Problem: new                                                                            kdr 

      Symptoms: have improved                                                                 kdr 

      Condition: Stable                                                                       kdr 

      Diagnosis                                                                                   

        - T12 vertebral compression fracture                                                  kdr 

      Followup:                                                                               kdr 

        - With: Jorge Franco MD                                                                 

        - When: 2 - 3 days                                                                         

        - Reason: If symptoms return, Further diagnostic work-up, Recheck today's complaints,      

      Continuance of care, Re-evaluation by your physician                                        

      Discharge Instructions:                                                                     

        - Discharge Summary Sheet                                                             kdr 

        - Spinal Compression Fracture                                                         kdr 

      Forms:                                                                                      

        - Medication Reconciliation Form                                                      kdr 

        - Thank You Letter                                                                    kdr 

        - Prescription Opioid Use                                                             kdr 

      Prescriptions:                                                                              

        - Ibuprofen 600 mg Oral Tablet                                                             

            - take 1 tablet by ORAL route every 6 hours As needed take with food; 30 tablet;  kdr 

      Refills: 0, Product Selection Permitted                                                     

        - Tramadol 50 mg Oral Tablet                                                               

            - take 1 tablet by ORAL route every 8 hours as needed; 12 tablet; Refills: 0,     kdr 

      Product Selection Permitted                                                                 

Signatures:                                                                                       

Dispatcher [employer]                           EDMS                                                 

Richi Guido MD MD cha Rittger, Kevin, MD MD kdr Reid, Kelley, RN                        RN   kr3                                                  

Marlene Josue RN                     RN   em6                                                  

                                                                                                  

Corrections: (The following items were deleted from the chart)                                    

20:33 20:30 Constitutional: This is a well developed, well nourished patient who is awake,    kdr 

      alert, and in no acute distress. Head/Face: Normocephalic, atraumatic. Eyes: Pupils         

      equal round and reactive to light, extra-ocular motions intact. Lids and lashes normal.     

      Conjunctiva and sclera are non-icteric and not injected. Cornea within normal limits.       

      Periorbital areas with no swelling, redness, or edema. Neck: Trachea midline, no            

      thyromegaly or masses palpated, and no cervical lymphadenopathy. Supple, full range of      

      motion without nuchal rigidity, or vertebral point tenderness. No Meningismus.              

      Chest/axilla: Normal chest wall appearance and motion. Nontender with no deformity. No      

      lesions are appreciated. Cardiovascular: Regular rate and rhythm with a normal S1 and       

      S2. No gallops, murmurs, or rubs. Normal PMI, no JVD. No pulse deficits. Respiratory:       

      Lungs have equal breath sounds bilaterally, clear to auscultation and percussion. No        

      rales, rhonchi or wheezes noted. No increased work of breathing, no retractions or          

      nasal flaring. Abdomen/GI: Soft, non-tender, with normal bowel sounds. No distension or     

      tympany. No guarding or rebound. No evidence of tenderness throughout. Back: No spinal      

      tenderness. No costovertebral tenderness. Full range of motion. kdr                         

                                                                                                  

**************************************************************************************************

## 2022-12-14 NOTE — ER
Nurse's Notes                                                                                     

 [facility]                                                                 

Name: Meaghan Tavares                                                                            

Age: 67 yrs                                                                                       

Sex: Female                                                                                       

: 1955                                                                                   

MRN: [id number]                                                                                   

Arrival Date: 2022                                                                          

Time: 18:31                                                                                       

Account#: [id number]                                                                            

Bed 26                                                                                            

Private MD: Jorge Franco E                                                                     

Diagnosis: T12 vertebral compression fracture                                                     

                                                                                                  

Presentation:                                                                                     

                                                                                             

18:45 Chief complaint: Patient states: I was sitting in my chair 2 weeks ago and started      kr3 

      vomiting and became lightheaded, fell from sitting in my chair to the floor landing on      

      my left hip. I have been having back pain and shortness of breath since. I was seen at      

      my PCP last week but did not tell him I fell or was SOB. Coronavirus screen: Vaccine        

      status: Patient reports being unvaccinated. Client denies travel out of the [place] in the     

      last 14 days. Ebola Screen: Patient denies travel to an Ebola-affected area in the 21       

      days before illness onset. Initial Sepsis Screen: Does the patient meet any 2 criteria?     

      No. Patient's initial sepsis screen is negative. Does the patient have a suspected          

      source of infection? No. Patient's initial sepsis screen is negative. Risk Assessment:      

      Do you want to hurt yourself or someone else? Patient reports no desire to harm self or     

      others. Onset of symptoms was 2022.                                            

18:45 Method Of Arrival: Ambulatory                                                           kr3 

18:45 Acuity: ALVIN 3                                                                           kr3 

                                                                                                  

Triage Assessment:                                                                                

18:48 General: Appears in no apparent distress. comfortable, Behavior is calm, cooperative,   kr3 

      appropriate for age. Pain: Complains of pain in lumbar area.                                

19:03 Respiratory: Reports shortness of breath on exertion Onset: The symptoms/episode        em6 

      began/occurred gradually.                                                                   

19:03 Respiratory: the patient has mild shortness of breath.                                  em6 

                                                                                                  

Historical:                                                                                       

- Allergies:                                                                                      

18:48 No Known Allergies;                                                                     kr3 

- Home Meds:                                                                                      

19:02 lisinopril 10 mg Oral tab 1 tab once daily [Active];                                    em6 

- PMHx:                                                                                           

18:48 Hypertension;                                                                           kr3 

                                                                                                  

- Immunization history:: Adult Immunizations not up to date.                                      

- Social history:: Smoking status: Patient reports the use of cigarette tobacco                   

  products, smokes one-half pack cigarettes per day.                                              

                                                                                                  

                                                                                                  

Screenin:41 Abuse screen: Denies threats or abuse. Nutritional screening: No deficits noted.        em6 

      Tuberculosis screening: No symptoms or risk factors identified. Fall Risk Total [place]       

      Fall Scale indicates No Risk (0-24 pts).                                                    

19:02 [facility] ED Fall Risk Assessment (Adult) History of falling in the last 3 months,       em6 

      including since admission Yes- single mechanical fall (1 pt) Confusion or                   

      Disorientation No (0 pts) Intoxicated or Sedated No (0 pts) Impaired Gait No (0 pts)        

      Mobility Assist Device Used No (0 pt) Altered Elimination No (0 pt) Score/Fall Risk         

      Level 0 - 2 = Low Risk Oriented to surroundings, Maintained a safe environment,             

      Educated pt \T\ family on fall prevention, incl call for assistance when getting out of     

      bed, Assessed \T\ reinforced patient's understanding of fall precautions, Hourly rounding   

      (assess needs \T\ fall precautionary measures) done.                                        

                                                                                                  

Assessment:                                                                                       

19:01 General: Appears in no apparent distress. Behavior is cooperative. Pain: Complains of   em6 

      pain in back. Neuro: Level of Consciousness is awake, alert, obeys commands, Oriented       

      to person, place, time, situation. Cardiovascular: Capillary refill < 3 seconds Rhythm      

      is sinus tachycardia Chest pain is denied. Respiratory: Airway is patent Respiratory        

      effort is even, unlabored, Breath sounds are clear bilaterally. GI: No signs and/or         

      symptoms were reported involving the gastrointestinal system. : No signs and/or           

      symptoms were reported regarding the genitourinary system. EENT: No signs and/or            

      symptoms were reported regarding the EENT system. Derm: No signs and/or symptoms            

      reported regarding the dermatologic system. Musculoskeletal: Circulation, motion, and       

      sensation intact. Range of motion: intact in all extremities.                               

20:00 Reassessment: Patient appears in no apparent distress at this time. No changes from     em6 

      previously documented assessment. Patient and/or family updated on plan of care and         

      expected duration. Pain level reassessed. Patient is alert, oriented x 3, equal             

      unlabored respirations, skin warm/dry/pink.                                                 

20:38 Reassessment: patient is stating 9 out of 10 pain in the back. provider notified. new   em6 

      order given.                                                                                

20:38 Neuro: Level of Consciousness is awake, alert, obeys commands, Oriented to person,      em6 

      place, time, situation. Respiratory: Airway is patent Respiratory effort is even,           

      unlabored.                                                                                  

21:30 Reassessment: Patient appears in no apparent distress at this time. No changes from     em6 

      previously documented assessment. Patient and/or family updated on plan of care and         

      expected duration. Pain level reassessed. Patient is alert, oriented x 3, equal             

      unlabored respirations, skin warm/dry/pink.                                                 

                                                                                                  

Vital Signs:                                                                                      

18:45  / 115; Pulse 108; Resp 18; Temp 97.8; Pulse Ox 100% on R/A; Weight 68.04 kg;     kr3 

      Height 5 ft. 2 in. (157.48 cm);                                                             

20:30  / 83; Pulse 103; Resp 20; Pulse Ox 100% on R/A;                                  em6 

21:15  / 90; Pulse 98; Resp 18; Pulse Ox 100% on R/A;                                   em6 

18:45 Body Mass Index 27.44 (68.04 kg, 157.48 cm)                                             kr3 

                                                                                                  

ED Course:                                                                                        

18:31 Patient arrived in ED.                                                                  am2 

18:31 Jorge Franco MD is Private Physician.                                                am2 

18:37 Richi Guido MD is Attending Physician.                                             jessica 

18:41 Marlene Josue, RN is Primary Nurse.                                                   em6 

18:41 Bed in low position. Call light in reach. Side rails up X 1. Pulse ox on. NIBP on. Warm em6 

      blanket given.                                                                              

18:48 Triage completed.                                                                       kr3 

19:02 Arm band placed on.                                                                     em6 

19:09 Attending Physician role handed off by Richi Guido MD                              kdr 

19:09 Scott Miranda MD is Attending Physician.                                              kdr 

19:28 COVID-19/FLU A+B Sent.                                                                  em6 

19:28 Urine Microscopic Only Sent.                                                            em6 

19:28 Lipase Sent.                                                                            em6 

19:28 Inserted saline lock: 20 gauge in right antecubital area, using aseptic technique.      em6 

      Blood collected.                                                                            

19:37 XRAY Chest (1 view) In Process Unspecified.                                             EDMS

20:30 CT Aorta for Dissection In Process Unspecified.                                         EDMS

20:30 Head Brain Wo Cont In Process Unspecified.                                              EDMS

21:57 Jorge Franco MD is Referral Physician.                                               kdr 

22:01 No provider procedures requiring assistance completed.                                  em6 

22:10 IV discontinued, intact, bleeding controlled, No redness/swelling at site. Pressure     em6 

      dressing applied.                                                                           

                                                                                                  

Administered Medications:                                                                         

19:27 Drug: NS 0.9% 1000 ml Route: IV; Rate: 125 ml/hr; Site: right antecubital;              em6 

22:01 Follow up: Response: No adverse reaction; IV Status: Order to discontinue infusion; IV  em6 

      Intake: 375ml                                                                               

20:44 Drug: Norco (HYDROcodone-acetaminophen) (7.5 mg-325 mg) 1 tabs Route: PO;               em6 

21:20 Follow up: Response: No adverse reaction; RASS: Alert and Calm (0)                      em6 

                                                                                                  

                                                                                                  

Medication:                                                                                       

22:01 VIS not applicable for this client.                                                     em6 

                                                                                                  

Intake:                                                                                           

22:01 IV: 375ml; Total: 375ml.                                                                em6 

                                                                                                  

Outcome:                                                                                          

21:58 Discharge ordered by MD.                                                                kdr 

22:10 Discharged to home ambulatory, with family.                                             em6 

22:10 Condition: stable                                                                           

22:10 Discharge instructions given to patient, family, Instructed on discharge instructions,      

      follow up and referral plans. medication usage, Demonstrated understanding of               

      instructions, follow-up care, medications, Prescriptions given X 2.                         

22:10 Patient left the ED.                                                                    em6 

                                                                                                  

Signatures:                                                                                       

Dispatcher [employer]                           EDRichi Mayberry MD MD cha Rittger, Kevin, MD MD kdr Moreno, Amanda                               amRegina Santillan, RN                        RN   kr3                                                  

Marlene Josue RN                     RN   em6                                                  

                                                                                                  

**************************************************************************************************

## 2023-02-04 ENCOUNTER — HOSPITAL ENCOUNTER (EMERGENCY)
Dept: HOSPITAL 97 - ER | Age: 68
Discharge: HOME | End: 2023-02-04
Payer: COMMERCIAL

## 2023-02-04 VITALS — DIASTOLIC BLOOD PRESSURE: 82 MMHG | OXYGEN SATURATION: 99 % | SYSTOLIC BLOOD PRESSURE: 153 MMHG

## 2023-02-04 VITALS — TEMPERATURE: 98.4 F

## 2023-02-04 DIAGNOSIS — R55: Primary | ICD-10-CM

## 2023-02-04 DIAGNOSIS — I10: ICD-10-CM

## 2023-02-04 DIAGNOSIS — R11.2: ICD-10-CM

## 2023-02-04 LAB
ALBUMIN SERPL BCP-MCNC: 3.6 G/DL (ref 3.4–5)
ALP SERPL-CCNC: 95 U/L (ref 45–117)
ALT SERPL W P-5'-P-CCNC: 20 U/L (ref 13–56)
AST SERPL W P-5'-P-CCNC: 24 U/L (ref 15–37)
BUN BLD-MCNC: 23 MG/DL (ref 7–18)
GLUCOSE SERPLBLD-MCNC: 107 MG/DL (ref 74–106)
HCT VFR BLD CALC: 39.2 % (ref 36–45)
INR BLD: 1.09
LYMPHOCYTES # SPEC AUTO: 2.6 K/UL (ref 0.7–4.9)
MAGNESIUM SERPL-MCNC: 2.1 MG/DL (ref 1.6–2.4)
MCV RBC: 89 FL (ref 80–100)
NT-PROBNP SERPL-MCNC: 319 PG/ML (ref ?–125)
PMV BLD: 7.5 FL (ref 7.6–11.3)
POTASSIUM SERPL-SCNC: 3.7 MMOL/L (ref 3.5–5.1)
RBC # BLD: 4.4 M/UL (ref 3.86–4.86)
TROPONIN I SERPL HS-MCNC: 8.3 PG/ML (ref ?–58.9)

## 2023-02-04 PROCEDURE — 84484 ASSAY OF TROPONIN QUANT: CPT

## 2023-02-04 PROCEDURE — 71045 X-RAY EXAM CHEST 1 VIEW: CPT

## 2023-02-04 PROCEDURE — 80048 BASIC METABOLIC PNL TOTAL CA: CPT

## 2023-02-04 PROCEDURE — 83735 ASSAY OF MAGNESIUM: CPT

## 2023-02-04 PROCEDURE — 85610 PROTHROMBIN TIME: CPT

## 2023-02-04 PROCEDURE — 81003 URINALYSIS AUTO W/O SCOPE: CPT

## 2023-02-04 PROCEDURE — 85025 COMPLETE CBC W/AUTO DIFF WBC: CPT

## 2023-02-04 PROCEDURE — 80076 HEPATIC FUNCTION PANEL: CPT

## 2023-02-04 PROCEDURE — 99283 EMERGENCY DEPT VISIT LOW MDM: CPT

## 2023-02-04 PROCEDURE — 70450 CT HEAD/BRAIN W/O DYE: CPT

## 2023-02-04 PROCEDURE — 36415 COLL VENOUS BLD VENIPUNCTURE: CPT

## 2023-02-04 PROCEDURE — 83880 ASSAY OF NATRIURETIC PEPTIDE: CPT

## 2023-02-04 PROCEDURE — 93005 ELECTROCARDIOGRAM TRACING: CPT

## 2023-02-04 PROCEDURE — 81015 MICROSCOPIC EXAM OF URINE: CPT

## 2023-02-04 NOTE — EDPHYS
Physician Documentation                                                                           

 [facility]                                                                 

Name: Meaghan Tavares                                                                            

Age: 67 yrs                                                                                       

Sex: Female                                                                                       

: 1955                                                                                   

MRN: [id number]                                                                                   

Arrival Date: 2023                                                                          

Time: 19:25                                                                                       

Account#: [id number]                                                                            

Bed 15                                                                                            

Private MD:                                                                                       

ED Physician Nova Varela                                                                    

HPI:                                                                                              

                                                                                             

19:50 This 67 yrs old  Female presents to ER via EMS with complaints of Syncope.      cp  

19:50 The patient has experienced syncope, lost consciousness. Onset: The symptoms/episode    cp  

      began/occurred just prior to arrival. Duration: This was a single episode, that lasted      

      an unknown period of time. Context: the episode(s) was witnessed, by a friend, occurred     

      Jew, occurred while the patient was sitting, Just prior to the episode the patient       

      experienced no apparent symptoms.                                                           

19:50 Associated injury: The patient did not suffer any apparent associated injury.           cp  

      Associated signs and symptoms: Pertinent positives: nausea, 1 episode of vomiting.          

                                                                                                  

Historical:                                                                                       

- Allergies:                                                                                      

19:46 No Known Allergies;                                                                     jb4 

- Home Meds:                                                                                      

19:46 lisinopril 10 mg Oral tab 1 tab once daily [Active];                                    jb4 

- PMHx:                                                                                           

19:46 Hypertension;                                                                           jb4 

                                                                                                  

- Immunization history:: Adult Immunizations unknown.                                             

- Social history:: Smoking status: unknown.                                                       

                                                                                                  

                                                                                                  

ROS:                                                                                              

19:55 Constitutional: Negative for body aches, chills, fever, poor PO intake.                 cp  

19:55 Eyes: Negative for injury, pain, redness, and discharge.                                cp  

19:55 ENT: Negative for drainage from ear(s), ear pain, sore throat, difficulty swallowing,   cp  

      difficulty handling secretions.                                                             

19:55 Neck: Negative for pain with movement, pain at rest, stiffness.                             

19:55 Cardiovascular: Negative for chest pain, edema, palpitations.                               

19:55 Respiratory: Negative for cough, shortness of breath, wheezing.                             

19:55 Abdomen/GI: Positive for nausea, vomiting, Negative for abdominal pain, diarrhea,           

      constipation, black/tarry stool, rectal bleeding.                                           

19:55 Back: Negative for pain at rest, pain with movement.                                        

19:55 Neuro: Positive for syncope, Negative for altered mental status, dizziness, headache,       

      weakness.                                                                                   

19:55 All other systems are negative.                                                             

                                                                                                  

Exam:                                                                                             

20:00 Constitutional: The patient appears in no acute distress, alert, awake,                 cp  

      non-diaphoretic, non-toxic, well developed, well nourished.                                 

20:00 Head/Face:  Normocephalic, atraumatic.                                                  cp  

20:00 Eyes: Periorbital structures: appear normal, Pupils: equal, round, and reactive to          

      light and accomodation, Extraocular movements: intact throughout, Conjunctiva: normal,      

      no exudate, no injection, Sclera: no appreciated abnormality, Lids and lashes: appear       

      normal, bilaterally.                                                                        

20:00 ENT: External ear(s): are unremarkable, Ear canal(s): are normal, clear, TM's:              

      dullness, bilaterally, Nose: is normal, Mouth: Lips: moist, Oral mucosa: pink and           

      intact, moist, Posterior pharynx: is normal, airway is patent, no erythema, no exudate.     

20:00 Neck: C-spine: vertebral tenderness, is not appreciated, crepitus, is not appreciated,      

      ROM/movement: is normal, is supple, without pain, no range of motions limitations.          

20:00 Chest/axilla: Inspection: normal, Palpation: crepitus, is not appreciated, tenderness,      

      is not appreciated.                                                                         

20:00 Cardiovascular: Rate: normal, Rhythm: regular, Edema: is not appreciated, JVD: is not       

      appreciated.                                                                                

20:00 Respiratory: the patient does not display signs of respiratory distress,  Respirations:     

      normal, no use of accessory muscles, no retractions, labored breathing, is not present,     

      Breath sounds: are clear throughout, no decreased breath sounds, no stridor, no             

      wheezing.                                                                                   

20:00 Abdomen/GI: Inspection: abdomen appears normal, Bowel sounds: active, all quadrants,        

      Palpation: abdomen is soft and non-tender, in all quadrants.                                

20:00 Neuro: Orientation: to person, place \T\ time. Mentation: is normal, Cerebellar function:   

      Romberg testing is negative, normal finger to nose testing, heel to shin testing is         

      normal, Motor: moves all fours, strength is normal, Sensation: is normal.                   

22:36 ECG was reviewed by the Attending Physician.                                            cp  

                                                                                                  

Vital Signs:                                                                                      

19:25  / 92; Pulse 63; Resp 16; Temp 98.4(O); Pulse Ox 99% on R/A; Weight 64.86 kg (R); jb4 

      Height 5 ft. 2 in. (157.48 cm) (R); Pain 0/10;                                              

20:00  / 80 LA Supine (auto/reg); Pulse 80; Resp 18; Pulse Ox 98% on R/A;               jb4 

20:01  / 75 LA Sitting (auto/reg); Pulse 80; Resp 16; Pulse Ox 98% on R/A;              jb4 

20:02  / 74 LA Standing (auto/reg); Pulse 80; Resp 16; Pulse Ox 99% on R/A;             jb4 

21:16  / 81; Pulse 69; Resp 13; Pulse Ox 100% on R/A;                                   jb4 

22:30  / 82; Pulse 70; Resp 16; Pulse Ox 99% on R/A;                                    jb4 

19:25 Body Mass Index 26.15 (64.86 kg, 157.48 cm)                                             jb4 

                                                                                                  

MDM:                                                                                              

19:30 Patient medically screened.                                                             cp  

20:00 Differential Diagnosis: cardiac arrhythmia, drug effect, GI bleed, idiopathic syncope,  cp  

      seizure, vasovagal episode.                                                                 

22:38 Data reviewed: vital signs, nurses notes, lab test result(s), EKG, radiologic studies,  cp  

      CT scan, plain films. Consideration of Admission/Observation Escalation of care             

      including admission/observation considered. Test considered but Not performed: Other        

      Details cardiac echo. Counseling: I had a detailed discussion with the patient and/or       

      guardian regarding: the historical points, exam findings, and any diagnostic results        

      supporting the discharge/admit diagnosis, the presence of at least one elevated blood       

      pressure reading (>120/80) during this emergency department visit, lab results,             

      radiology results, the need for outpatient follow up, a cardiologist. Refusal of            

      service: The patient/guardian displays adequate decision making capability and despite      

      a detailed discussion of alternatives, benefits, risks, and consequences refuses:           

      Admission to the hospital for further work-up and treatment.                                

                                                                                                  

                                                                                             

19:43 Order name: Basic Metabolic Panel; Complete Time: 20:48                                 cp  

                                                                                             

20:49 Interpretation: Normal except: ; GLUC 107; BUN 23; GFR 82.                        cp  

                                                                                             

19:43 Order name: CBC with Diff; Complete Time: 20:48                                         cp  

                                                                                             

19:43 Order name: LFT's; Complete Time: 20:48                                                 cp  

                                                                                             

19:43 Order name: Magnesium; Complete Time: 20:48                                             cp  

                                                                                             

19:43 Order name: NT PRO-BNP; Complete Time: 20:48                                            cp  

                                                                                             

19:43 Order name: PT-INR; Complete Time: 20:48                                                cp  

                                                                                             

19:43 Order name: Troponin HS; Complete Time: 20:48                                           cp  

/04                                                                                             

20:49 Interpretation: Reviewed.                                                               cp  

02                                                                                             

19:43 Order name: XRAY Chest (1 view); Complete Time: 22:24                                   cp  

                                                                                             

19:43 Order name: EKG; Complete Time: 19:44                                                   cp  

                                                                                             

19:43 Order name: Urine Microscopic Only; Complete Time: 22:24                                cp  

/                                                                                             

21:15 Order name: CT Head Brain wo Cont; Complete Time: 22:24                                 ds4 

                                                                                             

22:24 Interpretation: Report reviewed.                                                        cp  

                                                                                             

21:51 Order name: Urine Dipstick-Ancillary; Complete Time: 22:24                              EDMS

                                                                                             

19:43 Order name: Orthostatics; Complete Time: 20:09                                          cp  

                                                                                             

19:43 Order name: Cardiac monitoring; Complete Time: 20:51                                    cp  

                                                                                             

19:43 Order name: EKG - Nurse/Tech; Complete Time: 20:51                                      cp  

                                                                                             

19:43 Order name: IV Saline Lock; Complete Time: 20:09                                        cp  

                                                                                             

19:43 Order name: Labs collected and sent; Complete Time: 20:09                               cp  

                                                                                             

19:43 Order name: O2 Per Protocol; Complete Time: 20:09                                       cp  

                                                                                             

19:43 Order name: O2 Sat Monitoring; Complete Time: 20:09                                     cp  

                                                                                             

19:43 Order name: Urine Dipstick-Ancillary (obtain specimen); Complete Time: 21:48            cp  

                                                                                                  

EC:36 Rate is 69 beats/min. Rhythm is regular. NM interval is normal. QRS interval is normal. cp  

      QT interval is normal. T waves are Inverted in lead aVR. Interpreted by me. Reviewed by     

      me.                                                                                         

                                                                                                  

Administered Medications:                                                                         

20:09 Not Given (Patient Refused): Zofran (Ondansetron) 4 mg IVP once; over 2 minutes         jb4 

                                                                                                  

                                                                                                  

Disposition:                                                                                      

                                                                                             

06:48 I reviewed the patient's care provided by the Advanced Practice Provider and agree with sd2 

      the diagnosis and treatment plan.                                                           

                                                                                                  

Disposition Summary:                                                                              

23 22:39                                                                                    

Discharge Ordered                                                                                 

      Location: Home                                                                          cp  

      Problem: new                                                                            cp  

      Symptoms: have improved                                                                 cp  

      Condition: Stable                                                                       cp  

      Diagnosis                                                                                   

        - Syncope                                                                             cp  

      Followup:                                                                               cp  

        - With: Cameron Farah MD                                                                 

        - When: 2 - 3 days                                                                         

        - Reason: Recheck today's complaints                                                       

      Discharge Instructions:                                                                     

        - Discharge Summary Sheet                                                             cp  

        - Syncope                                                                             cp  

        - Aspirin and Your Heart                                                              cp  

      Forms:                                                                                      

        - Medication Reconciliation Form                                                      cp  

        - Thank You Letter                                                                    cp  

        - Antibiotic Education                                                                cp  

        - Prescription Opioid Use                                                             cp  

Signatures:                                                                                       

Dispatcher [employer]                           EDMS                                                 

Richi Monroy PA PA cp Bryson, James, RN                       RN   jb4                                                  

Nova Varela MD MD   sd2                                                  

                                                                                                  

Corrections: (The following items were deleted from the chart)                                    

                                                                                             

22:35 22:34 This 67 yrs old  Female presents to ER via EMS with complaints of         cp  

      Syncope. cp                                                                                 

                                                                                                  

**************************************************************************************************

## 2023-02-04 NOTE — RAD REPORT
EXAM DESCRIPTION:  CT - Head Brain Wo Cont - 2/4/2023 9:55 pm

 

CLINICAL HISTORY:  Alteration of awareness/confusion/syncope

 

COMPARISON:  December 2022

 

TECHNIQUE:  Computed axial tomography of the head was obtained. IV contrast was not requested.

 

All CT scans are performed using dose optimization technique as appropriate and may include automated
 exposure control or mA/KV adjustment according to patient size.

 

FINDINGS:  An intracranial  bleed is not seen

 

The ventricles are normal in caliber

 

No extra-axial fluid collection is noted.

 

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

 

Mild low-density areas within periventricular, deep and subcortical white matter likely represent isc
hemic changes secondary to small vessel disease.

 

Fluid within the sinuses/ mastoids is not seen.

 

IMPRESSION:  No acute intracranial abnormality is seen

 

If patient's symptoms persist  MRI of the brain would be recommended

## 2023-02-04 NOTE — ER
Nurse's Notes                                                                                     

 [facility]                                                                 

Name: Meaghan Tavares                                                                            

Age: 67 yrs                                                                                       

Sex: Female                                                                                       

: 1955                                                                                   

MRN: [id number]                                                                                   

Arrival Date: 2023                                                                          

Time: 19:25                                                                                       

Account#: [id number]                                                                            

Bed 15                                                                                            

Private MD:                                                                                       

Diagnosis: Syncope                                                                                

                                                                                                  

Presentation:                                                                                     

                                                                                             

19:25 Chief complaint: EMS states: Pt was at [facility] and passed out. After passing out pt      jb4 

      vomited on herself. BGL was 107. Pt is now A\T\O x4.                                        

19:25 Coronavirus screen: At this time, the client does not indicate any symptoms associated  jb4 

      with coronavirus-19. Ebola Screen: No symptoms or risks identified at this time.            

      Initial Sepsis Screen: Does the patient meet any 2 criteria? No. Patient's initial          

      sepsis screen is negative. Does the patient have a suspected source of infection? No.       

      Patient's initial sepsis screen is negative. Risk Assessment: Do you want to hurt           

      yourself or someone else? Patient reports no desire to harm self or others. Onset of        

      symptoms was 2023. Transition of care: patient was not received from           

      another setting of care.                                                                    

19:25 Method Of Arrival: EMS: [facility]                                                       jb4 

19:25 Acuity: ALVIN 3                                                                           jb4 

                                                                                                  

Historical:                                                                                       

- Allergies:                                                                                      

19:46 No Known Allergies;                                                                     jb4 

- Home Meds:                                                                                      

19:46 lisinopril 10 mg Oral tab 1 tab once daily [Active];                                    jb4 

- PMHx:                                                                                           

19:46 Hypertension;                                                                           jb4 

                                                                                                  

- Immunization history:: Adult Immunizations unknown.                                             

- Social history:: Smoking status: unknown.                                                       

                                                                                                  

                                                                                                  

Screenin:48 [facility] ED Fall Risk Assessment (Adult) History of falling in the last 3 months,       jb4 

      including since admission No falls in past 3 months (0 pts) Confusion or Disorientation     

      No (0 pts) Score/Fall Risk Level 0 - 2 = Low Risk Oriented to surroundings, Maintained      

      a safe environment. Abuse screen: Denies threats or abuse. Nutritional screening: No        

      deficits noted. Tuberculosis screening: No symptoms or risk factors identified.             

                                                                                                  

Assessment:                                                                                       

19:25 General: Appears in no apparent distress. comfortable, Behavior is calm, cooperative,   jb4 

      appropriate for age. Pain: Denies pain. Neuro: Level of Consciousness is awake, alert,      

      obeys commands, Oriented to person, place, time, situation. Cardiovascular: Patient's       

      skin is warm and dry. Respiratory: Airway is patent Respiratory effort is even,             

      unlabored, Respiratory pattern is regular, symmetrical. GI: No signs and/or symptoms        

      were reported involving the gastrointestinal system. Patient currently denies nausea,       

      vomiting. : No signs and/or symptoms were reported regarding the genitourinary            

      system. EENT: No signs and/or symptoms were reported regarding the EENT system. Derm:       

      Skin is intact, Skin is pink, warm \T\ dry. Musculoskeletal: Circulation, motion, and       

      sensation intact. Range of motion: intact in all extremities.                               

20:30 Reassessment: Patient appears in no apparent distress at this time. Patient and/or      jb4 

      family updated on plan of care and expected duration. Pain level reassessed. Patient is     

      alert, oriented x 3, equal unlabored respirations, skin warm/dry/pink.                      

21:30 Reassessment: Patient appears in no apparent distress at this time. Patient and/or      jb4 

      family updated on plan of care and expected duration. Pain level reassessed. Patient is     

      alert, oriented x 3, equal unlabored respirations, skin warm/dry/pink.                      

22:59 Reassessment: Patient appears in no apparent distress at this time. Patient and/or      jb4 

      family updated on plan of care and expected duration. Pain level reassessed. Patient is     

      alert, oriented x 3, equal unlabored respirations, skin warm/dry/pink.                      

                                                                                                  

Vital Signs:                                                                                      

19:25  / 92; Pulse 63; Resp 16; Temp 98.4(O); Pulse Ox 99% on R/A; Weight 64.86 kg (R); jb4 

      Height 5 ft. 2 in. (157.48 cm) (R); Pain 0/10;                                              

20:00  / 80 LA Supine (auto/reg); Pulse 80; Resp 18; Pulse Ox 98% on R/A;               jb4 

20:01  / 75 LA Sitting (auto/reg); Pulse 80; Resp 16; Pulse Ox 98% on R/A;              jb4 

20:02  / 74 LA Standing (auto/reg); Pulse 80; Resp 16; Pulse Ox 99% on R/A;             jb4 

21:16  / 81; Pulse 69; Resp 13; Pulse Ox 100% on R/A;                                   jb4 

22:30  / 82; Pulse 70; Resp 16; Pulse Ox 99% on R/A;                                    jb4 

19:25 Body Mass Index 26.15 (64.86 kg, 157.48 cm)                                             jb4 

                                                                                                  

ED Course:                                                                                        

19:25 Patient arrived in ED.                                                                  jb4 

19:26 Richi Monroy PA is PHCP.                                                                cp  

19:26 Nova Varela MD is Attending Physician.                                           cp  

19:44 Stew Joseph, RN is Primary Nurse.                                                     jb4 

19:46 Triage completed.                                                                       jb4 

19:46 Arm band placed on right wrist.                                                         jb4 

19:48 Patient has correct armband on for positive identification. Bed in low position. Call   jb4 

      light in reach. Side rails up X 1. Client placed on continuous cardiac and pulse            

      oximetry monitoring. NIBP monitoring applied.                                               

21:23 XRAY Chest (1 view) In Process Unspecified.                                             EDMS

21:57 CT Head Brain wo Cont In Process Unspecified.                                           EDMS

22:39 Cameron Farah MD is Referral Physician.                                               cp  

23:00 No provider procedures requiring assistance completed. IV discontinued, intact,         jb4 

      bleeding controlled, No redness/swelling at site. Pressure dressing applied.                

                                                                                                  

Administered Medications:                                                                         

20:09 Not Given (Patient Refused): Zofran (Ondansetron) 4 mg IVP once; over 2 minutes         jb4 

                                                                                                  

                                                                                                  

Medication:                                                                                       

22:30 VIS not applicable for this client.                                                     jb4 

                                                                                                  

Outcome:                                                                                          

22:39 Discharge ordered by MD.                                                                cp  

23:00 Discharged to home ambulatory, with family.                                             jb4 

23:00 Condition: stable                                                                           

23:00 Discharge instructions given to patient, family, Instructed on discharge instructions,      

      follow up and referral plans. Demonstrated understanding of instructions, follow-up         

      care.                                                                                       

23:01 Patient left the ED.                                                                    jb4 

                                                                                                  

Signatures:                                                                                       

Dispatcher [employer]                           EDMS                                                 

Richi Monroy PA PA   cp                                                   

Stew Joseph, RN                       RN   jb4                                                  

                                                                                                  

**************************************************************************************************

## 2023-02-04 NOTE — XMS REPORT
Continuity of Care Document

                           Created on:2023



Patient:SUKHI ZELAYA

Sex:Female

:1955

External Reference #:[id number]





Demographics







                          Address                   [address] [place] [address]

 

                          Home Phone                [phone]

 

                          Mobile Phone              [phone]

 

                          Email Address             [email or web address]

 

                          Preferred Language        English

 

                          Marital Status            Unknown

 

                          Church Affiliation     Unknown

 

                          Race                      Unknown

 

                          Additional Race(s)        Unavailable



                                                    White

 

                          Ethnic Group              Unknown









Author







                          Organization              [facility]

t

 

                          Address                   [address]



                                                    [place] [address]

 

                          Phone                     [phone]









Support







                Name            Relationship    Address         Phone

 

                Aurora Dillard  Other           Unavailable     [phone]









Care Team Providers







                    Name                Role                Phone

 

                    Lab, Adc Herberth Pob I  Attending Clinician Unavailable

 

                    Nika Szymanski  Attending Clinician [phone]

 

                    Sahil Jones Attending Clinician [phone]









Payers







           Payer Name Policy Type Policy Number Effective Date Expiration Date S

ource







Problems







       Condition Condition Condition Status Onset  Resolution Last   Treating Co

mments 

Source



       Name   Details Category        Date   Date   Treatment Clinician        



                                                 Date                 

 

       SUB ACUTE  SUB ACUTE Diagnosis Active 2013-0        2013-09-15           

    [facility]



       MCA STROKE MCA STROKE               9-15          18:02:00               

l



              Active               00:00:                             Eduardo



              09/15/2013               00                                 



              [facility]                                                  

 

       TIA     TIA   Diagnosis Active 2013               Mem

oria



              Active               9-15          12:05:00               l



              09/15/2013               00:00:                             Romaine adhikari



               [address]                                                  

 

       TRANS   TRANS Diagnosis Active               2013               Mem

oria



       CEREB  CEREB                              12:05:00               l



       ISCHEMIA ISCHEMIA                                                  Romaine adhikari



       NEC    NEC Active                                                  



              [facility]                                                  

 

       Arthritis  Arthritis Problem Resolve               2020            

   [facility]



       (disorder) (disorder)        d                    22:45:28               

l



              Resolved                                                  [facility]



              Problem                                                  



              2020                                                  



              [facility]                                                  



              Neuro                                                   

 

       Hyperlipid  Hyperlipi Problem Resolve               2020           

    [facility]



       emia   demia         d                    22:45:28               l



       (disorder) (disorder)                                                  He

rmann



              Resolved                                                  



              Problem                                                  



              2020                                                  



              [facility]                                                  



              Neuro                                                   

 

       Laser    Laser Problem Resolve               2020               Mem

oria



       assisted assisted        d                    22:45:28               l



       in situ in situ                                                  [facility]



       keratomile keratomile                                                  



       usis   usis                                                    



       (procedure (procedure                                                  



       )      ) Resolved                                                  



              Problem                                                  



              2020                                                  



               [facility]                                                  



              Neuro                                                   

 

       Arthritis  Arthritis Problem Resolve               2013            

   [facility]



              Resolved        d                    21:14:51               l



              Problem                                                  [facility]



              2013                                                  



              [facility]                                                  

 

       Hyperlipid  Hyperlipi Problem Resolve               2013           

    [facility]



       emia   demia         d                    21:14:51               l



              Resolved                                                  Eduardo



              Problem                                                  



              2013                                                  



              [facility]                                                  

 

       LASIK   LASIK Problem Resolve               2013               Ariel

vashti



              Resolved        d                    21:14:51               l



              Problem                                                  [facility]



              2013                                                  



              [facility]                                                  







Allergies, Adverse Reactions, Alerts







       Allergy Allergy Status Severity Reaction(s) Onset  Inactive Treating Comm

ents 

Source



       Name   Type                        Date   Date   Clinician        

 

       NO KNOWN Drug   Active                                           [facility]



       ALLERGIE Class                                                   ity of



       S                                                              [facility]







Social History







           Social Habit Start Date Stop Date  Quantity   Comments   Source

 

           Sex Assigned At                                             [facility]

y of



           Birth                                                  [facility]

 

           Exposure to                       Yes                   [facility]



           SARS-CoV-2                                             [facility]



           (event)                                                [place]

 

           Alcohol intake 2015 Current               [facility]



                      00:00:00   00:00:00   non-drinker of            [facility]

song



                                            alcohol               [place]



                                            (finding)             









                Smoking Status  Start Date      Stop Date       Source

 

                Current every day smoker 2015 00:00:00                 [facility]

versity [facility]







Medications







       Ordered Filled Start  Stop   Current Ordering Indication Dosage Frequency

 Signature

                    Comments            Components          Source



     Medication Medication Date Date Medication? Clinician                (SIG) 

          



     Name Name                                                   

 

     CRESTOR 20      2015      Yes            20mg      Take 20 mg           U

nivers



     mg tablet      0-26                               by mouth           ity of



               00:00:                               at             [place]



               00                                 bedtime.           Medical



                                                                 [facility]

 

     atorvastati            Yes  Mary                80 mg, 1          

 [facility]



     n 80 mg      9-16           Camryn                tab, PO,           l



     oral tablet      18:23:           Brown                Daily, 30           

Eduardo



               46                                 tab,           



                                                  Substituti           



                                                  on             



                                                  Allowed,           



                                                  TAB            

 

     atorvastati            Yes  Mary                80 mg, 1          

 [facility]



     n 80 mg      9-16           Camryn                tab, PO,           l



     oral tablet      18:23:           Brown                Daily, 30           

[facility]



               46                                 tab,           



                                                  Substituti           



                                                  on             



                                                  Allowed,           



                                                  TAB            

 

     aspirin 81            Yes  Mary                81 mg, 1           

[facility]



     mg tablet,      9-16           Camryn                tab, PO,           l



     enteric      18:23:           Brown                Daily, 30           [facility]

marco antonio



     coated      38                                 tab,           



                                                  Substituti           



                                                  on             



                                                  Allowed,           



                                                  ECTAB           

 

     aspirin 81            Yes  Mary                81 mg, 1           

[facility]



     mg tablet,      9-           Camryn                tab, PO,           l



     enteric      18:23:           Brown                Daily, 30           Herm

marco antonio



     coated      38                                 tab,           



                                                  Substituti           



                                                  on             



                                                  Allowed,           



                                                  ECTAB           

 

     atorvastati            No   Kimberly                80 mg, 1           M

emoria



     n         9-16           Deni                tab,           l



               14:00:           Jose Alberto                Route: PO,           Romaine

n



               00                                 Drug form:           



                                                  TAB,           



                                                  Daily,           



                                                  Dosing           



                                                  Weight           



                                                  72.727,           



                                                  kg, Start           



                                                  date:           



                                                  13           



                                                  9:00:00,           



                                                  Duration:           



                                                  30 day,           



                                                  Stop date:           



                                                  10/15/13           



                                                  9:00:00           

 

     aspirin            No   Kimberly                81 mg, 1           Mem[facility]

ia



               9-16           Deni                tab,           l



               14:00:           Jose Alberto                Route: PO,           [facility]

n



               00                                 Drug form:           



                                                  ECTAB,           



                                                  Daily,           



                                                  Dosing           



                                                  Weight           



                                                  72.727,           



                                                  kg, Start           



                                                  date:           



                                                  13           



                                                  9:00:00,           



                                                  Duration:           



                                                  30 day,           



                                                  Stop date:           



                                                  10/15/13           



                                                  9:00:00           

 

     atorvastati            No   Kimberly                80 mg, 1           M

emoria



     n         9-16           Deni                tab,           l



               14:00:           Jose Alberto                Route: PO,           [facility]

n



               00                                 Drug form:           



                                                  TAB,           



                                                  Daily,           



                                                  Dosing           



                                                  Weight           



                                                  72.727,           



                                                  kg, Start           



                                                  date:           



                                                  13           



                                                  9:00:00,           



                                                  Duration:           



                                                  30 day,           



                                                  Stop date:           



                                                  10/15/13           



                                                  9:00:00           

 

     aspirin      -0      No   Kimberly                81 mg, 1           Memor

ia



               9-16           Deni                tab,           l



               14:00:           Jose Alberto                Route: PO,           [facility]

n



               00                                 Drug form:           



                                                  ECTAB,           



                                                  Daily,           



                                                  Dosing           



                                                  Weight           



                                                  72.727,           



                                                  kg, Start           



                                                  date:           



                                                  13           



                                                  9:00:00,           



                                                  Duration:           



                                                  30 day,           



                                                  Stop date:           



                                                  10/15/13           



                                                  9:00:00           

 

     heparin            No   Kimberly                5,000           [facility]



               9-16           Deni                unit, 1           l



               13:00:           Jose Alberto                mL, Route:           Rom[facility]

n



               00                                 SUB-Q,           



                                                  Drug form:           



                                                  INJ, Q8H,           



                                                  Dosing           



                                                  Weight           



                                                  72.727,           



                                                  kg, Start           



                                                  date:           



                                                  13           



                                                  8:00:00,           



                                                  Duration:           



                                                  30 day,           



                                                  Stop date:           



                                                  10/16/13           



                                                  0:00:00           

 

     heparin      -0      No   Kimberly                5,000           [facility]



               9-16           Deni                unit, 1           l



               13:00:           Jose Alberto                mL, Route:           Rom[facility]

n



               00                                 SUB-Q,           



                                                  Drug form:           



                                                  INJ, Q8H,           



                                                  Dosing           



                                                  Weight           



                                                  72.727,           



                                                  kg, Start           



                                                  date:           



                                                  13           



                                                  8:00:00,           



                                                  Duration:           



                                                  30 day,           



                                                  Stop date:           



                                                  10/16/13           



                                                  0:00:00           

 

     Saline            No                   5 ml,           [facility]



     Flush 0.9%      -16           Yoshii-Con                Route:           l



               02:00:           treras                IVP, Drug           Romaine

n



               00                                 Form: INJ,           



                                                  Dosing           



                                                  Weight           



                                                  72.727,           



                                                  kg, Q12H,           



                                                  Start           



                                                  date:           



                                                  09/15/13           



                                                  21:00:00,           



                                                  Duration:           



                                                  30 day,           



                                                  Stop date:           



                                                  10/15/13           



                                                  9:00:00           

 

     Saline            No                   5 ml,           [facility]



     Flush 0.9%      9-16           Yoshii-Con                Route:           l



               02:00:           treras                IVP, Drug           Romiane

n



               00                                 Form: INJ,           



                                                  Dosing           



                                                  Weight           



                                                  72.727,           



                                                  kg, Q12H,           



                                                  Start           



                                                  date:           



                                                  09/15/13           



                                                  21:00:00,           



                                                  Duration:           



                                                  30 day,           



                                                  Stop date:           



                                                  10/15/13           



                                                  9:00:00           

 

     Zantac      -      Yes                      Substituti           Memor

ia



               9-15                               on Allowed           l



               22:49:                                              [facility]



               00                                                

 

     Zantac      -      Yes                      Substituti           Mem[facility]

ia



               9-15                               on Allowed           l



               22:49:                                              [facility]



               00                                                

 

     Multiple            Yes                      1 cap, PO,           Mem

oria



     Vitamins      9-15                               Daily, 30           l



     oral      22:48:                               cap,           [facility]



     capsule      47                                 Substituti           



                                                  on             



                                                  Allowed,           



                                                  Maintenanc           



                                                  e, CAP           

 

     Multiple            Yes                      1 cap, PO,           Mem

oria



     Vitamins      9-15                               Daily, 30           l



     oral      22:48:                               cap,           [facility]



     capsule      47                                 Substituti           



                                                  on             



                                                  Allowed,           



                                                  Maintenanc           



                                                  e, CAP           

 

     Saline            No                   5 ml,           [facility]



     Flush 0.9%      9-15           Yoshii-Con                Route:           l



               22:09:           treras                IVP, Drug           Romaine

n



               00                                 Form: INJ,           



                                                  Dosing           



                                                  Weight           



                                                  72.727,           



                                                  kg, PRN,           



                                                  PRN Line           



                                                  Flush,           



                                                  Start           



                                                  date:           



                                                  09/15/13           



                                                  17:09:00,           



                                                  Duration:           



                                                  30 day,           



                                                  Stop date:           



                                                  10/15/13           



                                                  17:08:00           

 

     labetalol            No                   10 mg, 2           [facility]

vashti



               9-15           Yoshii-Con                mL, Route:           l



               22:09:           treras                IVP, Drug           Romaine

n



               00                                 form: INJ,           



                                                  Q10Min,           



                                                  Dosing           



                                                  Weight           



                                                  72.727,           



                                                  kg, PRN           



                                                  Hypertensi           



                                                  on, Start           



                                                  date:           



                                                  09/15/13           



                                                  17:09:00,           



                                                  Duration:           



                                                  30 day,           



                                                  Stop date:           



                                                  10/15/13           



                                                  17:08:00,           



                                                  For SBP >           



                                                  180mmHg           



                                                  and/or DBP           



                                                  > 105mmHg           

 

     Sodium      -      No   Mary                1,000 mL,           [facility]

[facility]



     Chloride      9-15           Camryn                Rate: 100           l



     0.9% IV      22:09:           Brown                ml/hr,           [facility]



     1,000 mL      00                                 Infuse           



                                                  over: 10           



                                                  hr, Route:           



                                                  IV, Dosing           



                                                  Weight           



                                                  72.727 kg,           



                                                  Total           



                                                  Volume:           



                                                  1,000,           



                                                  Start           



                                                  date:           



                                                  09/15/13           



                                                  17:09:00,           



                                                  Stop date:           



                                                  10/15/13           



                                                  17:08:00           

 

     Saline      -      No                   5 ml,           [facility]



     Flush 0.9%      9-15           Yoshii-Con                Route:           l



               22:09:           treras                IVP, Drug           Romaine

n



               00                                 Form: INJ,           



                                                  Dosing           



                                                  Weight           



                                                  72.727,           



                                                  kg, PRN,           



                                                  PRN Line           



                                                  Flush,           



                                                  Start           



                                                  date:           



                                                  09/15/13           



                                                  17:09:00,           



                                                  Duration:           



                                                  30 day,           



                                                  Stop date:           



                                                  10/15/13           



                                                  17:08:00           

 

     labetalol            No                   10 mg, 2           [facility]

vashti



               9-15           Yoshii-Con                mL, Route:           l



               22:09:           treras                IVP, Drug           Romaine

n



               00                                 form: INJ,           



                                                  Q10Min,           



                                                  Dosing           



                                                  Weight           



                                                  72.727,           



                                                  kg, PRN           



                                                  Hypertensi           



                                                  on, Start           



                                                  date:           



                                                  09/15/13           



                                                  17:09:00,           



                                                  Duration:           



                                                  30 day,           



                                                  Stop date:           



                                                  10/15/13           



                                                  17:08:00,           



                                                  For SBP >           



                                                  180mmHg           



                                                  and/or DBP           



                                                  > 105mmHg           

 

     Sodium            No   Mary                1,000 mL,           [facility]

[facility]



     Chloride      9-15           Camryn                Rate: 100           l



     0.9% IV      22:09:           Brown                ml/hr,           [facility]



     1,000 mL      00                                 Infuse           



                                                  over: 10           



                                                  hr, Route:           



                                                  IV, Dosing           



                                                  Weight           



                                                  72.727 kg,           



                                                  Total           



                                                  Volume:           



                                                  1,000,           



                                                  Start           



                                                  date:           



                                                  09/15/13           



                                                  17:09:00,           



                                                  Stop date:           



                                                  10/15/13           



                                                  17:08:00           

 

     Omnipaque      -0      No   Jennifer                85 mL,           [facility]

vashti



     350mg/ml      9-15           Jefferson                Route:           l



               19:37:                               IVP, Drug           [facility]



                                                Form:           



                                                  SOLN,           



                                                  Dosing           



                                                  Weight           



                                                  72.727,           



                                                  kg,            



                                                  ONCALL,           



                                                  STAT,           



                                                  Start           



                                                  date:           



                                                  09/15/13           



                                                  14:37:00,           



                                                  Duration:           



                                                  1 doses or           



                                                  times,           



                                                  Dose =           



                                                  2.2ml/kg,           



                                                  Max dose =           



                                                  100ml --           



                                                  "To be           



                                                  infused by           



                                                  Radiology           



                                                  Staff           



                                                  ONLY"Dose           



                                                  =              



                                                  2.2ml/kg,           



                                                  Max dose =           



                                                  100ml --           



                                                  "To be           



                                                  infused by           



                                                  Radiology           



                                                  Staff           



                                                  ONLY"           

 

     Omnipaque            No   Jennifer                85 mL,           [facility]

vashti



     350mg/ml      9-15           [place]erson                Route:           l



               19:37:                               IVP, Drug           [facility]



               00                                 Form:           



                                                  SOLN,           



                                                  Dosing           



                                                  Weight           



                                                  72.727,           



                                                  kg,            



                                                  ONCALL,           



                                                  STAT,           



                                                  Start           



                                                  date:           



                                                  09/15/13           



                                                  14:37:00,           



                                                  Duration:           



                                                  1 doses or           



                                                  times,           



                                                  Dose =           



                                                  2.2ml/kg,           



                                                  Max dose =           



                                                  100ml --           



                                                  "To be           



                                                  infused by           



                                                  Radiology           



                                                  Staff           



                                                  ONLY"Dose           



                                                  =              



                                                  2.2ml/kg,           



                                                  Max dose =           



                                                  100ml --           



                                                  "To be           



                                                  infused by           



                                                  Radiology           



                                                  Staff           



                                                  ONLY"           







Vital Signs







             Vital Name   Observation Time Observation Value Comments     Source

 

             Diastolic (mm Hg) 2013 17:00:00                           [facility]

[facility]

 

             Heart Rate   2013 17:00:00                           [facility]

 [facility]

 

             Respitory Rate 2013 17:00:00                           [facility]

[facility]

 

             Temperature Oral (F) 2013 17:00:00 97.0 F                    

[facility]

 

             Systolic (mm Hg) 2013 17:00:00                           [facility]

[facility]

 

             Systolic (mm Hg) 2013 12:00:00                           [facility]

[facility]

 

             Diastolic (mm Hg) 2013 12:00:00                           [facility]

[facility]

 

             Respitory Rate 2013 12:00:00                           [facility]

[facility] [place]

 

             Temperature Oral (F) 2013 12:00:00 96.3 F                    

[facility]

 

             Heart Rate   2013 12:00:00                           [facility]

 [facility]

 

             Heart Rate   2013 08:51:00                           [facility]

 [facility]

 

             Temperature Oral (F) 2013 08:51:00 97.8 F                    

[facility]

 

             Systolic (mm Hg) 2013 08:51:00                           [facility]

[facility]

 

             Diastolic (mm Hg) 2013 08:51:00                           [facility]

[facility]

 

             Respitory Rate 2013 00:41:00                           [facility]

[facility]

 

             Weight       2013-09-15 17:49:00                           [facility]

 [facility]

 

             Height       2013-09-15 17:49:00 157.48 cm                 [facility]

 [place]







Procedures







                Procedure       Date / Time Performed Performing Clinician Sourc

e

 

                LASIK                                           [facility]

 

                LASIK                                           [facility]







Encounters







        Start   End     Encounter Admission Attending Care    Care    Encounter 

Source



        Date/Time Date/Time Type    Type    Clinicians Facility Department ID   

   

 

        2020 Laboratory         Lab, [facility]    [id number]

[id number] 



        09:23:20 09:43:20 Only            [facility]  [id number]         



                                                [place] [id number]         



                                                [facility] [phone]         



                                                [place] [address]             



                                                Office                  



                                                Building                 



                                                One                     

 

        2020 Laboratory         Lab, [facility]    1.2.

[id number] 

[facility]



        09:23:20 09:43:20 Only            An[facility], [facility]  [id number]    

     ity of



                                                [place] [id number]         [place]

as



                                                [facility] [phone]         Me

dical



                                                [place] [address]             [place]anch



                                                Office                  



                                                Building                 



                                                One                     

 

        2020 Outpatient R               [facility]    [id number]

[id number] [facility]



        09:00:00 09:00:00                                                 ity of



                                                                        [facility]

 

        2020 Ambulatory                 nullFlavo [facility]     [id number]

[id number] [facility]



        19:45:00 19:45:00 Pre-Reg                 r       Neurology 00      l



                                                        [place]         [place]

 

        2020 Ambulatory                 [place]     [id number]

[id number] [facility]



        19:45:00 19:45:00 Pre-Reg                 r       Neurology 00      l



                                                        [place]         [place]

 

        2020 Outpatient                 TANMAYIE    ABBEY    [id number]

265 [facility]



        13:45:00 13:45:00                                         00      l



                                                                        [place]

 

        2020 Outpatient         ALONDRA JonesSCHLISSETTE ELDER [address]

[id number] 



        13:45:00 13:45:00                 Sahil                   Justino Patel                         

 

        2013-09-15 2013 OU                      nullFlavo [facility] [id number]

232 [facility]



        17:09:00 15:00:00                                Medical 58      l



                                                        [facility]

 

        2013-09-15 2013 OU                      [facility]Flavo [facility] [id number]

232 [facility]



        17:09 15:00:00                         r       Medical 58      l



                                                        [facility]







Results







           Test Description Test Time  Test Comments Results    Result Comments 

Source









                    Thyroid Stimulating Hormone 2019 22:27:55 









                      Test Item  Value      Reference Range Interpretation Comme

nts









             TSH (test code = TSH) 0.445 mIU/mL 0.270-4.200               



Erythrocyte Sedimentation Rate [id number] 22:20:13





             Test Item    Value        Reference Range Interpretation Comments

 

             ESR STAT (test code = ESR STAT) 8 mm/hr      0-20                  

    



Comprehensive Metabolic [id number] 21:59:49





             Test Item    Value        Reference Range Interpretation Comments

 

             Sodium Level (test code = Sodium 139.0 mmol/L 135.0-145.0          

     



             Level)                                              

 

             Potassium Level (test code = 3.7 mmol/L   3.5-5.1                  

 



             Potassium Level)                                        

 

             Chloride Level (test code = 98 mmol/L                        



             Chloride Level)                                        

 

             CO2 (test code = CO2) 26 mmol/L    22-29                     

 

             Anion Gap (test code = Anion 15 mmol/L    7-16                     

 



             Gap)                                                

 

             BUN (test code = BUN) 14.70 mg/dL  8.00-23.00                

 

             Creatinine Level (test code = 0.70 mg/dL   0.50-0.90               

  



             Creatinine Level)                                        

 

             BUN/Creat Ratio (test code = 21                        N           

 



             BUN/Creat Ratio)                                        

 

             Glucose Level (test code = 121 mg/dL           H            



             Glucose Level)                                        

 

             Calcium Level (test code = 9.8 mg/dL    8.3-10.5                  



             Calcium Level)                                        

 

             Alk Phos (test code = Alk Phos) 98 U/L                       

    

 

             Bilirubin Total (test code = 0.4 mg/dL    0.1-0.9                  

 



             Bilirubin Total)                                        

 

             Albumin Level (test code = 4.6 g/dL     3.5-5.2                   



             Albumin Level)                                        

 

             Protein Total (test code = 7.4 g/dL     6.4-8.3                   



             Protein Total)                                        

 

             ALT (test code = ALT) 22 U/L       1-33                      

 

             AST (test code = AST) 24 U/L       1-32                      

 

             Globulin (test code = Globulin) 2.8 g/dL     2.9-3.1      L        

    

 

             A/G Ratio (test code = A/G 1.6 ratio                 N            



             Ratio)                                              



Comprehensive Metabolic [id number] 21:59:49





             Test Item    Value        Reference Range Interpretation Comments

 

             Sodium Level (test 139.0 mmol/L 135.0-145.0               



             code = Sodium Level)                                        

 

             Potassium Level 3.7 mmol/L   3.5-5.1                   



             (test code =                                        



             Potassium Level)                                        

 

             Chloride Level (test 98 mmol/L                        



             code = Chloride                                        



             Level)                                              

 

             CO2 (test code = 26 mmol/L    22-29                     



             CO2)                                                

 

             Anion Gap (test code 15 mmol/L    7-16                      



             = Anion Gap)                                        

 

             BUN (test code = 14.70 mg/dL  8.00-23.00                



             BUN)                                                

 

             Creatinine Level 0.70 mg/dL   0.50-0.90                 



             (test code =                                        



             Creatinine Level)                                        

 

             BUN/Creat Ratio 21                        N            



             (test code =                                        



             BUN/Creat Ratio)                                        

 

             Glucose Level (test 121 mg/dL           H            



             code = Glucose                                        



             Level)                                              

 

             Calcium Level (test 9.8 mg/dL    8.3-10.5                  



             code = Calcium                                        



             Level)                                              

 

             Alk Phos (test code 98 U/L                           



             = Alk Phos)                                         

 

             Bilirubin Total 0.4 mg/dL    0.1-0.9                   



             (test code =                                        



             Bilirubin Total)                                        

 

             Albumin Level (test 4.6 g/dL     3.5-5.2                   



             code = Albumin                                        



             Level)                                              

 

             Protein Total (test 7.4 g/dL     6.4-8.3                   



             code = Protein                                        



             Total)                                              

 

             ALT (test code = 22 U/L       1-33                      



             ALT)                                                

 

             AST (test code = 24 U/L       1-32                      



             AST)                                                

 

             Globulin (test code 2.8 g/dL     2.9-3.1      L            



             = Globulin)                                         

 

             A/G Ratio (test code 1.6 ratio                 N            



             = A/G Ratio)                                        

 

             eGFR AA (test code = >60                       N            eGFR (e

stimated



             eGFR AA)     mL/min/1.73 m2                           Glomerular



                                                                 Filtration Rate

) is



                                                                 an estimated va

lue,



                                                                 calculated from

 the



                                                                 patient's serum



                                                                 creatinine usin

g the



                                                                 MDRD equation. 

It is



                                                                 NOT the patient

's



                                                                 actual GFR. The

 eGFR



                                                                 provides a more



                                                                 clinically usef

ul



                                                                 measure of kidn

ey



                                                                 disease than se

rum



                                                                 creatinine



                                                                 alone.***This



                                                                 calculation neelima

es



                                                                 sex and race in

to



                                                                 account, if the



                                                                 information is



                                                                 provided. If th

e



                                                                 race is not



                                                                 provided, and t

he



                                                                 patient is



                                                                 -Carole

n,



                                                                 multiply by 1.2

12.



                                                                 If sex is not



                                                                 provided, and t

he



                                                                 patient is femhans

le,



                                                                 multiply by 0.7

42.



                                                                 Results for pat

ients



                                                                 <18 years of ag

e



                                                                 have not been



                                                                 validated by 

e



                                                                 MDRD study and



                                                                 should be



                                                                 interpreted wit

h



                                                                 caution. eGFR R

esult



                                                                 Interpretation:

eGFR



                                                                 > or = 60 is in

 the



                                                                 Normal RangeeGF

R <



                                                                 60 may mean kid

neeru



                                                                 diseaseeGFR < 1

5 may



                                                                 mean kidney



                                                                 failure*** Rang

es



                                                                 recommended by 

the



                                                                 [employer]



                                                                 [employer],



                                                                 http://nkdep.ni

h.gov



Lipid [id number] 21:59:49





             Test Item    Value        Reference Range Interpretation Comments

 

             Cholesterol Total 294 mg/dL    0-200        H            RISK OF HE

ART



             (test code =                                        DISEASEPublishe

d by



             Cholesterol Total)                                        American 

Heart



                                                                 [employer] [facility]riet

lyte



                                                                 Optimal Borderl

ine



                                                                 Increased RiskC

HOL



                                                                 <200 200-239 >2

40TRIG



                                                                 <150 150-199 >2

00HDL



                                                                 Male >60 <40HDL

 Female



                                                                 >60 <50LDL <100



                                                                 130-159 >160LDL

 Near



                                                                 optimal is 100-

129

 

             Triglycerides (test 616 mg/dL    9-200        H            



             code =                                              



             Triglycerides)                                        

 

             HDL (test code = 52 mg/dL     50-60                     



             HDL)                                                

 

             LDL (test code = N/A Trig     0-130        N            LDL calcula

tion is



             LDL)         >400 mg/dL                             unreliable when



                                                                 Triglyceride is



                                                                 greater than 40

0.

 

             VLDL (test code = N/A Trig     5-40         N            VLDL calcu

lation is



             VLDL)        >400 mg/dL                             unreliable when



                                                                 Triglyceride is



                                                                 greater than 40

0.

 

             Chol/HDL (test code 5.7 ratio    0.0-4.4      H            



             = Chol/HDL)                                         

 

             LDL/HDL Ratio (test N/A Trig                  N            LDL/HDL 

Ratio



             code = LDL/HDL >400                                   calculation i

s



             Ratio)                                              unreliable when



                                                                 Triglyceride is



                                                                 greater than 40

0.



Comprehensive Metabolic [id number] 21:59:49





             Test Item    Value        Reference Range Interpretation Comments

 

             Sodium Level (test 139.0 mmol/L 135.0-145.0               



             code = Sodium Level)                                        

 

             Potassium Level 3.7 mmol/L   3.5-5.1                   



             (test code =                                        



             Potassium Level)                                        

 

             Chloride Level (test 98 mmol/L                        



             code = Chloride                                        



             Level)                                              

 

             CO2 (test code = 26 mmol/L    22-29                     



             CO2)                                                

 

             Anion Gap (test code 15 mmol/L    7-16                      



             = Anion Gap)                                        

 

             BUN (test code = 14.70 mg/dL  8.00-23.00                



             BUN)                                                

 

             Creatinine Level 0.70 mg/dL   0.50-0.90                 



             (test code =                                        



             Creatinine Level)                                        

 

             BUN/Creat Ratio 21                        N            



             (test code =                                        



             BUN/Creat Ratio)                                        

 

             Glucose Level (test 121 mg/dL           H            



             code = Glucose                                        



             Level)                                              

 

             Calcium Level (test 9.8 mg/dL    8.3-10.5                  



             code = Calcium                                        



             Level)                                              

 

             Alk Phos (test code 98 U/L                           



             = Alk Phos)                                         

 

             Bilirubin Total 0.4 mg/dL    0.1-0.9                   



             (test code =                                        



             Bilirubin Total)                                        

 

             Albumin Level (test 4.6 g/dL     3.5-5.2                   



             code = Albumin                                        



             Level)                                              

 

             Protein Total (test 7.4 g/dL     6.4-8.3                   



             code = Protein                                        



             Total)                                              

 

             ALT (test code = 22 U/L       1-33                      



             ALT)                                                

 

             AST (test code = 24 U/L       1-32                      



             AST)                                                

 

             Globulin (test code 2.8 g/dL     2.9-3.1      L            



             = Globulin)                                         

 

             A/G Ratio (test code 1.6 ratio                 N            



             = A/G Ratio)                                        

 

             eGFR AA (test code = >60                       N            eGFR (e

stimated



             eGFR AA)     mL/min/1.73 m2                           Glomerular



                                                                 Filtration Rate

) is



                                                                 an estimated va

lue,



                                                                 calculated from

 the



                                                                 patient's serum



                                                                 creatinine usin

g the



                                                                 MDRD equation. 

It is



                                                                 NOT the patient

's



                                                                 actual GFR. The

 eGFR



                                                                 provides a more



                                                                 clinically usef

ul



                                                                 measure of kidn

ey



                                                                 disease than se

rum



                                                                 creatinine



                                                                 alone.***This



                                                                 calculation neelima

es



                                                                 sex and race in

to



                                                                 account, if the



                                                                 information is



                                                                 provided. If th

e



                                                                 race is not



                                                                 provided, and t

he



                                                                 patient is



                                                                 -Carole

n,



                                                                 multiply by 1.2

12.



                                                                 If sex is not



                                                                 provided, and t

he



                                                                 patient is fema

le,



                                                                 multiply by 0.7

42.



                                                                 Results for pat

ients



                                                                 <18 years of ag

e



                                                                 have not been



                                                                 validated by th

e



                                                                 MDRD study and



                                                                 should be



                                                                 interpreted wit

h



                                                                 caution. eGFR R

esult



                                                                 Interpretation:

eGFR



                                                                 > or = 60 is in

 the



                                                                 Normal RangeeGF

R <



                                                                 60 may mean kid

neeru



                                                                 diseaseeGFR < 1

5 may



                                                                 mean kidney



                                                                 failure*** Rang

es



                                                                 recommended by 

the



                                                                 [employer]



                                                                 [employer],



                                                                 http://nkdep.ni

h.gov

 

             eGFR Non-AA (test >60.00                    N            eGFR (hailey

mated



             code = eGFR Non-AA) mL/min/1.73 m2                           Glomer

ular



                                                                 Filtration Rate

) is



                                                                 an estimated va

lue,



                                                                 calculated from

 the



                                                                 patient's serum



                                                                 creatinine usin

g the



                                                                 MDRD equation. 

It is



                                                                 NOT the patient

's



                                                                 actual GFR. The

 eGFR



                                                                 provides a more



                                                                 clinically usef

ul



                                                                 measure of kidn

ey



                                                                 disease than se

rum



                                                                 creatinine



                                                                 alone.***This



                                                                 calculation neelima

es



                                                                 sex and race in

to



                                                                 account, if the



                                                                 information is



                                                                 provided. If th

e



                                                                 race is not



                                                                 provided, and t

he



                                                                 patient is



                                                                 -Carole

n,



                                                                 multiply by 1.2

12.



                                                                 If sex is not



                                                                 provided, and t

he



                                                                 patient is fema

le,



                                                                 multiply by 0.7

42.



                                                                 Results for pat

ients



                                                                 <18 years of ag

e



                                                                 have not been



                                                                 validated by th

e



                                                                 MDRD study and



                                                                 should be



                                                                 interpreted wit

h



                                                                 caution. eGFR R

esult



                                                                 Interpretation:

eGFR



                                                                 > or = 60 is in

 the



                                                                 Normal RangeeGF

R <



                                                                 60 may mean kid

neeru



                                                                 diseaseeGFR < 1

5 may



                                                                 mean kidney



                                                                 failure*** Rang

es



                                                                 recommended by 

the



                                                                 [employer]



                                                                 [employer],



                                                                 http://nkdep.ni

h.gov



Complete Blood Count with [id number] 21:52:58





             Test Item    Value        Reference Range Interpretation Comments

 

             WBC (test code = WBC) 9.0 x10      4.4-10.5                  

 

             RBC (test code = RBC) 4.76 x10     3.75-5.20                 

 

             Hgb (test code = Hgb) 14.4 g/dL    12.2-14.8                 

 

             Hct (test code = Hct) 42.4 %       36.5-44.4                 

 

             MCV (test code = MCV) 89.10 fL     80..00              

 

             MCHC (test code = 34.00 g/dL   32.00-37.50               



             MCHC)                                               

 

             MCH (test code = MCH) 30.3 pg      27.0-32.5                 

 

             RDW CV (test code = 12.6 %       11.5-14.5                 



             RDW CV)                                             

 

             Platelets (test code = 340.0 x10    140.0-440.0               



             Platelets)                                          

 

             MPV (test code = MPV) 10.1 fL                   N            

 

             Slide Review (test Auto         Auto                      Result cr

eated by



             code = Slide Review)                                        [id number]

SLIDE_REV_AUTO

 

             nRBC (test code = 0                         N            



             nRBC)                                               

 

             NRBC Abs (test code = 0.00 x10                  N            



             NRBC Abs)                                           

 

             IPF (test code = IPF) 0 %                       N            



Automated [id number] 21:52:58





             Test Item    Value        Reference Range Interpretation Comments

 

             Neutro Auto (test code = Neutro 62.7 %       36.0-70.0             

    



             Auto)                                               

 

             Lymph Auto (test code = Lymph Auto) 28.3 %       12.0-44.0         

        

 

             Mono Auto (test code = Mono Auto) 7.6 %        0.0-11.0            

      

 

             Eos, Auto (test code = Eos, Auto) 0.7 %        0.0-7.0             

      

 

             Basophil Auto (test code = Basophil 0.3 %        0.0-2.0           

        



             Auto)                                               

 

             Neutro Absolute (test code = Neutro 5.7 x10      1.6-7.4           

        



             Absolute)                                           

 

             Lymph Absolute (test code = Lymph 2.56 x10     .50-4.60            

      



             Absolute)                                           

 

             Mono Absolute (test code = Mono .69 x10      .00-1.20              

    



             Absolute)                                           

 

             Eos Absolute (test code = Eos 0.06 x10     0.00-0.74               

  



             Absolute)                                           

 

             Baso Absolute (test code = Baso 0.03 x10     0.00-0.21             

    



             Absolute)                                           



Pro B Natriuretic [id number] 21:52:58





             Test Item    Value        Reference Range Interpretation Comments

 

             NT-proBNP (test code = NT-proBNP) 80 pg/mL     0-124               

      



IG [id number] 21:52:58





             Test Item    Value        Reference Range Interpretation Comments

 

             IG (test code = IG) 0.4 %        0.0-5.0                   

 

             IG Abs (test code = IG Abs) 0 x10                     N            



[id number] 12:35:11





             Test Item    Value        Reference Range Interpretation Comments

 

             LDL Direct (test code = LDL Direct) 144                       H    

        



[id number]:35[facility]11





             Test Item    Value        Reference Range Interpretation Comments

 

             LDL Direct (test code = LDL Direct) 144                       H    

        



[id number] 08:00:00





             Test Item    Value        Reference Range Interpretation Comments

 

             AGAP (test code = AGAP) 13.1         10.0-20.0    N            



[id number] 08:00:00





             Test Item    Value        Reference Range Interpretation Comments

 

             eGFR (test code = eGFR) 96                                     



[facility]KnhdbxdMFUMBNXOE6734[facility]





             Test Item    Value        Reference Range Interpretation Comments

 

             Creatinine Lvl (test code = Creatinine 0.7          0.5-1.4      N 

           



             Lvl)                                                



[facility]TjgywjmBFUJNYPYE4042-58-33 [facility]:00





             Test Item    Value        Reference Range Interpretation Comments

 

             BUN (test code = BUN) 18           7-22         N            



[facility] HospitalRxqgefsCSJJWGEIP0002-15-22 08:00:00





             Test Item    Value        Reference Range Interpretation Comments

 

             Glucose Lvl (test code = Glucose Lvl) 101          70-99        H  

          



[facility] HospitalSvtyvyxRWBPSTHYK9692-20-42 08:00:00





             Test Item    Value        Reference Range Interpretation Comments

 

             Sodium Lvl (test code = Sodium Lvl) 142          135-145      N    

        



[facility] HospitalQafrounFWXVUHQBH0613-02-99 08:00:00





             Test Item    Value        Reference Range Interpretation Comments

 

             Calcium Lvl (test code = Calcium Lvl) 8.3          8.5-10.5     L  

          



[facility] HospitalGzklizcZFGTUNKXN0845-13-62 08:00:00





             Test Item    Value        Reference Range Interpretation Comments

 

             CO2 (test code = CO2) 23           24-32        L            



[facility] HospitalVvojotiNOEVKDQKK3986-85-84 08:00:00





             Test Item    Value        Reference Range Interpretation Comments

 

             Chloride Lvl (test code = Chloride Lvl) 110                 H

            



[facility] HospitalTisdmjcGLEEOLMHY7154-94-65 08:00:00





             Test Item    Value        Reference Range Interpretation Comments

 

             Potassium Lvl (test code = Potassium 4.1          3.5-5.1      N   

         



             Lvl)                                                



[facility] HospitalTxbciuhTCORCSAQR1550-25-95 08:00:00





             Test Item    Value        Reference Range Interpretation Comments

 

             LDL (test code = LDL) See Note mg/dL                           



                          5*NA*(2013                           



                          03:00:00)                              



[facility] HospitalWhgpimpRYRLVFDGF2012-20-54 08:00:00





             Test Item    Value        Reference Range Interpretation Comments

 

             CHD Risk (test code = CHD Risk) 8.45         3.90-5.80    H        

    



[facility] HospitalGxfvkdhQLLHTSHHG5581-94-04 08:00:00





             Test Item    Value        Reference Range Interpretation Comments

 

             HDL (test code = HDL) 29                        L            



[facility] HospitalSksjdaySXUDBRKBK4705-10-80 08:00:00





             Test Item    Value        Reference Range Interpretation Comments

 

             Trig (test code = Trig) 531                       H            



[facility] HospitalQoctuqmHHIRISVQU1077-98-79 08:00:00





             Test Item    Value        Reference Range Interpretation Comments

 

             Chol (test code = Chol) 245                       H            



[facility]ownQvosmomLTBNFXCQXZ4638-40-17 08:00:00





             Test Item    Value        Reference Range Interpretation Comments

 

             Basophils (test code = 0.7          See_Comment  N             [Aut

omated message] The



             Basophils)                                          system which ge

nerated



                                                                 this result tra

nsmitted



                                                                 reference range

: <=1.0.



                                                                 The reference r

erna was



                                                                 not used to int

erpret



                                                                 this result as



                                                                 normal/abnormal

.



[facility]ownVjthcdgVESYMGRNKH3027-67-62 08:00:00





             Test Item    Value        Reference Range Interpretation Comments

 

             Segs-Bands # (test code = Segs-Bands #) 3.6          1.5-8.1      N

            



[facility]ownGswotsgYFFJJLPJXY0952-54-05 08:00:00





             Test Item    Value        Reference Range Interpretation Comments

 

             Lymphocytes # (test code = Lymphocytes 3.1          1.0-5.5      N 

           



             #)                                                  



[facility]ptownXogudvaUQISYOVXVJ7868-08-21 08:00:00





             Test Item    Value        Reference Range Interpretation Comments

 

             Monocytes # (test code 0.6          See_Comment  N             [Aut

omated message] The



             = Monocytes #)                                        system which 

generated



                                                                 this result tra

nsmitted



                                                                 reference range

: <=0.8.



                                                                 The reference r

erna was



                                                                 not used to int

erpret



                                                                 this result as



                                                                 normal/abnormal

.



[facility]ownOctkpsqXCKYQKXMBS0861-39-68 08:00:00





             Test Item    Value        Reference Range Interpretation Comments

 

             Eosinophils # (test code 0.1          See_Comment  N             [A

utomated message] The



             = Eosinophils #)                                        system whic

h generated



                                                                 this result tra

nsmitted



                                                                 reference range

: <=0.5.



                                                                 The reference r

erna was



                                                                 not used to int

erpret



                                                                 this result as



                                                                 normal/abnormal

.



[facility]ptownIbxvfzyAGGDKKLWGM5499-92-03 08:00:00





             Test Item    Value        Reference Range Interpretation Comments

 

             Basophils # (test code 0.1          See_Comment  N             [Aut

omated message] The



             = Basophils #)                                        system which 

generated



                                                                 this result tra

nsmitted



                                                                 reference range

: <=0.2.



                                                                 The reference r

erna was



                                                                 not used to int

erpret



                                                                 this result as



                                                                 normal/abnormal

.



[facility]ownGugkzqhZKGDLACNHN9738-76-20 08:00:00





             Test Item    Value        Reference Range Interpretation Comments

 

             Eosinophils (test code = 1.7          See_Comment  N             [A

utomated message] The



             Eosinophils)                                        system which ge

nerated



                                                                 this result tra

nsmitted



                                                                 reference range

: <=4.0.



                                                                 The reference r

erna was



                                                                 not used to int

erpret



                                                                 this result as



                                                                 normal/abnormal

.



[facility]ptownVmtuhbeBQGJHJSLZE7330-51-79 08:00:00





             Test Item    Value        Reference Range Interpretation Comments

 

             Monocytes (test code = Monocytes) 8.0          2.0-12.0     N      

      



[facility] UptownBqnlnxiILCEYFICVC9572-68-31 08:00:00





             Test Item    Value        Reference Range Interpretation Comments

 

             Segs (test code = Segs) 48.0         45.0-75.0    N            



[facility]ptownPaaaxkcFWPEAROVQR4610-72-84 08:00:00





             Test Item    Value        Reference Range Interpretation Comments

 

             Lymphocytes (test code = Lymphocytes) 41.6         20.0-40.0    H  

          



[facility]ptownEqjrkiaFYYCTZWIDR9801-65-55 08:00:00





             Test Item    Value        Reference Range Interpretation Comments

 

             RBC (test code = RBC) 3.87         4.20-5.40    L            



[facility]ptownGnadbhlMFOFLNPNFD0825-72-00 08:00:00





             Test Item    Value        Reference Range Interpretation Comments

 

             WBC (test code = WBC) 7.4          3.7-10.4     N            



[facility]ptownWcjaihkWQEEIITHTO6192-22-07 08:00:00





             Test Item    Value        Reference Range Interpretation Comments

 

             Hgb (test code = Hgb) 11.9         12.0-16.0    L            



[facility] UptownOchebnkFOYPYLNJMX9944-18-11 08:00:00





             Test Item    Value        Reference Range Interpretation Comments

 

             Hct (test code = Hct) 35.2         36.0-48.0    L            



[facility]ptownBczdpgjYQHSSNXUQX6391-79-20 08:00:00





             Test Item    Value        Reference Range Interpretation Comments

 

             MCV (test code = MCV) 90.8         81.0-99.0    N            



[facility]ptownKpfxqrtJSUPQEADBB0123-25-74 08:00:00





             Test Item    Value        Reference Range Interpretation Comments

 

             MCH (test code = MCH) 30.7 pg      27.0-31.0    N            



[facility] UptownOmtegagOSXUVSWSFL7029-73-26 08:00:00





             Test Item    Value        Reference Range Interpretation Comments

 

             RDW (test code = RDW) 13.3         11.5-14.5    N            



[facility] UptownVidpyyxDWLQYXNOHP1071-87-51 08:00:00





             Test Item    Value        Reference Range Interpretation Comments

 

             MCHC (test code = MCHC) 33.9         32.0-36.0    N            



[facility]ptownQvnmtcaJZILITEODY1514-26-06 08:00:00





             Test Item    Value        Reference Range Interpretation Comments

 

             Platelet (test code = Platelet) 279          133-450      N        

    



[facility]ptownHhdbqndJADVFNTDLZ0333-04-89 08:00:00





             Test Item    Value        Reference Range Interpretation Comments

 

             MPV (test code = MPV) 7.5          7.4-10.4     N            



[facility] HospitalWizogmqVKGCNVEGH7559-42-43 08:00:00





             Test Item    Value        Reference Range Interpretation Comments

 

             AGAP (test code = AGAP) 13.1         10.0-20.0    N            



[facility] HospitalIlmuihuQWSFUNGUF9395-06-69 08:00:00





             Test Item    Value        Reference Range Interpretation Comments

 

             eGFR (test code = eGFR) 96                                     



[facility] HospitalBqrxmxwQYLATREKV8000-72-36 08:00:00





             Test Item    Value        Reference Range Interpretation Comments

 

             Creatinine Lvl (test code = Creatinine 0.7          0.5-1.4      N 

           



             Lvl)                                                



[facility] HospitalXrjybwoINTISUIYV5771-31-39 08:00:00





             Test Item    Value        Reference Range Interpretation Comments

 

             BUN (test code = BUN) 18           7-22         N            



[facility] HospitalLhkdjmfMVYTULHLG7104-02-37 08:00:00





             Test Item    Value        Reference Range Interpretation Comments

 

             Glucose Lvl (test code = Glucose Lvl) 101          70-99        H  

          



[facility] HospitalQremxohGCOSGTDGN7195-26-68 08:00:00





             Test Item    Value        Reference Range Interpretation Comments

 

             Sodium Lvl (test code = Sodium Lvl) 142          135-145      N    

        



[facility] HospitalVuwjymyDJKBGVCSX1355-99-56 08:00:00





             Test Item    Value        Reference Range Interpretation Comments

 

             Calcium Lvl (test code = Calcium Lvl) 8.3          8.5-10.5     L  

          



[facility] HospitalZikmznxNSZSGLGDS0493-18-76 08:00:00





             Test Item    Value        Reference Range Interpretation Comments

 

             CO2 (test code = CO2) 23           24-32        L            



[facility] HospitalXndozoqSAYXJVDZO7092-61-46 08:00:00





             Test Item    Value        Reference Range Interpretation Comments

 

             Chloride Lvl (test code = Chloride Lvl) 110                 H

            



[facility] HospitalGuzwvbnOXQLCQDMG6795-97-33 08:00:00





             Test Item    Value        Reference Range Interpretation Comments

 

             Potassium Lvl (test code = Potassium 4.1          3.5-5.1      N   

         



             Lvl)                                                



[facility] HospitalJpebmxvJEAOFKBQV9049-91-46 08:00:00





             Test Item    Value        Reference Range Interpretation Comments

 

             LDL (test code = LDL) See Note mg/dL                           



                          5*NA*(2013                           



                          03:00:00)                              



[facility] HospitalCrqtrjpAFEBLMZIM8750-71-05 08:00:00





             Test Item    Value        Reference Range Interpretation Comments

 

             CHD Risk (test code = CHD Risk) 8.45         3.90-5.80    H        

    



[facility] HospitalWsyhcyyEIOGDCJUR9163-23-77 08:00:00





             Test Item    Value        Reference Range Interpretation Comments

 

             HDL (test code = HDL) 29                        L            



[facility] HospitalSlgtzduYIMGPYXGT9126-77-68 08:00:00





             Test Item    Value        Reference Range Interpretation Comments

 

             Trig (test code = Trig) 531                       H            



[facility] HospitalUiobzfvLBZQIDMVA0661-00-25 08:00:00





             Test Item    Value        Reference Range Interpretation Comments

 

             Chol (test code = Chol) 245                       H            



[facility]ptownTzwxhevSYUSOSHNDH9792-30-38 08:00:00





             Test Item    Value        Reference Range Interpretation Comments

 

             Basophils (test code = 0.7          See_Comment  N             [Aut

omated message] The



             Basophils)                                          system which ge

nerated



                                                                 this result tra

nsmitted



                                                                 reference range

: <=1.0.



                                                                 The reference r

erna was



                                                                 not used to int

erpret



                                                                 this result as



                                                                 normal/abnormal

.



[facility]ptownOudyekaECTAIHQVGO4060-71-87 08:00:00





             Test Item    Value        Reference Range Interpretation Comments

 

             Segs-Bands # (test code = Segs-Bands #) 3.6          1.5-8.1      N

            



[facility]ptownHbcsuztJVDLYDMUCD2685-79-83 08:00:00





             Test Item    Value        Reference Range Interpretation Comments

 

             Lymphocytes # (test code = Lymphocytes 3.1          1.0-5.5      N 

           



             #)                                                  



[facility] UptownOinzoxzCEUCXZIMHF7433-03-87 08:00:00





             Test Item    Value        Reference Range Interpretation Comments

 

             Monocytes # (test code 0.6          See_Comment  N             [Aut

omated message] The



             = Monocytes #)                                        system which 

generated



                                                                 this result tra

nsmitted



                                                                 reference range

: <=0.8.



                                                                 The reference r

erna was



                                                                 not used to int

erpret



                                                                 this result as



                                                                 normal/abnormal

.



[facility] UptownAolkbdjKGFPTOIWRS4509-48-33 08:00:00





             Test Item    Value        Reference Range Interpretation Comments

 

             Eosinophils # (test code 0.1          See_Comment  N             [A

utomated message] The



             = Eosinophils #)                                        system whic

h generated



                                                                 this result tra

nsmitted



                                                                 reference range

: <=0.5.



                                                                 The reference r

erna was



                                                                 not used to int

erpret



                                                                 this result as



                                                                 normal/abnormal

.



[facility]ptownWmnwjueDEXEVRTNRK2621-98-19 08:00:00





             Test Item    Value        Reference Range Interpretation Comments

 

             Basophils # (test code 0.1          See_Comment  N             [Aut

omated message] The



             = Basophils #)                                        system which 

generated



                                                                 this result tra

nsmitted



                                                                 reference range

: <=0.2.



                                                                 The reference r

erna was



                                                                 not used to int

erpret



                                                                 this result as



                                                                 normal/abnormal

.



[facility]ptownQwvypxmRJUNCSNCHC3213-27-18 08:00:00





             Test Item    Value        Reference Range Interpretation Comments

 

             Eosinophils (test code = 1.7          See_Comment  N             [A

utomated message] The



             Eosinophils)                                        system which ge

nerated



                                                                 this result tra

nsmitted



                                                                 reference range

: <=4.0.



                                                                 The reference r

erna was



                                                                 not used to int

erpret



                                                                 this result as



                                                                 normal/abnormal

.



[facility] UptownFzxsdlfJKPIKXGSJC3463-44-67 08:00:00





             Test Item    Value        Reference Range Interpretation Comments

 

             Monocytes (test code = Monocytes) 8.0          2.0-12.0     N      

      



[facility] UptownJzwynbpDAWEXABOVK9712-91-39 08:00:00





             Test Item    Value        Reference Range Interpretation Comments

 

             Segs (test code = Segs) 48.0         45.0-75.0    N            



[facility] UptownTdilfviLKCBBWUJUD0599-77-37 08:00:00





             Test Item    Value        Reference Range Interpretation Comments

 

             Lymphocytes (test code = Lymphocytes) 41.6         20.0-40.0    H  

          



[facility] UptownUsoiqohRNRSQDLODJ9586-29-34 08:00:00





             Test Item    Value        Reference Range Interpretation Comments

 

             RBC (test code = RBC) 3.87         4.20-5.40    L            



[facility]ownNikpmtoGNFDXIUIVH8508-27-28 08:00:00





             Test Item    Value        Reference Range Interpretation Comments

 

             WBC (test code = WBC) 7.4          3.7-10.4     N            



[facility] UptownUcewkriEKXCOTDESG8585-30-29 08:00:00





             Test Item    Value        Reference Range Interpretation Comments

 

             Hgb (test code = Hgb) 11.9         12.0-16.0    L            



[facility] UptownQuyfcseNNLIKXZJDE5772-72-71 08:00:00





             Test Item    Value        Reference Range Interpretation Comments

 

             Hct (test code = Hct) 35.2         36.0-48.0    L            



[facility] UptownRgnhdzwJJHKEMRHYB3981-18-98 08:00:00





             Test Item    Value        Reference Range Interpretation Comments

 

             MCV (test code = MCV) 90.8         81.0-99.0    N            



[facility] UptownWalezyjIZNDBGEXXB4680-56-01 08:00:00





             Test Item    Value        Reference Range Interpretation Comments

 

             MCH (test code = MCH) 30.7 pg      27.0-31.0    N            



[facility] UptownNdtqavyDXGAZWKWJH2732-16-59 08:00:00





             Test Item    Value        Reference Range Interpretation Comments

 

             RDW (test code = RDW) 13.3         11.5-14.5    N            



[facility] UptownLvjmimnOBANGCGXJP5124-38-96 08:00:00





             Test Item    Value        Reference Range Interpretation Comments

 

             MCHC (test code = MCHC) 33.9         32.0-36.0    N            



[facility] UptownUzzubhgJFKPOAMMVY5962-06-79 08:00:00





             Test Item    Value        Reference Range Interpretation Comments

 

             Platelet (test code = Platelet) 279          133-450      N        

    



[facility]ptownQdewffyYGDSWETCBZ2150-21-82 08:00:00





             Test Item    Value        Reference Range Interpretation Comments

 

             MPV (test code = MPV) 7.5          7.4-10.4     N            



[facility] UptownNwiaehoFNDXSIBSSE8354-85-63 18:53:00





             Test Item    Value        Reference Range Interpretation Comments

 

             MCHC (test code = MCHC) 33.5         32.0-36.0    N            



[facility]ownXpteojrLOLNXZTCLK8091-56-35 18:53:00





             Test Item    Value        Reference Range Interpretation Comments

 

             MCH (test code = MCH) 29.8 pg      27.0-31.0    N            



[facility]ownFusdlbrQLCKNZPPOQ8461-59-53 18:53:00





             Test Item    Value        Reference Range Interpretation Comments

 

             Platelet (test code = Platelet) 305          133-450      N        

    



[facility]ownKzcmogtWYHNOVQRFV1461-29-45 18:53:00





             Test Item    Value        Reference Range Interpretation Comments

 

             RDW (test code = RDW) 12.4         11.5-14.5    N            



[facility]ownOgibmveWNKJXWFJJE5743-46-60 18:53:00





             Test Item    Value        Reference Range Interpretation Comments

 

             MCV (test code = MCV) 89.0         81.0-99.0    N            



[facility]ownWccbcbhGAFZYDCHJL5929-18-40 18:53:00





             Test Item    Value        Reference Range Interpretation Comments

 

             Hgb (test code = Hgb) 13.3         12.0-16.0    N            



[facility]ownIxvzikkJAXDKDXGPD8044-16-85 18:53:00





             Test Item    Value        Reference Range Interpretation Comments

 

             RBC (test code = RBC) 4.45         4.20-5.40    N            



[facility]ownMnotomgJUHMCQECUQ5462-06-62 18:53:00





             Test Item    Value        Reference Range Interpretation Comments

 

             Hct (test code = Hct) 39.6         36.0-48.0    N            



[facility]ownYyedqvcQDLFEOKPQT2369-95-04 18:53:00





             Test Item    Value        Reference Range Interpretation Comments

 

             WBC (test code = WBC) 8.3          3.7-10.4     N            



[facility] HospitalFrgfmrqGAJEZSKXU1739-47-87 18:53:00





             Test Item    Value        Reference Range Interpretation Comments

 

             eGFR (test code = eGFR) 96                                     



[facility] HospitalZjlbhvkXBMUHGEOB8094-95-15 18:53:00





             Test Item    Value        Reference Range Interpretation Comments

 

             Sodium Lvl (test code = Sodium Lvl) 140          135-145      N    

        



[facility] HospitalYeitaqnBJDBNWBQK2269-90-32 18:53:00





             Test Item    Value        Reference Range Interpretation Comments

 

             Potassium Lvl (test code = Potassium 3.9          3.5-5.1      N   

         



             Lvl)                                                



[facility] HospitalDpgsemdTENOXJTAT3843-11-04 18:53:00





             Test Item    Value        Reference Range Interpretation Comments

 

             Chloride Lvl (test code = Chloride Lvl) 105                 N

            



[facility] HospitalIpboivfUDXTUSRNZ5140-65-92 18:53:00





             Test Item    Value        Reference Range Interpretation Comments

 

             CO2 (test code = CO2) 28           24-32        N            



[facility] HospitalVwcrdhkRORPDTMVX1317-06-65 18:53:00





             Test Item    Value        Reference Range Interpretation Comments

 

             Glucose Lvl (test code = Glucose Lvl) 92           70-99        N  

          



[facility] HospitalQwslzavIRFZIPQQO1097-39-37 18:53:00





             Test Item    Value        Reference Range Interpretation Comments

 

             BUN (test code = BUN) 14           7-22         N            



[facility] HospitalTeljlglGYGWURGPV4728-80-51 18:53:00





             Test Item    Value        Reference Range Interpretation Comments

 

             Creatinine Lvl (test code = Creatinine 0.7          0.5-1.4      N 

           



             Lvl)                                                



[facility] HospitalVajgwjcCDSDEFGEB0806-69-36 18:53:00





             Test Item    Value        Reference Range Interpretation Comments

 

             Calcium Lvl (test code = Calcium Lvl) 9.1          8.5-10.5     N  

          



[facility] HospitalGeyuilrLDIURMSJM2893-72-74 18:53:00





             Test Item    Value        Reference Range Interpretation Comments

 

             AGAP (test code = AGAP) 10.9         10.0-20.0    N            



[facility]ownPpoggdzSBXBHVFHOM4750-02-41 18:53:00





             Test Item    Value        Reference Range Interpretation Comments

 

             Basophils # (test code 0.1          See_Comment  N             [Aut

omated message] The



             = Basophils #)                                        system which 

generated



                                                                 this result tra

nsmitted



                                                                 reference range

: <=0.2.



                                                                 The reference r

erna was



                                                                 not used to int

erpret



                                                                 this result as



                                                                 normal/abnormal

.



[facility]ownGrhkjqsWAGPOBKTBE8522-36-63 18:53:00





             Test Item    Value        Reference Range Interpretation Comments

 

             Lymphocytes # (test code = Lymphocytes 2.8          1.0-5.5      N 

           



             #)                                                  



[facility]ownGyazwphHJPRYORWWA8821-08-31 18:53:00





             Test Item    Value        Reference Range Interpretation Comments

 

             Eosinophils # (test code 0.1          See_Comment  N             [A

utomated message] The



             = Eosinophils #)                                        system whic

h generated



                                                                 this result tra

nsmitted



                                                                 reference range

: <=0.5.



                                                                 The reference r

erna was



                                                                 not used to int

erpret



                                                                 this result as



                                                                 normal/abnormal

.



[facility]ptownGajwxmeWNIMBAAIWV4606-07-63 18:53:00





             Test Item    Value        Reference Range Interpretation Comments

 

             Monocytes # (test code 0.7          See_Comment  N             [Aut

omated message] The



             = Monocytes #)                                        system which 

generated



                                                                 this result tra

nsmitted



                                                                 reference range

: <=0.8.



                                                                 The reference r

erna was



                                                                 not used to int

erpret



                                                                 this result as



                                                                 normal/abnormal

.



[facility]ptownDnoqlapYUIXSNVRAG7310-01-13 18:53:00





             Test Item    Value        Reference Range Interpretation Comments

 

             Segs-Bands # (test code = Segs-Bands #) 4.6          1.5-8.1      N

            



[facility]ownTbbzverFGHQWPLURT3670-22-65 18:53:00





             Test Item    Value        Reference Range Interpretation Comments

 

             Basophils (test code = 1.0          See_Comment  N             [Aut

omated message] The



             Basophils)                                          system which ge

nerated



                                                                 this result tra

nsmitted



                                                                 reference range

: <=1.0.



                                                                 The reference r

erna was



                                                                 not used to int

erpret



                                                                 this result as



                                                                 normal/abnormal

.



[facility]ptownLofhvuzEJMGFPYKBN7731-00-08 18:53:00





             Test Item    Value        Reference Range Interpretation Comments

 

             Lymphocytes (test code = Lymphocytes) 34.0         20.0-40.0    N  

          



[facility]ptownZllbramOYKLXXYCDZ4223-18-19 18:53:00





             Test Item    Value        Reference Range Interpretation Comments

 

             Eosinophils (test code = 1.3          See_Comment  N             [A

utomated message] The



             Eosinophils)                                        system which ge

nerated



                                                                 this result tra

nsmitted



                                                                 reference range

: <=4.0.



                                                                 The reference r

erna was



                                                                 not used to int

erpret



                                                                 this result as



                                                                 normal/abnormal

.



[facility]ptownAtvowwiOQALDRLCEV3418-71-01 18:53:00





             Test Item    Value        Reference Range Interpretation Comments

 

             Monocytes (test code = Monocytes) 8.4          2.0-12.0     N      

      



[facility] UptownCxythcmPQFYCQRDAV4829-36-07 18:53:00





             Test Item    Value        Reference Range Interpretation Comments

 

             Segs (test code = Segs) 55.3         45.0-75.0    N            



[facility]ownFexpbavLZOMLFASNJ4450-10-15 18:53:00





             Test Item    Value        Reference Range Interpretation Comments

 

             PTT (test code = PTT) 27.3 s       22.9-35.8    N            



[facility]ownOpggeywILFOWOOHYS1286-96-91 18:53:00





             Test Item    Value        Reference Range Interpretation Comments

 

             PT (test code = PT) 13.3 s       12.0-14.7    N            



[facility]ptownIildgwmQWLYZRQRGZ3782-18-23 18:53:00





             Test Item    Value        Reference Range Interpretation Comments

 

             INR (test code = INR) 1.02         0.85-1.17    N            



[facility]ptownGnvsrsaALMNIRWGUK3802-07-10 18:53:00





             Test Item    Value        Reference Range Interpretation Comments

 

             MPV (test code = MPV) 7.2          7.4-10.4     L            



[facility] UptownItfoqphDLBLYFJGLO1606-61-60 18:53:00





             Test Item    Value        Reference Range Interpretation Comments

 

             MCHC (test code = MCHC) 33.5         32.0-36.0    N            



[facility] UptownLmrrasaZOYAQREQXA2523-41-64 18:53:00





             Test Item    Value        Reference Range Interpretation Comments

 

             MCH (test code = MCH) 29.8 pg      27.0-31.0    N            



[facility]ptownUomjypbYQKVEQAOHA2034-07-18 18:53:00





             Test Item    Value        Reference Range Interpretation Comments

 

             Platelet (test code = Platelet) 305          133-450      N        

    



[facility]ptownYfyqakzPAFUFAPAMI3536-72-19 18:53:00





             Test Item    Value        Reference Range Interpretation Comments

 

             RDW (test code = RDW) 12.4         11.5-14.5    N            



[facility]ptownFwhcqqbOTHQANNOUS4553-85-91 18:53:00





             Test Item    Value        Reference Range Interpretation Comments

 

             MCV (test code = MCV) 89.0         81.0-99.0    N            



[facility]ptownPfmaggxCLWFROCGPK8520-79-28 18:53:00





             Test Item    Value        Reference Range Interpretation Comments

 

             Hgb (test code = Hgb) 13.3         12.0-16.0    N            



[facility]ptownEltiwiyUHDBPNUIEK4583-28-14 18:53:00





             Test Item    Value        Reference Range Interpretation Comments

 

             RBC (test code = RBC) 4.45         4.20-5.40    N            



[facility]ptownEagtujwTHERUNITYO2740-88-79 18:53:00





             Test Item    Value        Reference Range Interpretation Comments

 

             Hct (test code = Hct) 39.6         36.0-48.0    N            



[facility] UptownUfnyubdDGHLGICMYF3839-61-61 18:53:00





             Test Item    Value        Reference Range Interpretation Comments

 

             WBC (test code = WBC) 8.3          3.7-10.4     N            



[facility] HospitalHghzmyjAOFDYRHSD8020-66-87 18:53:00





             Test Item    Value        Reference Range Interpretation Comments

 

             eGFR (test code = eGFR) 96                                     



[facility] HospitalPnnpudwJGBYGWYHP4565-31-01 18:53:00





             Test Item    Value        Reference Range Interpretation Comments

 

             Sodium Lvl (test code = Sodium Lvl) 140          135-145      N    

        



[facility] HospitalPpsncyfTWNMBQXZE4365-28-34 18:53:00





             Test Item    Value        Reference Range Interpretation Comments

 

             Potassium Lvl (test code = Potassium 3.9          3.5-5.1      N   

         



             Lvl)                                                



[facility] HospitalXfbknlkEOJDOOQBH0158-98-52 18:53:00





             Test Item    Value        Reference Range Interpretation Comments

 

             Chloride Lvl (test code = Chloride Lvl) 105                 N

            



[facility] HospitalKkzfokoNZCBDPOSS5908-02-19 18:53:00





             Test Item    Value        Reference Range Interpretation Comments

 

             CO2 (test code = CO2) 28           24-32        N            



[facility] HospitalUfaojjwBOLBSSAWI4223-45-84 18:53:00





             Test Item    Value        Reference Range Interpretation Comments

 

             Glucose Lvl (test code = Glucose Lvl) 92           70-99        N  

          



[facility] HospitalWxvoatqLRCHBTSDI6272-99-15 18:53:00





             Test Item    Value        Reference Range Interpretation Comments

 

             BUN (test code = BUN) 14           7-22         N            



[facility] HospitalFqbjlumHSRGZOKYL8452-57-70 18:53:00





             Test Item    Value        Reference Range Interpretation Comments

 

             Creatinine Lvl (test code = Creatinine 0.7          0.5-1.4      N 

           



             Lvl)                                                



[facility] HospitalTethareEFPRZGHGA1596-17-77 18:53:00





             Test Item    Value        Reference Range Interpretation Comments

 

             Calcium Lvl (test code = Calcium Lvl) 9.1          8.5-10.5     N  

          



[facility] HospitalFurjtqcFHLAVHVPD5943-97-08 18:53:00





             Test Item    Value        Reference Range Interpretation Comments

 

             AGAP (test code = AGAP) 10.9         10.0-20.0    N            



[facility]ptownWzxpsktDSKPTBAEHV9894-09-81 18:53:00





             Test Item    Value        Reference Range Interpretation Comments

 

             Basophils # (test code 0.1          See_Comment  N             [Aut

omated message] The



             = Basophils #)                                        system which 

generated



                                                                 this result tra

nsmitted



                                                                 reference range

: <=0.2.



                                                                 The reference r

erna was



                                                                 not used to int

erpret



                                                                 this result as



                                                                 normal/abnormal

.



[facility]ownOamcgrsHLLSGPZTKL8110-52-62 18:53:00





             Test Item    Value        Reference Range Interpretation Comments

 

             Lymphocytes # (test code = Lymphocytes 2.8          1.0-5.5      N 

           



             #)                                                  



[facility]ptownRkevcauEIUACQEIQF8482-39-98 18:53:00





             Test Item    Value        Reference Range Interpretation Comments

 

             Eosinophils # (test code 0.1          See_Comment  N             [A

utomated message] The



             = Eosinophils #)                                        system whic

h generated



                                                                 this result tra

nsmitted



                                                                 reference range

: <=0.5.



                                                                 The reference r

erna was



                                                                 not used to int

erpret



                                                                 this result as



                                                                 normal/abnormal

.



[facility]ownIkflpboGIDLYYDKCV7380-33-61 18:53:00





             Test Item    Value        Reference Range Interpretation Comments

 

             Monocytes # (test code 0.7          See_Comment  N             [Aut

omated message] The



             = Monocytes #)                                        system which 

generated



                                                                 this result tra

nsmitted



                                                                 reference range

: <=0.8.



                                                                 The reference r

erna was



                                                                 not used to int

erpret



                                                                 this result as



                                                                 normal/abnormal

.



[facility]ptownLjailuvTYECQRRKXU2370-22-90 18:53:00





             Test Item    Value        Reference Range Interpretation Comments

 

             Segs-Bands # (test code = Segs-Bands #) 4.6          1.5-8.1      N

            



[facility]ptownNiscbqjFJTRTFOFAS1272-59-81 18:53:00





             Test Item    Value        Reference Range Interpretation Comments

 

             Basophils (test code = 1.0          See_Comment  N             [Aut

omated message] The



             Basophils)                                          system which ge

nerated



                                                                 this result tra

nsmitted



                                                                 reference range

: <=1.0.



                                                                 The reference r

erna was



                                                                 not used to int

erpret



                                                                 this result as



                                                                 normal/abnormal

.



[facility]ptownPlmxiehBZGXEZTQXX0968-77-12 18:53:00





             Test Item    Value        Reference Range Interpretation Comments

 

             Lymphocytes (test code = Lymphocytes) 34.0         20.0-40.0    N  

          



[facility]ptownAvqpxidCSEQKZSAAC9865-94-14 18:53:00





             Test Item    Value        Reference Range Interpretation Comments

 

             Eosinophils (test code = 1.3          See_Comment  N             [A

utomated message] The



             Eosinophils)                                        system which ge

nerated



                                                                 this result tra

nsmitted



                                                                 reference range

: <=4.0.



                                                                 The reference r

erna was



                                                                 not used to int

erpret



                                                                 this result as



                                                                 normal/abnormal

.



[facility]ptownQvocxbtQANUGASQBK5796-30-25 18:53:00





             Test Item    Value        Reference Range Interpretation Comments

 

             Monocytes (test code = Monocytes) 8.4          2.0-12.0     N      

      



[facility]ptownQqcdypnIIIBOOLMWH5473-92-35 18:53:00





             Test Item    Value        Reference Range Interpretation Comments

 

             Segs (test code = Segs) 55.3         45.0-75.0    N            



[facility] UptownIpxggruYUEUPFNBJH9882-73-07 18:53:00





             Test Item    Value        Reference Range Interpretation Comments

 

             PTT (test code = PTT) 27.3 s       22.9-35.8    N            



[facility]ownDiwltdxOKNJFHXYFP7362-01-63 18:53:00





             Test Item    Value        Reference Range Interpretation Comments

 

             PT (test code = PT) 13.3 s       12.0-14.7    N            



[facility] UptownYkaxfcsHBMZFKAGFT0558-65-77 18:53:00





             Test Item    Value        Reference Range Interpretation Comments

 

             INR (test code = INR) 1.02         0.85-1.17    N            



[facility] UptownKomixlgKLENMKCUUJ3746-86-72 18:53:00





             Test Item    Value        Reference Range Interpretation Comments

 

             MPV (test code = MPV) 7.2          7.4-10.4     L            



[facility]

## 2023-02-04 NOTE — RAD REPORT
EXAM DESCRIPTION:  Josefa Single View2/4/2023 9:21 pm

 

CLINICAL HISTORY:  Syncope

 

COMPARISON:  December 2022

 

FINDINGS:   The lungs appear clear of acute infiltrate. The heart is normal size

 

IMPRESSION:  No acute abnormalities displayed

## 2023-03-20 ENCOUNTER — HOSPITAL ENCOUNTER (EMERGENCY)
Dept: HOSPITAL 97 - ER | Age: 68
Discharge: HOME | End: 2023-03-20
Payer: COMMERCIAL

## 2023-03-20 VITALS — DIASTOLIC BLOOD PRESSURE: 88 MMHG | OXYGEN SATURATION: 100 % | SYSTOLIC BLOOD PRESSURE: 144 MMHG | TEMPERATURE: 98.5 F

## 2023-03-20 DIAGNOSIS — M25.551: Primary | ICD-10-CM

## 2023-03-20 PROCEDURE — 72170 X-RAY EXAM OF PELVIS: CPT

## 2023-03-20 NOTE — RAD REPORT
EXAM DESCRIPTION:  RAD - Hip Right 2 View - 3/20/2023 12:28 pm

 

CLINICAL HISTORY:  Right hip pain

 

FINDINGS:  Bones are osteoporotic.

 

No fracture or dislocation seen.

 

If patient continues to have symptoms to suggest an occult fracture MRI would recommended

## 2023-03-20 NOTE — ER
Nurse's Notes                                                                                     

 [facility]                                                                 

Name: Meaghan Tavares                                                                            

Age: 67 yrs                                                                                       

Sex: Female                                                                                       

: 1955                                                                                   

MRN: [id number]                                                                                   

Arrival Date: 2023                                                                          

Time: 11:32                                                                                       

Account#: [id number]                                                                            

Bed IW1                                                                                           

Private MD: Jorge Franco E                                                                     

Diagnosis: Pain in right hip                                                                      

                                                                                                  

Presentation:                                                                                     

                                                                                             

11:43 Chief complaint: Patient's son or daughter states: fell Friday and now has right hip    iw  

      pain, she tripped over the dog.                                                             

11:43 Acuity: ALVIN 4                                                                           iw  

11:43 Method Of Arrival: Ambulatory                                                           iw  

                                                                                                  

Historical:                                                                                       

- Allergies:                                                                                      

11:44 No Known Allergies;                                                                     iw  

- PMHx:                                                                                           

11:44 Hypertension; H Pyloi;                                                                  iw  

- PSHx:                                                                                           

11:44 None;                                                                                   iw  

                                                                                                  

- Social history:: Smoking status: .                                                              

                                                                                                  

                                                                                                  

Vital Signs:                                                                                      

11:44  / 88; Pulse 75; Resp 16; Temp 98.5; Pulse Ox 100% on R/A; Weight 60.78 kg;       iw  

      Height 5 ft. 2 in. ; Pain 8/10;                                                             

11:44 Body Mass Index 24.51 (60.78 kg, 157.48 cm)                                             iw  

11:44 Pain Scale: Adult                                                                       iw  

                                                                                                  

ED Course:                                                                                        

11:32 Patient arrived in ED.                                                                  am2 

11:32 Jorge Franco MD is Private Physician.                                                am2 

11:33 Horacio Gan MD is Attending Physician.                                              bs3 

11:43 Triage completed.                                                                       iw  

11:45 Arm band placed on.                                                                     iw  

12:30 Hip Right 2 View XRAY In Process Unspecified.                                           EDMS

12:30 Pelvis XRAY In Process Unspecified.                                                     EDMS

12:57 Jorge Franco MD is Referral Physician.                                               bs3 

13:02 Abena Yost RN is Primary Nurse.                                                   iw  

                                                                                                  

Administered Medications:                                                                         

No medications were administered                                                                  

                                                                                                  

                                                                                                  

Outcome:                                                                                          

12:57 Discharge ordered by MD.                                                                bs3 

13:02 Patient left the ED.                                                                    iw  

                                                                                                  

Signatures:                                                                                       

Dispatcher [employer]                           EDMS                                                 

Abena Yost, AJAY                     RN                                                      

Angela Flynn                               am2                                                  

Horacio Gan MD MD   bs3                                                  

                                                                                                  

**************************************************************************************************

## 2023-03-20 NOTE — XMS REPORT
Continuity of Care Document

                            Created on:2023



Patient:SUKHI ZELAYA

Sex:Female

:1955

External Reference #:[id number]





Demographics







                          Address                   [address] [place] [address]

 

                          Home Phone                [phone]

 

                          Work Phone                [phone]

 

                          Mobile Phone              [phone]

 

                          Email Address             [email or web address]

 

                          Preferred Language        English

 

                          Marital Status            Unknown

 

                          [place] Affiliation     Unknown

 

                          Race                      Unknown

 

                          Additional Race(s)        White



                                                    Unavailable

 

                          Ethnic Group              Unknown









Author







                          Organization              [facility]

t

 

                          Address                   [address] [address]

 

                          Phone                     [phone]









Support







                Name            Relationship    Address         Phone

 

                Aurora Dillard  Other           Unavailable     [phone]









Care Team Providers







                    Name                Role                Phone

 

                    Lab, Adc Fam Pob I  Attending Clinician Unavailable

 

                    Nika Szymanski  Attending Clinician [phone]

 

                    Sahil Jones Attending Clinician [phone]









Payers







           Payer Name Policy Type Policy Number Effective Date Expiration Date S

ource







Problems







       Condition Condition Condition Status Onset  Resolution Last   Treating Co

mments 

Source



       Name   Details Category        Date   Date   Treatment Clinician        



                                                 Date                 

 

       SUB ACUTE  SUB ACUTE Diagnosis Active 2013-0        2013-09-15           

    [facility]



       MCA STROKE MCA STROKE               9-15          18:02:00               

l



               Active               00:00:                             Eduardo



              09/15/2013               00                                 



              [facility]                                                  

 

       TIA     TIA   Diagnosis Active 2013               Mem

oria



              Active               9-15          12:05:00               l



              09/15/2013               00:00:                             Romaine adhikari



               [address]                                                  

 

       TRANS   TRANS Diagnosis Active               2013               Mem

oria



       CEREB  CEREB                              12:05:00               l



       ISCHEMIA ISCHEMIA                                                  Romaine adhikari



       NEC    NEC Active                                                  



               [facility]                                                  

 

       Arthritis  Arthritis Problem Resolve               2020            

   [facility]



       (disorder) (disorder)        d                    22:45:28               

l



              Resolved                                                  Eduardo



              Problem                                                  



              2020                                                  



              [facility]                                                  



              Neuro                                                   

 

       Hyperlipid  Hyperlipi Problem Resolve               2020           

    [facility]



       emia   demia         d                    22:45:28               l



       (disorder) (disorder)                                                  He

rmann



              Resolved                                                  



              Problem                                                  



              2020                                                  



              [facility]                                                  



              Neuro                                                   

 

       Laser   Laser Problem Resolve               2020               Ariel

vashti



       assisted assisted        d                    22:45:28               l



       in situ in situ                                                  Dorothy



       keratomile keratomile                                                  



       usis   usis                                                    



       (procedure (procedure                                                  



       )      ) Resolved                                                  



              Problem                                                  



              2020                                                  



              [facility]                                                  



              Neuro                                                   

 

       Arthritis  Arthritis Problem Resolve               2013            

   [facility]



              Resolved        d                    21:14:51               l



              Problem                                                  Dorothy



              2013                                                  



              [facility]                                                  

 

       Hyperlipid  Hyperlipi Problem Resolve               2013           

    [facility]



       emia   demia         d                    21:14:51               l



              Resolved                                                  Dorothy



              Problem                                                  



              2013                                                  



              [facility]                                                  

 

       LASIK   LASIK Problem Resolve               2013               Ariel

vashti



              Resolved        d                    21:14:51               l



              Problem                                                  Dorothy



              2013                                                  



               [facility]                                                  







Allergies, Adverse Reactions, Alerts







       Allergy Allergy Status Severity Reaction(s) Onset  Inactive Treating Comm

ents 

Source



       Name   Type                        Date   Date   Clinician        

 

       No Known No Known Active                                           [facility]

a



       Medicati Medicati                                                  l



       on     on                                                      Eduardo



       Allergie Allergie                                                  



       s      s                                                       

 

       NO KNOWN Drug   Active                                           [facility]



       ALLERGIE Class                                                   ity of



       S                                                              [facility]







Social History







           Social Habit Start Date Stop Date  Quantity   Comments   Source

 

           Sex Assigned At                                             [facility]

y of



           Birth                                                  [facility]

 

           Exposure to                       Yes                   [facility]



           SARS-CoV-2                                             [facility]



           (event)                                                Branch

 

           Alcohol intake 2015 Current               [facility]

 of



                      00:00:00   00:00:00   non-drinker of            [place] [facility]

song



                                            alcohol               Branch



                                            (finding)             









                Smoking Status  Start Date      Stop Date       Source

 

                Current every day smoker 2015 00:00:00                 [facility]i

versity of [facility]







Medications







       Ordered Filled Start  Stop   Current Ordering Indication Dosage Frequency

 Signature

                    Comments            Components          Source



     Medication Medication Date Date Medication? Clinician                (SIG) 

          



     Name Name                                                   

 

     CRESTOR 20      2015      Yes            20mg      Take 20 mg           U

nivers



     mg tablet      0-26                               by mouth           ity of



               00:00:                               at             [place]



               00                                 bedtime.           Medical



                                                                 Branch

 

     atorvastati            Yes  Mary                80 mg, 1          

 [facility]



     n 80 mg      9-16           Camryn                tab, PO,           l



     oral tablet      18:23:           Brown                Daily, 30           

Dorothy



               46                                 tab,           



                                                  Substituti           



                                                  on             



                                                  Allowed,           



                                                  TAB            

 

     atorvastati            Yes  Mary                80 mg, 1          

 [facility]



     n 80 mg      9-16           Camryn                tab, PO,           l



     oral tablet      18:23:           Brown                Daily, 30           

Eduardo



               46                                 tab,           



                                                  Substituti           



                                                  on             



                                                  Allowed,           



                                                  TAB            

 

     atorvastati            Yes  Mary                80 mg, 1          

 [facility]



     n 80 mg      9-16           Camryn                tab, PO,           l



     oral tablet      18:23:           Brown                Daily, 30           

Dorothy



               46                                 tab,           



                                                  Substituti           



                                                  on             



                                                  Allowed,           



                                                  TAB            

 

     aspirin 81            Yes  Mary                81 mg, 1           

[facility]



     mg tablet,                 Camryn                tab, PO,           l



     enteric      18:23:           Brown                Daily, 30           H[facility]

marco antonio



     coated      38                                 tab,           



                                                  Substituti           



                                                  on             



                                                  Allowed,           



                                                  ECTAB           

 

     aspirin 81      0      Yes  Mary                81 mg, 1           

[facility]



     mg tablet,      9-           Camryn                tab, PO,           l



     enteric      18:23:           Brown                Daily, 30           Herm

marco antonio



     coated      38                                 tab,           



                                                  Substituti           



                                                  on             



                                                  Allowed,           



                                                  ECTAB           

 

     aspirin 81            Yes  Mary                81 mg, 1           

[facility]



     mg tablet,                 Camryn                tab, PO,           l



     enteric      18:23:           Brown                Daily, 30           Herm

marco antonio



     coated      38                                 tab,           



                                                  Substituti           



                                                  on             



                                                  Allowed,           



                                                  ECTAB           

 

     atorvastati      0      No   Kimberly                80 mg, 1           M

emoria



     n         -16           Deni                tab,           l



               14:00:           Jose Alberto                Route: PO,           Romaine

n



               00                                 Drug form:           



                                                  TAB,           



                                                  Daily,           



                                                  Dosing           



                                                  Weight           



                                                  72.727,           



                                                  kg, Start           



                                                  date:           



                                                  13           



                                                  9:00:00,           



                                                  Duration:           



                                                  30 day,           



                                                  Stop date:           



                                                  10/15/13           



                                                  9:00:00           

 

     aspirin      0      No   Kimberly                81 mg, 1           Mem[facility]

ia



                          Deni                tab,           l



               14:00:           Jose Alberto                Route: PO,           Romaine

n



               00                                 Drug form:           



                                                  ECTAB,           



                                                  Daily,           



                                                  Dosing           



                                                  Weight           



                                                  72.727,           



                                                  kg, Start           



                                                  date:           



                                                  13           



                                                  9:00:00,           



                                                  Duration:           



                                                  30 day,           



                                                  Stop date:           



                                                  10/15/13           



                                                  9:00:00           

 

     atorvastati      0      No   Kimberly                80 mg, 1           M

emoria



     n                    Deni                tab,           l



               14:00:           Jose Alberto                Route: PO,           Romaine

n



               00                                 Drug form:           



                                                  TAB,           



                                                  Daily,           



                                                  Dosing           



                                                  Weight           



                                                  72.727,           



                                                  kg, Start           



                                                  date:           



                                                  13           



                                                  9:00:00,           



                                                  Duration:           



                                                  30 day,           



                                                  Stop date:           



                                                  10/15/13           



                                                  9:00:00           

 

     aspirin      -0      No   Kimberly                81 mg, 1           Memor

ia



               -16           Deni                tab,           l



               14:00:           Jose Alberto                Route: PO,           Romaine

n



               00                                 Drug form:           



                                                  ECTAB,           



                                                  Daily,           



                                                  Dosing           



                                                  Weight           



                                                  72.727,           



                                                  kg, Start           



                                                  date:           



                                                  13           



                                                  9:00:00,           



                                                  Duration:           



                                                  30 day,           



                                                  Stop date:           



                                                  10/15/13           



                                                  9:00:00           

 

     atorvastati            No   Kimberly                80 mg, 1           M

emoria



     n         -16           Deni                tab,           l



               14:00:           Jose Alberto                Route: PO,           Romaine

n



               00                                 Drug form:           



                                                  TAB,           



                                                  Daily,           



                                                  Dosing           



                                                  Weight           



                                                  72.727,           



                                                  kg, Start           



                                                  date:           



                                                  13           



                                                  9:00:00,           



                                                  Duration:           



                                                  30 day,           



                                                  Stop date:           



                                                  10/15/13           



                                                  9:00:00           

 

     aspirin      -      No   Kimberly                81 mg, 1           Memor

ia



                          Deni                tab,           l



               14:00:           Jose Alberto                Route: PO,           Romaine

n



               00                                 Drug form:           



                                                  ECTAB,           



                                                  Daily,           



                                                  Dosing           



                                                  Weight           



                                                  72.727,           



                                                  kg, Start           



                                                  date:           



                                                  13           



                                                  9:00:00,           



                                                  Duration:           



                                                  30 day,           



                                                  Stop date:           



                                                  10/15/13           



                                                  9:00:00           

 

     heparin      -0      No   Kimberly                5,000           [facility]



               -16           Deni                unit, 1           l



               13:00:           Jose Alberto                mL, Route:           Romaine

n



               00                                 SUB-Q,           



                                                  Drug form:           



                                                  INJ, Q8H,           



                                                  Dosing           



                                                  Weight           



                                                  72.727,           



                                                  kg, Start           



                                                  date:           



                                                  13           



                                                  8:00:00,           



                                                  Duration:           



                                                  30 day,           



                                                  Stop date:           



                                                  10/16/13           



                                                  0:00:00           

 

     heparin      -0      No   Kimberly                5,000           [facility]



               -16           Deni                unit, 1           l



               13:00:           Jose Alberto                mL, Route:           Romaine

n



               00                                 SUB-Q,           



                                                  Drug form:           



                                                  INJ, Q8H,           



                                                  Dosing           



                                                  Weight           



                                                  72.727,           



                                                  kg, Start           



                                                  date:           



                                                  13           



                                                  8:00:00,           



                                                  Duration:           



                                                  30 day,           



                                                  Stop date:           



                                                  10/16/13           



                                                  0:00:00           

 

     heparin      -0      No   Kimberly                5,000           [facility]



               9-16           Deni                unit, 1           l



               13:00:           Jose Alberto                mL, Route:           Romaine

n



               00                                 SUB-Q,           



                                                  Drug form:           



                                                  INJ, Q8H,           



                                                  Dosing           



                                                  Weight           



                                                  72.727,           



                                                  kg, Start           



                                                  date:           



                                                  13           



                                                  8:00:00,           



                                                  Duration:           



                                                  30 day,           



                                                  Stop date:           



                                                  10/16/13           



                                                  0:00:00           

 

     Saline      -0      No   Kellen                5 ml,           [facility]



     Flush 0.9%                 [facility]                Route:           l



               02:00:           treras                IVP, Drug           [facility]

n



               00                                 Form: INJ,           



                                                  Dosing           



                                                  Weight           



                                                  72.727,           



                                                  kg, Q12H,           



                                                  Start           



                                                  date:           



                                                  09/15/13           



                                                  21:00:00,           



                                                  Duration:           



                                                  30 day,           



                                                  Stop date:           



                                                  10/15/13           



                                                  9:00:00           

 

     Saline      -0      No    ml,           [facility]



     Flush 0.9%      9-16           [facility]                Route:           l



               02:00:           treras                IVP, Drug           [facility]

n



               00                                 Form: INJ,           



                                                  Dosing           



                                                  Weight           



                                                  72.727,           



                                                  kg, Q12H,           



                                                  Start           



                                                  date:           



                                                  09/15/13           



                                                  21:00:00,           



                                                  Duration:           



                                                  30 day,           



                                                  Stop date:           



                                                  10/15/13           



                                                  9:00:00           

 

     Saline      -0      No    ml,           [facility]



     Flush 0.9%      9-16           [facility]                Route:           l



               02:00:           treras                IVP, Drug           [facility]

n



               00                                 Form: INJ,           



                                                  Dosing           



                                                  Weight           



                                                  72.727,           



                                                  kg, Q12H,           



                                                  Start           



                                                  date:           



                                                  09/15/13           



                                                  21:00:00,           



                                                  Duration:           



                                                  30 day,           



                                                  Stop date:           



                                                  10/15/13           



                                                  9:00:00           

 

     Zantac      -0      Yes                      Substituti           [facility]

ia



               9-15                               on Allowed           l



               22:49:                                              Dorothy



               00                                                

 

     Zantac      0      Yes                      Substituti           [facility]

ia



               9-15                               on Allowed           l



               22:49:                                              Eduardo



               00                                                

 

     Zantac      -0      Yes                      Substituti           [facility]

ia



               9-15                               on Allowed           l



               22:49:                                              Eduardo



               00                                                

 

     Multiple            Yes                      1 cap, PO,           [facility]

[facility]



     Vitamins      9-15                               Daily, 30           l



     oral      22:48:                               cap,           [facility]



     capsule      47                                 Substituti           



                                                  on             



                                                  Allowed,           



                                                  Maintenanc           



                                                  e, CAP           

 

     Multiple            Yes                      1 cap, PO,           [facility]

[facility]



     Vitamins      9-15                               Daily, 30           l



     oral      22:48:                               cap,           [facility]



     capsule      47                                 Substituti           



                                                  on             



                                                  Allowed,           



                                                  Maintenanc           



                                                  e, CAP           

 

     Multiple            Yes                      1 cap, PO,           [facility]

[facility]



     Vitamins      9-15                               Daily, 30           l



     oral      22:48:                               cap,           [facility]



     capsule      47                                 Substituti           



                                                  on             



                                                  Allowed,           



                                                  Maintenanc           



                                                  e, CAP           

 

     Saline      -0      No    ml,           [facility]



     Flush 0.9%      9-15           [facility]                Route:           l



               22:09:           treras                IVP, Drug           [facility]

n



               00                                 Form: INJ,           



                                                  Dosing           



                                                  Weight           



                                                  72.727,           



                                                  kg, PRN,           



                                                  PRN Line           



                                                  Flush,           



                                                  Start           



                                                  date:           



                                                  09/15/13           



                                                  17:09:00,           



                                                  Duration:           



                                                  30 day,           



                                                  Stop date:           



                                                  10/15/13           



                                                  17:08:00           

 

     labetalol      -0      No                   10 mg, 2           [facility]

[facility]



               9-15           [facility]Con                mL, Route:           l



               22:09:           treras                IVP, Drug           [facility]

n



               00                                 form: INJ,           



                                                  Q10Min,           



                                                  Dosing           



                                                  Weight           



                                                  72.727,           



                                                  kg, PRN           



                                                  Hypertensi           



                                                  on, Start           



                                                  date:           



                                                  09/15/13           



                                                  17:09:00,           



                                                  Duration:           



                                                  30 day,           



                                                  Stop date:           



                                                  10/15/13           



                                                  17:08:00,           



                                                  For SBP >           



                                                  180mmHg           



                                                  and/or DBP           



                                                  > 105mmHg           

 

     Sodium      -0      No   Mary                1,000 mL,           [facility]

[facility]



     Chloride      9-15           Camryn                Rate: 100           l



     0.9% IV      22:09:           Brown                ml/hr,           [facility]



     1,000 mL      00                                 Infuse           



                                                  over: 10           



                                                  hr, Route:           



                                                  IV, Dosing           



                                                  Weight           



                                                  72.727 kg,           



                                                  Total           



                                                  Volume:           



                                                  1,000,           



                                                  Start           



                                                  date:           



                                                  09/15/13           



                                                  17:09:00,           



                                                  Stop date:           



                                                  10/15/13           



                                                  17:08:00           

 

     Saline      -0      No                   5 ml,           [facility]



     Flush 0.9%      9-15           [facility]                Route:           l



               22:09:           treras                IVP, Drug           [facility]

n



               00                                 Form: INJ,           



                                                  Dosing           



                                                  Weight           



                                                  72.727,           



                                                  kg, PRN,           



                                                  PRN Line           



                                                  Flush,           



                                                  Start           



                                                  date:           



                                                  09/15/13           



                                                  17:09:00,           



                                                  Duration:           



                                                  30 day,           



                                                  Stop date:           



                                                  10/15/13           



                                                  17:08:00           

 

     labetalol      -0      No                   10 mg, 2           [facility]

[facility]hti



               9-15           [facility]-Con                mL, Route:           l



               22:09:           treras                IVP, Drug           [facility]

n



               00                                 form: INJ,           



                                                  Q10Min,           



                                                  Dosing           



                                                  Weight           



                                                  72.727,           



                                                  kg, PRN           



                                                  Hypertensi           



                                                  on, Start           



                                                  date:           



                                                  09/15/13           



                                                  17:09:00,           



                                                  Duration:           



                                                  30 day,           



                                                  Stop date:           



                                                  10/15/13           



                                                  17:08:00,           



                                                  For SBP >           



                                                  180mmHg           



                                                  and/or DBP           



                                                  > 105mmHg           

 

     Sodium      -0      No   Mary                1,000 mL,           [facility]

[facility]



     Chloride      9-15           [facility]                Rate: 100           l



     0.9% IV      22:09:           Brown                ml/hr,           [facility]



     1,000 mL      00                                 Infuse           



                                                  over: 10           



                                                  hr, Route:           



                                                  IV, Dosing           



                                                  Weight           



                                                  72.727 kg,           



                                                  Total           



                                                  Volume:           



                                                  1,000,           



                                                  Start           



                                                  date:           



                                                  09/15/13           



                                                  17:09:00,           



                                                  Stop date:           



                                                  10/15/13           



                                                  17:08:00           

 

     Saline      -      No                   5 ml,           [facility]



     Flush 0.9%      9-15           [facility]                Route:           l



               22:09:           treras                IVP, Drug           [facility]

n



               00                                 Form: INJ,           



                                                  Dosing           



                                                  Weight           



                                                  72.727,           



                                                  kg, PRN,           



                                                  PRN Line           



                                                  Flush,           



                                                  Start           



                                                  date:           



                                                  09/15/13           



                                                  17:09:00,           



                                                  Duration:           



                                                  30 day,           



                                                  Stop date:           



                                                  10/15/13           



                                                  17:08:00           

 

     labetalol            No                   10 mg, 2           [facility]

[facility]



               9-15           [facility]Con                mL, Route:           l



               22:09:           treras                IVP, Drug           [facility]

n



               00                                 form: INJ,           



                                                  Q10Min,           



                                                  Dosing           



                                                  Weight           



                                                  72.727,           



                                                  kg, PRN           



                                                  Hypertensi           



                                                  on, Start           



                                                  date:           



                                                  09/15/13           



                                                  17:09:00,           



                                                  Duration:           



                                                  30 day,           



                                                  Stop date:           



                                                  10/15/13           



                                                  17:08:00,           



                                                  For SBP >           



                                                  180mmHg           



                                                  and/or DBP           



                                                  > 105mmHg           

 

     Sodium            No   Mary                1,000 mL,           [facility]

[facility]



     Chloride      9-15           [facility]                Rate: 100           l



     0.9% IV      22:09:           Brown                ml/hr,           [facility]



     1,000 mL      00                                 Infuse           



                                                  over: 10           



                                                  hr, Route:           



                                                  IV, Dosing           



                                                  Weight           



                                                  72.727 kg,           



                                                  Total           



                                                  Volume:           



                                                  1,000,           



                                                  Start           



                                                  date:           



                                                  09/15/13           



                                                  17:09:00,           



                                                  Stop date:           



                                                  10/15/13           



                                                  17:08:00           

 

     Omnipaque            No   Jennifer                85 mL,           [facility]

[facility]



     350mg/ml      9-15           [facility]                Route:           l



               19:37:                               IVP, Drug           [facility]



               00                                 Form:           



                                                  SOLN,           



                                                  Dosing           



                                                  Weight           



                                                  72.727,           



                                                  kg,            



                                                  ONCALL,           



                                                  STAT,           



                                                  Start           



                                                  date:           



                                                  09/15/13           



                                                  14:37:00,           



                                                  Duration:           



                                                  1 doses or           



                                                  times,           



                                                  Dose =           



                                                  2.2ml/kg,           



                                                  Max dose =           



                                                  100ml --           



                                                  "To be           



                                                  infused by           



                                                  Radiology           



                                                  Staff           



                                                  ONLY"Dose           



                                                  =              



                                                  2.2ml/kg,           



                                                  Max dose =           



                                                  100ml --           



                                                  "To be           



                                                  infused by           



                                                  Radiology           



                                                  Staff           



                                                  ONLY"           

 

     Omnipaque            No   Jennifer                85 mL,           [facility]

[facility]



     350mg/ml      9-15           [facility]                Route:           l



               19:37:                               IVP, Drug           [facility]



               00                                 Form:           



                                                  SOLN,           



                                                  Dosing           



                                                  Weight           



                                                  72.727,           



                                                  kg,            



                                                  ONCALL,           



                                                  STAT,           



                                                  Start           



                                                  date:           



                                                  09/15/13           



                                                  14:37:00,           



                                                  Duration:           



                                                  1 doses or           



                                                  times,           



                                                  Dose =           



                                                  2.2ml/kg,           



                                                  Max dose =           



                                                  100ml --           



                                                  "To be           



                                                  infused by           



                                                  Radiology           



                                                  Staff           



                                                  ONLY"Dose           



                                                  =              



                                                  2.2ml/kg,           



                                                  Max dose =           



                                                  100ml --           



                                                  "To be           



                                                  infused by           



                                                  Radiology           



                                                  Staff           



                                                  ONLY"           

 

     Omnipaque            No   Jennifer                85 mL,           Ariel

vashti



     350mg/ml      9-15           [place]                Route:           l



               19:37:                               IVP, Drug           Dor[facility]



               00                                 Form:           



                                                  SOLN,           



                                                  Dosing           



                                                  Weight           



                                                  72.727,           



                                                  kg,            



                                                  ONCALL,           



                                                  STAT,           



                                                  Start           



                                                  date:           



                                                  09/15/13           



                                                  14:37:00,           



                                                  Duration:           



                                                  1 doses or           



                                                  times,           



                                                  Dose =           



                                                  2.2ml/kg,           



                                                  Max dose =           



                                                  100ml --           



                                                  "To be           



                                                  infused by           



                                                  Radiology           



                                                  Staff           



                                                  ONLY"Dose           



                                                  =              



                                                  2.2ml/kg,           



                                                  Max dose =           



                                                  100ml --           



                                                  "To be           



                                                  infused by           



                                                  Radiology           



                                                  Staff           



                                                  ONLY"           







Vital Signs







             Vital Name   Observation Time Observation Value Comments     Source

 

             Diastolic (mm Hg) 2013 17:00:00                           [facility]

 

             Heart Rate   2013 17:00:00                           [facility]

 

             Respitory Rate 2013 17:00:00                           [facility]

[facility]

 

             Temperature Oral (F) 2013 17:00:00 97.0 F                    

[facility]

 

             Systolic (mm Hg) 2013 17:00:00                           [facility]

[facility]

 

             Systolic (mm Hg) 2013 12:00:00                           [facility]

[facility]

 

             Diastolic (mm Hg) 2013 12:00:00                           [facility]

[facility]

 

             Respitory Rate 2013 12:00:00                           [facility]

[facility]

 

             Temperature Oral (F) 2013 12:00:00 96.3 F                    

[facility]

 

             Heart Rate   2013 12:00:00                           [facility]

 [facility]

 

             Heart Rate   2013 08:51:00                           [facility]

 [facility]

 

             Temperature Oral (F) 2013 08:51:00 97.8 F                    

[facility]

 

             Systolic (mm Hg) 2013 08:51:00                           [facility]

[facility]

 

             Diastolic (mm Hg) 2013 08:51:00                           [facility]

[facility]

 

             Respitory Rate 2013 00:41:00                           [facility]

[facility]

 

             Weight       2013-09-15 17:49:00                           [facility]

 [facility]

 

             Height       2013-09-15 17:49:00 157.48 cm                 [facility]

 [facility]







Procedures







                Procedure       Date / Time Performed Performing Clinician Sourc

e

 

                LASIK                                           [facility] an[facility] PENALOZA                                           [facility]







Encounters







        Start   End     Encounter Admission Attending Care    Care    Encounter 

Source



        Date/Time Date/Time Type    Type    Clinicians Facility Department ID   

   

 

        2020 Laboratory         Lab, [facility]    [id number]

[id number] 



        09:23:20 09:43:20 Only            [facility]  [id number]         



                                                [place] [id number]         



                                                [facility] [phone]         



                                                [place] [address]             



                                                Office                  



                                                Building                 



                                                One                     

 

        2020 Laboratory         Lab, [facility]    1.2.

[id number] 

[facility]



        09:23:20 09:43:20 Only            [facility], [facility]  [id number]    

     ity of



                                                [place] [id number]         [place]

as



                                                [facility] [phone]         Me

dical



                                                [address]



                                                Office                  



                                                Building                 



                                                One                     

 

        2020 Outpatient R               [facility]    [id number]

[id number] [facility]



        09:00:00 09:00:00                                                 ity of



                                                                        [facility]

 

        2020 Ambulatory                 [facility]     [id number]

[id number] [facility]



        19:45:00 19:45:00 Pre-Reg                 r       Neurology 00      l



                                                        [place]         [facility]

 

        2020 Ambulatory                 [facility]     [id number]

[id number] [facility]



        19:45:00 19:45:00 Pre-Reg                 r       Neurology 00      l



                                                        [place]         [facility]

 

        2020 Out[facility]         [id number]

[id number] 



        13:45:00 13:45:00                 Sahil                   Justino Patel                         

 

        2013-09-15 2013 OU                      nullFl[facility] [id number]

232 [facility]



        17:09:00 15:00:00                         r       Medical 58      l



                                                        [place]          Eduardo

 

        2013-09-15 2013 OU                      nullFlavo [facility] [id number]

232 [facility]



        17:09:00 15:00:00                         r       Medical 58      l



                                                        [facility]







Results







           Test Description Test Time  Test Comments Results    Result Comments 

Source









                    Thyroid Stimulating Hormone 2019 22:27:55 









                      Test Item  Value      Reference Range Interpretation Comme

nts









             TSH (test code = TSH) 0.445 mIU/mL 0.270-4.200               



Erythrocyte Sedimentation Rate [id number] 22:20:13





             Test Item    Value        Reference Range Interpretation Comments

 

             ESR STAT (test code = ESR STAT) 8 mm/hr      0-20                  

    



Comprehensive Metabolic [id number] 21:59:49





             Test Item    Value        Reference Range Interpretation Comments

 

             Sodium Level (test code = Sodium 139.0 mmol/L 135.0-145.0          

     



             Level)                                              

 

             Potassium Level (test code = 3.7 mmol/L   3.5-5.1                  

 



             Potassium Level)                                        

 

             Chloride Level (test code = 98 mmol/L                        



             Chloride Level)                                        

 

             CO2 (test code = CO2) 26 mmol/L    22-29                     

 

             Anion Gap (test code = Anion 15 mmol/L    7-16                     

 



             Gap)                                                

 

             BUN (test code = BUN) 14.70 mg/dL  8.00-23.00                

 

             Creatinine Level (test code = 0.70 mg/dL   0.50-0.90               

  



             Creatinine Level)                                        

 

             BUN/Creat Ratio (test code = 21                        N           

 



             BUN/Creat Ratio)                                        

 

             Glucose Level (test code = 121 mg/dL           H            



             Glucose Level)                                        

 

             Calcium Level (test code = 9.8 mg/dL    8.3-10.5                  



             Calcium Level)                                        

 

             Alk Phos (test code = Alk Phos) 98 U/L                       

    

 

             Bilirubin Total (test code = 0.4 mg/dL    0.1-0.9                  

 



             Bilirubin Total)                                        

 

             Albumin Level (test code = 4.6 g/dL     3.5-5.2                   



             Albumin Level)                                        

 

             Protein Total (test code = 7.4 g/dL     6.4-8.3                   



             Protein Total)                                        

 

             ALT (test code = ALT) 22 U/L       1-33                      

 

             AST (test code = AST) 24 U/L       1-32                      

 

             Globulin (test code = Globulin) 2.8 g/dL     2.9-3.1      L        

    

 

             A/G Ratio (test code = A/G 1.6 ratio                 N            



             Ratio)                                              



Comprehensive Metabolic [id number] 21:59:49





             Test Item    Value        Reference Range Interpretation Comments

 

             Sodium Level (test 139.0 mmol/L 135.0-145.0               



             code = Sodium Level)                                        

 

             Potassium Level 3.7 mmol/L   3.5-5.1                   



             (test code =                                        



             Potassium Level)                                        

 

             Chloride Level (test 98 mmol/L                        



             code = Chloride                                        



             Level)                                              

 

             CO2 (test code = 26 mmol/L    22-29                     



             CO2)                                                

 

             Anion Gap (test code 15 mmol/L    7-16                      



             = Anion Gap)                                        

 

             BUN (test code = 14.70 mg/dL  8.00-23.00                



             BUN)                                                

 

             Creatinine Level 0.70 mg/dL   0.50-0.90                 



             (test code =                                        



             Creatinine Level)                                        

 

             BUN/Creat Ratio 21                        N            



             (test code =                                        



             BUN/Creat Ratio)                                        

 

             Glucose Level (test 121 mg/dL           H            



             code = Glucose                                        



             Level)                                              

 

             Calcium Level (test 9.8 mg/dL    8.3-10.5                  



             code = Calcium                                        



             Level)                                              

 

             Alk Phos (test code 98 U/L                           



             = Alk Phos)                                         

 

             Bilirubin Total 0.4 mg/dL    0.1-0.9                   



             (test code =                                        



             Bilirubin Total)                                        

 

             Albumin Level (test 4.6 g/dL     3.5-5.2                   



             code = Albumin                                        



             Level)                                              

 

             Protein Total (test 7.4 g/dL     6.4-8.3                   



             code = Protein                                        



             Total)                                              

 

             ALT (test code = 22 U/L       1-33                      



             ALT)                                                

 

             AST (test code = 24 U/L       1-32                      



             AST)                                                

 

             Globulin (test code 2.8 g/dL     2.9-3.1      L            



             = Globulin)                                         

 

             A/G Ratio (test code 1.6 ratio                 N            



             = A/G Ratio)                                        

 

             eGFR AA (test code = >60                       N            eGFR (e

stimated



             eGFR AA)     mL/min/1.73 m2                           Glomerular



                                                                 Filtration Rate

) is



                                                                 an estimated va

lue,



                                                                 calculated from

 the



                                                                 patient's serum



                                                                 creatinine usin

g the



                                                                 MDRD equation. 

It is



                                                                 NOT the patient

's



                                                                 actual GFR. The

 eGFR



                                                                 provides a more



                                                                 clinically usef

ul



                                                                 measure of kidn

ey



                                                                 disease than se

rum



                                                                 creatinine



                                                                 alone.***This



                                                                 calculation neelima

es



                                                                 sex and race in

to



                                                                 account, if the



                                                                 information is



                                                                 provided. If th

e



                                                                 race is not



                                                                 provided, and t

he



                                                                 patient is



                                                                 -Carole

n,



                                                                 multiply by 1.2

12.



                                                                 If sex is not



                                                                 provided, and t

he



                                                                 patient is fema

le,



                                                                 multiply by 0.7

42.



                                                                 Results for pat

ients



                                                                 <18 years of ag

e



                                                                 have not been



                                                                 validated by th

e



                                                                 MDRD study and



                                                                 should be



                                                                 interpreted wit

h



                                                                 caution. eGFR R

esult



                                                                 Interpretation:

eGFR



                                                                 > or = 60 is in

 the



                                                                 Normal RangeeGF

R <



                                                                 60 may mean kid

neeru



                                                                 diseaseeGFR < 1

5 may



                                                                 mean kidney



                                                                 failure*** Rang

es



                                                                 recommended by 

the



                                                                 [employer]



                                                                 Foundation,



                                                                 http://nkdep.ni

h.gov



Lipid [id number] 21:59:49





             Test Item    Value        Reference Range Interpretation Comments

 

             Cholesterol Total 294 mg/dL    0-200        H            RISK OF HE

ART



             (test code =                                        DISEASEPublishe

d by



             Cholesterol Total)                                        American 

Heart



                                                                 Association Harriet

lyte



                                                                 Optimal Borderl

ine



                                                                 Increased RiskC

HOL



                                                                 <200 200-239 >2

40TRIG



                                                                 <150 150-199 >2

00HDL



                                                                 Male >60 <40HDL

 Female



                                                                 >60 <50LDL <100



                                                                 130-159 >160LDL

 Near



                                                                 optimal is 100-

129

 

             Triglycerides (test 616 mg/dL    9-200        H            



             code =                                              



             Triglycerides)                                        

 

             HDL (test code = 52 mg/dL     50-60                     



             HDL)                                                

 

             LDL (test code = N/A Trig     0-130        N            LDL calcula

tion is



             LDL)         >400 mg/dL                             unreliable when



                                                                 Triglyceride is



                                                                 greater than 40

0.

 

             VLDL (test code = N/A Trig     5-40         N            VLDL calcu

lation is



             VLDL)        >400 mg/dL                             unreliable when



                                                                 Triglyceride is



                                                                 greater than 40

0.

 

             Chol/HDL (test code 5.7 ratio    0.0-4.4      H            



             = Chol/HDL)                                         

 

             LDL/HDL Ratio (test N/A Trig                  N            LDL/HDL 

Ratio



             code = LDL/HDL >400                                   calculation i

s



             Ratio)                                              unreliable when



                                                                 Triglyceride is



                                                                 greater than 40

0.



Comprehensive Metabolic [id number] 21:59:49





             Test Item    Value        Reference Range Interpretation Comments

 

             Sodium Level (test 139.0 mmol/L 135.0-145.0               



             code = Sodium Level)                                        

 

             Potassium Level 3.7 mmol/L   3.5-5.1                   



             (test code =                                        



             Potassium Level)                                        

 

             Chloride Level (test 98 mmol/L                        



             code = Chloride                                        



             Level)                                              

 

             CO2 (test code = 26 mmol/L    22-29                     



             CO2)                                                

 

             Anion Gap (test code 15 mmol/L    7-16                      



             = Anion Gap)                                        

 

             BUN (test code = 14.70 mg/dL  8.00-23.00                



             BUN)                                                

 

             Creatinine Level 0.70 mg/dL   0.50-0.90                 



             (test code =                                        



             Creatinine Level)                                        

 

             BUN/Creat Ratio 21                        N            



             (test code =                                        



             BUN/Creat Ratio)                                        

 

             Glucose Level (test 121 mg/dL           H            



             code = Glucose                                        



             Level)                                              

 

             Calcium Level (test 9.8 mg/dL    8.3-10.5                  



             code = Calcium                                        



             Level)                                              

 

             Alk Phos (test code 98 U/L                           



             = Alk Phos)                                         

 

             Bilirubin Total 0.4 mg/dL    0.1-0.9                   



             (test code =                                        



             Bilirubin Total)                                        

 

             Albumin Level (test 4.6 g/dL     3.5-5.2                   



             code = Albumin                                        



             Level)                                              

 

             Protein Total (test 7.4 g/dL     6.4-8.3                   



             code = Protein                                        



             Total)                                              

 

             ALT (test code = 22 U/L       1-33                      



             ALT)                                                

 

             AST (test code = 24 U/L       1-32                      



             AST)                                                

 

             Globulin (test code 2.8 g/dL     2.9-3.1      L            



             = Globulin)                                         

 

             A/G Ratio (test code 1.6 ratio                 N            



             = A/G Ratio)                                        

 

             eGFR AA (test code = >60                       N            eGFR (e

stimated



             eGFR AA)     mL/min/1.73 m2                           Glomerular



                                                                 Filtration Rate

) is



                                                                 an estimated va

lue,



                                                                 calculated from

 the



                                                                 patient's serum



                                                                 creatinine usin

g the



                                                                 MDRD equation. 

It is



                                                                 NOT the patient

's



                                                                 actual GFR. The

 eGFR



                                                                 provides a more



                                                                 clinically usef

ul



                                                                 measure of kidn

ey



                                                                 disease than se

rum



                                                                 creatinine



                                                                 alone.***This



                                                                 calculation neelima

es



                                                                 sex and race in

to



                                                                 account, if the



                                                                 information is



                                                                 provided. If th

e



                                                                 race is not



                                                                 provided, and t

he



                                                                 patient is



                                                                 -Carole

n,



                                                                 multiply by 1.2

12.



                                                                 If sex is not



                                                                 provided, and t

he



                                                                 patient is fema

le,



                                                                 multiply by 0.7

42.



                                                                 Results for pat

ients



                                                                 <18 years of ag

e



                                                                 have not been



                                                                 validated by th

e



                                                                 MDRD study and



                                                                 should be



                                                                 interpreted wit

h



                                                                 caution. eGFR R

esult



                                                                 Interpretation:

eGFR



                                                                 > or = 60 is in

 the



                                                                 Normal RangeeGF

R <



                                                                 60 may mean kid

neeru



                                                                 diseaseeGFR < 1

5 may



                                                                 mean kidney



                                                                 failure*** Rang

es



                                                                 recommended by 

the



                                                                 [employer]



                                                                 Foundation,



                                                                 http://nkdep.ni

h.gov

 

             eGFR Non-AA (test >60.00                    N            eGFR (hailey

mated



             code = eGFR Non-AA) mL/min/1.73 m2                           Glomer

ular



                                                                 Filtration Rate

) is



                                                                 an estimated va

lue,



                                                                 calculated from

 the



                                                                 patient's serum



                                                                 creatinine usin

g the



                                                                 MDRD equation. 

It is



                                                                 NOT the patient

's



                                                                 actual GFR. The

 eGFR



                                                                 provides a more



                                                                 clinically usef

ul



                                                                 measure of kidn

ey



                                                                 disease than se

rum



                                                                 creatinine



                                                                 alone.***This



                                                                 calculation neelima

es



                                                                 sex and race in

to



                                                                 account, if the



                                                                 information is



                                                                 provided. If th

e



                                                                 race is not



                                                                 provided, and t

he



                                                                 patient is



                                                                 -Carole

n,



                                                                 multiply by 1.2

12.



                                                                 If sex is not



                                                                 provided, and t

he



                                                                 patient is fema

le,



                                                                 multiply by 0.7

42.



                                                                 Results for pat

ients



                                                                 <18 years of ag

e



                                                                 have not been



                                                                 validated by th

e



                                                                 MDRD study and



                                                                 should be



                                                                 interpreted wit

h



                                                                 caution. eGFR R

esult



                                                                 Interpretation:

eGFR



                                                                 > or = 60 is in

 the



                                                                 Normal RangeeGF

R <



                                                                 60 may mean kid

neeru



                                                                 diseaseeGFR < 1

5 may



                                                                 mean kidney



                                                                 failure*** Rang

es



                                                                 recommended by 

the



                                                                 [employer]



                                                                 [employer],



                                                                 http://nkdep.ni

h.gov



Complete Blood Count with [id number] 21:52:58





             Test Item    Value        Reference Range Interpretation Comments

 

             WBC (test code = WBC) 9.0 x10      4.4-10.5                  

 

             RBC (test code = RBC) 4.76 x10     3.75-5.20                 

 

             Hgb (test code = Hgb) 14.4 g/dL    12.2-14.8                 

 

             Hct (test code = Hct) 42.4 %       36.5-44.4                 

 

             MCV (test code = MCV) 89.10 fL     80..00              

 

             MCHC (test code = 34.00 g/dL   32.00-37.50               



             MCHC)                                               

 

             MCH (test code = MCH) 30.3 pg      27.0-32.5                 

 

             RDW CV (test code = 12.6 %       11.5-14.5                 



             RDW CV)                                             

 

             Platelets (test code = 340.0 x10    140.0-440.0               



             Platelets)                                          

 

             MPV (test code = MPV) 10.1 fL                   N            

 

             Slide Review (test Auto         Auto                      Result cr

eated by



             code = Slide Review)                                        [id number]_

SLIDE_REV_AUTO

 

             nRBC (test code = 0                         N            



             nRBC)                                               

 

             NRBC Abs (test code = 0.00 x10                  N            



             NRBC Abs)                                           

 

             IPF (test code = IPF) 0 %                       N            



Automated Vyjyiqyztqcf4912-21-96 21:52:58





             Test Item    Value        Reference Range Interpretation Comments

 

             Neutro Auto (test code = Neutro 62.7 %       36.0-70.0             

    



             Auto)                                               

 

             Lymph Auto (test code = Lymph Auto) 28.3 %       12.0-44.0         

        

 

             Mono Auto (test code = Mono Auto) 7.6 %        0.0-11.0            

      

 

             Eos, Auto (test code = Eos, Auto) 0.7 %        0.0-7.0             

      

 

             Basophil Auto (test code = Basophil 0.3 %        0.0-2.0           

        



             Auto)                                               

 

             Neutro Absolute (test code = Neutro 5.7 x10      1.6-7.4           

        



             Absolute)                                           

 

             Lymph Absolute (test code = Lymph 2.56 x10     .50-4.60            

      



             Absolute)                                           

 

             Mono Absolute (test code = Mono .69 x10      .00-1.20              

    



             Absolute)                                           

 

             Eos Absolute (test code = Eos 0.06 x10     0.00-0.74               

  



             Absolute)                                           

 

             Baso Absolute (test code = Baso 0.03 x10     0.00-0.21             

    



             Absolute)                                           



Pro B Natriuretic [id number] 21:52:58





             Test Item    Value        Reference Range Interpretation Comments

 

             NT-proBNP (test code = NT-proBNP) 80 pg/mL     0-124               

      



IG [id number] 21:52:58





             Test Item    Value        Reference Range Interpretation Comments

 

             IG (test code = IG) 0.4 %        0.0-5.0                   

 

             IG Abs (test code = IG Abs) 0 x10                     N            



[id number] 12:35:11





             Test Item    Value        Reference Range Interpretation Comments

 

             LDL Direct (test code = LDL Direct) 144                       H    

        



[facility]RWJGEP0607[facility] 12:35:11





             Test Item    Value        Reference Range Interpretation Comments

 

             LDL Direct (test code = LDL Direct) 144                       H    

        



[facility]DnevccyVSKOZXBAC2576-07-90 12:35:11





             Test Item    Value        Reference Range Interpretation Comments

 

             LDL Direct (test code = LDL Direct) 144                       H    

        



[facility]hamUdwxonfFFKCUUORZZ1295-77-16 08:00:00





             Test Item    Value        Reference Range Interpretation Comments

 

             Segs (test code = Segs) 48.0         45.0-75.0    N            



[facility]JsfzfsoSIZUHURMEQ9372-34-26 08:00:00





             Test Item    Value        Reference Range Interpretation Comments

 

             Lymphocytes (test code = Lymphocytes) 41.6         20.0-40.0    H  

          



[facility]wayWpeootsBQXNCNDSDB8416-07-61 08:00:00





             Test Item    Value        Reference Range Interpretation Comments

 

             RBC (test code = RBC) 3.87         4.20-5.40    L            



[facility]NpszvdvIZBLCZUURC5965[facility]





             Test Item    Value        Reference Range Interpretation Comments

 

             WBC (test code = WBC) 7.4          3.7-10.4     N            



[facility]MvprjmaFUELFXMGRL7113-11-25 08:00:00





             Test Item    Value        Reference Range Interpretation Comments

 

             Hgb (test code = Hgb) 11.9         12.0-16.0    L            



[facility]anoBlexucmHTTVWILUYE4825-80-94 08:00:00





             Test Item    Value        Reference Range Interpretation Comments

 

             Hct (test code = Hct) 35.2         36.0-48.0    L            



[facility]anoGacmkvsMJIVJPZEAW4672-56-51 08:00:00





             Test Item    Value        Reference Range Interpretation Comments

 

             MCV (test code = MCV) 90.8         81.0-99.0    N            



[facility]anoFvkooevBUQJBPXVOZ8620-70-96 08:00:00





             Test Item    Value        Reference Range Interpretation Comments

 

             MCH (test code = MCH) 30.7 pg      27.0-31.0    N            



[facility]anoPwmfvbiWXWRMYEVBH5375-33-92 08:00:00





             Test Item    Value        Reference Range Interpretation Comments

 

             RDW (test code = RDW) 13.3         11.5-14.5    N            



[facility]anoRpjjzwiCUMXIPVLTS9307-14-59 08:00:00





             Test Item    Value        Reference Range Interpretation Comments

 

             MCHC (test code = MCHC) 33.9         32.0-36.0    N            



[facility]anoWmsxsofTHGPZQNHEV8184-66-14 08:00:00





             Test Item    Value        Reference Range Interpretation Comments

 

             Platelet (test code = Platelet) 279          133-450      N        

    



[facility]anoBhsxwedBCVGUJVQVI6445-01-30 08:00:00





             Test Item    Value        Reference Range Interpretation Comments

 

             MPV (test code = MPV) 7.5          7.4-10.4     N            



[facility] AreaRfpxjvwBHGHOPCAH1368[facility]18-39 [facility]:00





             Test Item    Value        Reference Range Interpretation Comments

 

             AGAP (test code = AGAP) 13.1         10.0-20.0    N            



[facility] AreaEcndeulTPJVQTJGO3330-80-90 08:00:00





             Test Item    Value        Reference Range Interpretation Comments

 

             eGFR (test code = eGFR) 96                                     



[facility] AreaKwwiofiWCUVQRCVW0510-32-59 08:00:00





             Test Item    Value        Reference Range Interpretation Comments

 

             Creatinine Lvl (test code = Creatinine 0.7          0.5-1.4      N 

           



             Lvl)                                                



[facility] AreaBfppocjWILKQIRFQ4956-02-58 08:00:00





             Test Item    Value        Reference Range Interpretation Comments

 

             BUN (test code = BUN) 18           7-22         N            



[facility] AreaLoneqyaYFPPNHDJU8980-93-20 08:00:00





             Test Item    Value        Reference Range Interpretation Comments

 

             Glucose Lvl (test code = Glucose Lvl) 101          70-99        H  

          



[facility] AreaIbdogzsTXGOIENJP7360-42-39 08:00:00





             Test Item    Value        Reference Range Interpretation Comments

 

             Sodium Lvl (test code = Sodium Lvl) 142          135-145      N    

        



[facility] AreaGhpknmcXJYJMHDRL8363-34-37 08:00:00





             Test Item    Value        Reference Range Interpretation Comments

 

             Calcium Lvl (test code = Calcium Lvl) 8.3          8.5-10.5     L  

          



[facility] AreaRduudceUFIDUOUFP4702-41-82 08:00:00





             Test Item    Value        Reference Range Interpretation Comments

 

             CO2 (test code = CO2) 23           24-32        L            



[facility] AreaUebomdzCZTSLSQRT0785-50-02 08:00:00





             Test Item    Value        Reference Range Interpretation Comments

 

             Chloride Lvl (test code = Chloride Lvl) 110                 H

            



[facility] AreaQerjdfmVVRHKSULE3367-42-10 08:00:00





             Test Item    Value        Reference Range Interpretation Comments

 

             Potassium Lvl (test code = Potassium 4.1          3.5-5.1      N   

         



             Lvl)                                                



[facility] AreaOlrpglhTXOKAUGEP7584-08-71 08:00:00





             Test Item    Value        Reference Range Interpretation Comments

 

             LDL (test code = LDL) See Note mg/dL                           



                          5*NA*(2013                           



                          03:00:00)                              



[facility] AreaAftrzkaMTUBXBWEA5225-76-53 08:00:00





             Test Item    Value        Reference Range Interpretation Comments

 

             CHD Risk (test code = CHD Risk) 8.45         3.90-5.80    H        

    



[facility] AreaQipgzurCCZIQJHUM6774-11-67 08:00:00





             Test Item    Value        Reference Range Interpretation Comments

 

             HDL (test code = HDL) 29                        L            



[facility] AreaDsrxpxsKWRFGKCKF6191-18-60 08:00:00





             Test Item    Value        Reference Range Interpretation Comments

 

             Trig (test code = Trig) 531                       H            



[facility] AreaDnhczacQZRSVAJXI8540-61-84 08:00:00





             Test Item    Value        Reference Range Interpretation Comments

 

             Chol (test code = Chol) 245                       H            



[facility]anoOolejrlDGMVNEHAKV3445-35-73 08:00:00





             Test Item    Value        Reference Range Interpretation Comments

 

             Basophils (test code = 0.7          See_Comment  N             [Aut

omated message] The



             Basophils)                                          system which ge

nerated



                                                                 this result tra

nsmitted



                                                                 reference range

: <=1.0.



                                                                 The reference r

erna was



                                                                 not used to int

erpret



                                                                 this result as



                                                                 normal/abnormal

.



[facility]anoYckpobfSFZRJZYCBI6194-77-97 08:00:00





             Test Item    Value        Reference Range Interpretation Comments

 

             Segs-Bands # (test code = Segs-Bands #) 3.6          1.5-8.1      N

            



[facility]anoXsdiekvVOEKKBWWWX5438-35-73 08:00:00





             Test Item    Value        Reference Range Interpretation Comments

 

             Lymphocytes # (test code = Lymphocytes 3.1          1.0-5.5      N 

           



             #)                                                  



[facility]anoNcnvdrjAGISHOXCOM8686-93-87 08:00:00





             Test Item    Value        Reference Range Interpretation Comments

 

             Monocytes # (test code 0.6          See_Comment  N             [Aut

omated message] The



             = Monocytes #)                                        system which 

generated



                                                                 this result tra

nsmitted



                                                                 reference range

: <=0.8.



                                                                 The reference r

erna was



                                                                 not used to int

erpret



                                                                 this result as



                                                                 normal/abnormal

.



[facility]anoBtltrasMKAZSTVBBE9839-35-40 08:00:00





             Test Item    Value        Reference Range Interpretation Comments

 

             Eosinophils # (test code 0.1          See_Comment  N             [A

utomated message] The



             = Eosinophils #)                                        system whic

h generated



                                                                 this result tra

nsmitted



                                                                 reference range

: <=0.5.



                                                                 The reference r

erna was



                                                                 not used to int

erpret



                                                                 this result as



                                                                 normal/abnormal

.



[facility]a[id number] 08[facility]:00





             Test Item    Value        Reference Range Interpretation Comments

 

             Basophils # (test code 0.1          See_Comment  N             [Aut

omated message] The



             = Basophils #)                                        system which 

generated



                                                                 this result tra

nsmitted



                                                                 reference range

: <=0.2.



                                                                 The reference r

erna was



                                                                 not used to int

erpret



                                                                 this result as



                                                                 normal/abnormal

.



[facility]anoYfojausZFXCPZWKYO6835-08-19 08:00:00





             Test Item    Value        Reference Range Interpretation Comments

 

             Eosinophils (test code = 1.7          See_Comment  N             [A

utomated message] The



             Eosinophils)                                        system which ge

nerated



                                                                 this result tra

nsmitted



                                                                 reference range

: <=4.0.



                                                                 The reference r

erna was



                                                                 not used to int

erpret



                                                                 this result as



                                                                 normal/abnormal

.



[facility]anoMmitbnzOAVGCYVZXG7452-04-10 08:00:00





             Test Item    Value        Reference Range Interpretation Comments

 

             Monocytes (test code = Monocytes) 8.0          2.0-12.0     N      

      



[facility]anoHvtspihVPEWCHIVHQ5438-40-10 08:00:00





             Test Item    Value        Reference Range Interpretation Comments

 

             Segs (test code = Segs) 48.0         45.0-75.0    N            



[facility]anoZesaoyeZUWQCOYKQO6303-02-06 08:00:00





             Test Item    Value        Reference Range Interpretation Comments

 

             Lymphocytes (test code = Lymphocytes) 41.6         20.0-40.0    H  

          



[facility]anoGwzdkieTFCVCDSHBJ6182-47-60 08:00:00





             Test Item    Value        Reference Range Interpretation Comments

 

             RBC (test code = RBC) 3.87         4.20-5.40    L            



[facility]anoIfesbwkQOJEMKUHFT1658-31-57 08:00:00





             Test Item    Value        Reference Range Interpretation Comments

 

             WBC (test code = WBC) 7.4          3.7-10.4     N            



[facility]anoNzdfcqpFLKWEHYGMP2934-23-44 08:00:00





             Test Item    Value        Reference Range Interpretation Comments

 

             Hgb (test code = Hgb) 11.9         12.0-16.0    L            



[facility]anoXxwcdleSXVDMBFBNQ2782-48-86 08:00:00





             Test Item    Value        Reference Range Interpretation Comments

 

             Hct (test code = Hct) 35.2         36.0-48.0    L            



[facility]anoYprkyggEGKOAVPBXN5709-94-25 08:00:00





             Test Item    Value        Reference Range Interpretation Comments

 

             MCV (test code = MCV) 90.8         81.0-99.0    N            



[facility]anoYtuquacILVVKCZERG1263-96-74 08:00:00





             Test Item    Value        Reference Range Interpretation Comments

 

             MCH (test code = MCH) 30.7 pg      27.0-31.0    N            



[facility]anoLicsfovUIVBPOVXSF9226-39-48 08:00:00





             Test Item    Value        Reference Range Interpretation Comments

 

             RDW (test code = RDW) 13.3         11.5-14.5    N            



[facility]anoHukjducHHDBTVKQVN8790-70-14 08:00:00





             Test Item    Value        Reference Range Interpretation Comments

 

             MCHC (test code = MCHC) 33.9         32.0-36.0    N            



[facility]anoGxvokwrHFBEXPXMTC3916-76-89 08:00:00





             Test Item    Value        Reference Range Interpretation Comments

 

             Platelet (test code = Platelet) 279          133-450      N        

    



[facility]anoJbukwqgZCNTRFFXVY9787-28-62 08:00:00





             Test Item    Value        Reference Range Interpretation Comments

 

             MPV (test code = MPV) 7.5          7.4-10.4     N            



[facility] AreaMnkmwfrRJAHGJVHB6857-74-16 08:00:00





             Test Item    Value        Reference Range Interpretation Comments

 

             AGAP (test code = AGAP) 13.1         10.0-20.0    N            



[facility] AreaFfzqhorDAUHRAEEV4914-74-94 08:00:00





             Test Item    Value        Reference Range Interpretation Comments

 

             eGFR (test code = eGFR) 96                                     



[facility] AreaLqmnugjYQACYVIZE7625-33-99 08:00:00





             Test Item    Value        Reference Range Interpretation Comments

 

             Creatinine Lvl (test code = Creatinine 0.7          0.5-1.4      N 

           



             Lvl)                                                



[facility] AreaGzyjsjfLFEFYRAKW2599-19-93 08:00:00





             Test Item    Value        Reference Range Interpretation Comments

 

             BUN (test code = BUN) 18           7-22         N            



[facility] AreaNdricrkPDSFGHESA9726-99-67 08:00:00





             Test Item    Value        Reference Range Interpretation Comments

 

             Glucose Lvl (test code = Glucose Lvl) 101          70-99        H  

          



[facility] AreaWfqeluzISOHCLXKP7403-85-60 08:00:00





             Test Item    Value        Reference Range Interpretation Comments

 

             Sodium Lvl (test code = Sodium Lvl) 142          135-145      N    

        



[facility] AreaYcmccbfJZRGHQLXK9773-71-62 08:00:00





             Test Item    Value        Reference Range Interpretation Comments

 

             Calcium Lvl (test code = Calcium Lvl) 8.3          8.5-10.5     L  

          



[facility] AreaVhiwogtKGNUFYIWL0171-95-35 08:00:00





             Test Item    Value        Reference Range Interpretation Comments

 

             CO2 (test code = CO2) 23           24-32        L            



[facility] AreaXwiwcqaKNLFNNTNU0073-73-01 08:00:00





             Test Item    Value        Reference Range Interpretation Comments

 

             Chloride Lvl (test code = Chloride Lvl) 110                 H

            



[facility] AreaLraybxnFCNMJSXWV7307-93-86 08:00:00





             Test Item    Value        Reference Range Interpretation Comments

 

             Potassium Lvl (test code = Potassium 4.1          3.5-5.1      N   

         



             Lvl)                                                



[facility] AreaGuobbycQMJATWMJO8231-20-66 08:00:00





             Test Item    Value        Reference Range Interpretation Comments

 

             LDL (test code = LDL) See Note mg/dL                           



                          5*NA*(2013                           



                          03:00:00)                              



[facility] AreaEnrsdboBIJVNYGTM6394-23-31 08:00:00





             Test Item    Value        Reference Range Interpretation Comments

 

             CHD Risk (test code = CHD Risk) 8.45         3.90-5.80    H        

    



[facility] AreaHofidlcBKEYBWXTL4425-40-08 08:00:00





             Test Item    Value        Reference Range Interpretation Comments

 

             HDL (test code = HDL) 29                        L            



[facility] AreaBvbvfdjDMNWXBPGQ7766-96-44 08:00:00





             Test Item    Value        Reference Range Interpretation Comments

 

             Trig (test code = Trig) 531                       H            



[facility] AreaEwrfhbpXWAAJWNSC1632-91-58 08:00:00





             Test Item    Value        Reference Range Interpretation Comments

 

             Chol (test code = Chol) 245                       H            



[facility]anoEirtdpkDFISPOHTSA1355-79-51 08:00:00





             Test Item    Value        Reference Range Interpretation Comments

 

             Basophils (test code = 0.7          See_Comment  N             [Aut

omated message] The



             Basophils)                                          system which ge

nerated



                                                                 this result tra

nsmitted



                                                                 reference range

: <=1.0.



                                                                 The reference r

erna was



                                                                 not used to int

erpret



                                                                 this result as



                                                                 normal/abnormal

.



[facility]anoVwhwjkeUOZWTOZIKL5422-41-17 08:00:00





             Test Item    Value        Reference Range Interpretation Comments

 

             Segs-Bands # (test code = Segs-Bands #) 3.6          1.5-8.1      N

            



[facility]anoQvfodchLLPLHWQLXN0763-72-92 08:00:00





             Test Item    Value        Reference Range Interpretation Comments

 

             Lymphocytes # (test code = Lymphocytes 3.1          1.0-5.5      N 

           



             #)                                                  



[facility]anoGrtrpccLSKNCXPUZZ7412-51-94 08:00:00





             Test Item    Value        Reference Range Interpretation Comments

 

             Monocytes # (test code 0.6          See_Comment  N             [Aut

omated message] The



             = Monocytes #)                                        system which 

generated



                                                                 this result tra

nsmitted



                                                                 reference range

: <=0.8.



                                                                 The reference r

erna was



                                                                 not used to int

erpret



                                                                 this result as



                                                                 normal/abnormal

.



[facility]anoJtizrxwYGHVURSCBB1190-10-28 08:00:00





             Test Item    Value        Reference Range Interpretation Comments

 

             Eosinophils # (test code 0.1          See_Comment  N             [A

utomated message] The



             = Eosinophils #)                                        system whic

h generated



                                                                 this result tra

nsmitted



                                                                 reference range

: <=0.5.



                                                                 The reference r

erna was



                                                                 not used to int

erpret



                                                                 this result as



                                                                 normal/abnormal

.



[facility]anoCatyavbVFBVPBZSJX1304-73-33 08:00:00





             Test Item    Value        Reference Range Interpretation Comments

 

             Basophils # (test code 0.1          See_Comment  N             [Aut

omated message] The



             = Basophils #)                                        system which 

generated



                                                                 this result tra

nsmitted



                                                                 reference range

: <=0.2.



                                                                 The reference r

erna was



                                                                 not used to int

erpret



                                                                 this result as



                                                                 normal/abnormal

.



[facility]anoXwgjwzpLAJZQBSFWF1188-95-90 08:00:00





             Test Item    Value        Reference Range Interpretation Comments

 

             Eosinophils (test code = 1.7          See_Comment  N             [A

utomated message] The



             Eosinophils)                                        system which ge

nerated



                                                                 this result tra

nsmitted



                                                                 reference range

: <=4.0.



                                                                 The reference r

erna was



                                                                 not used to int

erpret



                                                                 this result as



                                                                 normal/abnormal

.



[facility]anoRjiajdfHDLJQHTTFL7973-05-61 08:00:00





             Test Item    Value        Reference Range Interpretation Comments

 

             Monocytes (test code = Monocytes) 8.0          2.0-12.0     N      

      



[facility]anoEnuwazfYELWEIDOCX8584-46-26 08:00:00





             Test Item    Value        Reference Range Interpretation Comments

 

             Segs (test code = Segs) 48.0         45.0-75.0    N            



[facility]anoXceuevqQTHCEHMENN4741-21-55 08:00:00





             Test Item    Value        Reference Range Interpretation Comments

 

             Lymphocytes (test code = Lymphocytes) 41.6         20.0-40.0    H  

          



[facility]anoEhwbusbUDXKLVWTES3836-61-56 08:00:00





             Test Item    Value        Reference Range Interpretation Comments

 

             RBC (test code = RBC) 3.87         4.20-5.40    L            



[facility]anoKiuqdiaPYYMHPYKRW5286-71-98 08:00:00





             Test Item    Value        Reference Range Interpretation Comments

 

             WBC (test code = WBC) 7.4          3.7-10.4     N            



[facility]anoGfdcenmEARXODOXZK8426-98-32 08:00:00





             Test Item    Value        Reference Range Interpretation Comments

 

             Hgb (test code = Hgb) 11.9         12.0-16.0    L            



[facility]anoYjbzrjyZMUBHJLOEQ8715-05-46 08:00:00





             Test Item    Value        Reference Range Interpretation Comments

 

             Hct (test code = Hct) 35.2         36.0-48.0    L            



[facility]anoNrwiidlKDDWSEGXAT8173-15-38 08:00:00





             Test Item    Value        Reference Range Interpretation Comments

 

             MCV (test code = MCV) 90.8         81.0-99.0    N            



[facility]anoXgpfwuqHKSZDDNQYL4041-70-50 08:00:00





             Test Item    Value        Reference Range Interpretation Comments

 

             MCH (test code = MCH) 30.7 pg      27.0-31.0    N            



[facility]anoWkhgtdvYOPDJFATDT8568-86-82 08:00:00





             Test Item    Value        Reference Range Interpretation Comments

 

             RDW (test code = RDW) 13.3         11.5-14.5    N            



[facility]anoEvpdnitFDJFQEUXIG5934-64-69 08:00:00





             Test Item    Value        Reference Range Interpretation Comments

 

             MCHC (test code = MCHC) 33.9         32.0-36.0    N            



[facility]anoPvysdopMPDPWCKZVG8680-62-13 08:00:00





             Test Item    Value        Reference Range Interpretation Comments

 

             Platelet (test code = Platelet) 279          133-450      N        

    



[facility]anoOzprogkVUWQHBBZJB1274-21-08 08:00:00





             Test Item    Value        Reference Range Interpretation Comments

 

             MPV (test code = MPV) 7.5          7.4-10.4     N            



[facility] AreaDlufnbnLCSVYYNZF2238-07-39 08:00:00





             Test Item    Value        Reference Range Interpretation Comments

 

             AGAP (test code = AGAP) 13.1         10.0-20.0    N            



[facility] AreaSvuofyiADHTGIPAB0389-90-12 08:00:00





             Test Item    Value        Reference Range Interpretation Comments

 

             eGFR (test code = eGFR) 96                                     



[facility] AreaXeaompbSNTBMLGIU8082-93-15 08:00:00





             Test Item    Value        Reference Range Interpretation Comments

 

             Creatinine Lvl (test code = Creatinine 0.7          0.5-1.4      N 

           



             Lvl)                                                



[facility] AreaDjxsxtdZWAUFGXFH6766-67-16 08:00:00





             Test Item    Value        Reference Range Interpretation Comments

 

             BUN (test code = BUN) 18           7-22         N            



[facility] AreaWlnbtzjAWXJOZNDG0912-59-88 08:00:00





             Test Item    Value        Reference Range Interpretation Comments

 

             Glucose Lvl (test code = Glucose Lvl) 101          70-99        H  

          



[facility] AreaUucriakOXGDKNUAR6391-69-51 08:00:00





             Test Item    Value        Reference Range Interpretation Comments

 

             Sodium Lvl (test code = Sodium Lvl) 142          135-145      N    

        



[facility] AreaJwmyyesWHAUDXZXK9536-54-65 08:00:00





             Test Item    Value        Reference Range Interpretation Comments

 

             Calcium Lvl (test code = Calcium Lvl) 8.3          8.5-10.5     L  

          



[facility] AreaTzyzwisKDIGBFXSR3178-60-26 08:00:00





             Test Item    Value        Reference Range Interpretation Comments

 

             CO2 (test code = CO2) 23           24-32        L            



[facility] AreaVmtonhlSVTTGUQIS8537-32-43 08:00:00





             Test Item    Value        Reference Range Interpretation Comments

 

             Chloride Lvl (test code = Chloride Lvl) 110                 H

            



[facility] AreaJpwzgenVTMDEHVPK0711-98-54 08:00:00





             Test Item    Value        Reference Range Interpretation Comments

 

             Potassium Lvl (test code = Potassium 4.1          3.5-5.1      N   

         



             Lvl)                                                



[facility] AreaCbsclziWVAEXRSIG9921-09-44 08:00:00





             Test Item    Value        Reference Range Interpretation Comments

 

             LDL (test code = LDL) See Note mg/dL                           



                          5*NA*(2013                           



                          03:00:00)                              



[facility] AreaGpqtikdXOAFGOSHE2967-52-48 08:00:00





             Test Item    Value        Reference Range Interpretation Comments

 

             CHD Risk (test code = CHD Risk) 8.45         3.90-5.80    H        

    



[facility] AreaTuwrwfmWOCGSSGEN1327-15-49 08:00:00





             Test Item    Value        Reference Range Interpretation Comments

 

             HDL (test code = HDL) 29                        L            



[facility] AreaQepyuqsRZGIWEYOH8908-71-01 08:00:00





             Test Item    Value        Reference Range Interpretation Comments

 

             Trig (test code = Trig) 531                       H            



[facility] AreaXvsckwxOKIGADPUA3567-96-41 08:00:00





             Test Item    Value        Reference Range Interpretation Comments

 

             Chol (test code = Chol) 245                       H            



[facility]anoYvsbeinAGIHRKZMID3629-34-25 08:00:00





             Test Item    Value        Reference Range Interpretation Comments

 

             Basophils (test code = 0.7          See_Comment  N             [Aut

omated message] The



             Basophils)                                          system which ge

nerated



                                                                 this result tra

nsmitted



                                                                 reference range

: <=1.0.



                                                                 The reference r

erna was



                                                                 not used to int

erpret



                                                                 this result as



                                                                 normal/abnormal

.



[facility]anoZkmcpywMESPTNIYYK2599-70-02 08:00:00





             Test Item    Value        Reference Range Interpretation Comments

 

             Segs-Bands # (test code = Segs-Bands #) 3.6          1.5-8.1      N

            



[facility]anoKoutuyzZIXCIWVRTY7745-34-46 08:00:00





             Test Item    Value        Reference Range Interpretation Comments

 

             Lymphocytes # (test code = Lymphocytes 3.1          1.0-5.5      N 

           



             #)                                                  



[facility]anoDrvmhoeBIIQCLCOON9539-41-15 08:00:00





             Test Item    Value        Reference Range Interpretation Comments

 

             Monocytes # (test code 0.6          See_Comment  N             [Aut

omated message] The



             = Monocytes #)                                        system which 

generated



                                                                 this result tra

nsmitted



                                                                 reference range

: <=0.8.



                                                                 The reference r

erna was



                                                                 not used to int

erpret



                                                                 this result as



                                                                 normal/abnormal

.



[facility]anoKzqixoaQZBINTPULM6446-25-84 08:00:00





             Test Item    Value        Reference Range Interpretation Comments

 

             Eosinophils # (test code 0.1          See_Comment  N             [A

utomated message] The



             = Eosinophils #)                                        system whic

h generated



                                                                 this result tra

nsmitted



                                                                 reference range

: <=0.5.



                                                                 The reference r

erna was



                                                                 not used to int

erpret



                                                                 this result as



                                                                 normal/abnormal

.



[facility]anoEnkxqtgRKHMPYKORO1737-68-98 08:00:00





             Test Item    Value        Reference Range Interpretation Comments

 

             Basophils # (test code 0.1          See_Comment  N             [Aut

omated message] The



             = Basophils #)                                        system which 

generated



                                                                 this result tra

nsmitted



                                                                 reference range

: <=0.2.



                                                                 The reference r

erna was



                                                                 not used to int

erpret



                                                                 this result as



                                                                 normal/abnormal

.



[facility]BqoplcuESENCHVYVC9324[facility]-10 [facility]00





             Test Item    Value        Reference Range Interpretation Comments

 

             Eosinophils (test code = 1.7          See_Comment  N             [A

utomated message] The



             Eosinophils)                                        system which ge

nerated



                                                                 this result tra

nsmitted



                                                                 reference range

: <=4.0.



                                                                 The reference r

erna was



                                                                 not used to int

erpret



                                                                 this result as



                                                                 normal/abnormal

.



[facility]anoPigrrjhDAXLNQAZLF3481-98-61 08:00:00





             Test Item    Value        Reference Range Interpretation Comments

 

             Monocytes (test code = Monocytes) 8.0          2.0-12.0     N      

      



[facility] AreaQzukkueMKWKECXQY0177-33-99 18:53:00





             Test Item    Value        Reference Range Interpretation Comments

 

             eGFR (test code = eGFR) 96                                     



[facility] AreaUpytbycPIGVIBUZD5477-74-26 18:53:00





             Test Item    Value        Reference Range Interpretation Comments

 

             Sodium Lvl (test code = Sodium Lvl) 140          135-145      N    

        



[facility] AreaXsjyiqaBWHQNTGML7242-40-98 18:53:00





             Test Item    Value        Reference Range Interpretation Comments

 

             Potassium Lvl (test code = Potassium 3.9          3.5-5.1      N   

         



             Lvl)                                                



[facility] AreaHozmfsxPJWRVCKJP9170-15-83 18:53:00





             Test Item    Value        Reference Range Interpretation Comments

 

             Chloride Lvl (test code = Chloride Lvl) 105                 N

            



[facility] AreaHrudiyeSTTBRDHNR6685-20-25 18:53:00





             Test Item    Value        Reference Range Interpretation Comments

 

             CO2 (test code = CO2) 28           24-32        N            



[facility] AreaNpqybltKGGCKVNUR4635-63-40 18:53:00





             Test Item    Value        Reference Range Interpretation Comments

 

             Glucose Lvl (test code = Glucose Lvl) 92           70-99        N  

          



[facility] AreaQwuhyoeBWIBUYFSA2023-66-12 18:53:00





             Test Item    Value        Reference Range Interpretation Comments

 

             BUN (test code = BUN) 14           7-22         N            



[facility] AreaArvqvenFAZKLHSCT6786-36-27 18:53:00





             Test Item    Value        Reference Range Interpretation Comments

 

             Creatinine Lvl (test code = Creatinine 0.7          0.5-1.4      N 

           



             Lvl)                                                



[facility] AreaMmvmckjMPUEHEFXL0691-58-55 18:53:00





             Test Item    Value        Reference Range Interpretation Comments

 

             Calcium Lvl (test code = Calcium Lvl) 9.1          8.5-10.5     N  

          



[facility] AreaVbrofzqWHXXXPVNP0859-05-65 18:53:00





             Test Item    Value        Reference Range Interpretation Comments

 

             AGAP (test code = AGAP) 10.9         10.0-20.0    N            



[facility]anoLnsspfoCIWFUJPUXU4969-41-25 18:53:00





             Test Item    Value        Reference Range Interpretation Comments

 

             Basophils # (test code 0.1          See_Comment  N             [Aut

omated message] The



             = Basophils #)                                        system which 

generated



                                                                 this result tra

nsmitted



                                                                 reference range

: <=0.2.



                                                                 The reference r

erna was



                                                                 not used to int

erpret



                                                                 this result as



                                                                 normal/abnormal

.



[facility]anoGxftekoRKMPNVQWJI8528-52-25 18:53:00





             Test Item    Value        Reference Range Interpretation Comments

 

             Lymphocytes # (test code = Lymphocytes 2.8          1.0-5.5      N 

           



             #)                                                  



[facility]anoJfyaxsrEEBVPNUMQH9272-86-63 18:53:00





             Test Item    Value        Reference Range Interpretation Comments

 

             Eosinophils # (test code 0.1          See_Comment  N             [A

utomated message] The



             = Eosinophils #)                                        system whic

h generated



                                                                 this result tra

nsmitted



                                                                 reference range

: <=0.5.



                                                                 The reference r

erna was



                                                                 not used to int

erpret



                                                                 this result as



                                                                 normal/abnormal

.



[facility]anoRawuzsvZZYCPKVGSI5152-28-51 18:53:00





             Test Item    Value        Reference Range Interpretation Comments

 

             Monocytes # (test code 0.7          See_Comment  N             [Aut

omated message] The



             = Monocytes #)                                        system which 

generated



                                                                 this result tra

nsmitted



                                                                 reference range

: <=0.8.



                                                                 The reference r

erna was



                                                                 not used to int

erpret



                                                                 this result as



                                                                 normal/abnormal

.



[facility]anoZqdhtwiFPAQNXKVPF5506-32-11 18:53:00





             Test Item    Value        Reference Range Interpretation Comments

 

             Segs-Bands # (test code = Segs-Bands #) 4.6          1.5-8.1      N

            



[facility]anoOnkqcciPQNGXJZUCP6841-01-02 18:53:00





             Test Item    Value        Reference Range Interpretation Comments

 

             Basophils (test code = 1.0          See_Comment  N             [Aut

omated message] The



             Basophils)                                          system which ge

nerated



                                                                 this result tra

nsmitted



                                                                 reference range

: <=1.0.



                                                                 The reference r

erna was



                                                                 not used to int

erpret



                                                                 this result as



                                                                 normal/abnormal

.



[facility]anoOdjmllrVZJESYDCGY7052-91-53 18:53:00





             Test Item    Value        Reference Range Interpretation Comments

 

             Lymphocytes (test code = Lymphocytes) 34.0         20.0-40.0    N  

          



[facility]anoSwxqskoCBRMRPEFDA4867-23-68 18:53:00





             Test Item    Value        Reference Range Interpretation Comments

 

             Eosinophils (test code = 1.3          See_Comment  N             [A

utomated message] The



             Eosinophils)                                        system which ge

nerated



                                                                 this result tra

nsmitted



                                                                 reference range

: <=4.0.



                                                                 The reference r

erna was



                                                                 not used to int

erpret



                                                                 this result as



                                                                 normal/abnormal

.



[facility]anoOhbuixiMKHNLNNAQK6444-98-70 18:53:00





             Test Item    Value        Reference Range Interpretation Comments

 

             Monocytes (test code = Monocytes) 8.4          2.0-12.0     N      

      



[facility]anoPyxhfxoYYCPZRFYED2572-55-87 18:53:00





             Test Item    Value        Reference Range Interpretation Comments

 

             Segs (test code = Segs) 55.3         45.0-75.0    N            



[facility]anoSybijqoQCWXVAMHVX9909-24-38 18:53:00





             Test Item    Value        Reference Range Interpretation Comments

 

             PTT (test code = PTT) 27.3 s       22.9-35.8    N            



[facility]anoHqwtvkrDZAXKVFGWQ1690-22-95 18:53:00





             Test Item    Value        Reference Range Interpretation Comments

 

             PT (test code = PT) 13.3 s       12.0-14.7    N            



[facility]anoIrrbgbzYTZMGIBXTG5771-93-25 18:53:00





             Test Item    Value        Reference Range Interpretation Comments

 

             INR (test code = INR) 1.02         0.85-1.17    N            



[facility]anoIgucxttFWDWLVELSO1753-61-56 18:53:00





             Test Item    Value        Reference Range Interpretation Comments

 

             MPV (test code = MPV) 7.2          7.4-10.4     L            



[facility]anoDadnugnFBIYVWSGHC4120-50-46 18:53:00





             Test Item    Value        Reference Range Interpretation Comments

 

             MCHC (test code = MCHC) 33.5         32.0-36.0    N            



[facility]anoDekzewiYFZPOLBZIB0586-66-74 18:53:00





             Test Item    Value        Reference Range Interpretation Comments

 

             MCH (test code = MCH) 29.8 pg      27.0-31.0    N            



[facility]anoKhuakqpKECMYWWGVX8266-83-83 18:53:00





             Test Item    Value        Reference Range Interpretation Comments

 

             Platelet (test code = Platelet) 305          133-450      N        

    



[facility]anoZdajxecMXTBCCGMMD5233-37-49 18:53:00





             Test Item    Value        Reference Range Interpretation Comments

 

             RDW (test code = RDW) 12.4         11.5-14.5    N            



[facility]anoXouaiskRNKHFETIWL6171-70-35 18:53:00





             Test Item    Value        Reference Range Interpretation Comments

 

             MCV (test code = MCV) 89.0         81.0-99.0    N            



[facility]anoRfsiivtYVTRDOOXFN6288-45-19 18:53:00





             Test Item    Value        Reference Range Interpretation Comments

 

             Hgb (test code = Hgb) 13.3         12.0-16.0    N            



[facility]anoLmhhlgmIXVHRVLRSZ7484-90-42 18:53:00





             Test Item    Value        Reference Range Interpretation Comments

 

             RBC (test code = RBC) 4.45         4.20-5.40    N            



[facility]anoVxeoxjjDDSVNAFHIG8685-27-58 18:53:00





             Test Item    Value        Reference Range Interpretation Comments

 

             Hct (test code = Hct) 39.6         36.0-48.0    N            



[facility]anoHomzticTNPZWXXTAX9631-60-41 18:53:00





             Test Item    Value        Reference Range Interpretation Comments

 

             WBC (test code = WBC) 8.3          3.7-10.4     N            



[facility] AreaGbjkizdYGFHFBBRA8424-05-74 18:53:00





             Test Item    Value        Reference Range Interpretation Comments

 

             eGFR (test code = eGFR) 96                                     



[facility] AreaUamkaapEYWKMVGAT7750-05-56 18:53:00





             Test Item    Value        Reference Range Interpretation Comments

 

             Sodium Lvl (test code = Sodium Lvl) 140          135-145      N    

        



[facility] AreaKpdbfyaEMJJKYMYY2671-59-38 18:53:00





             Test Item    Value        Reference Range Interpretation Comments

 

             Potassium Lvl (test code = Potassium 3.9          3.5-5.1      N   

         



             Lvl)                                                



[facility] AreaHfaufwfJRFBVEPLZ8811-64-32 18:53:00





             Test Item    Value        Reference Range Interpretation Comments

 

             Chloride Lvl (test code = Chloride Lvl) 105                 N

            



[facility] AreaYwneworMXMWFDQYD8444-20-95 18:53:00





             Test Item    Value        Reference Range Interpretation Comments

 

             CO2 (test code = CO2) 28           24-32        N            



[facility] AreaLqytgprCGUUBAPEK7666-54-64 18:53:00





             Test Item    Value        Reference Range Interpretation Comments

 

             Glucose Lvl (test code = Glucose Lvl) 92           70-99        N  

          



[facility] AreaHoyifsvGKVVFFDCW2009-84-37 18:53:00





             Test Item    Value        Reference Range Interpretation Comments

 

             BUN (test code = BUN) 14           7-22         N            



[facility] AreaKrigfzcKLCUUJELO1762-62-41 18:53:00





             Test Item    Value        Reference Range Interpretation Comments

 

             Creatinine Lvl (test code = Creatinine 0.7          0.5-1.4      N 

           



             Lvl)                                                



[facility] AreaTpwsbwoVVTSIKACH9265-71-76 18:53:00





             Test Item    Value        Reference Range Interpretation Comments

 

             Calcium Lvl (test code = Calcium Lvl) 9.1          8.5-10.5     N  

          



[facility] AreaYcggsqxZENSMVAJF4417-98-63 18:53:00





             Test Item    Value        Reference Range Interpretation Comments

 

             AGAP (test code = AGAP) 10.9         10.0-20.0    N            



[facility]anoGcduvxzLCBCAISCZH0866-81-03 18:53:00





             Test Item    Value        Reference Range Interpretation Comments

 

             Basophils # (test code 0.1          See_Comment  N             [Aut

omated message] The



             = Basophils #)                                        system which 

generated



                                                                 this result tra

nsmitted



                                                                 reference range

: <=0.2.



                                                                 The reference r

erna was



                                                                 not used to int

erpret



                                                                 this result as



                                                                 normal/abnormal

.



[facility]anoAwywmflUZZZJVNSSO2888-66-60 18:53:00





             Test Item    Value        Reference Range Interpretation Comments

 

             Lymphocytes # (test code = Lymphocytes 2.8          1.0-5.5      N 

           



             #)                                                  



[facility]anoDnkniqeWCSWNOVGJF1206-98-25 18:53:00





             Test Item    Value        Reference Range Interpretation Comments

 

             Eosinophils # (test code 0.1          See_Comment  N             [A

utomated message] The



             = Eosinophils #)                                        system whic

h generated



                                                                 this result tra

nsmitted



                                                                 reference range

: <=0.5.



                                                                 The reference r

erna was



                                                                 not used to int

erpret



                                                                 this result as



                                                                 normal/abnormal

.



[facility]anoOmtjdypJWZNCYKQHP1260-71-99 18:53:00





             Test Item    Value        Reference Range Interpretation Comments

 

             Monocytes # (test code 0.7          See_Comment  N             [Aut

omated message] The



             = Monocytes #)                                        system which 

generated



                                                                 this result tra

nsmitted



                                                                 reference range

: <=0.8.



                                                                 The reference r

erna was



                                                                 not used to int

erpret



                                                                 this result as



                                                                 normal/abnormal

.



[facility]anoFxxwamrGJBCPBSJYF1317-06-40 18:53:00





             Test Item    Value        Reference Range Interpretation Comments

 

             Segs-Bands # (test code = Segs-Bands #) 4.6          1.5-8.1      N

            



[facility]anoYmmgyydTSKCXIXIEO7349-79-81 18:53:00





             Test Item    Value        Reference Range Interpretation Comments

 

             Basophils (test code = 1.0          See_Comment  N             [Aut

omated message] The



             Basophils)                                          system which ge

nerated



                                                                 this result tra

nsmitted



                                                                 reference range

: <=1.0.



                                                                 The reference r

erna was



                                                                 not used to int

erpret



                                                                 this result as



                                                                 normal/abnormal

.



[facility]anoBkxrdvoRTMQZIWBBM7708-39-97 18:53:00





             Test Item    Value        Reference Range Interpretation Comments

 

             Lymphocytes (test code = Lymphocytes) 34.0         20.0-40.0    N  

          



[facility]anoQouylhzEKURMKUQXO6457-25-00 18:53:00





             Test Item    Value        Reference Range Interpretation Comments

 

             Eosinophils (test code = 1.3          See_Comment  N             [A

utomated message] The



             Eosinophils)                                        system which ge

nerated



                                                                 this result tra

nsmitted



                                                                 reference range

: <=4.0.



                                                                 The reference r

erna was



                                                                 not used to int

erpret



                                                                 this result as



                                                                 normal/abnormal

.



[facility]anoRivwedoDWSCAJSMUD0283-47-07 18:53:00





             Test Item    Value        Reference Range Interpretation Comments

 

             Monocytes (test code = Monocytes) 8.4          2.0-12.0     N      

      



[facility]anoAmxdflbKSONPIUJPL9734-74-18 18:53:00





             Test Item    Value        Reference Range Interpretation Comments

 

             Segs (test code = Segs) 55.3         45.0-75.0    N            



[facility]anoEsxcrmtYRSILDPKWO9550-51-67 18:53:00





             Test Item    Value        Reference Range Interpretation Comments

 

             PTT (test code = PTT) 27.3 s       22.9-35.8    N            



[facility]anoBcltypjVXSAXIJPLY9187-38-13 18:53:00





             Test Item    Value        Reference Range Interpretation Comments

 

             PT (test code = PT) 13.3 s       12.0-14.7    N            



[facility]anoUemxvfeZOKBTUJPFA3844-61-29 18:53:00





             Test Item    Value        Reference Range Interpretation Comments

 

             INR (test code = INR) 1.02         0.85-1.17    N            



[facility]anoNpqrbfbHYLVWAJWQZ7320-44-53 18:53:00





             Test Item    Value        Reference Range Interpretation Comments

 

             MPV (test code = MPV) 7.2          7.4-10.4     L            



[facility]anoOsdntmoNAYYDNZDRK2595-34-52 18:53:00





             Test Item    Value        Reference Range Interpretation Comments

 

             MCHC (test code = MCHC) 33.5         32.0-36.0    N            



[facility]anoKslmkzjQCVSJVIIGR5739-66-48 18:53:00





             Test Item    Value        Reference Range Interpretation Comments

 

             MCH (test code = MCH) 29.8 pg      27.0-31.0    N            



[facility]anoQxxkqtaXKUVUZELZB0084-91-24 18:53:00





             Test Item    Value        Reference Range Interpretation Comments

 

             Platelet (test code = Platelet) 305          133-450      N        

    



[facility]anoHgbzipmQGMSAEJIAY9846-76-94 18:53:00





             Test Item    Value        Reference Range Interpretation Comments

 

             RDW (test code = RDW) 12.4         11.5-14.5    N            



[facility]anoXsyibhdHTPKQLXCUM0896-64-17 18:53:00





             Test Item    Value        Reference Range Interpretation Comments

 

             MCV (test code = MCV) 89.0         81.0-99.0    N            



[facility]anoXkqphbgATIHSDUAJY9049-30-74 18:53:00





             Test Item    Value        Reference Range Interpretation Comments

 

             Hgb (test code = Hgb) 13.3         12.0-16.0    N            



[facility]anoMstoierMEIKYBLUAO5434-19-46 18:53:00





             Test Item    Value        Reference Range Interpretation Comments

 

             RBC (test code = RBC) 4.45         4.20-5.40    N            



[facility]anoCgpfkklXKQAOPRVFU1795-21-11 18:53:00





             Test Item    Value        Reference Range Interpretation Comments

 

             Hct (test code = Hct) 39.6         36.0-48.0    N            



[id number] 18:53:00





             Test Item    Value        Reference Range Interpretation Comments

 

             WBC (test code = WBC) 8.3          3.7-10.4     N            



[facility] AreaMoeaehvZAAJENQRN7110-23-17 18:53:00





             Test Item    Value        Reference Range Interpretation Comments

 

             eGFR (test code = eGFR) 96                                     



[facility] AreaZelbaotZGMYSNNJQ6707-33-81 18:53:00





             Test Item    Value        Reference Range Interpretation Comments

 

             Sodium Lvl (test code = Sodium Lvl) 140          135-145      N    

        



[facility] AreaSzlawpcJJNKDGUSQ5459-46-91 18:53:00





             Test Item    Value        Reference Range Interpretation Comments

 

             Potassium Lvl (test code = Potassium 3.9          3.5-5.1      N   

         



             Lvl)                                                



[facility] AreaLqrfptdFHLUZTIHA3954-77-46 18:53:00





             Test Item    Value        Reference Range Interpretation Comments

 

             Chloride Lvl (test code = Chloride Lvl) 105                 N

            



[facility] AreaEkrfbsrSTPAZVHNS2683-42-09 18:53:00





             Test Item    Value        Reference Range Interpretation Comments

 

             CO2 (test code = CO2) 28           24-32        N            



[facility] AreaQjyjhncDCOSGDMBX9790-37-53 18:53:00





             Test Item    Value        Reference Range Interpretation Comments

 

             Glucose Lvl (test code = Glucose Lvl) 92           70-99        N  

          



[facility] AreaQzxrmibIVZZAHUJL3869-44-85 18:53:00





             Test Item    Value        Reference Range Interpretation Comments

 

             BUN (test code = BUN) 14           7-22         N            



[facility] AreaQixjlvlFKWGNNRSL8832-44-81 18:53:00





             Test Item    Value        Reference Range Interpretation Comments

 

             Creatinine Lvl (test code = Creatinine 0.7          0.5-1.4      N 

           



             Lvl)                                                



[facility] AreaYfpvuybKKWMUKPYE0540-30-20 18:53:00





             Test Item    Value        Reference Range Interpretation Comments

 

             Calcium Lvl (test code = Calcium Lvl) 9.1          8.5-10.5     N  

          



[facility] AreaIpfpieiZRYACDXJQ3210-47-90 18:53:00





             Test Item    Value        Reference Range Interpretation Comments

 

             AGAP (test code = AGAP) 10.9         10.0-20.0    N            



[facility]anoYbupuckIAXZVPVAFZ5070-82-06 18:53:00





             Test Item    Value        Reference Range Interpretation Comments

 

             Basophils # (test code 0.1          See_Comment  N             [Aut

omated message] The



             = Basophils #)                                        system which 

generated



                                                                 this result tra

nsmitted



                                                                 reference range

: <=0.2.



                                                                 The reference r

erna was



                                                                 not used to int

erpret



                                                                 this result as



                                                                 normal/abnormal

.



[facility]anoUnkqitnCQIPXFNYHU1247-30-23 18:53:00





             Test Item    Value        Reference Range Interpretation Comments

 

             Lymphocytes # (test code = Lymphocytes 2.8          1.0-5.5      N 

           



             #)                                                  



[facility]anoPspuyowMJGIDCZGKQ0518-28-55 18:53:00





             Test Item    Value        Reference Range Interpretation Comments

 

             Eosinophils # (test code 0.1          See_Comment  N             [A

utomated message] The



             = Eosinophils #)                                        system whic

h generated



                                                                 this result tra

nsmitted



                                                                 reference range

: <=0.5.



                                                                 The reference r

erna was



                                                                 not used to int

erpret



                                                                 this result as



                                                                 normal/abnormal

.



[facility]anoSgoawckLACKKUTVGJ2607-40-55 18:53:00





             Test Item    Value        Reference Range Interpretation Comments

 

             Monocytes # (test code 0.7          See_Comment  N             [Aut

omated message] The



             = Monocytes #)                                        system which 

generated



                                                                 this result tra

nsmitted



                                                                 reference range

: <=0.8.



                                                                 The reference r

erna was



                                                                 not used to int

erpret



                                                                 this result as



                                                                 normal/abnormal

.



[facility]anoSarigwtQPKURANWNU8155-65-81 18:53:00





             Test Item    Value        Reference Range Interpretation Comments

 

             Segs-Bands # (test code = Segs-Bands #) 4.6          1.5-8.1      N

            



[facility]anoDqnqomiZJYESSMVUS1044-97-52 18:53:00





             Test Item    Value        Reference Range Interpretation Comments

 

             Basophils (test code = 1.0          See_Comment  N             [Aut

omated message] The



             Basophils)                                          system which ge

nerated



                                                                 this result tra

nsmitted



                                                                 reference range

: <=1.0.



                                                                 The reference r

erna was



                                                                 not used to int

erpret



                                                                 this result as



                                                                 normal/abnormal

.



[facility]anoIcoopsvHFIKNMISCY4689-41-64 18:53:00





             Test Item    Value        Reference Range Interpretation Comments

 

             Lymphocytes (test code = Lymphocytes) 34.0         20.0-40.0    N  

          



[facility]anoNrvrevnXYIXSPHBCE7943-41-26 18:53:00





             Test Item    Value        Reference Range Interpretation Comments

 

             Eosinophils (test code = 1.3          See_Comment  N             [A

utomated message] The



             Eosinophils)                                        system which ge

nerated



                                                                 this result tra

nsmitted



                                                                 reference range

: <=4.0.



                                                                 The reference r

erna was



                                                                 not used to int

erpret



                                                                 this result as



                                                                 normal/abnormal

.



[facility]anoVczccooNVMBJBMKFJ3676-07-24 18:53:00





             Test Item    Value        Reference Range Interpretation Comments

 

             Monocytes (test code = Monocytes) 8.4          2.0-12.0     N      

      



[facility]anoSmxoqedYRUJFOBQKY2605-15-11 18:53:00





             Test Item    Value        Reference Range Interpretation Comments

 

             Segs (test code = Segs) 55.3         45.0-75.0    N            



[facility]anoDkrxmpiUPBDKAXTUE5651-07-55 18:53:00





             Test Item    Value        Reference Range Interpretation Comments

 

             PTT (test code = PTT) 27.3 s       22.9-35.8    N            



[facility]anoOoddoisUUUJSOBYCP3175-95-10 18:53:00





             Test Item    Value        Reference Range Interpretation Comments

 

             PT (test code = PT) 13.3 s       12.0-14.7    N            



[facility]anoLjofhjsHDIEKYXXBG7469-37-57 18:53:00





             Test Item    Value        Reference Range Interpretation Comments

 

             INR (test code = INR) 1.02         0.85-1.17    N            



[facility]anoAyzsfidTSZXTXRIBH7394-44-12 18:53:00





             Test Item    Value        Reference Range Interpretation Comments

 

             MPV (test code = MPV) 7.2          7.4-10.4     L            



[facility]anoQsseqbsISPZGEQNWB8274-68-37 18:53:00





             Test Item    Value        Reference Range Interpretation Comments

 

             MCHC (test code = MCHC) 33.5         32.0-36.0    N            



[facility]anoGigqfpvVIAWOVYMLC3004-77-41 18:53:00





             Test Item    Value        Reference Range Interpretation Comments

 

             MCH (test code = MCH) 29.8 pg      27.0-31.0    N            



[facility]anoGuduejvVQSJGLVXAP3603-32-03 18:53:00





             Test Item    Value        Reference Range Interpretation Comments

 

             Platelet (test code = Platelet) 305          133-450      N        

    



[facility]anoXbtjntoDUOAAUHZVV8528-33-66 18:53:00





             Test Item    Value        Reference Range Interpretation Comments

 

             RDW (test code = RDW) 12.4         11.5-14.5    N            



[facility]anoOhdpckhYPHERFWITG5765-72-96 18:53:00





             Test Item    Value        Reference Range Interpretation Comments

 

             MCV (test code = MCV) 89.0         81.0-99.0    N            



[facility]anoBtyhvahPMBFPNVLZD3676-80-98 18:53:00





             Test Item    Value        Reference Range Interpretation Comments

 

             Hgb (test code = Hgb) 13.3         12.0-16.0    N            



[facility]anoUbbdmtzRIVWLSYVTV9630-22-71 18:53:00





             Test Item    Value        Reference Range Interpretation Comments

 

             RBC (test code = RBC) 4.45         4.20-5.40    N            



[facility]anoHrmnsgtBRTQBWPPGX6957-91-90 18:53:00





             Test Item    Value        Reference Range Interpretation Comments

 

             Hct (test code = Hct) 39.6         36.0-48.0    N            



[facility]anoPevcuhcDWGWMLKDWC8407-68-33 18:53:00





             Test Item    Value        Reference Range Interpretation Comments

 

             WBC (test code = WBC) 8.3          3.7-10.4     N            



[facility]

## 2023-03-20 NOTE — RAD REPORT
EXAM DESCRIPTION:  RAD - Pelvis - 3/20/2023 12:28 pm

 

CLINICAL HISTORY:  Pelvic pain status post injury

 

FINDINGS:  No fracture or dislocation is seen.

 

The bones are osteoporotic

 

If the patient continues to have symptoms to suggest an occult fracture then MRI would be recommended

## 2023-03-20 NOTE — EDPHYS
Physician Documentation                                                                           

 [facility]                                                                 

Name: Meaghan Tavares                                                                            

Age: 67 yrs                                                                                       

Sex: Female                                                                                       

: 1955                                                                                   

MRN: [id number]                                                                                   

Arrival Date: 2023                                                                          

Time: 11:32                                                                                       

Account#: [id number]                                                                            

Bed IW1                                                                                           

Private MD: Jorge Franco E                                                                     

ED Physician Horacio Gan                                                                       

HPI:                                                                                              

                                                                                             

11:42 This 67 yrs old  Female presents to ER via Unassigned with complaints of Fall   bs3 

      Injury, Hip Pain, Trouble Walking.                                                          

11:42 67-year-old female history of hypertension presents status post fall this past Friday   bs3 

      she was going through a door and the dog attempted to go through the door at the same       

      time and she tripped and fell landing on her right hip and hitting her head no LOC          

      confusion or amnesia and no headache however since then she has had some right hip pain     

      she has slight pain with ambulation but is able to ambulate no numbness tingling or         

      weakness denies any back pain any urinary symptoms or anything else bothering her of        

      note she was recently started on antibiotics for a GI infection (h pylori).                 

                                                                                                  

Historical:                                                                                       

- Allergies:                                                                                      

11:44 No Known Allergies;                                                                     iw  

- PMHx:                                                                                           

11:44 Hypertension; H Pyloi;                                                                  iw  

- PSHx:                                                                                           

11:44 None;                                                                                   iw  

                                                                                                  

- Social history:: Smoking status: .                                                              

                                                                                                  

                                                                                                  

ROS:                                                                                              

11:42 Constitutional: Negative for fever, chills                                              bs3 

11:42 All other systems are negative.                                                             

                                                                                                  

Exam:                                                                                             

11:42 Constitutional:  This is a well developed, well nourished patient who is awake, alert,  bs3 

      and in no acute distress. Head/Face:  Normocephalic, atraumatic. Eyes:  Pupils equal        

      round and reactive to light, extra-ocular motions intact.  Lids and lashes normal.          

      ENT:  mmm, no posterior phyarngeal erythema Neck:  Trachea midline, no thyromegaly, no      

      neck stiffness Chest/axilla:  Normal chest wall appearance and motion.  Nontender with      

      no deformity.  No lesions are appreciated. Cardiovascular:  Regular rate and rhythm         

      with a normal S1 and S2.  symmetric pulses in upper extremities Respiratory:  Lungs         

      have equal breath sounds bilaterally, clear to auscultation, no respiratory distress        

      Abdomen/GI:  Soft, non-tender, no rebound or guarding Back:  No spinal tenderness.  No      

      costovertebral tenderness.  Full range of motion. Skin:  Warm, dry with normal turgor.      

      Normal color with no rashes, no lesions, and no evidence of cellulitis. MS/ Extremity:      

      Pulses equal, no cyanosis.  Neurovascular intact.  Full, normal range of motion.            

      Patient is able to walk but walks with slight limp Neuro:  Awake and alert, GCS 15,         

      oriented to person, place, time, and situation.  Cranial nerves II-XII grossly intact.      

      Motor strength 5/5 in all extremities.  Sensory grossly intact.                             

                                                                                                  

Vital Signs:                                                                                      

11:44  / 88; Pulse 75; Resp 16; Temp 98.5; Pulse Ox 100% on R/A; Weight 60.78 kg;       iw  

      Height 5 ft. 2 in. ; Pain 8/10;                                                             

11:44 Body Mass Index 24.51 (60.78 kg, 157.48 cm)                                             iw  

11:44 Pain Scale: Adult                                                                       iw  

                                                                                                  

MDM:                                                                                              

11:33 Patient medically screened.                                                             bs3 

11:42 Differential diagnosis: abrasion, contusion, fracture, sprain, strain. Data reviewed:   bs3 

      vital signs, nurses notes.                                                                  

11:44 ED course: We will get x-rays and reassess given that she is able to ambulate I do not  bs3 

      suspect an occult fracture.                                                                 

12:55 Independent interpretation of the following test(s) in the Emergency Department X-Ray:  bs3 

      My interpretation is No acute fracture. ED course: Patient reassessed able to ambulate      

      discussed at length with patient and family unlikely fracture given no significant pain     

      with ambulation advised rest follow-up with her neurologist tomorrow as scheduled           

      return precautions given recommended MRI for persistent symptoms.                           

                                                                                                  

                                                                                             

11:40 Order name: Hip Right 2 View XRAY; Complete Time: 12:44                                 bs3 

                                                                                             

11:40 Order name: Pelvis XRAY; Complete Time: 12:44                                           bs3 

                                                                                                  

Administered Medications:                                                                         

No medications were administered                                                                  

                                                                                                  

                                                                                                  

Disposition Summary:                                                                              

23 12:57                                                                                    

Discharge Ordered                                                                                 

      Location: Home                                                                          bs3 

      Condition: Stable                                                                       bs3 

      Diagnosis                                                                                   

        - Pain in right hip                                                                   bs3 

      Followup:                                                                               bs3 

        - With: Jorge Franco MD                                                                 

        - When: 1 week                                                                             

        - Reason: Re-evaluation by your physician                                                  

      Discharge Instructions:                                                                     

        - Discharge Summary Sheet                                                             bs3 

        - Hip Pain                                                                            bs3 

      Forms:                                                                                      

        - Medication Reconciliation Form                                                      bs3 

        - Thank You Letter                                                                    bs3 

        - Antibiotic Education                                                                bs3 

        - Prescription Opioid Use                                                             bs3 

Signatures:                                                                                       

Dispatcher [employer]                           Abena Wellington, RN                     RN   Horacio Coe MD MD   bs3                                                  

                                                                                                  

**************************************************************************************************

## 2023-04-05 NOTE — EKG
Test Date:    2022-12-14               Test Time:    18:53:12

Technician:                                          

                                                     

MEASUREMENT RESULTS:                                       

Intervals:                                           

Rate:         107                                    

WY:           154                                    

QRSD:         88                                     

QT:           318                                    

QTc:          424                                    

Axis:                                                

P:            50                                     

WY:           154                                    

QRS:          42                                     

T:            19                                     

                                                     

INTERPRETIVE STATEMENTS:                                       

                                                     

Sinus tachycardia

Otherwise normal ECG

Compared to ECG 05/03/2022 10:25:08

Sinus rhythm no longer present



Electronically Signed On 12-15-22 08:01:40 CST by Cameron Farah O-Z Flap Text: The defect edges were debeveled with a #15 scalpel blade.  Given the location of the defect, shape of the defect and the proximity to free margins an O-Z flap was deemed most appropriate.  Using a sterile surgical marker, an appropriate transposition flap was drawn incorporating the defect and placing the expected incisions within the relaxed skin tension lines where possible. The area thus outlined was incised deep to adipose tissue with a #15 scalpel blade.  The skin margins were undermined to an appropriate distance in all directions utilizing iris scissors.

## 2023-09-02 ENCOUNTER — HOSPITAL ENCOUNTER (EMERGENCY)
Dept: HOSPITAL 97 - ER | Age: 68
Discharge: ANOTHER HOSPITAL | End: 2023-09-02
Payer: COMMERCIAL

## 2023-09-02 VITALS — TEMPERATURE: 98.1 F

## 2023-09-02 VITALS — SYSTOLIC BLOOD PRESSURE: 109 MMHG | DIASTOLIC BLOOD PRESSURE: 56 MMHG

## 2023-09-02 VITALS — OXYGEN SATURATION: 98 %
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
## 2023-09-02 DIAGNOSIS — F17.210: ICD-10-CM

## 2023-09-02 DIAGNOSIS — S72.141A: Primary | ICD-10-CM

## 2023-09-02 DIAGNOSIS — I10: ICD-10-CM

## 2023-09-02 LAB
BUN BLD-MCNC: 21 MG/DL (ref 7–18)
GLUCOSE SERPLBLD-MCNC: 156 MG/DL (ref 74–106)
HCT VFR BLD CALC: 41.1 % (ref 36–45)
LYMPHOCYTES # SPEC AUTO: 2.8 K/UL (ref 0.7–4.9)
MCV RBC: 87.7 FL (ref 80–100)
PMV BLD: 7.3 FL (ref 7.6–11.3)
POTASSIUM SERPL-SCNC: 3.5 MEQ/L (ref 3.5–5.1)
RBC # BLD: 4.69 M/UL (ref 3.86–4.86)
WBC # BLD AUTO: 11.3 THOU/UL (ref 4.3–10.9)

## 2023-09-02 PROCEDURE — 99285 EMERGENCY DEPT VISIT HI MDM: CPT

## 2023-09-02 PROCEDURE — 96374 THER/PROPH/DIAG INJ IV PUSH: CPT

## 2023-09-02 PROCEDURE — 71045 X-RAY EXAM CHEST 1 VIEW: CPT

## 2023-09-02 PROCEDURE — 80048 BASIC METABOLIC PNL TOTAL CA: CPT

## 2023-09-02 PROCEDURE — 96375 TX/PRO/DX INJ NEW DRUG ADDON: CPT

## 2023-09-02 PROCEDURE — 85025 COMPLETE CBC W/AUTO DIFF WBC: CPT

## 2023-09-02 PROCEDURE — 36415 COLL VENOUS BLD VENIPUNCTURE: CPT

## 2023-09-02 PROCEDURE — 73552 X-RAY EXAM OF FEMUR 2/>: CPT

## 2023-09-02 NOTE — RAD REPORT
EXAM DESCRIPTION:  RAD - Femur Right - 9/2/2023 8:00 pm

 

CLINICAL HISTORY:  PAIN

 

COMPARISON:  No comparisons

 

TECHNIQUE:  Right femur, 2 views.

 

FINDINGS:  Buckled superiorly laterally angulated intertrochanteric fracture. There is no dislocation
 or periosteal reaction noted. No acute or suspicious bony finding.

 

IMPRESSION:  Buckled superiorly laterally angulated intertrochanteric fracture.

## 2023-09-02 NOTE — XMS REPORT
Continuity of Care Document

                          Created on:2023



Patient:SUKHI ZELAYA

Sex:Female

:1955

External Reference #:[id number]





Demographics







                          Address                   [address] [place] [address]

 

                          Home Phone                [phone]

 

                          Work Phone                [phone]

 

                          Mobile Phone              [phone]

 

                          Email Address             [email or web address]

 

                          Preferred Language        English

 

                          Marital Status            Unknown

 

                          Buddhism Affiliation     Unknown

 

                          Race                      Unknown

 

                          Additional Race(s)        White



                                                    Unavailable

 

                          Ethnic Group              Unknown









Author







                          Organization              [facility]

t

 

                          Address                   [address] [facility] [address] [place] [address]

 

                          Phone                     [phone]









Support







                Name            Relationship    Address         Phone

 

                Aurora Dillard  Other           Unavailable     [phone]









Care Team Providers







                    Name                Role                Phone

 

                    Sahil Jones Attending Clinician [phone]

 

                    Lab, Adc Fam Pob I  Attending Clinician Unavailable

 

                    Nika Szymanski  Attending Clinician [phone]









Payers







           Payer Name Policy Type Policy Number Effective Date Expiration Date S

ource







Problems







       Condition Condition Condition Status Onset  Resolution Last   Treating Co

mments 

Source



       Name   Details Category        Date   Date   Treatment Clinician        



                                                 Date                 

 

       SUB ACUTE  SUB ACUTE Diagnosis Active 2013-0        2013-09-15           

    [facility]



       MCA STROKE MCA STROKE               9-15          18:02:00               

l



               Active               00:00:                             Eduardo



              09/15/2013               00                                 



              [facility]                                                  

 

       TIA     TIA   Diagnosis Active 2013               Mem

oria



              Active               9-15          12:05:00               l



              09/15/2013               00:00:                             Romaine adhikari



               [address]                                                  

 

       TRANS   TRANS Diagnosis Active               2013               Mem

oria



       CEREB  CEREB                              12:05:00               l



       ISCHEMIA ISCHEMIA                                                  Romaine adhikari



       NEC    NEC Active                                                  



               [facility]                                                  

 

       Arthritis  Arthritis Problem Resolve               2020            

   [facility]



       (disorder) (disorder)        d                    22:45:28               

l



              Resolved                                                  Eduardo



              Problem                                                  



              2020                                                  



              [facility]                                                  



              Neuro                                                   

 

       Hyperlipid  Hyperlipi Problem Resolve               2020           

    [facility]



       emia   demia         d                    22:45:28               l



       (disorder) (disorder)                                                  He

rmann



              Resolved                                                  



              Problem                                                  



              2020                                                  



              [facility]                                                  



              Neuro                                                   

 

       Laser   Laser Problem Resolve               2020               Ariel

vashti



       assisted assisted        d                    22:45:28               l



       in situ in situ                                                  Eduardo



       keratomile keratomile                                                  



       usis   usis                                                    



       (procedure (procedure                                                  



       )      ) Resolved                                                  



              Problem                                                  



              2020                                                  



              [facility]                                                  



              Neuro                                                   

 

       Arthritis  Arthritis Problem Resolve               2013            

   [facility]



              Resolved        d                    21:14:51               l



              Problem                                                  [place]



              2013                                                  



              [facility]                                                  

 

       Hyperlipid  Hyperlipi Problem Resolve               2013           

    [facility]



       emia   demia         d                    21:14:51               l



              Resolved                                                  [place]



              Problem                                                  



              2013                                                  



              [facility]                                                  

 

       LASIK   LASIK Problem Resolve               2013               Ariel

vashti



              Resolved        d                    21:14:51               l



              Problem                                                  [place]



              2013                                                  



               [facility]                                                  

 

       Cerebrovas  Cerebrova Problem Active               2023            

   [facility]



       cular  scular                             11:39:54               l



       accident accident                                                  Romaine

n



       (disorder) (disorder)                                                  



              Active                                                  



              Problem                                                  



              2023                                                  



              May 2022                                                  



              [facility]                                                     



              Neurology                                                  



              [place]                                                  

 

       Hypertensi  Hypertens Problem Active               2023            

   [facility]



       ve     sameer                                11:39:54               l



       disorder, disorder,                                                  [facility]

marco antonio



       systemic systemic                                                  



       arterial arterial                                                  



       (disorder) (disorder)                                                  



              Active                                                  



              Problem                                                  



              2023                                                  



              [facility]                                                     



              Neurology                                                  



              [place]                                                  

 

       Syncope  Syncope Problem Active               2023               Me

moria



       (disorder) (disorder)                             11:39:54               

l



              Active                                                  Eduardo



              Problem                                                  



              2023                                                  



              [facility]                                                     



              Neurology                                                  



              [place]                                                  







Allergies, Adverse Reactions, Alerts







       Allergy Allergy Status Severity Reaction(s) Onset  Inactive Treating Comm

ents 

Source



       Name   Type                        Date   Date   Clinician        

 

       NO KNOWN Drug   Active                                           [facility]



       ALLERGIE Class                                                   ity of



       S                                                              [facility]

 

       No Known No Known Active                                           [facility]

a



       Medicati Medicati                                                  l



       on     on                                                      Eduardo



       Allergie Allergie                                                  



       s      s                                                       







Social History







           Social Habit Start Date Stop Date  Quantity   Comments   Source

 

           Sex Assigned At                                             [facility]

y of



           Birth                                                  [facility]

 

           Exposure to                       Yes                   [facility] 



           SARS-CoV-2                                             [facility]



           (event)                                                Branch

 

           Alcohol intake 2015 Current               University

 of



                      00:00:00   00:00:00   non-drinker of            [place] [facility]

song



                                            alcohol               Branch



                                            (finding)             









                Smoking Status  Start Date      Stop Date       Source

 

                Tobacco smoking status 2023 19:59:57                 [place]

timoteo Clark







Medications







       Ordered Filled Start  Stop   Current Ordering Indication Dosage Frequency

 Signature

                    Comments            Components          Source



     Medication Medication Date Date Medication? Clinician                (SIG) 

          



     Name Name                                                   

 

     aspirin 81            Yes                      81 mg = 1           Me

moria



     mg tablet,      3-22                               tab, PO,           l



     enteric      20:54:                               Daily, #           Romaine

n



     coated      00                                 90 tab, 3           



                                                  Refill(s)           

 

     lisinopril            Yes                      0              [facility]



     20 mg oral      3-22                               Refill(s)           l



     tablet      20:52:                                              Eduardo



               00                                                

 

     CRESTOR 20      2015      Yes            20mg      Take 20 mg           U

nivers



     mg tablet      0-26                               by mouth           ity of



               00:00:                               at             [place]



               00                                 bedtime.           Medical



                                                                 Branch

 

     atorvastati            Yes  Mary                80 mg, 1          

 [facility]



     n 80 mg      -16           [place]                tab, PO,           l



     oral tablet      18:23:           Brown                Daily, 30           

[place]



               46                                 tab,           



                                                  Substituti           



                                                  on             



                                                  Allowed,           



                                                  TAB            

 

     atorvastati            Yes  Mary                80 mg, 1          

 [facility]



     n 80 mg      -           [place]                tab, PO,           l



     oral tablet      18:23:           Brown                Daily, 30           

[place]



               46                                 tab,           



                                                  Substituti           



                                                  on             



                                                  Allowed,           



                                                  TAB            

 

     atorvastati            Yes  Mary                80 mg, 1          

 [facility]



     n 80 mg                 [place]                tab, PO,           l



     oral tablet      18:23:           Brown                Daily, 30           

[place]



               46                                 tab,           



                                                  Substituti           



                                                  on             



                                                  Allowed,           



                                                  TAB            

 

     atorvastati            Yes  Mary                80 mg, 1          

 [facility]



     n 80 mg      -           [place]                tab, PO,           l



     oral tablet      18:23:           Brown                Daily, 30           

[place]



               46                                 tab,           



                                                  Substituti           



                                                  on             



                                                  Allowed,           



                                                  TAB            

 

     aspirin 81            Yes  Mary                81 mg, 1           

[facility]



     mg tablet,                 [place]                tab, PO,           l



     enteric      18:23:           Brown                Daily, 30           H[facility]

marco antonio



     coated      38                                 tab,           



                                                  Substituti           



                                                  on             



                                                  Allowed,           



                                                  ECTAB           

 

     aspirin 81            Yes  Mary                81 mg, 1           

[facility]



     mg tablet,                 [place]                tab, PO,           l



     enteric      18:23:           Brown                Daily, 30           H[facility]

marco antonio



     coated      38                                 tab,           



                                                  Substituti           



                                                  on             



                                                  Allowed,           



                                                  ECTAB           

 

     aspirin 81            Yes  Mary                81 mg, 1           

[facility]



     mg tablet,                 [place]                tab, PO,           l



     enteric      18:23:           Brown                Daily, 30           Herm

marco antonio



     coated      38                                 tab,           



                                                  Substituti           



                                                  on             



                                                  Allowed,           



                                                  ECTAB           

 

     aspirin 81            Yes  Mary                81 mg, 1           

[facility]



     mg tablet,                 [place]                tab, PO,           l



     enteric      18:23:           Brown                Daily, 30           Herm

marco antonio



     coated      38                                 tab,           



                                                  Substituti           



                                                  on             



                                                  Allowed,           



                                                  ECTAB           

 

     atorvastati            No   Kimberly                80 mg, 1           M

emoria



     n         9-16           Deni                tab,           l



               14:00:           Jose Alberto                Route: PO,           Romaine

n



               00                                 Drug form:           



                                                  TAB,           



                                                  Daily,           



                                                  Dosing           



                                                  Weight           



                                                  72.727,           



                                                  kg, Start           



                                                  date:           



                                                  13           



                                                  9:00:00,           



                                                  Duration:           



                                                  30 day,           



                                                  Stop date:           



                                                  10/15/13           



                                                  9:00:00           

 

     aspirin      2013-0      No   Kimberly                81 mg, 1           Mem[facility]

ia



               -16           Deni                tab,           l



               14:00:           Jose Alberto                Route: PO,           Romaine

n



               00                                 Drug form:           



                                                  ECTAB,           



                                                  Daily,           



                                                  Dosing           



                                                  Weight           



                                                  72.727,           



                                                  kg, Start           



                                                  date:           



                                                  13           



                                                  9:00:00,           



                                                  Duration:           



                                                  30 day,           



                                                  Stop date:           



                                                  10/15/13           



                                                  9:00:00           

 

     atorvastati      2013-0      No   Kimberly                80 mg, 1           M

emoria



     n         9-16           Deni                tab,           l



               14:00:           Jose Alberto                Route: PO,           Romaine

n



               00                                 Drug form:           



                                                  TAB,           



                                                  Daily,           



                                                  Dosing           



                                                  Weight           



                                                  72.727,           



                                                  kg, Start           



                                                  date:           



                                                  13           



                                                  9:00:00,           



                                                  Duration:           



                                                  30 day,           



                                                  Stop date:           



                                                  10/15/13           



                                                  9:00:00           

 

     aspirin      2013-0      No   Kimberly                81 mg, 1           Memor

ia



               -16           Deni                tab,           l



               14:00:           Jose Alberto                Route: PO,           Romaine

n



               00                                 Drug form:           



                                                  ECTAB,           



                                                  Daily,           



                                                  Dosing           



                                                  Weight           



                                                  72.727,           



                                                  kg, Start           



                                                  date:           



                                                  13           



                                                  9:00:00,           



                                                  Duration:           



                                                  30 day,           



                                                  Stop date:           



                                                  10/15/13           



                                                  9:00:00           

 

     atorvastati      2013-0      No   Kimberly                80 mg, 1           [facility] [place]



     n         -16           Deni                tab,           l



               14:00:           Jose Alberto                Route: PO,           Romaine

n



               00                                 Drug form:           



                                                  TAB,           



                                                  Daily,           



                                                  Dosing           



                                                  Weight           



                                                  72.727,           



                                                  kg, Start           



                                                  date:           



                                                  13           



                                                  9:00:00,           



                                                  Duration:           



                                                  30 day,           



                                                  Stop date:           



                                                  10/15/13           



                                                  9:00:00           

 

     aspirin      2013-0      No   Kimberly                81 mg, 1           Memor

ia



               9-16           Deni                tab,           l



               14:00:           Jose Alberto                Route: PO,           [place]

n



               00                                 Drug form:           



                                                  ECTAB,           



                                                  Daily,           



                                                  Dosing           



                                                  Weight           



                                                  72.727,           



                                                  kg, Start           



                                                  date:           



                                                  13           



                                                  9:00:00,           



                                                  Duration:           



                                                  30 day,           



                                                  Stop date:           



                                                  10/15/13           



                                                  9:00:00           

 

     atorvastati      -0      No   Kimberly                80 mg, 1           M

[facility]



     n         -16           Deni                tab,           l



               14:00:           Jose Alberto                Route: PO,           [place]

n



               00                                 Drug form:           



                                                  TAB,           



                                                  Daily,           



                                                  Dosing           



                                                  Weight           



                                                  72.727,           



                                                  kg, Start           



                                                  date:           



                                                  13           



                                                  9:00:00,           



                                                  Duration:           



                                                  30 day,           



                                                  Stop date:           



                                                  10/15/13           



                                                  9:00:00           

 

     aspirin      -0      No   Kimberly                81 mg, 1           [facility]

ia



               9-16           Deni                tab,           l



               14:00:           Jose Alberto                Route: PO,           [place]

n



               00                                 Drug form:           



                                                  ECTAB,           



                                                  Daily,           



                                                  Dosing           



                                                  Weight           



                                                  72.727,           



                                                  kg, Start           



                                                  date:           



                                                  13           



                                                  9:00:00,           



                                                  Duration:           



                                                  30 day,           



                                                  Stop date:           



                                                  10/15/13           



                                                  9:00:00           

 

     heparin      -0      No   Kim[facility]ly                5,000           [facility]



               9-16           Deni                unit, 1           l



               13:00:           Jose Alberto                mL, Route:           [place]

n



               00                                 SUB-Q,           



                                                  Drug form:           



                                                  INJ, Q8H,           



                                                  Dosing           



                                                  Weight           



                                                  72.727,           



                                                  kg, Start           



                                                  date:           



                                                  13           



                                                  8:00:00,           



                                                  Duration:           



                                                  30 day,           



                                                  Stop date:           



                                                  10/16/13           



                                                  0:00:00           

 

     heparin      -0      No   Kim[facility]ly                5,000           [facility]



               9-16           Deni                unit, 1           l



               13:00:           Jose Alberto                mL, Route:           [place]

n



               00                                 SUB-Q,           



                                                  Drug form:           



                                                  INJ, Q8H,           



                                                  Dosing           



                                                  Weight           



                                                  72.727,           



                                                  kg, Start           



                                                  date:           



                                                  13           



                                                  8:00:00,           



                                                  Duration:           



                                                  30 day,           



                                                  Stop date:           



                                                  10/16/13           



                                                  0:00:00           

 

     heparin      -0      No   [facility]                5,000           [facility]



               9-16           Deni                unit, 1           l



               13:00:           Jose Alberto                mL, Route:           [place]

n



               00                                 SUB-Q,           



                                                  Drug form:           



                                                  INJ, Q8H,           



                                                  Dosing           



                                                  Weight           



                                                  72.727,           



                                                  kg, Start           



                                                  date:           



                                                  13           



                                                  8:00:00,           



                                                  Duration:           



                                                  30 day,           



                                                  Stop date:           



                                                  10/16/13           



                                                  0:00:00           

 

     heparin      -0      No   Kim[facility]                5,000           [facility]



               9-16           Deni                unit, 1           l



               13:00:           Jose Alberto                mL, Route:           [place]

n



               00                                 SUB-Q,           



                                                  Drug form:           



                                                  INJ, Q8H,           



                                                  Dosing           



                                                  Weight           



                                                  72.727,           



                                                  kg, Start           



                                                  date:           



                                                  13           



                                                  8:00:00,           



                                                  Duration:           



                                                  30 day,           



                                                  Stop date:           



                                                  10/16/13           



                                                  0:00:00           

 

     Saline      -0      No    ml,           [facility]



     Flush 0.9%      9-16           [facility]                Route:           l



               02:00:           treras                IVP, Drug           [place]

n



               00                                 Form: INJ,           



                                                  Dosing           



                                                  Weight           



                                                  72.727,           



                                                  kg, Q12H,           



                                                  Start           



                                                  date:           



                                                  09/15/13           



                                                  21:00:00,           



                                                  Duration:           



                                                  30 day,           



                                                  Stop date:           



                                                  10/15/13           



                                                  9:00:00           

 

     Saline      -0      No                   5 ml,           [facility]



     Flush 0.9%      9-16           [facility]                Route:           l



               02:00:           treras                IVP, Drug           [place]

n



               00                                 Form: INJ,           



                                                  Dosing           



                                                  Weight           



                                                  72.727,           



                                                  kg, Q12H,           



                                                  Start           



                                                  date:           



                                                  09/15/13           



                                                  21:00:00,           



                                                  Duration:           



                                                  30 day,           



                                                  Stop date:           



                                                  10/15/13           



                                                  9:00:00           

 

     Saline      -0      No    ml,           [facility]



     Flush 0.9%      9-16           [facility]                Route:           l



               02:00:           treras                IVP, Drug           [place]

n



               00                                 Form: INJ,           



                                                  Dosing           



                                                  Weight           



                                                  72.727,           



                                                  kg, Q12H,           



                                                  Start           



                                                  date:           



                                                  09/15/13           



                                                  21:00:00,           



                                                  Duration:           



                                                  30 day,           



                                                  Stop date:           



                                                  10/15/13           



                                                  9:00:00           

 

     Saline      -0      No                   5 ml,           [facility]



     Flush 0.9%      9-16           [facility]                Route:           l



               02:00:           treras                IVP, Drug           [place]

n



               00                                 Form: INJ,           



                                                  Dosing           



                                                  Weight           



                                                  72.727,           



                                                  kg, Q12H,           



                                                  Start           



                                                  date:           



                                                  09/15/13           



                                                  21:00:00,           



                                                  Duration:           



                                                  30 day,           



                                                  Stop date:           



                                                  10/15/13           



                                                  9:00:00           

 

     Zantac            Yes                      Substituti           [facility]

ia



               9-15                               on Allowed           l



               22:49:                                              [place]



                                                               

 

     Zantac            Yes                      Substituti           [facility]

ia



               9-15                               on Allowed           l



               22:49:                                              [place]



                                                               

 

     Zantac            Yes                      Substituti           [facility]

ia



               9-15                               on Allowed           l



               22:49:                                              [facility]



               ntac            Yes                      Substituti           [facility]

ia



               9-15                               on Allowed           l



               22:49:                                              [place]



                                                               

 

     Zantac            Yes                      Substituti           [facility]

ia



               9-15                               on Allowed           l



               22:49:                                              [facility]



               00                                                

 

     Multiple            Yes                      1 cap, PO,           [facility]

[facility]



     Vitamins      9-15                               Daily, 30           l



     oral      22:48:                               cap,           [place]



     capsule      47                                 Substituti           



                                                  on             



                                                  Allowed,           



                                                  Maintenanc           



                                                  e, CAP           

 

     Multiple            Yes                      1 cap, PO,           [facility]

[facility]



     Vitamins      9-15                               Daily, 30           l



     oral      22:48:                               cap,           [place]



     capsule      47                                 Substituti           



                                                  on             



                                                  Allowed,           



                                                  Maintenanc           



                                                  e, CAP           

 

     Multiple            Yes                      1 cap, PO,           [facility]

[facility]



     Vitamins      9-15                               Daily, 30           l



     oral      22:48:                               cap,           [facility]



     capsule      47                                 Substituti           



                                                  on             



                                                  Allowed,           



                                                  Maintenanc           



                                                  e, CAP           

 

     Multiple            Yes                      1 cap, PO,           [facility]

[facility]



     Vitamins      9-15                               Daily, 30           l



     oral      22:48:                               cap,           [facility]



     capsule      47                                 Substituti           



                                                  on             



                                                  Allowed,           



                                                  Maintenanc           



                                                  e, CAP           

 

     Multiple            Yes                      1 cap, PO,           [facility]

[facility]



     Vitamins      9-15                               Daily, 30           l



     oral      22:48:                               cap,           [place]



     capsule      47                                 Substituti           



                                                  on             



                                                  Allowed,           



                                                  Maintenanc           



                                                  e, CAP           

 

     Saline            No                   5 ml,           [facility]



     Flush 0.9%      9-15           [facility]                Route:           l



               22:09:           treras                IVP, Drug           [place]

n



               00                                 Form: INJ,           



                                                  Dosing           



                                                  Weight           



                                                  72.727,           



                                                  kg, PRN,           



                                                  PRN Line           



                                                  Flush,           



                                                  Start           



                                                  date:           



                                                  09/15/13           



                                                  17:09:00,           



                                                  Duration:           



                                                  30 day,           



                                                  Stop date:           



                                                  10/15/13           



                                                  17:08:00           

 

     labetalol                      10 mg, 2           [facility]

vashti



               -15           [facility]                mL, Route:           l



               22:09:           treras                IVP, Drug           [place]

n



               00                                 form: INJ,           



                                                  Q10Min,           



                                                  Dosing           



                                                  Weight           



                                                  72.727,           



                                                  kg, PRN           



                                                  Hypertensi           



                                                  on, Start           



                                                  date:           



                                                  09/15/13           



                                                  17:09:00,           



                                                  Duration:           



                                                  30 day,           



                                                  Stop date:           



                                                  10/15/13           



                                                  17:08:00,           



                                                  For SBP >           



                                                  180mmHg           



                                                  and/or DBP           



                                                  > 105mmHg           

 

     Sodium            No   Mary                1,000 mL,           [facility]

[facility]



     Chloride      -15           [place]                Rate: 100           l



     0.9% IV      22:09:           Brown                ml/hr,           [place]



     1,000 mL      00                                 Infuse           



                                                  over: 10           



                                                  hr, Route:           



                                                  IV, Dosing           



                                                  Weight           



                                                  72.727 kg,           



                                                  Total           



                                                  Volume:           



                                                  1,000,           



                                                  Start           



                                                  date:           



                                                  09/15/13           



                                                  17:09:00,           



                                                  Stop date:           



                                                  10/15/13           



                                                  17:08:00           

 

     Saline            No                   5 ml,           [facility]



     Flush 0.9%      9-15           [facility]                Route:           l



               22:09:           treras                IVP, Drug           [place]

n



               00                                 Form: INJ,           



                                                  Dosing           



                                                  Weight           



                                                  72.727,           



                                                  kg, PRN,           



                                                  PRN Line           



                                                  Flush,           



                                                  Start           



                                                  date:           



                                                  09/15/13           



                                                  17:09:00,           



                                                  Duration:           



                                                  30 day,           



                                                  Stop date:           



                                                  10/15/13           



                                                  17:08:00           

 

     Saline            No                   5 ml,           [facility]



     Flush 0.9%      9-15           [facility]                Route:           l



               22:09:           treras                IVP, Drug           [facility]

n



               00                                 Form: INJ,           



                                                  Dosing           



                                                  Weight           



                                                  72.727,           



                                                  kg, PRN,           



                                                  PRN Line           



                                                  Flush,           



                                                  Start           



                                                  date:           



                                                  09/15/13           



                                                  17:09:00,           



                                                  Duration:           



                                                  30 day,           



                                                  Stop date:           



                                                  10/15/13           



                                                  17:08:00           

 

     labetalol                      10 mg, 2           [facility]

[facility]



               -15           [facility]Con                mL, Route:           l



               22:09:           treras                IVP, Drug           [facility]

n



               00                                 form: INJ,           



                                                  Q10Min,           



                                                  Dosing           



                                                  Weight           



                                                  72.727,           



                                                  kg, PRN           



                                                  Hypertensi           



                                                  on, Start           



                                                  date:           



                                                  09/15/13           



                                                  17:09:00,           



                                                  Duration:           



                                                  30 day,           



                                                  Stop date:           



                                                  10/15/13           



                                                  17:08:00,           



                                                  For SBP >           



                                                  180mmHg           



                                                  and/or DBP           



                                                  > 105mmHg           

 

     Sodium            No   [facility]                1,000 mL,           [facility]

[facility]



     Chloride      9-15           [facility]                Rate: 100           l



     0.9% IV      22:09:           [facility]                ml/hr,           [place]



     1,000 mL      00                                 Infuse           



                                                  over: 10           



                                                  hr, Route:           



                                                  IV, Dosing           



                                                  Weight           



                                                  72.727 kg,           



                                                  Total           



                                                  Volume:           



                                                  1,000,           



                                                  Start           



                                                  date:           



                                                  09/15/13           



                                                  17:09:00,           



                                                  Stop date:           



                                                  10/15/13           



                                                  17:08:00           

 

     labetalol                      10 mg, 2           [facility]

[facility]



               9-15           [facility]                mL, Route:           l



               22:09:           [facility]                IVP, Drug           [facility]

n



               00                                 form: INJ,           



                                                  Q10Min,           



                                                  Dosing           



                                                  Weight           



                                                  72.727,           



                                                  kg, PRN           



                                                  Hypertensi           



                                                  on, Start           



                                                  date:           



                                                  09/15/13           



                                                  17:09:00,           



                                                  Duration:           



                                                  30 day,           



                                                  Stop date:           



                                                  10/15/13           



                                                  17:08:00,           



                                                  For SBP >           



                                                  180mmHg           



                                                  and/or DBP           



                                                  > 105mmHg           

 

     Sodium      -      No   [facility]                1,000 mL,           [facility]

[facility]



     Chloride      9-15           [facility]                Rate: 100           l



     0.9% IV      22:09:           Brown                ml/hr,           [place]



     1,000 mL      00                                 Infuse           



                                                  over: 10           



                                                  hr, Route:           



                                                  IV, Dosing           



                                                  Weight           



                                                  72.727 kg,           



                                                  Total           



                                                  Volume:           



                                                  1,000,           



                                                  Start           



                                                  date:           



                                                  09/15/13           



                                                  17:09:00,           



                                                  Stop date:           



                                                  10/15/13           



                                                  17:08:00           

 

     Saline      -      No                   5 ml,           [facility]



     Flush 0.9%      9-15           [facility]                Route:           l



               22:09:           [facility]                IVP, Drug           [facility]

n



               00                                 Form: INJ,           



                                                  Dosing           



                                                  Weight           



                                                  72.727,           



                                                  kg, PRN,           



                                                  PRN Line           



                                                  Flush,           



                                                  Start           



                                                  date:           



                                                  09/15/13           



                                                  17:09:00,           



                                                  Duration:           



                                                  30 day,           



                                                  Stop date:           



                                                  10/15/13           



                                                  17:08:00           

 

     labetalol            No                   10 mg, 2           [facility]

[facility]



               9-15           [facility]                mL, Route:           l



               22:09:           [facility]                IVP, Drug           [facility]

n



               00                                 form: INJ,           



                                                  Q10Min,           



                                                  Dosing           



                                                  Weight           



                                                  72.727,           



                                                  kg, PRN           



                                                  Hypertensi           



                                                  on, Start           



                                                  date:           



                                                  09/15/13           



                                                  17:09:00,           



                                                  Duration:           



                                                  30 day,           



                                                  Stop date:           



                                                  10/15/13           



                                                  17:08:00,           



                                                  For SBP >           



                                                  180mmHg           



                                                  and/or DBP           



                                                  > 105mmHg           

 

     Sodium            No   [facility]                1,000 mL,           [facility]

[facility]



     Chloride      -15           [facility]                Rate: 100           l



     0.9% IV      22:09:           [facility]                ml/hr,           [place]



     1,000 mL      00                                 Infuse           



                                                  over: 10           



                                                  hr, Route:           



                                                  IV, Dosing           



                                                  Weight           



                                                  72.727 kg,           



                                                  Total           



                                                  Volume:           



                                                  1,000,           



                                                  Start           



                                                  date:           



                                                  09/15/13           



                                                  17:09:00,           



                                                  Stop date:           



                                                  10/15/13           



                                                  17:08:00           

 

     Omnipaque      -0      No   Jennifer                85 mL,           [facility]

[facility]



     350mg/ml      9-15           [facility]                Route:           l



               19:37:                               IVP, Drug           [place]



                                                Form:           



                                                  SOLN,           



                                                  Dosing           



                                                  Weight           



                                                  72.727,           



                                                  kg,            



                                                  ONCALL,           



                                                  STAT,           



                                                  Start           



                                                  date:           



                                                  09/15/13           



                                                  14:37:00,           



                                                  Duration:           



                                                  1 doses or           



                                                  times,           



                                                  Dose =           



                                                  2.2ml/kg,           



                                                  Max dose =           



                                                  100ml --           



                                                  "To be           



                                                  infused by           



                                                  Radiology           



                                                  Staff           



                                                  ONLY"Dose           



                                                  =              



                                                  2.2ml/kg,           



                                                  Max dose =           



                                                  100ml --           



                                                  "To be           



                                                  infused by           



                                                  Radiology           



                                                  Staff           



                                                  ONLY"           

 

     Omnipaque      2013-0      No   Jennifer                85 mL,           [facility]

[facility]



     350mg/ml      9-15           [facility]                Route:           l



               19:37:                               IVP, Drug           [place]



                                                Form:           



                                                  SOLN,           



                                                  Dosing           



                                                  Weight           



                                                  72.727,           



                                                  kg,            



                                                  ONCALL,           



                                                  STAT,           



                                                  Start           



                                                  date:           



                                                  09/15/13           



                                                  14:37:00,           



                                                  Duration:           



                                                  1 doses or           



                                                  times,           



                                                  Dose =           



                                                  2.2ml/kg,           



                                                  Max dose =           



                                                  100ml --           



                                                  "To be           



                                                  infused by           



                                                  Radiology           



                                                  Staff           



                                                  ONLY"Dose           



                                                  =              



                                                  2.2ml/kg,           



                                                  Max dose =           



                                                  100ml --           



                                                  "To be           



                                                  infused by           



                                                  Radiology           



                                                  Staff           



                                                  ONLY"           

 

     Omnipaque      -0      No   Jennifer                85 mL,           [facility]

[facility]



     350mg/ml      9-15           [facility]                Route:           l



               19:37:                               IVP, Drug           [place]



                                                Form:           



                                                  SOLN,           



                                                  Dosing           



                                                  Weight           



                                                  72.727,           



                                                  kg,            



                                                  ONCALL,           



                                                  STAT,           



                                                  Start           



                                                  date:           



                                                  09/15/13           



                                                  14:37:00,           



                                                  Duration:           



                                                  1 doses or           



                                                  times,           



                                                  Dose =           



                                                  2.2ml/kg,           



                                                  Max dose =           



                                                  100ml --           



                                                  "To be           



                                                  infused by           



                                                  Radiology           



                                                  Staff           



                                                  ONLY"Dose           



                                                  =              



                                                  2.2ml/kg,           



                                                  Max dose =           



                                                  100ml --           



                                                  "To be           



                                                  infused by           



                                                  Radiology           



                                                  Staff           



                                                  ONLY"           

 

     Omnipaque      2013-0      No   [facility]ennifer                85 mL,           [facility]

[facility]



     350mg/ml      9-15           [facility]                Route:           l



               19:37:                               IVP, Drug           [place]



                                                Form:           



                                                  SOLN,           



                                                  Dosing           



                                                  Weight           



                                                  72.727,           



                                                  kg,            



                                                  ONCALL,           



                                                  STAT,           



                                                  Start           



                                                  date:           



                                                  09/15/13           



                                                  14:37:00,           



                                                  Duration:           



                                                  1 doses or           



                                                  times,           



                                                  Dose =           



                                                  2.2ml/kg,           



                                                  Max dose =           



                                                  100ml --           



                                                  "To be           



                                                  infused by           



                                                  Radiology           



                                                  Staff           



                                                  ONLY"Dose           



                                                  =              



                                                  2.2ml/kg,           



                                                  Max dose =           



                                                  100ml --           



                                                  "To be           



                                                  infused by           



                                                  Radiology           



                                                  Staff           



                                                  ONLY"           







Vital Signs







             Vital Name   Observation Time Observation Value Comments     Source

 

             Systolic (mm Hg) 2023 19:52:00                           [facility]

[facility] [place]

 

             Diastolic (mm Hg) 2023 19:52:00                           [facility]

[facility]

 

             Heart Rate   2023 19:52:00                           [facility]

 [facility]

 

             Height       2023 19:52:00 4 [ft_i]                  [facility]

 [place]

 

             Weight       2023 19:52:00                           [facility]

 [place]

 

             BMI Calculated 2023 19:52:00                           [facility]

[facility] [place]

 

             Diastolic (mm Hg) 2013 17:00:00                           [facility]

[facility] [place]

 

             Heart Rate   2013 17:00:00                           [facility]

 [place]

 

             Respitory Rate 2013 17:00:00                           [facility]

[facility]

 

             Temperature Oral (F) 2013 17:00:00 97.0 F                    

[facility]

 

             Systolic (mm Hg) 2013 17:00:00                           [facility]

[facility] [place]

 

             Systolic (mm Hg) 2013 12:00:00                           [facility]

[facility]

 

             Diastolic (mm Hg) 2013 12:00:00                           [facility]

[facility] [place]

 

             Respitory Rate 2013 12:00:00                           [facility]

[facility]

 

             Temperature Oral (F) 2013 12:00:00 96.3 F                    

[facility]

 

             Heart Rate   2013 12:00:00                           [facility]

 [facility]

 

             Heart Rate   2013 08:51:00                           [facility]

 [place]

 

             Temperature Oral (F) 2013 08:51:00 97.8 F                    

[facility]

 

             Systolic (mm Hg) 2013 08:51:00                           Ariel

[facility]

 

             Diastolic (mm Hg) 2013 08:51:00                           [facility]

[facility]

 

             Respitory Rate 2013 00:41:00                           [facility]

[facility] [place]

 

             Weight       2013-09-15 17:49:00                           [facility]

 [facility]

 

             Height       2013-09-15 17:49:00 157.48 cm                 [facility]

 [place]







Procedures







                Procedure       Date / Time Performed Performing Clinician Sourc

e

 

                LASIK                                           [facility] [place]

 

                LASIK                                           [facility]







Encounters







        Start   End     Encounter Admission Attending Care    Care    Encounter 

Source



        Date/Time Date/Time Type    Type    Clinicians Facility Department ID   

   

 

        2023 Outpatient                 [facility]     [id number]

[address] [facility]



        18:00:00 04:59:59                                 Neurology 02      l



                                                        [place]         Eduardo

 

        2023 Outpatient         ALONDRA JonesSCHLISSETTE MISCHER [address]

[id number] 



        13:00:00 23:59:59                 Sahil                   02      



                                        Jorge                         

 

        2023 Outpatient                 [facility]    [place]     [id number]

[address] [facility]



        20:00:00 04:59:59                                 Neurology 01      l



                                                        [place]         Eduardo

 

        2023 [facility]         [facility]

[id number] 



        15:00:00 23:59:59                 Sahil                   01      



                                        Jorge                         

 

        2020 Laboratory         Lab, [facility]    [id number]

[id number] 



        09:23:20 09:43:20 Only            [facility]  [id number]         



                                                [place] [id number]         



                                                [facility]fess[facility] [phone]         



                                                [place] [id number]             



                                                Office                  



                                                Building                 



                                                One                     

 

        2020 Laboratory         Lab, [facility]    1.2.

[id number] 

[facility]



        09:23:20 09:43:20 Only            Anene, [facility]  [id number]    

     ity of



                                                Morris [id number]         Asher

as



                                                [facility]fess[facility] [phone]         Me

dical



                                                [address]



                                                Office                  



                                                Building                 



                                                One                     

 

        2020 Outpatient R               [facility]    [id number]

[id number] [facility]



        09:00:00 09:00:00                                                 ity of



                                                                        [facility]

 

        2020 Ambulatory                 nullFlavo [facility]     [id number]

[id number] [facility]



        19:45:00 19:45:00 Pre-Reg                 r       Neurology 00      l



                                                        [place]         [place]

 

        2020 Ambulatory                 nullFlavo [facility]     [id number]

[id number] [facility]



        19:45:00 19:45:00 Pre-Reg                 r       Neurology 00      l



                                                        Harika         E[place]

 

        2020 Outpatient         ALONDRA JonesSCHER MISCH[facility] [address]

[id number] 



        13:45:00 13:45:00                 Sahil                   00      



                                        Jorge                         

 

        2013-09-15 2013 OU                      nullFlavo [facility] [id number]

[address] [facility]



        17:09:00 15:00:00                         r       Medical 58      l



                                                        [facility]

 

        2013-09-15 2013 OU                      nullFlavo [facility] [id number]

[address] [facility]



        17:09:00 15:00:00                         r       Medical 58      l



                                                        [facility]







Results







           Test Description Test Time  Test Comments Results    Result Comments 

Source









                    Thyroid Stimulating Hormone 2019 22:27:55 









                      Test Item  Value      Reference Range Interpretation Comme

nts









             TSH (test code = TSH) 0.445 mIU/mL 0.270-4.200               



Erythrocyte Sedimentation Rate [id number] 22:20:13





             Test Item    Value        Reference Range Interpretation Comments

 

             ESR STAT (test code = ESR STAT) 8 mm/hr      0-20                  

    



Comprehensive Metabolic [id number] 21:59:49





             Test Item    Value        Reference Range Interpretation Comments

 

             Sodium Level (test code = Sodium 139.0 mmol/L 135.0-145.0          

     



             Level)                                              

 

             Potassium Level (test code = 3.7 mmol/L   3.5-5.1                  

 



             Potassium Level)                                        

 

             Chloride Level (test code = 98 mmol/L                        



             Chloride Level)                                        

 

             CO2 (test code = CO2) 26 mmol/L    22-29                     

 

             Anion Gap (test code = Anion 15 mmol/L    7-16                     

 



             Gap)                                                

 

             BUN (test code = BUN) 14.70 mg/dL  8.00-23.00                

 

             Creatinine Level (test code = 0.70 mg/dL   0.50-0.90               

  



             Creatinine Level)                                        

 

             BUN/Creat Ratio (test code = 21                        N           

 



             BUN/Creat Ratio)                                        

 

             Glucose Level (test code = 121 mg/dL           H            



             Glucose Level)                                        

 

             Calcium Level (test code = 9.8 mg/dL    8.3-10.5                  



             Calcium Level)                                        

 

             Alk Phos (test code = Alk Phos) 98 U/L                       

    

 

             Bilirubin Total (test code = 0.4 mg/dL    0.1-0.9                  

 



             Bilirubin Total)                                        

 

             Albumin Level (test code = 4.6 g/dL     3.5-5.2                   



             Albumin Level)                                        

 

             Protein Total (test code = 7.4 g/dL     6.4-8.3                   



             Protein Total)                                        

 

             ALT (test code = ALT) 22 U/L       1-33                      

 

             AST (test code = AST) 24 U/L       1-32                      

 

             Globulin (test code = Globulin) 2.8 g/dL     2.9-3.1      L        

    

 

             A/G Ratio (test code = A/G 1.6 ratio                 N            



             Ratio)                                              



Comprehensive Metabolic [id number] 21:59:49





             Test Item    Value        Reference Range Interpretation Comments

 

             Sodium Level (test 139.0 mmol/L 135.0-145.0               



             code = Sodium Level)                                        

 

             Potassium Level 3.7 mmol/L   3.5-5.1                   



             (test code =                                        



             Potassium Level)                                        

 

             Chloride Level (test 98 mmol/L                        



             code = Chloride                                        



             Level)                                              

 

             CO2 (test code = 26 mmol/L    22-29                     



             CO2)                                                

 

             Anion Gap (test code 15 mmol/L    7-16                      



             = Anion Gap)                                        

 

             BUN (test code = 14.70 mg/dL  8.00-23.00                



             BUN)                                                

 

             Creatinine Level 0.70 mg/dL   0.50-0.90                 



             (test code =                                        



             Creatinine Level)                                        

 

             BUN/Creat Ratio 21                        N            



             (test code =                                        



             BUN/Creat Ratio)                                        

 

             Glucose Level (test 121 mg/dL           H            



             code = Glucose                                        



             Level)                                              

 

             Calcium Level (test 9.8 mg/dL    8.3-10.5                  



             code = Calcium                                        



             Level)                                              

 

             Alk Phos (test code 98 U/L                           



             = Alk Phos)                                         

 

             Bilirubin Total 0.4 mg/dL    0.1-0.9                   



             (test code =                                        



             Bilirubin Total)                                        

 

             Albumin Level (test 4.6 g/dL     3.5-5.2                   



             code = Albumin                                        



             Level)                                              

 

             Protein Total (test 7.4 g/dL     6.4-8.3                   



             code = Protein                                        



             Total)                                              

 

             ALT (test code = 22 U/L       1-33                      



             ALT)                                                

 

             AST (test code = 24 U/L       1-32                      



             AST)                                                

 

             Globulin (test code 2.8 g/dL     2.9-3.1      L            



             = Globulin)                                         

 

             A/G Ratio (test code 1.6 ratio                 N            



             = A/G Ratio)                                        

 

             eGFR AA (test code = >60                       N            eGFR (e

stimated



             eGFR AA)     mL/min/1.73 m2                           Glomerular



                                                                 Filtration Rate

) is



                                                                 an estimated va

lue,



                                                                 calculated from

 the



                                                                 patient's serum



                                                                 creatinine usin

g the



                                                                 MDRD equation. 

It is



                                                                 NOT the patient

's



                                                                 actual GFR. The

 eGFR



                                                                 provides a more



                                                                 clinically usef

ul



                                                                 measure of kidn

ey



                                                                 disease than se

rum



                                                                 creatinine



                                                                 alone.***This



                                                                 calculation neelima

es



                                                                 sex and race in

to



                                                                 account, if the



                                                                 information is



                                                                 provided. If th

e



                                                                 race is not



                                                                 provided, and t

he



                                                                 patient is



                                                                 -Carole

n,



                                                                 multiply by 1.2

12.



                                                                 If sex is not



                                                                 provided, and t

he



                                                                 patient is fema

le,



                                                                 multiply by 0.7

42.



                                                                 Results for pat

ients



                                                                 <18 years of ag

e



                                                                 have not been



                                                                 validated by th

e



                                                                 MDRD study and



                                                                 should be



                                                                 interpreted wit

h



                                                                 caution. eGFR R

esult



                                                                 Interpretation:

eGFR



                                                                 > or = 60 is in

 the



                                                                 Normal RangeeGF

R <



                                                                 60 may mean kid

neeru



                                                                 diseaseeGFR < 1

5 may



                                                                 mean kidney



                                                                 failure*** Rang

es



                                                                 recommended by 

the



                                                                 [employer]



                                                                 Foundation,



                                                                 http://nkdep.ni

h.gov



Lipid [id number] 21:59:49





             Test Item    Value        Reference Range Interpretation Comments

 

             Cholesterol Total 294 mg/dL    0-200        H            RISK OF HE

ART



             (test code =                                        DISEASEPublishe

d by



             Cholesterol Total)                                        American 

Heart



                                                                 Association Harriet

lyte



                                                                 Optimal Borderl

ine



                                                                 Increased RiskC

HOL



                                                                 <200 200-239 >2

40TRIG



                                                                 <150 150-199 >2

00HDL



                                                                 Male >60 <40HDL

 Female



                                                                 >60 <50LDL <100



                                                                 130-159 >160LDL

 Near



                                                                 optimal is 100-

129

 

             Triglycerides (test 616 mg/dL    9-200        H            



             code =                                              



             Triglycerides)                                        

 

             HDL (test code = 52 mg/dL     50-60                     



             HDL)                                                

 

             LDL (test code = N/A Trig     0-130        N            LDL calcula

tion is



             LDL)         >400 mg/dL                             unreliable when



                                                                 Triglyceride is



                                                                 greater than 40

0.

 

             VLDL (test code = N/A Trig     5-40         N            VLDL calcu

lation is



             VLDL)        >400 mg/dL                             unreliable when



                                                                 Triglyceride is



                                                                 greater than 40

0.

 

             Chol/HDL (test code 5.7 ratio    0.0-4.4      H            



             = Chol/HDL)                                         

 

             LDL/HDL Ratio (test N/A Trig                  N            LDL/HDL 

Ratio



             code = LDL/HDL >400                                   calculation i

s



             Ratio)                                              unreliable when



                                                                 Triglyceride is



                                                                 greater than 40

0.



Comprehensive Metabolic [id number] 21:59:49





             Test Item    Value        Reference Range Interpretation Comments

 

             Sodium Level (test 139.0 mmol/L 135.0-145.0               



             code = Sodium Level)                                        

 

             Potassium Level 3.7 mmol/L   3.5-5.1                   



             (test code =                                        



             Potassium Level)                                        

 

             Chloride Level (test 98 mmol/L                        



             code = Chloride                                        



             Level)                                              

 

             CO2 (test code = 26 mmol/L    22-29                     



             CO2)                                                

 

             Anion Gap (test code 15 mmol/L    7-16                      



             = Anion Gap)                                        

 

             BUN (test code = 14.70 mg/dL  8.00-23.00                



             BUN)                                                

 

             Creatinine Level 0.70 mg/dL   0.50-0.90                 



             (test code =                                        



             Creatinine Level)                                        

 

             BUN/Creat Ratio 21                        N            



             (test code =                                        



             BUN/Creat Ratio)                                        

 

             Glucose Level (test 121 mg/dL           H            



             code = Glucose                                        



             Level)                                              

 

             Calcium Level (test 9.8 mg/dL    8.3-10.5                  



             code = Calcium                                        



             Level)                                              

 

             Alk Phos (test code 98 U/L                           



             = Alk Phos)                                         

 

             Bilirubin Total 0.4 mg/dL    0.1-0.9                   



             (test code =                                        



             Bilirubin Total)                                        

 

             Albumin Level (test 4.6 g/dL     3.5-5.2                   



             code = Albumin                                        



             Level)                                              

 

             Protein Total (test 7.4 g/dL     6.4-8.3                   



             code = Protein                                        



             Total)                                              

 

             ALT (test code = 22 U/L       1-33                      



             ALT)                                                

 

             AST (test code = 24 U/L       1-32                      



             AST)                                                

 

             Globulin (test code 2.8 g/dL     2.9-3.1      L            



             = Globulin)                                         

 

             A/G Ratio (test code 1.6 ratio                 N            



             = A/G Ratio)                                        

 

             eGFR AA (test code = >60                       N            eGFR (e

stimated



             eGFR AA)     mL/min/1.73 m2                           Glomerular



                                                                 Filtration Rate

) is



                                                                 an estimated va

lue,



                                                                 calculated from

 the



                                                                 patient's serum



                                                                 creatinine usin

g the



                                                                 MDRD equation. 

It is



                                                                 NOT the patient

's



                                                                 actual GFR. The

 eGFR



                                                                 provides a more



                                                                 clinically usef

ul



                                                                 measure of kidn

ey



                                                                 disease than se

rum



                                                                 creatinine



                                                                 alone.***This



                                                                 calculation neelima

es



                                                                 sex and race in

to



                                                                 account, if the



                                                                 information is



                                                                 provided. If th

e



                                                                 race is not



                                                                 provided, and t

he



                                                                 patient is



                                                                 -Carole

n,



                                                                 multiply by 1.2

12.



                                                                 If sex is not



                                                                 provided, and t

he



                                                                 patient is fema

le,



                                                                 multiply by 0.7

42.



                                                                 Results for pat

ients



                                                                 <18 years of ag

e



                                                                 have not been



                                                                 validated by th

e



                                                                 MDRD study and



                                                                 should be



                                                                 interpreted wit

h



                                                                 caution. eGFR R

esult



                                                                 Interpretation:

eGFR



                                                                 > or = 60 is in

 the



                                                                 Normal RangeeGF

R <



                                                                 60 may mean kid

neeru



                                                                 diseaseeGFR < 1

5 may



                                                                 mean kidney



                                                                 failure*** Rang

es



                                                                 recommended by 

the



                                                                 [employer]



                                                                 Foundation,



                                                                 http://nkdep.ni

h.gov

 

             eGFR Non-AA (test >60.00                    N            eGFR (hailey

mated



             code = eGFR Non-AA) mL/min/1.73 m2                           Glomer

ular



                                                                 Filtration Rate

) is



                                                                 an estimated va

lue,



                                                                 calculated from

 the



                                                                 patient's serum



                                                                 creatinine usin

g the



                                                                 MDRD equation. 

It is



                                                                 NOT the patient

's



                                                                 actual GFR. The

 eGFR



                                                                 provides a more



                                                                 clinically usef

ul



                                                                 measure of kidn

ey



                                                                 disease than se

rum



                                                                 creatinine



                                                                 alone.***This



                                                                 calculation neelima

es



                                                                 sex and race in

to



                                                                 account, if the



                                                                 information is



                                                                 provided. If th

e



                                                                 race is not



                                                                 provided, and t

he



                                                                 patient is



                                                                 -Carole

n,



                                                                 multiply by 1.2

12.



                                                                 If sex is not



                                                                 provided, and t

he



                                                                 patient is fema

le,



                                                                 multiply by 0.7

42.



                                                                 Results for pat

ients



                                                                 <18 years of ag

e



                                                                 have not been



                                                                 validated by th

e



                                                                 MDRD study and



                                                                 should be



                                                                 interpreted wit

h



                                                                 caution. eGFR R

esult



                                                                 Interpretation:

eGFR



                                                                 > or = 60 is in

 the



                                                                 Normal RangeeGF

R <



                                                                 60 may mean kid

neeru



                                                                 diseaseeGFR < 1

5 may



                                                                 mean kidney



                                                                 failure*** Rang

es



                                                                 recommended by 

the



                                                                 [employer]



                                                                 [employer],



                                                                 http://nkdep.ni

h.gov



Complete Blood Count with [id number] 21:52:58





             Test Item    Value        Reference Range Interpretation Comments

 

             WBC (test code = WBC) 9.0 x10      4.4-10.5                  

 

             RBC (test code = RBC) 4.76 x10     3.75-5.20                 

 

             Hgb (test code = Hgb) 14.4 g/dL    12.2-14.8                 

 

             Hct (test code = Hct) 42.4 %       36.5-44.4                 

 

             MCV (test code = MCV) 89.10 fL     80..00              

 

             MCHC (test code = 34.00 g/dL   32.00-37.50               



             MCHC)                                               

 

             MCH (test code = MCH) 30.3 pg      27.0-32.5                 

 

             RDW CV (test code = 12.6 %       11.5-14.5                 



             RDW CV)                                             

 

             Platelets (test code = 340.0 x10    140.0-440.0               



             Platelets)                                          

 

             MPV (test code = MPV) 10.1 fL                   N            

 

             Slide Review (test Auto         Auto                      Result cr

eated by



             code = Slide Review)                                        [id number]_

SLIDE_REV_AUTO

 

             nRBC (test code = 0                         N            



             nRBC)                                               

 

             NRBC Abs (test code = 0.00 x10                  N            



             NRBC Abs)                                           

 

             IPF (test code = IPF) 0 %                       N            



Automated Uostyykmnlct3986-33-71 21:52:58





             Test Item    Value        Reference Range Interpretation Comments

 

             Neutro Auto (test code = Neutro 62.7 %       36.0-70.0             

    



             Auto)                                               

 

             Lymph Auto (test code = Lymph Auto) 28.3 %       12.0-44.0         

        

 

             Mono Auto (test code = Mono Auto) 7.6 %        0.0-11.0            

      

 

             Eos, Auto (test code = Eos, Auto) 0.7 %        0.0-7.0             

      

 

             Basophil Auto (test code = Basophil 0.3 %        0.0-2.0           

        



             Auto)                                               

 

             Neutro Absolute (test code = Neutro 5.7 x10      1.6-7.4           

        



             Absolute)                                           

 

             Lymph Absolute (test code = Lymph 2.56 x10     .50-4.60            

      



             Absolute)                                           

 

             Mono Absolute (test code = Mono .69 x10      .00-1.20              

    



             Absolute)                                           

 

             Eos Absolute (test code = Eos 0.06 x10     0.00-0.74               

  



             Absolute)                                           

 

             Baso Absolute (test code = Baso 0.03 x10     0.00-0.21             

    



             Absolute)                                           



Pro B Natriuretic Yojluev9199-09-02 21:52:58





             Test Item    Value        Reference Range Interpretation Comments

 

             NT-proBNP (test code = NT-proBNP) 80 pg/mL     0-124               

      



IG Fpddv7438-96-82 21:52:58





             Test Item    Value        Reference Range Interpretation Comments

 

             IG (test code = IG) 0.4 %        0.0-5.0                   

 

             IG Abs (test code = IG Abs) 0 x10                     N            



[id number] 12:35:11





             Test Item    Value        Reference Range Interpretation Comments

 

             LDL Direct (test code = LDL Direct) 144                       H    

        



[facility]Znt[facility]jLYMATMMIZ7108[facility]58 12:35:11





             Test Item    Value        Reference Range Interpretation Comments

 

             LDL Direct (test code = LDL Direct) 144                       H    

        



[facility] CenterPlronkcOEKCNKUDF4563-55-30 12:35:11





             Test Item    Value        Reference Range Interpretation Comments

 

             LDL Direct (test code = LDL Direct) 144                       H    

        



[facility] CenterAbtdylrFBUPTWSKH3561-49-52 12:35:11





             Test Item    Value        Reference Range Interpretation Comments

 

             LDL Direct (test code = LDL Direct) 144                       H    

        



[facility]WeaxqjoWBZKGSZJOU5618[facility]43 [facility]:00





             Test Item    Value        Reference Range Interpretation Comments

 

             Monocytes (test code = Monocytes) 8.0          2.0-12.0     N      

      



[facility] HospitalIdkllieEKMTVGIVIQ0883-99-91 08:00:00





             Test Item    Value        Reference Range Interpretation Comments

 

             Segs (test code = Segs) 48.0         45.0-75.0    N            



[facility]LbmtmpnDTOHCFCPHQ5250-55-40 [facility]:00





             Test Item    Value        Reference Range Interpretation Comments

 

             Lymphocytes (test code = Lymphocytes) 41.6         20.0-40.0    H  

          



[facility] HospitalBglsepxNSHCAVRXFR7166-41-79 08:00:00





             Test Item    Value        Reference Range Interpretation Comments

 

             RBC (test code = RBC) 3.87         4.20-5.40    L            



[facility] HospitalLkjskhmGYMVSJXWXP9975-36-15 08:00:00





             Test Item    Value        Reference Range Interpretation Comments

 

             WBC (test code = WBC) 7.4          3.7-10.4     N            



[facility] HospitalErzovgrYGRXROEFYV7426-27-51 08:00:00





             Test Item    Value        Reference Range Interpretation Comments

 

             Hgb (test code = Hgb) 11.9         12.0-16.0    L            



[facility] HospitalNethitfGKQPDNHJWV5162-76-18 08:00:00





             Test Item    Value        Reference Range Interpretation Comments

 

             Hct (test code = Hct) 35.2         36.0-48.0    L            



[facility] HospitalQufgxslYXFGVIKJXF7711-30-81 08:00:00





             Test Item    Value        Reference Range Interpretation Comments

 

             MCV (test code = MCV) 90.8         81.0-99.0    N            



[facility] HospitalYdaoblmRJKGXGPUCH5637-14-51 08:00:00





             Test Item    Value        Reference Range Interpretation Comments

 

             MCH (test code = MCH) 30.7 pg      27.0-31.0    N            



[facility] HospitalVjkttpkGSIZQBTNGZ6961-10-46 08:00:00





             Test Item    Value        Reference Range Interpretation Comments

 

             RDW (test code = RDW) 13.3         11.5-14.5    N            



[facility] HospitalMyejkizWPCCRBICYF0421-55-37 08:00:00





             Test Item    Value        Reference Range Interpretation Comments

 

             MCHC (test code = MCHC) 33.9         32.0-36.0    N            



[facility] HospitalLwggzktDXEOXVTAUO9517-69-00 08:00:00





             Test Item    Value        Reference Range Interpretation Comments

 

             Platelet (test code = Platelet) 279          133-450      N        

    



[facility] HospitalUvdiuzkAOCQBDDHZK7493-31-07 08:00:00





             Test Item    Value        Reference Range Interpretation Comments

 

             MPV (test code = MPV) 7.5          7.4-10.4     N            



[facility]GisvikqKICPCPXAO9324[facility]97 [facility]:00





             Test Item    Value        Reference Range Interpretation Comments

 

             AGAP (test code = AGAP) 13.1         10.0-20.0    N            



[facility] TylerVzxmtyaCVWVYXNZB5327-08-93 08:00:00





             Test Item    Value        Reference Range Interpretation Comments

 

             eGFR (test code = eGFR) 96                                     



[facility] TylerFqvoxroPATYEAGVK4294-86-03 08:00:00





             Test Item    Value        Reference Range Interpretation Comments

 

             Creatinine Lvl (test code = Creatinine 0.7          0.5-1.4      N 

           



             Lvl)                                                



[facility] TylerDhaptzzJZPIGNEYR8999-90-97 08:00:00





             Test Item    Value        Reference Range Interpretation Comments

 

             BUN (test code = BUN) 18           7-22         N            



[facility] TylerVsyjbpxFWXRJDDKU6205-11-92 08:00:00





             Test Item    Value        Reference Range Interpretation Comments

 

             Glucose Lvl (test code = Glucose Lvl) 101          70-99        H  

          



[facility] TylerVhnzpghRZDLOEUXL7490-84-73 08:00:00





             Test Item    Value        Reference Range Interpretation Comments

 

             Sodium Lvl (test code = Sodium Lvl) 142          135-145      N    

        



[facility] TylerKirbuxcIHLLBCAAW5860-64-04 08:00:00





             Test Item    Value        Reference Range Interpretation Comments

 

             Calcium Lvl (test code = Calcium Lvl) 8.3          8.5-10.5     L  

          



[facility] TylerSswetdtMXCVVTFEG1805-23-04 08:00:00





             Test Item    Value        Reference Range Interpretation Comments

 

             CO2 (test code = CO2) 23           24-32        L            



[facility] TylerKgrlahaUDKYMLTCZ5875-67-31 08:00:00





             Test Item    Value        Reference Range Interpretation Comments

 

             Chloride Lvl (test code = Chloride Lvl) 110                 H

            



[facility] TylerRwrlwhdUFNKLPRNL1713-33-75 08:00:00





             Test Item    Value        Reference Range Interpretation Comments

 

             Potassium Lvl (test code = Potassium 4.1          3.5-5.1      N   

         



             Lvl)                                                



[facility] TylerRruiabeEERFJWHMS1759-21-98 08:00:00





             Test Item    Value        Reference Range Interpretation Comments

 

             LDL (test code = LDL) See Note mg/dL                           



                          5*NA*(2013                           



                          03:00:00)                              



[facility] TylerSzjqtmnYWSFOCMSI4037-90-28 08:00:00





             Test Item    Value        Reference Range Interpretation Comments

 

             CHD Risk (test code = CHD Risk) 8.45         3.90-5.80    H        

    



[facility] TylerWeyswunAFFYHNCQP2405-05-92 08:00:00





             Test Item    Value        Reference Range Interpretation Comments

 

             HDL (test code = HDL) 29                        L            



[facility] TylerSjdzidcJVCKGIUKP9151-65-13 08:00:00





             Test Item    Value        Reference Range Interpretation Comments

 

             Trig (test code = Trig) 531                       H            



[facility] TylerGlqavtdKOVUURHGX1644-69-70 08:00:00





             Test Item    Value        Reference Range Interpretation Comments

 

             Chol (test code = Chol) 245                       H            



[facility] HospitalUttnoxoOFFZHWVIQH9473-54-46 08:00:00





             Test Item    Value        Reference Range Interpretation Comments

 

             Basophils (test code = 0.7          See_Comment  N             [Aut

omated message] The



             Basophils)                                          system which ge

nerated



                                                                 this result tra

nsmitted



                                                                 reference range

: <=1.0.



                                                                 The reference r

erna was



                                                                 not used to int

erpret



                                                                 this result as



                                                                 normal/abnormal

.



[facility] HospitalIxaqezkROPXNKHJAU8744-22-54 08:00:00





             Test Item    Value        Reference Range Interpretation Comments

 

             Segs-Bands # (test code = Segs-Bands #) 3.6          1.5-8.1      N

            



[facility] HospitalLypfzswQTIGYFECSD1294-05-41 08:00:00





             Test Item    Value        Reference Range Interpretation Comments

 

             Lymphocytes # (test code = Lymphocytes 3.1          1.0-5.5      N 

           



             #)                                                  



[facility] HospitalGfpxicgKOVYUZPBQZ2448-00-76 08:00:00





             Test Item    Value        Reference Range Interpretation Comments

 

             Monocytes # (test code 0.6          See_Comment  N             [Aut

omated message] The



             = Monocytes #)                                        system which 

generated



                                                                 this result tra

nsmitted



                                                                 reference range

: <=0.8.



                                                                 The reference r

erna was



                                                                 not used to int

erpret



                                                                 this result as



                                                                 normal/abnormal

.



[facility] HospitalTaktmwgKWWQYSAGFS8351-81-17 08:00:00





             Test Item    Value        Reference Range Interpretation Comments

 

             Eosinophils # (test code 0.1          See_Comment  N             [A

utomated message] The



             = Eosinophils #)                                        system whic

h generated



                                                                 this result tra

nsmitted



                                                                 reference range

: <=0.5.



                                                                 The reference r

erna was



                                                                 not used to int

erpret



                                                                 this result as



                                                                 normal/abnormal

.



[facility] HospitalUieoliyUYKOSLWAOQ2618-27-08 08:00:00





             Test Item    Value        Reference Range Interpretation Comments

 

             Basophils # (test code 0.1          See_Comment  N             [Aut

omated message] The



             = Basophils #)                                        system which 

generated



                                                                 this result tra

nsmitted



                                                                 reference range

: <=0.2.



                                                                 The reference r

erna was



                                                                 not used to int

erpret



                                                                 this result as



                                                                 normal/abnormal

.



[facility] HospitalOobjobqQZMHOBMXKE9801-39-07 08:00:00





             Test Item    Value        Reference Range Interpretation Comments

 

             Eosinophils (test code = 1.7          See_Comment  N             [A

utomated message] The



             Eosinophils)                                        system which ge

nerated



                                                                 this result tra

nsmitted



                                                                 reference range

: <=4.0.



                                                                 The reference r

erna was



                                                                 not used to int

erpret



                                                                 this result as



                                                                 normal/abnormal

.



[facility] HospitalXramkgpOFCKPRFTGB0961-13-07 08:00:00





             Test Item    Value        Reference Range Interpretation Comments

 

             Monocytes (test code = Monocytes) 8.0          2.0-12.0     N      

      



[facility] HospitalAzbvjdyUACNSKHMUV4680-65-01 08:00:00





             Test Item    Value        Reference Range Interpretation Comments

 

             Segs (test code = Segs) 48.0         45.0-75.0    N            



[facility] HospitalZnpkanuAVOQGYYCEW5985-64-21 08:00:00





             Test Item    Value        Reference Range Interpretation Comments

 

             Lymphocytes (test code = Lymphocytes) 41.6         20.0-40.0    H  

          



[facility] HospitalMjciqpbIFWXYHOHSK2888-59-16 08:00:00





             Test Item    Value        Reference Range Interpretation Comments

 

             RBC (test code = RBC) 3.87         4.20-5.40    L            



[facility] HospitalFlfqfurWHYSSDRJSH0183-61-34 08:00:00





             Test Item    Value        Reference Range Interpretation Comments

 

             WBC (test code = WBC) 7.4          3.7-10.4     N            



[facility] HospitalCinancoWPSGJDMRMZ0466-79-29 08:00:00





             Test Item    Value        Reference Range Interpretation Comments

 

             Hgb (test code = Hgb) 11.9         12.0-16.0    L            



[facility] HospitalHdokbuvPLPJIYBWTO5261-54-30 08:00:00





             Test Item    Value        Reference Range Interpretation Comments

 

             Hct (test code = Hct) 35.2         36.0-48.0    L            



[facility] HospitalYklywptPZXWQQYBFL4014-40-36 08:00:00





             Test Item    Value        Reference Range Interpretation Comments

 

             MCV (test code = MCV) 90.8         81.0-99.0    N            



[facility] HospitalKnrdavnNWYAYBFVFN2729-27-23 08:00:00





             Test Item    Value        Reference Range Interpretation Comments

 

             MCH (test code = MCH) 30.7 pg      27.0-31.0    N            



[facility] HospitalZujqmotPMWMMGSWNM7660-41-42 08:00:00





             Test Item    Value        Reference Range Interpretation Comments

 

             RDW (test code = RDW) 13.3         11.5-14.5    N            



[facility] HospitalEpspoiyGVTQIKUZQT3816-20-79 08:00:00





             Test Item    Value        Reference Range Interpretation Comments

 

             MCHC (test code = MCHC) 33.9         32.0-36.0    N            



[facility]wxabatSXYPSSAODU0314-95-94 [facility]:00





             Test Item    Value        Reference Range Interpretation Comments

 

             Platelet (test code = Platelet) 279          133-450      N        

    



[facility] HospitalUndtcafBSGRPRSOYU4082-11-33 08:00:00





             Test Item    Value        Reference Range Interpretation Comments

 

             MPV (test code = MPV) 7.5          7.4-10.4     N            



[facility]ngilnnYDVDVUMEV5770[facility]94-90 08[facility]:00





             Test Item    Value        Reference Range Interpretation Comments

 

             AGAP (test code = AGAP) 13.1         10.0-20.0    N            



[facility] TylerEbqzhxbRDDSPLMEH3111-72-03 08:00:00





             Test Item    Value        Reference Range Interpretation Comments

 

             eGFR (test code = eGFR) 96                                     



[facility] TylerLlmzkmuGJFMJAAYT7995-32-05 08:00:00





             Test Item    Value        Reference Range Interpretation Comments

 

             Creatinine Lvl (test code = Creatinine 0.7          0.5-1.4      N 

           



             Lvl)                                                



[facility] TylerWxvggsiXATNBXDLY4291-14-43 08:00:00





             Test Item    Value        Reference Range Interpretation Comments

 

             BUN (test code = BUN) 18           7-22         N            



[facility] TylerIdpzhmdSOSPUWCFQ2728-99-22 08:00:00





             Test Item    Value        Reference Range Interpretation Comments

 

             Glucose Lvl (test code = Glucose Lvl) 101          70-99        H  

          



[facility] TylerNnsarteISXIYFKGN7775-44-24 08:00:00





             Test Item    Value        Reference Range Interpretation Comments

 

             Sodium Lvl (test code = Sodium Lvl) 142          135-145      N    

        



[facility] TylerBogfbrzPHLVIFGXR5948-46-66 08:00:00





             Test Item    Value        Reference Range Interpretation Comments

 

             Calcium Lvl (test code = Calcium Lvl) 8.3          8.5-10.5     L  

          



[facility] TylerRiigpgvXQIVSPFSI9353-73-83 08:00:00





             Test Item    Value        Reference Range Interpretation Comments

 

             CO2 (test code = CO2) 23           24-32        L            



[facility] TylerTbwyyrnRUCIDFLLS5962-58-43 08:00:00





             Test Item    Value        Reference Range Interpretation Comments

 

             Chloride Lvl (test code = Chloride Lvl) 110                 H

            



[facility] TylerDjwfqnhNFTICNNIV5835-01-58 08:00:00





             Test Item    Value        Reference Range Interpretation Comments

 

             Potassium Lvl (test code = Potassium 4.1          3.5-5.1      N   

         



             Lvl)                                                



[facility] TylerDgkvabmGADKVBGSF4845-23-00 08:00:00





             Test Item    Value        Reference Range Interpretation Comments

 

             LDL (test code = LDL) See Note mg/dL                           



                          5*NA*(2013                           



                          03:00:00)                              



[facility] TylerGtmkasuFWSNEDGJK3679-68-81 08:00:00





             Test Item    Value        Reference Range Interpretation Comments

 

             CHD Risk (test code = CHD Risk) 8.45         3.90-5.80    H        

    



[facility] TylerQsudfhoZHTGOJTTH3023-85-41 08:00:00





             Test Item    Value        Reference Range Interpretation Comments

 

             HDL (test code = HDL) 29                        L            



[facility] TylerKuoemhcYEDWGIEVU4275-77-22 08:00:00





             Test Item    Value        Reference Range Interpretation Comments

 

             Trig (test code = Trig) 531                       H            



[facility] TylerEugvzlfPWLMLCCZM4325-70-36 08:00:00





             Test Item    Value        Reference Range Interpretation Comments

 

             Chol (test code = Chol) 245                       H            



[facility] HospitalGlvysccXXFBDYXHCA7028-56-59 08:00:00





             Test Item    Value        Reference Range Interpretation Comments

 

             Basophils (test code = 0.7          See_Comment  N             [Aut

omated message] The



             Basophils)                                          system which ge

nerated



                                                                 this result tra

nsmitted



                                                                 reference range

: <=1.0.



                                                                 The reference r

erna was



                                                                 not used to int

erpret



                                                                 this result as



                                                                 normal/abnormal

.



[facility] HospitalMoazbfoFQZNKTNYJW7610-41-82 08:00:00





             Test Item    Value        Reference Range Interpretation Comments

 

             Segs-Bands # (test code = Segs-Bands #) 3.6          1.5-8.1      N

            



[facility] HospitalDyvwjzjCQXJKBCYIY3801-63-04 08:00:00





             Test Item    Value        Reference Range Interpretation Comments

 

             Lymphocytes # (test code = Lymphocytes 3.1          1.0-5.5      N 

           



             #)                                                  



[facility] HospitalTzsuvedMUELSIEGRQ7508-00-38 08:00:00





             Test Item    Value        Reference Range Interpretation Comments

 

             Monocytes # (test code 0.6          See_Comment  N             [Aut

omated message] The



             = Monocytes #)                                        system which 

generated



                                                                 this result tra

nsmitted



                                                                 reference range

: <=0.8.



                                                                 The reference r

erna was



                                                                 not used to int

erpret



                                                                 this result as



                                                                 normal/abnormal

.



[facility] HospitalCfmzauqPBCYIAHRUF0016-97-99 08:00:00





             Test Item    Value        Reference Range Interpretation Comments

 

             Eosinophils # (test code 0.1          See_Comment  N             [A

utomated message] The



             = Eosinophils #)                                        system whic

h generated



                                                                 this result tra

nsmitted



                                                                 reference range

: <=0.5.



                                                                 The reference r

erna was



                                                                 not used to int

erpret



                                                                 this result as



                                                                 normal/abnormal

.



[facility] HospitalRzxtzvfUQVJJKNRUQ9138-25-51 08:00:00





             Test Item    Value        Reference Range Interpretation Comments

 

             Basophils # (test code 0.1          See_Comment  N             [Aut

omated message] The



             = Basophils #)                                        system which 

generated



                                                                 this result tra

nsmitted



                                                                 reference range

: <=0.2.



                                                                 The reference r

erna was



                                                                 not used to int

erpret



                                                                 this result as



                                                                 normal/abnormal

.



[facility] HospitalAxhlcmrPDUALLKQEM0990-80-17 08:00:00





             Test Item    Value        Reference Range Interpretation Comments

 

             Eosinophils (test code = 1.7          See_Comment  N             [A

utomated message] The



             Eosinophils)                                        system which ge

nerated



                                                                 this result tra

nsmitted



                                                                 reference range

: <=4.0.



                                                                 The reference r

erna was



                                                                 not used to int

erpret



                                                                 this result as



                                                                 normal/abnormal

.



[facility] HospitalNkbotasKJEDRPYUUZ4651-88-69 08:00:00





             Test Item    Value        Reference Range Interpretation Comments

 

             Monocytes (test code = Monocytes) 8.0          2.0-12.0     N      

      



[facility] HospitalKatyjxwPDLEUVWOPV2126-60-21 08:00:00





             Test Item    Value        Reference Range Interpretation Comments

 

             Segs (test code = Segs) 48.0         45.0-75.0    N            



[facility] HospitalGdttzowMYKBCULBYP7732-00-56 08:00:00





             Test Item    Value        Reference Range Interpretation Comments

 

             Lymphocytes (test code = Lymphocytes) 41.6         20.0-40.0    H  

          



[facility] HospitalRkowroqLBXNNZLHWM1802-33-19 08:00:00





             Test Item    Value        Reference Range Interpretation Comments

 

             RBC (test code = RBC) 3.87         4.20-5.40    L            



[facility] HospitalMdlpcmdWKBBVUDPKD4993-01-97 08:00:00





             Test Item    Value        Reference Range Interpretation Comments

 

             WBC (test code = WBC) 7.4          3.7-10.4     N            



[facility] HospitalEysgycoWHGVFCZHCE8518-51-26 08:00:00





             Test Item    Value        Reference Range Interpretation Comments

 

             Hgb (test code = Hgb) 11.9         12.0-16.0    L            



[facility] HospitalKzkrnnzVZXQQNRXET8030-58-11 08:00:00





             Test Item    Value        Reference Range Interpretation Comments

 

             Hct (test code = Hct) 35.2         36.0-48.0    L            



[facility] HospitalLdeiklyIZXQHZLZCY7340-83-59 08:00:00





             Test Item    Value        Reference Range Interpretation Comments

 

             MCV (test code = MCV) 90.8         81.0-99.0    N            



[facility] HospitalOipbuyfBBLSELJSVM9937-07-67 08:00:00





             Test Item    Value        Reference Range Interpretation Comments

 

             MCH (test code = MCH) 30.7 pg      27.0-31.0    N            



[facility] HospitalDuctbgbGMIBJDBXDH3937-81-36 08:00:00





             Test Item    Value        Reference Range Interpretation Comments

 

             RDW (test code = RDW) 13.3         11.5-14.5    N            



[facility] HospitalOjedcxtSOSEOMFWPX0987-29-97 08:00:00





             Test Item    Value        Reference Range Interpretation Comments

 

             MCHC (test code = MCHC) 33.9         32.0-36.0    N            



[facility] HospitalYuppvshJQHLGCTNZG0056-53-25 08:00:00





             Test Item    Value        Reference Range Interpretation Comments

 

             Platelet (test code = Platelet) 279          133-450      N        

    



[facility] HospitalHruvkxrMMSVCJIXWN0792-03-13 08:00:00





             Test Item    Value        Reference Range Interpretation Comments

 

             MPV (test code = MPV) 7.5          7.4-10.4     N            



[facility] TylerOirilavGRKJUFHYT6758-07-15 08:00:00





             Test Item    Value        Reference Range Interpretation Comments

 

             AGAP (test code = AGAP) 13.1         10.0-20.0    N            



[facility] TylerSmktshbSIKNXRRNZ0090-28-94 08:00:00





             Test Item    Value        Reference Range Interpretation Comments

 

             eGFR (test code = eGFR) 96                                     



[facility] TylerGgechexXVAKWYYAC3027-92-60 08:00:00





             Test Item    Value        Reference Range Interpretation Comments

 

             Creatinine Lvl (test code = Creatinine 0.7          0.5-1.4      N 

           



             Lvl)                                                



[facility] TylerQompjjwWNFVFRIKM6955-46-33 08:00:00





             Test Item    Value        Reference Range Interpretation Comments

 

             BUN (test code = BUN) 18           7-22         N            



[facility] TylerHfdfyonRQNHYFVEK4332-96-29 08:00:00





             Test Item    Value        Reference Range Interpretation Comments

 

             Glucose Lvl (test code = Glucose Lvl) 101          70-99        H  

          



[facility] TylerWyazthsWGCXFTAHO9366-18-95 08:00:00





             Test Item    Value        Reference Range Interpretation Comments

 

             Sodium Lvl (test code = Sodium Lvl) 142          135-145      N    

        



[facility] TylerOpynkkgZGVSSCBAO6827-17-37 08:00:00





             Test Item    Value        Reference Range Interpretation Comments

 

             Calcium Lvl (test code = Calcium Lvl) 8.3          8.5-10.5     L  

          



[facility] TylerOjxsqwaHPOHTGENZ2330-52-46 08:00:00





             Test Item    Value        Reference Range Interpretation Comments

 

             CO2 (test code = CO2) 23           24-32        L            



[facility] TylerGgufuweWCVFRRWFA4482-11-08 08:00:00





             Test Item    Value        Reference Range Interpretation Comments

 

             Chloride Lvl (test code = Chloride Lvl) 110                 H

            



[facility] TylerLipzvxcLMOXVDUCK2170-78-95 08:00:00





             Test Item    Value        Reference Range Interpretation Comments

 

             Potassium Lvl (test code = Potassium 4.1          3.5-5.1      N   

         



             Lvl)                                                



[facility] TylerQmtjjgsHXTRHJQGA0171-20-62 08:00:00





             Test Item    Value        Reference Range Interpretation Comments

 

             LDL (test code = LDL) See Note mg/dL                           



                          5*NA*(2013                           



                          03:00:00)                              



[facility] TylerMhhhqttWGUAOCJDP9665-88-74 08:00:00





             Test Item    Value        Reference Range Interpretation Comments

 

             CHD Risk (test code = CHD Risk) 8.45         3.90-5.80    H        

    



[facility] TylerBrtryilSHJZTQUIZ0139-99-96 08:00:00





             Test Item    Value        Reference Range Interpretation Comments

 

             HDL (test code = HDL) 29                        L            



[facility] TylerSpdtcpdUNPLJRLCP1877-18-02 08:00:00





             Test Item    Value        Reference Range Interpretation Comments

 

             Trig (test code = Trig) 531                       H            



[facility] TylerJzeabzvJECVQEZFI4210-25-38 08:00:00





             Test Item    Value        Reference Range Interpretation Comments

 

             Chol (test code = Chol) 245                       H            



[facility] HospitalOvbumagDVUQIEFXAC1218-05-03 08:00:00





             Test Item    Value        Reference Range Interpretation Comments

 

             Basophils (test code = 0.7          See_Comment  N             [Aut

omated message] The



             Basophils)                                          system which ge

nerated



                                                                 this result tra

nsmitted



                                                                 reference range

: <=1.0.



                                                                 The reference r

erna was



                                                                 not used to int

erpret



                                                                 this result as



                                                                 normal/abnormal

.



[facility] HospitalWksslebVIIVBJWAJR3446-28-52 08:00:00





             Test Item    Value        Reference Range Interpretation Comments

 

             Segs-Bands # (test code = Segs-Bands #) 3.6          1.5-8.1      N

            



[facility] HospitalCfgsevyLJEQJCJKNC1453-68-47 08:00:00





             Test Item    Value        Reference Range Interpretation Comments

 

             Lymphocytes # (test code = Lymphocytes 3.1          1.0-5.5      N 

           



             #)                                                  



[facility] HospitalGxupbzbHCDYWMPPKU2799-12-29 08:00:00





             Test Item    Value        Reference Range Interpretation Comments

 

             Monocytes # (test code 0.6          See_Comment  N             [Aut

omated message] The



             = Monocytes #)                                        system which 

generated



                                                                 this result tra

nsmitted



                                                                 reference range

: <=0.8.



                                                                 The reference r

erna was



                                                                 not used to int

erpret



                                                                 this result as



                                                                 normal/abnormal

.



[facility] HospitalJfqichbNZXYMJIBBM9282-35-96 08:00:00





             Test Item    Value        Reference Range Interpretation Comments

 

             Eosinophils # (test code 0.1          See_Comment  N             [A

utomated message] The



             = Eosinophils #)                                        system whic

h generated



                                                                 this result tra

nsmitted



                                                                 reference range

: <=0.5.



                                                                 The reference r

erna was



                                                                 not used to int

erpret



                                                                 this result as



                                                                 normal/abnormal

.



[facility] HospitalBdpndtoTMZZXEFEOY2426-43-99 08:00:00





             Test Item    Value        Reference Range Interpretation Comments

 

             Basophils # (test code 0.1          See_Comment  N             [Aut

omated message] The



             = Basophils #)                                        system which 

generated



                                                                 this result tra

nsmitted



                                                                 reference range

: <=0.2.



                                                                 The reference r

erna was



                                                                 not used to int

erpret



                                                                 this result as



                                                                 normal/abnormal

.



[facility] HospitalGzrtwyfDNAFSQWUXJ1726-70-70 08:00:00





             Test Item    Value        Reference Range Interpretation Comments

 

             Eosinophils (test code = 1.7          See_Comment  N             [A

utomated message] The



             Eosinophils)                                        system which ge

nerated



                                                                 this result tra

nsmitted



                                                                 reference range

: <=4.0.



                                                                 The reference r

erna was



                                                                 not used to int

erpret



                                                                 this result as



                                                                 normal/abnormal

.



[facility] HospitalBiwblsnBLIESBBVHU8563-54-68 08:00:00





             Test Item    Value        Reference Range Interpretation Comments

 

             Monocytes (test code = Monocytes) 8.0          2.0-12.0     N      

      



[facility] HospitalEuuhlaxVFSBSIARTM4539-66-38 08:00:00





             Test Item    Value        Reference Range Interpretation Comments

 

             Segs (test code = Segs) 48.0         45.0-75.0    N            



[facility] HospitalXxavnwhJUYFTJZWND6888-67-89 08:00:00





             Test Item    Value        Reference Range Interpretation Comments

 

             Lymphocytes (test code = Lymphocytes) 41.6         20.0-40.0    H  

          



[facility] HospitalRmrrrymRUVKQFGMAE9288-03-66 08:00:00





             Test Item    Value        Reference Range Interpretation Comments

 

             RBC (test code = RBC) 3.87         4.20-5.40    L            



[facility] HospitalHhyuntnZATGYVLHQH6874-79-06 08:00:00





             Test Item    Value        Reference Range Interpretation Comments

 

             WBC (test code = WBC) 7.4          3.7-10.4     N            



[facility] HospitalFzgctixBXNTSNLRYZ1557-22-98 08:00:00





             Test Item    Value        Reference Range Interpretation Comments

 

             Hgb (test code = Hgb) 11.9         12.0-16.0    L            



[facility] HospitalTuactfoOSNUCOLWOY9870-43-85 08:00:00





             Test Item    Value        Reference Range Interpretation Comments

 

             Hct (test code = Hct) 35.2         36.0-48.0    L            



[facility] HospitalHfwnjsvWKHEDUXCHD7796-73-94 08:00:00





             Test Item    Value        Reference Range Interpretation Comments

 

             MCV (test code = MCV) 90.8         81.0-99.0    N            



[facility] HospitalFmpubfgCMVSVUJVHM1619-37-76 08:00:00





             Test Item    Value        Reference Range Interpretation Comments

 

             MCH (test code = MCH) 30.7 pg      27.0-31.0    N            



[facility] HospitalOiymbbrRFYFIQDRFW4688-42-51 08:00:00





             Test Item    Value        Reference Range Interpretation Comments

 

             RDW (test code = RDW) 13.3         11.5-14.5    N            



[facility] HospitalMqzodzsPFOQXKIFCJ4018-50-77 08:00:00





             Test Item    Value        Reference Range Interpretation Comments

 

             MCHC (test code = MCHC) 33.9         32.0-36.0    N            



[facility] HospitalDblcpcbAEUDJHFIBF6533-33-38 08:00:00





             Test Item    Value        Reference Range Interpretation Comments

 

             Platelet (test code = Platelet) 279          133-450      N        

    



[facility] HospitalIrsmoomGMOGWAPTCM6225-05-85 08:00:00





             Test Item    Value        Reference Range Interpretation Comments

 

             MPV (test code = MPV) 7.5          7.4-10.4     N            



[facility] TylerLjglyhlOMYPWZZCC6257-72-63 08:00:00





             Test Item    Value        Reference Range Interpretation Comments

 

             AGAP (test code = AGAP) 13.1         10.0-20.0    N            



[facility] TylerFjdmoioECIBCPMKA4880-58-04 08:00:00





             Test Item    Value        Reference Range Interpretation Comments

 

             eGFR (test code = eGFR) 96                                     



[facility] TylerUrghowbFDCINRNOC6853-09-06 08:00:00





             Test Item    Value        Reference Range Interpretation Comments

 

             Creatinine Lvl (test code = Creatinine 0.7          0.5-1.4      N 

           



             Lvl)                                                



[id number] [facility]:00





             Test Item    Value        Reference Range Interpretation Comments

 

             BUN (test code = BUN) 18           7-22         N            



[facility] TylerMqkewzuGSYZVTVGQ7034-43-42 08:00:00





             Test Item    Value        Reference Range Interpretation Comments

 

             Glucose Lvl (test code = Glucose Lvl) 101          70-99        H  

          



[facility] TylerAwwedslSURAODDTQ7304-69-86 08:00:00





             Test Item    Value        Reference Range Interpretation Comments

 

             Sodium Lvl (test code = Sodium Lvl) 142          135-145      N    

        



[facility] TylerCjkrromYZVOGSLWE6684-82-50 08:00:00





             Test Item    Value        Reference Range Interpretation Comments

 

             Calcium Lvl (test code = Calcium Lvl) 8.3          8.5-10.5     L  

          



[facility] TylerIzkgzhgLUIMSCRQN8943-29-37 08:00:00





             Test Item    Value        Reference Range Interpretation Comments

 

             CO2 (test code = CO2) 23           24-32        L            



[facility] TylerHjpewmfBAAKONGFN9566-74-36 08:00:00





             Test Item    Value        Reference Range Interpretation Comments

 

             Chloride Lvl (test code = Chloride Lvl) 110                 H

            



[facility] TylerOyblpvpTHLFKHPXK4395-63-64 08:00:00





             Test Item    Value        Reference Range Interpretation Comments

 

             Potassium Lvl (test code = Potassium 4.1          3.5-5.1      N   

         



             Lvl)                                                



[facility] TylerTbkenvsLQTRRZGWY1639-79-58 08:00:00





             Test Item    Value        Reference Range Interpretation Comments

 

             LDL (test code = LDL) See Note mg/dL                           



                          5*NA*(2013                           



                          03:00:00)                              



[facility] TylerGejponfOUNWIHUNB4698-31-06 08:00:00





             Test Item    Value        Reference Range Interpretation Comments

 

             CHD Risk (test code = CHD Risk) 8.45         3.90-5.80    H        

    



[facility] TylerFelgrqkSLOJDXEZW2023-40-05 08:00:00





             Test Item    Value        Reference Range Interpretation Comments

 

             HDL (test code = HDL) 29                        L            



[facility] TylerSffvopxRSVUIFAXV8190-71-83 08:00:00





             Test Item    Value        Reference Range Interpretation Comments

 

             Trig (test code = Trig) 531                       H            



[facility] TylerYozaiwcVXIHDYJKE3080-72-40 08:00:00





             Test Item    Value        Reference Range Interpretation Comments

 

             Chol (test code = Chol) 245                       H            



[facility] HospitalWayfljmAMGZQPOHVL4647-46-05 08:00:00





             Test Item    Value        Reference Range Interpretation Comments

 

             Basophils (test code = 0.7          See_Comment  N             [Aut

omated message] The



             Basophils)                                          system which ge

nerated



                                                                 this result tra

nsmitted



                                                                 reference range

: <=1.0.



                                                                 The reference r

erna was



                                                                 not used to int

erpret



                                                                 this result as



                                                                 normal/abnormal

.



[facility] HospitalTkfnlmoPEMFGSEVVL2717-45-13 08:00:00





             Test Item    Value        Reference Range Interpretation Comments

 

             Segs-Bands # (test code = Segs-Bands #) 3.6          1.5-8.1      N

            



[facility] HospitalThrxmbnDNXGQQVZPP8380-83-65 08:00:00





             Test Item    Value        Reference Range Interpretation Comments

 

             Lymphocytes # (test code = Lymphocytes 3.1          1.0-5.5      N 

           



             #)                                                  



[facility] HospitalMmmajtrMIFNZAVYVJ2256-08-91 08:00:00





             Test Item    Value        Reference Range Interpretation Comments

 

             Monocytes # (test code 0.6          See_Comment  N             [Aut

omated message] The



             = Monocytes #)                                        system which 

generated



                                                                 this result tra

nsmitted



                                                                 reference range

: <=0.8.



                                                                 The reference r

erna was



                                                                 not used to int

erpret



                                                                 this result as



                                                                 normal/abnormal

.



[facility] HospitalYfzyggcYDPJDZRJMF3134-16-62 08:00:00





             Test Item    Value        Reference Range Interpretation Comments

 

             Eosinophils # (test code 0.1          See_Comment  N             [A

utomated message] The



             = Eosinophils #)                                        system whic

h generated



                                                                 this result tra

nsmitted



                                                                 reference range

: <=0.5.



                                                                 The reference r

erna was



                                                                 not used to int

erpret



                                                                 this result as



                                                                 normal/abnormal

.



[facility] HospitalGyzekrfMUHXMZOLIK5315-71-93 08:00:00





             Test Item    Value        Reference Range Interpretation Comments

 

             Basophils # (test code 0.1          See_Comment  N             [Aut

omated message] The



             = Basophils #)                                        system which 

generated



                                                                 this result tra

nsmitted



                                                                 reference range

: <=0.2.



                                                                 The reference r

erna was



                                                                 not used to int

erpret



                                                                 this result as



                                                                 normal/abnormal

.



[facility] HospitalOatgjvuMRAXJEZXQQ8112-38-14 08:00:00





             Test Item    Value        Reference Range Interpretation Comments

 

             Eosinophils (test code = 1.7          See_Comment  N             [A

utomated message] The



             Eosinophils)                                        system which ge

nerated



                                                                 this result tra

nsmitted



                                                                 reference range

: <=4.0.



                                                                 The reference r

erna was



                                                                 not used to int

erpret



                                                                 this result as



                                                                 normal/abnormal

.



[facility] TylerGmqjsrxXWLYFLFOW4632-46-14 18:53:00





             Test Item    Value        Reference Range Interpretation Comments

 

             eGFR (test code = eGFR) 96                                     



[facility] TylerXrhtsqpEDHHRGZGX2976-43-09 18:53:00





             Test Item    Value        Reference Range Interpretation Comments

 

             Sodium Lvl (test code = Sodium Lvl) 140          135-145      N    

        



[facility] TylerDefklfvCGPBSUCHZ4265-45-43 18:53:00





             Test Item    Value        Reference Range Interpretation Comments

 

             Potassium Lvl (test code = Potassium 3.9          3.5-5.1      N   

         



             Lvl)                                                



[facility] TylerEccqvjmGMQCFAGZV8089-99-70 18:53:00





             Test Item    Value        Reference Range Interpretation Comments

 

             Chloride Lvl (test code = Chloride Lvl) 105                 N

            



[facility] TylerNtvcaukJIURIQUGF9093-33-86 18:53:00





             Test Item    Value        Reference Range Interpretation Comments

 

             CO2 (test code = CO2) 28           24-32        N            



[facility] TylerOtbjaewGVJBDJXKP0355-36-74 18:53:00





             Test Item    Value        Reference Range Interpretation Comments

 

             Glucose Lvl (test code = Glucose Lvl) 92           70-99        N  

          



[facility] TylerUbjmsknWZGRTEPHK6924-11-89 18:53:00





             Test Item    Value        Reference Range Interpretation Comments

 

             BUN (test code = BUN) 14           7-22         N            



[facility] TylerPetcsphXXDFKLKXA6832-50-69 18:53:00





             Test Item    Value        Reference Range Interpretation Comments

 

             Creatinine Lvl (test code = Creatinine 0.7          0.5-1.4      N 

           



             Lvl)                                                



[facility] TylerPytjgrlEJPXHDPUG4747-09-41 18:53:00





             Test Item    Value        Reference Range Interpretation Comments

 

             Calcium Lvl (test code = Calcium Lvl) 9.1          8.5-10.5     N  

          



[facility] TylerXzuagmxHEQCPUFFH2711-00-43 18:53:00





             Test Item    Value        Reference Range Interpretation Comments

 

             AGAP (test code = AGAP) 10.9         10.0-20.0    N            



[facility] HospitalUdtvumtKUYACEBYYX3554-44-47 18:53:00





             Test Item    Value        Reference Range Interpretation Comments

 

             Basophils # (test code 0.1          See_Comment  N             [Aut

omated message] The



             = Basophils #)                                        system which 

generated



                                                                 this result tra

nsmitted



                                                                 reference range

: <=0.2.



                                                                 The reference r

erna was



                                                                 not used to int

erpret



                                                                 this result as



                                                                 normal/abnormal

.



[facility] HospitalDfzqaplJWNRGGCQPH5343-95-96 18:53:00





             Test Item    Value        Reference Range Interpretation Comments

 

             Lymphocytes # (test code = Lymphocytes 2.8          1.0-5.5      N 

           



             #)                                                  



[facility] HospitalSwkvojfKABYVUVIBN4904-32-64 18:53:00





             Test Item    Value        Reference Range Interpretation Comments

 

             Eosinophils # (test code 0.1          See_Comment  N             [A

utomated message] The



             = Eosinophils #)                                        system whic

h generated



                                                                 this result tra

nsmitted



                                                                 reference range

: <=0.5.



                                                                 The reference r

erna was



                                                                 not used to int

erpret



                                                                 this result as



                                                                 normal/abnormal

.



[facility] HospitalLgoshmzUHVHPBNUFR4323-02-34 18:53:00





             Test Item    Value        Reference Range Interpretation Comments

 

             Monocytes # (test code 0.7          See_Comment  N             [Aut

omated message] The



             = Monocytes #)                                        system which 

generated



                                                                 this result tra

nsmitted



                                                                 reference range

: <=0.8.



                                                                 The reference r

erna was



                                                                 not used to int

erpret



                                                                 this result as



                                                                 normal/abnormal

.



[facility] HospitalWbplbnaIYXPJFFIIR1840-30-65 18:53:00





             Test Item    Value        Reference Range Interpretation Comments

 

             Segs-Bands # (test code = Segs-Bands #) 4.6          1.5-8.1      N

            



[facility] HospitalLyolrnzOASGPKCDRB7468-53-18 18:53:00





             Test Item    Value        Reference Range Interpretation Comments

 

             Basophils (test code = 1.0          See_Comment  N             [Aut

omated message] The



             Basophils)                                          system which ge

nerated



                                                                 this result tra

nsmitted



                                                                 reference range

: <=1.0.



                                                                 The reference r

erna was



                                                                 not used to int

erpret



                                                                 this result as



                                                                 normal/abnormal

.



[facility] HospitalInnaqzjXTRSZGFUWK2208-12-13 18:53:00





             Test Item    Value        Reference Range Interpretation Comments

 

             Lymphocytes (test code = Lymphocytes) 34.0         20.0-40.0    N  

          



[facility] HospitalDqhyctpADCIGWMHXL6361-78-39 18:53:00





             Test Item    Value        Reference Range Interpretation Comments

 

             Eosinophils (test code = 1.3          See_Comment  N             [A

utomated message] The



             Eosinophils)                                        system which ge

nerated



                                                                 this result tra

nsmitted



                                                                 reference range

: <=4.0.



                                                                 The reference r

erna was



                                                                 not used to int

erpret



                                                                 this result as



                                                                 normal/abnormal

.



[facility] HospitalYxrhueaMXPMXHWOUP0625-91-83 18:53:00





             Test Item    Value        Reference Range Interpretation Comments

 

             Monocytes (test code = Monocytes) 8.4          2.0-12.0     N      

      



[facility] HospitalSdhptqbEEUMQSBBGC4688-25-94 18:53:00





             Test Item    Value        Reference Range Interpretation Comments

 

             Segs (test code = Segs) 55.3         45.0-75.0    N            



[facility] HospitalObyfrcbCYTDTWRNGL7675-45-43 18:53:00





             Test Item    Value        Reference Range Interpretation Comments

 

             PTT (test code = PTT) 27.3 s       22.9-35.8    N            



[facility] HospitalGihtzsxASORYMHGYI5603-77-02 18:53:00





             Test Item    Value        Reference Range Interpretation Comments

 

             PT (test code = PT) 13.3 s       12.0-14.7    N            



[facility] HospitalQfefjltWCPCKJCEPG4668-85-57 18:53:00





             Test Item    Value        Reference Range Interpretation Comments

 

             INR (test code = INR) 1.02         0.85-1.17    N            



[facility] HospitalNacycxgVBZPDHAQXG8916-27-20 18:53:00





             Test Item    Value        Reference Range Interpretation Comments

 

             MPV (test code = MPV) 7.2          7.4-10.4     L            



[facility] HospitalTdqcxyjFPZSHVHDXE5761-66-27 18:53:00





             Test Item    Value        Reference Range Interpretation Comments

 

             MCHC (test code = MCHC) 33.5         32.0-36.0    N            



[facility] HospitalUmqfcjgTWTEIZZCCX9099-28-31 18:53:00





             Test Item    Value        Reference Range Interpretation Comments

 

             MCH (test code = MCH) 29.8 pg      27.0-31.0    N            



[facility] HospitalUreesjlJNSNWLTGOC3388-44-24 18:53:00





             Test Item    Value        Reference Range Interpretation Comments

 

             Platelet (test code = Platelet) 305          133-450      N        

    



[facility] HospitalIrndweuNCNHQPNYGK9854-89-90 18:53:00





             Test Item    Value        Reference Range Interpretation Comments

 

             RDW (test code = RDW) 12.4         11.5-14.5    N            



[facility] HospitalJzzoqxcZMAJBPRKZC9442-40-40 18:53:00





             Test Item    Value        Reference Range Interpretation Comments

 

             MCV (test code = MCV) 89.0         81.0-99.0    N            



[facility] HospitalBvieayqHYRKQCPYVY7744-60-62 18:53:00





             Test Item    Value        Reference Range Interpretation Comments

 

             Hgb (test code = Hgb) 13.3         12.0-16.0    N            



[facility] HospitalDkfnmkgSKFEGUPFQB9657-57-29 18:53:00





             Test Item    Value        Reference Range Interpretation Comments

 

             RBC (test code = RBC) 4.45         4.20-5.40    N            



[place] [address]-09-15 18:53:00





             Test Item    Value        Reference Range Interpretation Comments

 

             Hct (test code = Hct) 39.6         36.0-48.0    N            



[facility] HospitalNdzsdffGEWXJYTZYX8604-34-24 18:53:00





             Test Item    Value        Reference Range Interpretation Comments

 

             WBC (test code = WBC) 8.3          3.7-10.4     N            



[facility] TylerFejmmjhRYULDRCRE7471-78-85 18:53:00





             Test Item    Value        Reference Range Interpretation Comments

 

             eGFR (test code = eGFR) 96                                     



[facility] TylerRwrevssFUZKCQEVL1782-92-00 18:53:00





             Test Item    Value        Reference Range Interpretation Comments

 

             Sodium Lvl (test code = Sodium Lvl) 140          135-145      N    

        



[facility] TylerWgyfszpDDUPQWZJN8462-51-46 18:53:00





             Test Item    Value        Reference Range Interpretation Comments

 

             Potassium Lvl (test code = Potassium 3.9          3.5-5.1      N   

         



             Lvl)                                                



[facility] TylerNyxwqbeSGOJOZXGW3025-66-13 18:53:00





             Test Item    Value        Reference Range Interpretation Comments

 

             Chloride Lvl (test code = Chloride Lvl) 105                 N

            



[facility] TylerCfeqpzuEVBHTBFIV6013-09-25 18:53:00





             Test Item    Value        Reference Range Interpretation Comments

 

             CO2 (test code = CO2) 28           24-32        N            



[facility] TylerGjkcrhpCEYXDTKHW5648-16-05 18:53:00





             Test Item    Value        Reference Range Interpretation Comments

 

             Glucose Lvl (test code = Glucose Lvl) 92           70-99        N  

          



[facility] TylerHnqglngSHJNYCYIY0035-26-91 18:53:00





             Test Item    Value        Reference Range Interpretation Comments

 

             BUN (test code = BUN) 14           7-22         N            



[facility] TylerFrgmwktAMMATJCIF0582-61-17 18:53:00





             Test Item    Value        Reference Range Interpretation Comments

 

             Creatinine Lvl (test code = Creatinine 0.7          0.5-1.4      N 

           



             Lvl)                                                



[facility] TylerVybqzmoDFDWJQRXI8518-25-73 18:53:00





             Test Item    Value        Reference Range Interpretation Comments

 

             Calcium Lvl (test code = Calcium Lvl) 9.1          8.5-10.5     N  

          



[facility] TylerPfdygvvUMJKEUKFP1084-66-43 18:53:00





             Test Item    Value        Reference Range Interpretation Comments

 

             AGAP (test code = AGAP) 10.9         10.0-20.0    N            



[facility]BhueevmKXBXFJESFG4411-27-47 18:53:00





             Test Item    Value        Reference Range Interpretation Comments

 

             Basophils # (test code 0.1          See_Comment  N             [Aut

omated message] The



             = Basophils #)                                        system which 

generated



                                                                 this result tra

nsmitted



                                                                 reference range

: <=0.2.



                                                                 The reference r

erna was



                                                                 not used to int

erpret



                                                                 this result as



                                                                 normal/abnormal

.



[facility] HospitalJtrfqzmLJETYRUIAX9037-43-36 18:53:00





             Test Item    Value        Reference Range Interpretation Comments

 

             Lymphocytes # (test code = Lymphocytes 2.8          1.0-5.5      N 

           



             #)                                                  



[facility] HospitalHdxsuhbJUCBELNNJC7614-67-06 18:53:00





             Test Item    Value        Reference Range Interpretation Comments

 

             Eosinophils # (test code 0.1          See_Comment  N             [A

utomated message] The



             = Eosinophils #)                                        system whic

h generated



                                                                 this result tra

nsmitted



                                                                 reference range

: <=0.5.



                                                                 The reference r

erna was



                                                                 not used to int

erpret



                                                                 this result as



                                                                 normal/abnormal

.



[facility] HospitalOmmfbvrZETIJLQEJV4314-05-28 18:53:00





             Test Item    Value        Reference Range Interpretation Comments

 

             Monocytes # (test code 0.7          See_Comment  N             [Aut

omated message] The



             = Monocytes #)                                        system which 

generated



                                                                 this result tra

nsmitted



                                                                 reference range

: <=0.8.



                                                                 The reference r

erna was



                                                                 not used to int

erpret



                                                                 this result as



                                                                 normal/abnormal

.



[facility] HospitalMavyfmkRGGACFLYEQ9351-39-94 18:53:00





             Test Item    Value        Reference Range Interpretation Comments

 

             Segs-Bands # (test code = Segs-Bands #) 4.6          1.5-8.1      N

            



[facility] HospitalPyvspzvOLQKSEJAZC9312-77-24 18:53:00





             Test Item    Value        Reference Range Interpretation Comments

 

             Basophils (test code = 1.0          See_Comment  N             [Aut

omated message] The



             Basophils)                                          system which ge

nerated



                                                                 this result tra

nsmitted



                                                                 reference range

: <=1.0.



                                                                 The reference r

erna was



                                                                 not used to int

erpret



                                                                 this result as



                                                                 normal/abnormal

.



[facility] HospitalHfxuhtpCFSBBRLBEY3922-18-04 18:53:00





             Test Item    Value        Reference Range Interpretation Comments

 

             Lymphocytes (test code = Lymphocytes) 34.0         20.0-40.0    N  

          



[facility] HospitalFwtfejmCRCSEGQDRT6279-15-42 18:53:00





             Test Item    Value        Reference Range Interpretation Comments

 

             Eosinophils (test code = 1.3          See_Comment  N             [A

utomated message] The



             Eosinophils)                                        system which ge

nerated



                                                                 this result tra

nsmitted



                                                                 reference range

: <=4.0.



                                                                 The reference r

erna was



                                                                 not used to int

erpret



                                                                 this result as



                                                                 normal/abnormal

.



[facility] HospitalImrqankKGAIMXOOED6712-11-85 18:53:00





             Test Item    Value        Reference Range Interpretation Comments

 

             Monocytes (test code = Monocytes) 8.4          2.0-12.0     N      

      



[facility] HospitalMlvbfdbACVEUIRHEZ1936-24-66 18:53:00





             Test Item    Value        Reference Range Interpretation Comments

 

             Segs (test code = Segs) 55.3         45.0-75.0    N            



[facility] HospitalDyucqsmESRKTOPZZT1898-36-23 18:53:00





             Test Item    Value        Reference Range Interpretation Comments

 

             PTT (test code = PTT) 27.3 s       22.9-35.8    N            



[facility] HospitalZwteyhuCLANXRHHLX5997-68-77 18:53:00





             Test Item    Value        Reference Range Interpretation Comments

 

             PT (test code = PT) 13.3 s       12.0-14.7    N            



[facility] HospitalWyawsvbHJEPTIEPBR7339-21-47 18:53:00





             Test Item    Value        Reference Range Interpretation Comments

 

             INR (test code = INR) 1.02         0.85-1.17    N            



[facility] HospitalVtsjtfvEVOWWUWEZG5469-06-85 18:53:00





             Test Item    Value        Reference Range Interpretation Comments

 

             MPV (test code = MPV) 7.2          7.4-10.4     L            



[facility] HospitalMskuqfoYOBBGOZXBZ0340-61-22 18:53:00





             Test Item    Value        Reference Range Interpretation Comments

 

             MCHC (test code = MCHC) 33.5         32.0-36.0    N            



[facility] HospitalLhepzkrXMITPWFTSX3566-39-06 18:53:00





             Test Item    Value        Reference Range Interpretation Comments

 

             MCH (test code = MCH) 29.8 pg      27.0-31.0    N            



[facility] HospitalOljfjzeYAPSMYFRUR9038-34-71 18:53:00





             Test Item    Value        Reference Range Interpretation Comments

 

             Platelet (test code = Platelet) 305          133-450      N        

    



[facility] HospitalSpyxmvfKYJJFQLQKW6218-97-34 18:53:00





             Test Item    Value        Reference Range Interpretation Comments

 

             RDW (test code = RDW) 12.4         11.5-14.5    N            



[facility] HospitalZbqbhusKMJHBYEYFG3753-36-04 18:53:00





             Test Item    Value        Reference Range Interpretation Comments

 

             MCV (test code = MCV) 89.0         81.0-99.0    N            



[facility] HospitalQffigamJKCXILRSFN4510-82-53 18:53:00





             Test Item    Value        Reference Range Interpretation Comments

 

             Hgb (test code = Hgb) 13.3         12.0-16.0    N            



[facility] HospitalVauhzvdFFISXIODKZ5757-14-06 18:53:00





             Test Item    Value        Reference Range Interpretation Comments

 

             RBC (test code = RBC) 4.45         4.20-5.40    N            



[facility] HospitalPwipuipLZJMFUVQTR0867-10-44 18:53:00





             Test Item    Value        Reference Range Interpretation Comments

 

             Hct (test code = Hct) 39.6         36.0-48.0    N            



[facility] HospitalGhulhskNQIKMCBWYU0088-46-08 18:53:00





             Test Item    Value        Reference Range Interpretation Comments

 

             WBC (test code = WBC) 8.3          3.7-10.4     N            



[facility]YyllaeoJDQTGLPBX9003[facility]23-60 [facility]:53:00





             Test Item    Value        Reference Range Interpretation Comments

 

             eGFR (test code = eGFR) 96                                     



[facility] TylerVtuxlypHSNTPIIMI8319-48-81 18:53:00





             Test Item    Value        Reference Range Interpretation Comments

 

             Sodium Lvl (test code = Sodium Lvl) 140          135-145      N    

        



[facility] TylerYrxswbxPFVCSFEDA2850-53-32 18:53:00





             Test Item    Value        Reference Range Interpretation Comments

 

             Potassium Lvl (test code = Potassium 3.9          3.5-5.1      N   

         



             Lvl)                                                



[facility] TylerHaznvxeOZAIMELPD7177-04-87 18:53:00





             Test Item    Value        Reference Range Interpretation Comments

 

             Chloride Lvl (test code = Chloride Lvl) 105                 N

            



[facility] TylerBufrrwpFGEBRZUMU0098-95-37 18:53:00





             Test Item    Value        Reference Range Interpretation Comments

 

             CO2 (test code = CO2) 28           24-32        N            



[facility] TylerNwzxckmFZSNLVPYS7852-57-95 18:53:00





             Test Item    Value        Reference Range Interpretation Comments

 

             Glucose Lvl (test code = Glucose Lvl) 92           70-99        N  

          



[facility] TylerUfjkxfyQFZTKUYGP9844-22-73 18:53:00





             Test Item    Value        Reference Range Interpretation Comments

 

             BUN (test code = BUN) 14           7-22         N            



[facility] TylerKqdctqiHRCNNSUEZ5069-36-02 18:53:00





             Test Item    Value        Reference Range Interpretation Comments

 

             Creatinine Lvl (test code = Creatinine 0.7          0.5-1.4      N 

           



             Lvl)                                                



[facility] TylerIbsfypuZZYJHVTCE0055-90-59 18:53:00





             Test Item    Value        Reference Range Interpretation Comments

 

             Calcium Lvl (test code = Calcium Lvl) 9.1          8.5-10.5     N  

          



[facility] TylerTbzutfdGVBUZTZHI7970-28-55 18:53:00





             Test Item    Value        Reference Range Interpretation Comments

 

             AGAP (test code = AGAP) 10.9         10.0-20.0    N            



[facility] HospitalXwuketmFMYMIRDWEY5047-52-01 18:53:00





             Test Item    Value        Reference Range Interpretation Comments

 

             Basophils # (test code 0.1          See_Comment  N             [Aut

omated message] The



             = Basophils #)                                        system which 

generated



                                                                 this result tra

nsmitted



                                                                 reference range

: <=0.2.



                                                                 The reference r

erna was



                                                                 not used to int

erpret



                                                                 this result as



                                                                 normal/abnormal

.



[facility] HospitalQzgkgikKMRGLNWXOJ8370-03-95 18:53:00





             Test Item    Value        Reference Range Interpretation Comments

 

             Lymphocytes # (test code = Lymphocytes 2.8          1.0-5.5      N 

           



             #)                                                  



[facility] HospitalDeptlpdIAFALWAIRF7787-61-35 18:53:00





             Test Item    Value        Reference Range Interpretation Comments

 

             Eosinophils # (test code 0.1          See_Comment  N             [A

utomated message] The



             = Eosinophils #)                                        system whic

h generated



                                                                 this result tra

nsmitted



                                                                 reference range

: <=0.5.



                                                                 The reference r

erna was



                                                                 not used to int

erpret



                                                                 this result as



                                                                 normal/abnormal

.



[facility] HospitalTwgmtkjXLCNNACIOE6003-07-03 18:53:00





             Test Item    Value        Reference Range Interpretation Comments

 

             Monocytes # (test code 0.7          See_Comment  N             [Aut

omated message] The



             = Monocytes #)                                        system which 

generated



                                                                 this result tra

nsmitted



                                                                 reference range

: <=0.8.



                                                                 The reference r

erna was



                                                                 not used to int

erpret



                                                                 this result as



                                                                 normal/abnormal

.



[facility] HospitalTbthlbqROLTDKHZQI9996-81-52 18:53:00





             Test Item    Value        Reference Range Interpretation Comments

 

             Segs-Bands # (test code = Segs-Bands #) 4.6          1.5-8.1      N

            



[facility] HospitalJpmadmhAZDCCECYNZ1028-83-92 18:53:00





             Test Item    Value        Reference Range Interpretation Comments

 

             Basophils (test code = 1.0          See_Comment  N             [Aut

omated message] The



             Basophils)                                          system which ge

nerated



                                                                 this result tra

nsmitted



                                                                 reference range

: <=1.0.



                                                                 The reference r

erna was



                                                                 not used to int

erpret



                                                                 this result as



                                                                 normal/abnormal

.



[facility] HospitalVjesngyEUMVNIMLUB4613-40-32 18:53:00





             Test Item    Value        Reference Range Interpretation Comments

 

             Lymphocytes (test code = Lymphocytes) 34.0         20.0-40.0    N  

          



[facility] HospitalPqmvqxbGXQJXFETXY8892-00-28 18:53:00





             Test Item    Value        Reference Range Interpretation Comments

 

             Eosinophils (test code = 1.3          See_Comment  N             [A

utomated message] The



             Eosinophils)                                        system which ge

nerated



                                                                 this result tra

nsmitted



                                                                 reference range

: <=4.0.



                                                                 The reference r

erna was



                                                                 not used to int

erpret



                                                                 this result as



                                                                 normal/abnormal

.



[facility] HospitalJqnxmjgFYEBTOYEGS7631-74-97 18:53:00





             Test Item    Value        Reference Range Interpretation Comments

 

             Monocytes (test code = Monocytes) 8.4          2.0-12.0     N      

      



[facility] HospitalIyujapiSIRCCIFAAN7857-06-05 18:53:00





             Test Item    Value        Reference Range Interpretation Comments

 

             Segs (test code = Segs) 55.3         45.0-75.0    N            



[facility] HospitalPrffhcbLKRGVYLMCV4860-37-78 18:53:00





             Test Item    Value        Reference Range Interpretation Comments

 

             PTT (test code = PTT) 27.3 s       22.9-35.8    N            



[facility] HospitalWbroghpOIXTKJKRCU8275-11-44 18:53:00





             Test Item    Value        Reference Range Interpretation Comments

 

             PT (test code = PT) 13.3 s       12.0-14.7    N            



[facility] HospitalVzniznuLBDXIFHBRU1386-55-07 18:53:00





             Test Item    Value        Reference Range Interpretation Comments

 

             INR (test code = INR) 1.02         0.85-1.17    N            



[facility] HospitalPmhimdwCDIOFKUYXS2813-51-89 18:53:00





             Test Item    Value        Reference Range Interpretation Comments

 

             MPV (test code = MPV) 7.2          7.4-10.4     L            



[facility] HospitalOcccsagJXWWPMVSVP2958-32-64 18:53:00





             Test Item    Value        Reference Range Interpretation Comments

 

             MCHC (test code = MCHC) 33.5         32.0-36.0    N            



[facility] HospitalGituufaVTJEIZVTCH0243-42-70 18:53:00





             Test Item    Value        Reference Range Interpretation Comments

 

             MCH (test code = MCH) 29.8 pg      27.0-31.0    N            



[facility] HospitalCgweboyFOFBPDWTNW4775-76-96 18:53:00





             Test Item    Value        Reference Range Interpretation Comments

 

             Platelet (test code = Platelet) 305          133-450      N        

    



[facility] HospitalOgyhjahVYFLWCCWEL5185-50-90 18:53:00





             Test Item    Value        Reference Range Interpretation Comments

 

             RDW (test code = RDW) 12.4         11.5-14.5    N            



[facility] HospitalOvulqzhOSCUSVNWUQ5445-22-08 18:53:00





             Test Item    Value        Reference Range Interpretation Comments

 

             MCV (test code = MCV) 89.0         81.0-99.0    N            



[facility] HospitalSwwefmaOXQGXDTWNG9226-54-86 18:53:00





             Test Item    Value        Reference Range Interpretation Comments

 

             Hgb (test code = Hgb) 13.3         12.0-16.0    N            



[facility] HospitalHewqwryGHOELHRYRH9988-89-93 18:53:00





             Test Item    Value        Reference Range Interpretation Comments

 

             RBC (test code = RBC) 4.45         4.20-5.40    N            



[facility] HospitalKetllsjVCQYEESHWH0027-91-55 18:53:00





             Test Item    Value        Reference Range Interpretation Comments

 

             Hct (test code = Hct) 39.6         36.0-48.0    N            



[facility] HospitalDbbtgfmVCMNBAKCZA0853-03-72 18:53:00





             Test Item    Value        Reference Range Interpretation Comments

 

             WBC (test code = WBC) 8.3          3.7-10.4     N            



[facility] HospitalZgzwdbqAFMWOCCLHN6807-42-62 18:53:00





             Test Item    Value        Reference Range Interpretation Comments

 

             MCV (test code = MCV) 89.0         81.0-99.0    N            



[facility] HospitalDervedlDYBXPCOZXK9314-44-43 18:53:00





             Test Item    Value        Reference Range Interpretation Comments

 

             Hgb (test code = Hgb) 13.3         12.0-16.0    N            



[facility] HospitalGqifmnkCUXDQPRWMD8419-00-90 18:53:00





             Test Item    Value        Reference Range Interpretation Comments

 

             RBC (test code = RBC) 4.45         4.20-5.40    N            



[facility] HospitalTzfzuzlOXCHHVCMWE0397-98-41 18:53:00





             Test Item    Value        Reference Range Interpretation Comments

 

             Hct (test code = Hct) 39.6         36.0-48.0    N            



[facility] HospitalXkxcasfKDUZGIEFTE2012-65-29 18:53:00





             Test Item    Value        Reference Range Interpretation Comments

 

             WBC (test code = WBC) 8.3          3.7-10.4     N            



[facility] TylerGktslnfZGYCFOZKS5047-09-48 18:53:00





             Test Item    Value        Reference Range Interpretation Comments

 

             eGFR (test code = eGFR) 96                                     



[facility] TylerRsdkesrSZEBPWMTY0347-08-31 18:53:00





             Test Item    Value        Reference Range Interpretation Comments

 

             Sodium Lvl (test code = Sodium Lvl) 140          135-145      N    

        



[facility] TylerVrzhzadFKDIMGQBD8979-18-55 18:53:00





             Test Item    Value        Reference Range Interpretation Comments

 

             Potassium Lvl (test code = Potassium 3.9          3.5-5.1      N   

         



             Lvl)                                                



[facility] TylerAtgsweuCYFOBNSMB8979-31-73 18:53:00





             Test Item    Value        Reference Range Interpretation Comments

 

             Chloride Lvl (test code = Chloride Lvl) 105                 N

            



[facility] TylerDalehlpAPBCRLHGQ6166-73-04 18:53:00





             Test Item    Value        Reference Range Interpretation Comments

 

             CO2 (test code = CO2) 28           24-32        N            



[facility] TylerBczozpsTSKJKPDPV2621-36-71 18:53:00





             Test Item    Value        Reference Range Interpretation Comments

 

             Glucose Lvl (test code = Glucose Lvl) 92           70-99        N  

          



[facility] TylerUhsocjpXORECCHGI1152-29-20 18:53:00





             Test Item    Value        Reference Range Interpretation Comments

 

             BUN (test code = BUN) 14           7-22         N            



[facility] TylerZtekqcoIHBQISDRE7869-85-22 18:53:00





             Test Item    Value        Reference Range Interpretation Comments

 

             Creatinine Lvl (test code = Creatinine 0.7          0.5-1.4      N 

           



             Lvl)                                                



[facility] TylerUwqlazvPWNOKCZPT5546-22-49 18:53:00





             Test Item    Value        Reference Range Interpretation Comments

 

             Calcium Lvl (test code = Calcium Lvl) 9.1          8.5-10.5     N  

          



[facility] TylerNvvnuzfHAVXWHYQY3919-47-17 18:53:00





             Test Item    Value        Reference Range Interpretation Comments

 

             AGAP (test code = AGAP) 10.9         10.0-20.0    N            



[facility] HospitalMzvfainMSBYIBLNSW8528-75-58 18:53:00





             Test Item    Value        Reference Range Interpretation Comments

 

             Basophils # (test code 0.1          See_Comment  N             [Aut

omated message] The



             = Basophils #)                                        system which 

generated



                                                                 this result tra

nsmitted



                                                                 reference range

: <=0.2.



                                                                 The reference r

erna was



                                                                 not used to int

erpret



                                                                 this result as



                                                                 normal/abnormal

.



[facility] HospitalAqbyhycFVXSZRONVW2868-70-61 18:53:00





             Test Item    Value        Reference Range Interpretation Comments

 

             Lymphocytes # (test code = Lymphocytes 2.8          1.0-5.5      N 

           



             #)                                                  



[facility] HospitalRvhnckaBADROVEZEG1942-26-34 18:53:00





             Test Item    Value        Reference Range Interpretation Comments

 

             Eosinophils # (test code 0.1          See_Comment  N             [A

utomated message] The



             = Eosinophils #)                                        system whic

h generated



                                                                 this result tra

nsmitted



                                                                 reference range

: <=0.5.



                                                                 The reference r

erna was



                                                                 not used to int

erpret



                                                                 this result as



                                                                 normal/abnormal

.



[facility] HospitalAruzuutKQNSXZDMKI3146-21-13 18:53:00





             Test Item    Value        Reference Range Interpretation Comments

 

             Monocytes # (test code 0.7          See_Comment  N             [Aut

omated message] The



             = Monocytes #)                                        system which 

generated



                                                                 this result tra

nsmitted



                                                                 reference range

: <=0.8.



                                                                 The reference r

erna was



                                                                 not used to int

erpret



                                                                 this result as



                                                                 normal/abnormal

.



[facility] HospitalRehfiecAWNJWPMGWM6251-24-75 18:53:00





             Test Item    Value        Reference Range Interpretation Comments

 

             Segs-Bands # (test code = Segs-Bands #) 4.6          1.5-8.1      N

            



[facility] HospitalOnxvnjgGNVNEGHQJE4067-56-33 18:53:00





             Test Item    Value        Reference Range Interpretation Comments

 

             Basophils (test code = 1.0          See_Comment  N             [Aut

omated message] The



             Basophils)                                          system which ge

nerated



                                                                 this result tra

nsmitted



                                                                 reference range

: <=1.0.



                                                                 The reference r

erna was



                                                                 not used to int

erpret



                                                                 this result as



                                                                 normal/abnormal

.



[facility] HospitalWxkcjtxUZQQCKTXNL8680-43-15 18:53:00





             Test Item    Value        Reference Range Interpretation Comments

 

             Lymphocytes (test code = Lymphocytes) 34.0         20.0-40.0    N  

          



[id number] 18:53:00





             Test Item    Value        Reference Range Interpretation Comments

 

             Eosinophils (test code = 1.3          See_Comment  N             [A

utomated message] The



             Eosinophils)                                        system which ge

nerated



                                                                 this result tra

nsmitted



                                                                 reference range

: <=4.0.



                                                                 The reference r

erna was



                                                                 not used to int

erpret



                                                                 this result as



                                                                 normal/abnormal

.



[facility] H[id number] 18:53:00





             Test Item    Value        Reference Range Interpretation Comments

 

             Monocytes (test code = Monocytes) 8.4          2.0-12.0     N      

      



[facility] H[id number] 18:53:00





             Test Item    Value        Reference Range Interpretation Comments

 

             Segs (test code = Segs) 55.3         45.0-75.0    N            



[facility] H[id number] 18:53:00





             Test Item    Value        Reference Range Interpretation Comments

 

             PTT (test code = PTT) 27.3 s       22.9-35.8    N            



[facility] H[id number] 18:53:00





             Test Item    Value        Reference Range Interpretation Comments

 

             PT (test code = PT) 13.3 s       12.0-14.7    N            



[facility] HospitalWzuonpsWTIXLVXKTQ0625-11-29 18:53:00





             Test Item    Value        Reference Range Interpretation Comments

 

             INR (test code = INR) 1.02         0.85-1.17    N            



[facility] H[id number] 18:53:00





             Test Item    Value        Reference Range Interpretation Comments

 

             MPV (test code = MPV) 7.2          7.4-10.4     L            



[facility] [id number] 18:53:00





             Test Item    Value        Reference Range Interpretation Comments

 

             MCHC (test code = MCHC) 33.5         32.0-36.0    N            



[facility] H[id number] 18:53:00





             Test Item    Value        Reference Range Interpretation Comments

 

             MCH (test code = MCH) 29.8 pg      27.0-31.0    N            



[facility] H[id number] 18:53:00





             Test Item    Value        Reference Range Interpretation Comments

 

             Platelet (test code = Platelet) 305          133-450      N        

    



[facility] H[id number] 18:53:00





             Test Item    Value        Reference Range Interpretation Comments

 

             RDW (test code = RDW) 12.4         11.5-14.5    N            



[facility]



Notes







                Date/Time       Note            Provider        Source

 

                2013      EXAM: MRI BRAIN WITH AND WITHOUT CONTRAST       

          [facility]



                09:47:00-00:00                                  Center



                                DATE: Sep 16, 2013 at 0940 hours                

 



                                                                



                                INDICATION: Hemiparesis                 



                                                                



                                COMPARISON: MRI brain without contrast dated                  



                                                                



                                TECHNIQUE:                      



                                                                



                                                    Multiplanar imaging of the b

rain was obtained before and after intravenous 

administration of 14 mL of MultiHance gadolinium contrast.                      

     



                                                                



                                FINDINGS:                       



                                                                



                                                    No abnormal enhancement is i

dentified. Redemonstration of the T2 hyperintensity

in the left frontal subcortical white matter without perilesional edema or 
marked mass effect (but absence of volume loss as well).                        

   



                                                                



                                                    An area of diffusion restric

tion along the left periventricular white matter is

once again seen without enhancement, compatible with acute ischemia.            

               



                                                                



                                                    Remote bilateral lacunar isc

hemic changes are present. Chronic white matter 

disease is once again noted.                           



                                                                



                                IMPRESSION:                     



                                                                



                                                    1. Nonenhancing T2 hyperinte

nse lesion in the left frontal subcortical white 

matter. Given the lack of enhancement this is not felt to be a consequence of 
demyelinating disease. Absence of volume loss i                           



                                                    s concerning and the lesion 

differs from others present. The differential 

remains a subacute versus more remote infarct or a low grade neoplasm. Recommend
followup MRI brain with and without contrast in                           



                                                     3 to 6 months to document s

tability. A change in size/morphology in the 

interim could help discriminate between a more recent infarct and neoplasm.     

                      



                                                    2. Periventricular lesion do

es not enhance and, therefore, is consistent with 

recent infarct rather than demyelinating disease.                           

 

                2013-09-15      EXAM: MRI BRAIN                 [facility]



                18:49:00-00:00                                  Center



                                DATE: Sep 15, 2013 dated 1839 hours             

    



                                                                



                                INDICATION: Right-sided weakness                

 



                                                                



                                COMPARISON: CT of the head dated 9/15/2013      

           



                                                                



                                                    TECHNIQUE: Multiplanar and m

ultisequence MRI of the brain was performed without

administration of intravenous contrast.                           



                                                                



                                                    FINDINGS: There is tiny area

 of diffusion restriction along the left 

periventricular white matter (series 302, image 120), likely related to recent 
ischemic change. In a patient where MS is suspected, a                          

 



                                 more acute lesion would possibly have this appe

arance as well.                 



                                                                



                                                    There is a T2 hyperintense, 

T1 isointense lesion in the left frontal 

subcortical white matter without volume loss or perilesional edema which may 
represent subacute infarct, multiple sclerosis plaque, or low grade neoplasm.   

                        



                                                                



                                                    Central cystic changes in th

e caudate heads are most likely lacunar infarcts. 

Scattered areas of T2 FLAIR abnormality is compatible with advanced for age 
chronic white matter disease.                           



                                                                



                                                    There is no evidence of intr

aparenchymal or extra-axial hemorrhage, mass, mass 

effect, or hydrocephalus. Appropriate flow-voids are present in the vessels at 
the base of the brain.                              



                                                                



                                                    Incidental imaging of the or

bits, paranasal sinuses, mastoid air cells, and 

skull base are unremarkable.                           



                                                                



                                IMPRESSION:                     



                                                    1. Tiny area of diffusion re

striction along the left periventricular white 

matter (series 302, image 120), likely related to an acute subcortical ischemic 
event and less likely secondary to demyelinating disease.                       

    



                                                                



                                                    2. Left frontal white matter

 T2 hyperintense, T1 isointense lesion may 

represent subacute ischemic change, multiple sclerosis plaque, or low grade 
neoplasm. Contrast study may be indicated for further evaluation.               

            

 

                2013-09-15      CT ANGIOGRAM OF THE HEAD AND NECK               

  [facility]



                14:45:00-00:00                                  Center



                                DATE: 12/15/2013 at 2:47 p.m.                 



                                                                



                                CLINICAL INFORMATION: Aphasia, dysarthria.      

           



                                                                



                                                    TECHNIQUE: Axial images were

 obtained from the vertex through the upper thorax 

at 1.5 mm intervals in the enhanced mode. 3-D maximum intensity projection, MIP 
images were also created.                           



                                                                



                                FINDINGS:                       



                                                    There is normal contrast-enh

ancement of the aortic arch and proximal great 

vessels. The right innominate, left common carotid, and left subclavian arteries
arise in normal anatomic configuration. There are no proximal stenoses.         

                  



                                                                



                                                    Minimal calcified atheroscle

rotic plaque is noted within the bilateral carotid 

bulbs without stenosis. There is normal contrast-enhancement of the bilateral 
common, internal, and external carotid arteries.                           



                                                                



                                There is normal contrast-enhancement of the bila

teral vertebral arteries.                 



                                                                



                                                    Intracranially there is norm

al contrast-enhancement of the bilateral 

intracranial internal carotid arteries, the bilateral distal vertebral arteries,
and the basilar artery. There is normal contrast enh                           



                                                    ancement of the the bilatera

l anterior, middle, and posterior cerebral 

arteries. There is normal contrast-enhancement of the superior cerebellar, 
anterior/inferior cerebellar, and posterior inferior cer                        

   



                                ebellar arteries bilaterally. There are no intra

cranial branch occlusions.                 



                                                                



                                There is normal contrast-enhancement of the veno

us structures.                 



                                                                



                                                                



                                                                



                                IMPRESSION:                     



                                                    1. Minimal atherosclerotic p

laque of the carotid bifurcations without stenosis.

                                                    



                                                    2. Normal intracranial CT an

giogram. There are no intracranial branch 

occlusions.

## 2023-09-02 NOTE — RAD REPORT
EXAM DESCRIPTION:  RADChest Single View9/2/2023 8:00 pm

 

CLINICAL HISTORY:  PAIN

 

COMPARISON:  Chest Single View dated 2/4/2023; Chest Single View dated 12/14/2022; Chest Single View 
dated 5/3/2022; Chest Single View dated 1/1/2021

 

TECHNIQUE:   Portable AP view of the chest.

 

FINDINGS:  The lungs are clear. No pneumothorax or effusion. The cardiomediastinal contours are unrem
arkable.

 

IMPRESSION:  No acute cardiopulmonary process.

## 2023-09-02 NOTE — EDPHYS
Physician Documentation                                                                           

 [facility]                                                                 

Name: Meaghan Tavares                                                                            

Age: 67 yrs                                                                                       

Sex: Female                                                                                       

: 1955                                                                                   

MRN: [id number]                                                                                   

Arrival Date: 2023                                                                          

Time: 18:12                                                                                       

Account#: [id number]                                                                            

Bed 7                                                                                             

Private MD:                                                                                       

ED Physician Scott Miranda                                                                       

HPI:                                                                                              

                                                                                             

18:27 This 67 yrs old  Female presents to ER via EMS with complaints of Fall Injury.  kb  

18:27 Details of fall: The patient fell from an upright position, while walking. Onset: The   kb  

      symptoms/episode began/occurred just prior to arrival. Associated injuries: The patient     

      sustained right quadriceps and right hip, decreased range of motion, deformity, painful     

      injury. Severity of symptoms: At their worst the symptoms were moderate, in the             

      emergency department the symptoms are unchanged. The patient has not experienced            

      similar symptoms in the past. The patient has not recently seen a physician. Pt tripped     

      while walking outside and fell onto right hip. .                                            

                                                                                                  

Historical:                                                                                       

- Allergies:                                                                                      

18:13 No Known Allergies;                                                                     aa5 

- Home Meds:                                                                                      

18:13 lisinopril 10 mg Oral tab 1 tab once daily [Active]; carvedilol oral [Active];          aa5 

18:45 Cipro Oral [Active]; Methocarbamol Oral [Active];                                       aa5 

- PMHx:                                                                                           

18:13 H Pyloi; Hypertension;                                                                  aa5 

- PSHx:                                                                                           

18:13 spinal fusion L4-L5;                                                                    aa5 

                                                                                                  

- Immunization history:: Adult Immunizations unknown.                                             

- Social history:: Smoking status: Patient reports the use of cigarette tobacco                   

  products, smokes one pack cigarettes per day.                                                   

- Immunization history: Last tetanus immunization: unknown.                                       

                                                                                                  

                                                                                                  

ROS:                                                                                              

18:26 Constitutional: Negative for fever, chills, and weight loss.                            kb  

18:26 MS/extremity: Positive for decreased range of motion, pain, of the right hip and right      

      quadriceps.                                                                                 

18:26 All other systems are negative.                                                             

                                                                                                  

Exam:                                                                                             

18:26 Constitutional:  This is a well developed, well nourished patient who is awake, alert,  kb  

      and in no acute distress. Head/Face:  Normocephalic, atraumatic. ENT:  Moist Mucous         

      membranes Cardiovascular:  Regular rate and rhythm with a normal S1 and S2.  No             

      gallops, murmurs, or rubs.  No pulse deficits. Respiratory:  Respirations even and          

      unlabored. No increased work of breathing. Talking in full sentences Abdomen/GI:  Soft,     

      non-tender. No distention Skin:  Warm, dry with normal turgor.  Normal color. Neuro:        

      Awake and alert, GCS 15, oriented to person, place, time, and situation. Moves all          

      extremities. Normal gait.                                                                   

18:26 Musculoskeletal/extremity: Extremities: grossly normal except: noted in the right           

      quadriceps and right hip: decreased ROM, pain, tenderness, ROM: intact in all               

      extremities, Circulation is intact in all extremities. Sensation intact. Weight             

      bearing: is unable to bear weight.                                                          

                                                                                                  

Vital Signs:                                                                                      

18:13  / 69; Pulse 69; Resp 18 S; Temp 98.1(O); Pulse Ox 98% on R/A; Weight 58.06 kg    aa5 

      (M); Height 5 ft. 1 in. (R);                                                                

19:00  / 71; Pulse 80; Resp 18; Pulse Ox 98% ;                                          vc1 

20:30  / 80; Pulse 71; Resp 18; Pulse Ox 97% ;                                          vc1 

21:15  / 70; Pulse 71; Resp 18; Pulse Ox 98% ;                                          vc1 

22:45  / 56; Pulse 59; Resp 18; Pulse Ox 98% ;                                          vc1 

18:13 Body Mass Index 24.19 (58.06 kg, 154.94 cm)                                             aa5 

                                                                                                  

Rom Coma Score:                                                                               

18:13 Eye Response: spontaneous(4). Motor Response: obeys commands(6). Verbal Response:       aa5 

      oriented(5). Total: 15.                                                                     

                                                                                                  

Trauma Score (Adult):                                                                             

18:13 Eye Response: spontaneous(1); Verbal Response: oriented(1); Motor Response: obeys       aa5 

      commands(2); Systolic BP: > 89 mm Hg(4); Respiratory Rate: 10 to 29 per min(4); [place]     

      Score: 15; Trauma Score: 12                                                                 

                                                                                                  

MDM:                                                                                              

18:15 Patient medically screened.                                                             kb  

18:28 Differential diagnosis: contusion, fracture. Data reviewed: vital signs, nurses notes.  kb  

18:28 Historians other than the Patient: EMS: [facility].                                      kb  

19:20 Counseling: I had a detailed discussion with the patient and/or guardian regarding the  kb  

      historical points, exam findings, and any diagnostic results supporting the                 

      discharge/admit diagnosis, radiology results, the need for further work-up and              

      treatment in the hospital.                                                                  

19:30 ED course: Dr Melanie accepts pt for transfer to [facility] .                             kb  

                                                                                                  

                                                                                             

18:15 Order name: CBC with Diff; Complete Time: 18:53                                         kb  

                                                                                             

18:15 Order name: Basic Metabolic Panel; Complete Time: 18:54                                 kb  

                                                                                             

18:15 Order name: Femur Right XRAY; Complete Time: 10:59                                      kb  

                                                                                             

20:01 Order name: Chest Single View; Complete Time: 10:59                                     EDMS

                                                                                             

18:15 Order name: IV Start; Complete Time: 18:19                                              kb  

                                                                                                  

Administered Medications:                                                                         

19:03 Drug: morphine IVP or IV 4 mg Route: IVP; Infused Over: 4 mins; Site: right antecubital;rs5 

19:03 Drug: Ondansetron IVP 4 mg Route: IVP; Site: right antecubital;                         rs5 

21:30 Drug: morphine IVP or IV 4 mg Route: IVP; Infused Over: 4 mins; Site: right antecubital;vc1 

21:31 Drug: Ondansetron IVP 4 mg Route: IVP; Site: right antecubital;                         vc1 

                                                                                                  

                                                                                                  

Disposition Summary:                                                                              

23 19:20                                                                                    

Transfer Ordered                                                                                  

      Transfer Location: [facility]                                              kb  

      Reason: Higher level of care                                                            kb  

      Condition: Stable                                                                       kb  

      Problem: new                                                                            kb  

      Symptoms: are unchanged                                                                 kb  

      Accepting Physician: Dr Navarro(23 23:28)                                            vc1 

      Diagnosis                                                                                   

        - Intertrochanteric Fracture - right hip                                              kb  

        - Fall on same level from slipping, tripping and stumbling without subsequent         kb  

      striking against object                                                                     

      Forms:                                                                                      

        - Medication Reconciliation Form                                                      kb  

        - SBAR form                                                                           kb  

Signatures:                                                                                       

Dispatcher [employer]                           EDMS                                                 

Mary Tyler, MANOLO-C                 FNP-Shelli Camacho, RN                     RN   aa5                                                  

Regine Montejo RN                    RN   vc1                                                  

Emmett Salcedo RN                       RN   rs5                                                  

                                                                                                  

Corrections: (The following items were deleted from the chart)                                    

19:30 19:20 Dr thacker                                                                             kb  

20:01 18:16 Hip Right 2 View+RAD.RAD.BRZ ordered. EDMS                                        EDMS

23:28 19:30 Dr Navarro kb                                                                        vc1 

                                                                                                  

**************************************************************************************************

## 2023-09-02 NOTE — ER
Nurse's Notes                                                                                     

 [facility]                                                                 

Name: Meaghan Tavares                                                                            

Age: 67 yrs                                                                                       

Sex: Female                                                                                       

: 1955                                                                                   

MRN: [id number]                                                                                   

Arrival Date: 2023                                                                          

Time: 18:12                                                                                       

Account#: [id number]                                                                            

Bed 7                                                                                             

Private MD:                                                                                       

Diagnosis: Intertrochanteric Fracture - right hip;Fall on same level from slipping, tripping and  

  stumbling without subsequent striking against object                                            

                                                                                                  

Presentation:                                                                                     

                                                                                             

18:13 Chief complaint: EMS states: tripped and fell onto right hip. Pt reports having spinal  aa5 

      fusion sx approximately 1 week ago. Right hip pain.                                         

18:13 Coronavirus screen: At this time, the client does not indicate any symptoms associated  aa5 

      with coronavirus-19. Ebola Screen: Patient denies travel to an Ebola-affected area in       

      the 21 days before illness onset. Initial Sepsis Screen: Does the patient meet any 2        

      criteria? No. Patient's initial sepsis screen is negative. Does the patient have a          

      suspected source of infection? No. Patient's initial sepsis screen is negative. Risk        

      Assessment: Do you want to hurt yourself or someone else? Patient reports no desire to      

      harm self or others. Onset of symptoms was 2023.                              

18:13 Acuity: ALVIN 2                                                                           aa5 

18:13 Method Of Arrival: EMS: [facility]                                                       aa5 

18:13 Care prior to arrival: IV initiated. 20 GA, in the left forearm.                        aa5 

18:13 Mechanism of Injury: Fall from standing position. Trauma event details: Injury occurred aa5 

      in the [facility], Injury occurred: at home. Injury occurred: 2023.                                                                                       

                                                                                                  

Trauma Activation: Alert                                                                          

 Physician: ED Physician; Name: ; Notified At: ; Arrived At:                                      

 Physician: General Surgeon; Name: ; Notified At: ; Arrived At:                                   

 Physician: Radiology; Name: ; Notified At: ; Arrived At:                                         

 Physician: Respiratory; Name: ; Notified At: ; Arrived At:                                       

 Physician: Lab; Name: ; Notified At: ; Arrived At:                                               

                                                                                                  

Historical:                                                                                       

- Allergies:                                                                                      

18:13 No Known Allergies;                                                                     aa5 

- Home Meds:                                                                                      

18:13 lisinopril 10 mg Oral tab 1 tab once daily [Active]; carvedilol oral [Active];          aa5 

18:45 Cipro Oral [Active]; Methocarbamol Oral [Active];                                       aa5 

- PMHx:                                                                                           

18:13 H Pyloi; Hypertension;                                                                  aa5 

- PSHx:                                                                                           

18:13 spinal fusion L4-L5;                                                                    aa5 

                                                                                                  

- Immunization history:: Adult Immunizations unknown.                                             

- Social history:: Smoking status: Patient reports the use of cigarette tobacco                   

  products, smokes one pack cigarettes per day.                                                   

- Immunization history: Last tetanus immunization: unknown.                                       

                                                                                                  

                                                                                                  

Screenin:15 [facility] ED Fall Risk Assessment (Adult) History of falling in the last 3 months,       aa5 

      including since admission Yes- single mechanical fall (1 pt) Confusion or                   

      Disorientation No (0 pts) Intoxicated or Sedated No (0 pts) Impaired Gait No (0 pts)        

      Mobility Assist Device Used No (0 pt) Altered Elimination No (0 pt) Score/Fall Risk         

      Level 0 - 2 = Low Risk Oriented to surroundings, Maintained a safe environment,             

      Educated pt \T\ family on fall prevention, incl call for assistance when getting out of     

      bed. Abuse screen: Denies threats or abuse. Nutritional screening: No deficits noted.       

      Tuberculosis screening: No symptoms or risk factors identified.                             

                                                                                                  

Primary Survey:                                                                                   

18:13 NO uncontrolled hemorrhage observed. A: The client is awake and alert. The airway is    aa5 

      patent. Breathing/Chest: Spontaneous respiratory effort, equal unlabored respirations,      

      breath sounds clear bilaterally, regular pattern, symmetrical chest rise and fall.          

      Circulation: Skin color: pink. Disability Client is alert. Exposure/Environment: A          

      warming method has been applied: A warm blanket has been provided to the patient.           

                                                                                                  

Secondary Survey:                                                                                 

18:13 HEENT: No deficits noted. Gastrointestinal: No deficits noted. : No signs and/or      aa5 

      symptoms were reported regarding the genitourinary system. Musculoskeletal: Reports         

      pain in right hip.                                                                          

                                                                                                  

Assessment:                                                                                       

18:13 General: Appears comfortable, Behavior is calm, cooperative. Pain: Complains of pain in aa5 

      right hip Pain currently is 5 out of 10 on a pain scale. Quality of pain is described       

      as sharp, Is continuous. Neuro: Level of Consciousness is awake, alert, obeys commands,     

      Oriented to person, place, time, situation. EENT: No signs and/or symptoms were             

      reported regarding the EENT system. Cardiovascular: Patient's skin is warm and dry.         

      Respiratory: Airway is patent Respiratory effort is even, unlabored, Respiratory            

      pattern is regular, symmetrical. GI: No signs and/or symptoms were reported involving       

      the gastrointestinal system. : No signs and/or symptoms were reported regarding the       

      genitourinary system. Derm: Skin is pink, warm \T\ dry. Musculoskeletal: Right leg is       

      shortened and externally rotated. Reports pain in right hip.                                

19:48 Reassessment: No changes from previously documented assessment. Patient and/or family   vc1 

      updated on plan of care and expected duration. Pain level reassessed.                       

21:00 Reassessment: No changes from previously documented assessment. Patient and/or family   vc1 

      updated on plan of care and expected duration. Pain level reassessed.                       

22:00 Reassessment: No changes from previously documented assessment. Patient and/or family   vc1 

      updated on plan of care and expected duration. Pain level reassessed.                       

23:00 Reassessment: No changes from previously documented assessment. Patient and/or family   vc1 

      updated on plan of care and expected duration. Pain level reassessed.                       

                                                                                                  

Vital Signs:                                                                                      

18:13  / 69; Pulse 69; Resp 18 S; Temp 98.1(O); Pulse Ox 98% on R/A; Weight 58.06 kg    aa5 

      (M); Height 5 ft. 1 in. (R);                                                                

19:00  / 71; Pulse 80; Resp 18; Pulse Ox 98% ;                                          vc1 

20:30  / 80; Pulse 71; Resp 18; Pulse Ox 97% ;                                          vc1 

21:15  / 70; Pulse 71; Resp 18; Pulse Ox 98% ;                                          vc1 

22:45  / 56; Pulse 59; Resp 18; Pulse Ox 98% ;                                          vc1 

18:13 Body Mass Index 24.19 (58.06 kg, 154.94 cm)                                             aa5 

                                                                                                  

Rom Coma Score:                                                                               

18:13 Eye Response: spontaneous(4). Motor Response: obeys commands(6). Verbal Response:       aa5 

      oriented(5). Total: 15.                                                                     

                                                                                                  

Trauma Score (Adult):                                                                             

18:13 Eye Response: spontaneous(1); Verbal Response: oriented(1); Motor Response: obeys       aa5 

      commands(2); Systolic BP: > 89 mm Hg(4); Respiratory Rate: 10 to 29 per min(4); Rom     

      Score: 15; Trauma Score: 12                                                                 

                                                                                                  

ED Course:                                                                                        

18:13 Patient arrived in ED.                                                                  hb  

18:13 Arm band placed on.                                                                     aa5 

18:13 Patient has correct armband on for positive identification. Bed in low position. Call   aa5 

      light in reach. Side rails up X2. Pulse ox on. NIBP on.                                     

18:13 Patient maintains SpO2 saturation greater than 95% on room air. Thermoregulation: warm  aa5 

      blanket given to patient.                                                                   

18:15 [facility]                                                        kb  

18:15 Scott Miranda MD is Attending Physician.                                              kb  

18:16 Shelli Gomez, AJAY is Primary Nurse.                                                   aa5 

18:19 Triage completed.                                                                       aa5 

18:20 Inserted saline lock: 20 gauge in right antecubital area, using aseptic technique.      rs5 

      Blood collected.                                                                            

19:00 Report given to AJAY Lee and AJAY Weiner.                                                aa5 

19:26 called [facility] for Transfer.                                                            sm8 

20:01 Chest Single View In Process Unspecified.                                               EDMS

20:02 Femur Right XRAY In Process Unspecified.                                                EDMS

20:31 Primary Nurse role handed off by Shelli Gomez RN                                      

23:27 No provider procedures requiring assistance completed. Patient transferred, IV remains  vc1 

      in place.                                                                                   

                                                                                                  

Administered Medications:                                                                         

19:03 Drug: morphine IVP or IV 4 mg Route: IVP; Infused Over: 4 mins; Site: right antecubital;rs5 

19:03 Drug: Ondansetron IVP 4 mg Route: IVP; Site: right antecubital;                         rs5 

21:30 Drug: morphine IVP or IV 4 mg Route: IVP; Infused Over: 4 mins; Site: right antecubital;vc1 

21:31 Drug: Ondansetron IVP 4 mg Route: IVP; Site: right antecubital;                         vc1 

                                                                                                  

                                                                                                  

Medication:                                                                                       

21:16 VIS not applicable for this client.                                                     vc1 

                                                                                                  

Outcome:                                                                                          

19:20 ER care complete, transfer ordered by MD.                                               kb  

23:28 Transferred by ground EMS to [facility], Transfer form completed. X-rays sent vc1 

      w/ patient.                                                                                 

23:28 Condition: stable                                                                           

23:28 Instructed on the need for transfer.                                                        

23:28 Patient left the ED.                                                                    vc1 

                                                                                                  

Signatures:                                                                                       

Dispatcher [employer]                           EDMS                                                 

Mary Tyler FNP-C FNP-Shelli Camacho RN RN   aa5                                                  

Gail Hoover RN RN                                                      

Dotty Subramanian                                                                                    

Regine Montejo RN RN   vc1                                                  

Emmett Salcedo RN RN   rs5                                                  

Demond Milett                              sm8                                                  

                                                                                                  

**************************************************************************************************

## 2023-09-13 ENCOUNTER — HOSPITAL ENCOUNTER (INPATIENT)
Dept: HOSPITAL 97 - 5TH | Age: 68
LOS: 8 days | Discharge: HOME HEALTH | DRG: 560 | End: 2023-09-21
Attending: PSYCHIATRY & NEUROLOGY | Admitting: PSYCHIATRY & NEUROLOGY
Payer: COMMERCIAL

## 2023-09-13 ENCOUNTER — HOSPITAL ENCOUNTER (EMERGENCY)
Dept: HOSPITAL 97 - ER | Age: 68
Discharge: HOME | End: 2023-09-13
Payer: COMMERCIAL

## 2023-09-13 VITALS — TEMPERATURE: 98.7 F | OXYGEN SATURATION: 100 %

## 2023-09-13 VITALS — DIASTOLIC BLOOD PRESSURE: 63 MMHG | SYSTOLIC BLOOD PRESSURE: 142 MMHG

## 2023-09-13 VITALS — BODY MASS INDEX: 22.1 KG/M2

## 2023-09-13 DIAGNOSIS — F32.A: ICD-10-CM

## 2023-09-13 DIAGNOSIS — I10: ICD-10-CM

## 2023-09-13 DIAGNOSIS — M19.90: ICD-10-CM

## 2023-09-13 DIAGNOSIS — D64.9: Primary | ICD-10-CM

## 2023-09-13 DIAGNOSIS — M62.838: ICD-10-CM

## 2023-09-13 DIAGNOSIS — D64.9: ICD-10-CM

## 2023-09-13 DIAGNOSIS — K59.00: ICD-10-CM

## 2023-09-13 DIAGNOSIS — E44.1: ICD-10-CM

## 2023-09-13 DIAGNOSIS — G47.00: ICD-10-CM

## 2023-09-13 DIAGNOSIS — F17.200: ICD-10-CM

## 2023-09-13 DIAGNOSIS — F17.210: ICD-10-CM

## 2023-09-13 DIAGNOSIS — S72.141D: Primary | ICD-10-CM

## 2023-09-13 DIAGNOSIS — E78.5: ICD-10-CM

## 2023-09-13 LAB
BUN BLD-MCNC: 20 MG/DL (ref 7–18)
GLUCOSE SERPLBLD-MCNC: 103 MG/DL (ref 74–106)
HCT VFR BLD CALC: 25.8 % (ref 36–45)
LYMPHOCYTES # SPEC AUTO: 1.6 K/UL (ref 0.7–4.9)
MCV RBC: 90.1 FL (ref 80–100)
PMV BLD: 6.3 FL (ref 7.6–11.3)
POTASSIUM SERPL-SCNC: 3.8 MEQ/L (ref 3.5–5.1)
RBC # BLD: 2.87 M/UL (ref 3.86–4.86)
WBC # BLD AUTO: 9 THOU/UL (ref 4.3–10.9)

## 2023-09-13 PROCEDURE — 92523 SPEECH SOUND LANG COMPREHEN: CPT

## 2023-09-13 PROCEDURE — 97129 THER IVNTJ 1ST 15 MIN: CPT

## 2023-09-13 PROCEDURE — 36415 COLL VENOUS BLD VENIPUNCTURE: CPT

## 2023-09-13 PROCEDURE — 99284 EMERGENCY DEPT VISIT MOD MDM: CPT

## 2023-09-13 PROCEDURE — 97116 GAIT TRAINING THERAPY: CPT

## 2023-09-13 PROCEDURE — 85025 COMPLETE CBC W/AUTO DIFF WBC: CPT

## 2023-09-13 PROCEDURE — 97010 HOT OR COLD PACKS THERAPY: CPT

## 2023-09-13 PROCEDURE — 82040 ASSAY OF SERUM ALBUMIN: CPT

## 2023-09-13 PROCEDURE — 83735 ASSAY OF MAGNESIUM: CPT

## 2023-09-13 PROCEDURE — 81001 URINALYSIS AUTO W/SCOPE: CPT

## 2023-09-13 PROCEDURE — 80048 BASIC METABOLIC PNL TOTAL CA: CPT

## 2023-09-13 PROCEDURE — 97530 THERAPEUTIC ACTIVITIES: CPT

## 2023-09-13 PROCEDURE — 97542 WHEELCHAIR MNGMENT TRAINING: CPT

## 2023-09-13 PROCEDURE — 87086 URINE CULTURE/COLONY COUNT: CPT

## 2023-09-13 PROCEDURE — 97165 OT EVAL LOW COMPLEX 30 MIN: CPT

## 2023-09-13 PROCEDURE — 97162 PT EVAL MOD COMPLEX 30 MIN: CPT

## 2023-09-13 PROCEDURE — 97110 THERAPEUTIC EXERCISES: CPT

## 2023-09-13 PROCEDURE — 87088 URINE BACTERIA CULTURE: CPT

## 2023-09-13 PROCEDURE — 84134 ASSAY OF PREALBUMIN: CPT

## 2023-09-13 RX ADMIN — APIXABAN SCH MG: 2.5 TABLET, FILM COATED ORAL at 19:48

## 2023-09-13 RX ADMIN — GABAPENTIN SCH MG: 100 CAPSULE ORAL at 19:48

## 2023-09-13 RX ADMIN — MAGNESIUM OXIDE TAB 400 MG (241.3 MG ELEMENTAL MG) SCH MG: 400 (241.3 MG) TAB at 19:49

## 2023-09-13 NOTE — XMS REPORT
Continuity of Care Document

                          Created on:2023



Patient:SUKHI ZELAYA

Sex:Female

:1955

External Reference #:[id number]





Demographics







                          Address                   [address] [place] [address]

 

                          Home Phone                [phone]

 

                          Work Phone                [phone]

 

                          Mobile Phone              [phone]

 

                          Email Address             [email or web address]

 

                          Preferred Language        English

 

                          Marital Status            Unknown

 

                          Islam Affiliation     Unknown

 

                          Race                      Unknown

 

                          Additional Race(s)        Unavailable



                                                    Unavailable



                                                    White

 

                          Ethnic Group              Unknown









Author







                          Organization              [facility]

t

 

                          Address                   [address]



                                                    [place] [address]

 

                          Phone                     [phone]









Support







                Name            Relationship    Address         Phone

 

                KEKE MEJIA  Friend          [address] [phone]



                                                [place] [address] 









Care Team Providers







                    Name                Role                Phone

 

                    HARESH BROWN      Attending Clinician Unavailable

 

                    Lab, Adc Fam Pob I  Attending Clinician Unavailable

 

                    Nika Szymanski  Attending Clinician [phone]

 

                    HARESH BROWN      Admitting Clinician Unavailable









Payers







           Payer Name Policy Type Policy Number Effective Date Expiration Date S

ource







Problems







       Condition Condition Condition Status Onset  Resolution Last   Treating Co

mments 

Source



       Name   Details Category        Date   Date   Treatment Clinician        



                                                 Date                 

 

       SUB ACUTE  SUB ACUTE Diagnosis Active 2013-0        2013-09-15           

    [facility]



       MCA STROKE MCA STROKE               9-15          18:02:00               

l



              Active               00:00:                             Eduardo



              09/15/2013               00                                 



               [facility]                                                  

 

       TIA     TIA   Diagnosis Active 2013               Mem

oria



              Active               9-15          12:05:00               l



              09/15/2013               00:00:                             Romaine adhikari



              [address]                                                  

 

       TRANS   TRANS Diagnosis Active               2013               Mem

oria



       CEREB  CEREB                              12:05:00               l



       ISCHEMIA ISCHEMIA                                                  Romaine adhikari



       NEC    NEC Active                                                  



               [facility]                                                  

 

       Cerebrovas  Cerebrova Problem Active               2023            

   [facility]



       cular  scular                             11:39:54               l



       accident accident                                                  Romaine adhikari



       (disorder) (disorder)                                                  



              Active                                                  



              Problem                                                  



              2023                                                  



               May 2022                                                  



              [facility]                                                     



              Neurology                                                  



              [place]                                                  

 

       Hypertensi        Problem Active               2023               M

emoria



       ve     Hypertensi                             11:39:54               l



       disorder, ve                                                      Eduardo



       systemic disorder,                                                  



       arterial systemic                                                  



       (disorder) arterial                                                  



              (disorder)                                                  



              Active                                                  



              Problem                                                  



              2023                                                  



               [facility]                                                    



              Neurology                                                  



              [place]                                                  

 

       Syncope  Syncope Problem Active               2023               Me

moria



       (disorder) (disorder)                             11:39:54               

l



              Active                                                  [place]



              Problem                                                  



              2023                                                  



              [facility]                                                     



              Neurology                                                  



              [place]                                                  

 

       Arthritis  Arthritis Problem Resolve               2020            

   [facility]



       (disorder) (disorder)        d                    22:45:28               

l



              Resolved                                                  [place]



              Problem                                                  



              2020                                                  



              [facility]                                                  



              Neuro                                                   

 

       Hyperlipid  Hyperlipi Problem Resolve               2020           

    [facility]



       emia   demia         d                    22:45:28               l



       (disorder) (disorder)                                                  He

rmann



              Resolved                                                  



              Problem                                                  



              2020                                                  



               [facility]                                                  



              Neuro                                                   

 

       Laser    Laser Problem Resolve               2020               Mem

oria



       assisted assisted        d                    22:45:28               l



       in situ in situ                                                  Eduardo



       keratomile keratomile                                                  



       usis   usis                                                    



       (procedure (procedure                                                  



       )      ) Resolved                                                  



              Problem                                                  



              2020                                                  



               [facility]                                                  



              Neuro                                                   

 

       Arthritis  Arthritis Problem Resolve               2013            

   [facility]



              Resolved        d                    21:14:51               l



              Problem                                                  [place]



              2013                                                  



              [facility]                                                  

 

       Hyperlipid  Hyperlipi Problem Resolve               2013           

    [facility]



       emia   demia         d                    21:14:51               l



              Resolved                                                  [place]



              Problem                                                  



              2013                                                  



              [facility]                                                  

 

       LASIK    LASIK Problem Resolve               2013               Mem

oria



              Resolved        d                    21:14:51               l



              Problem                                                  [place]



              2013                                                  



              [facility]                                                  







Allergies, Adverse Reactions, Alerts







       Allergy Allergy Status Severity Reaction(s) Onset  Inactive Treating Comm

ents 

Source



       Name   Type                        Date   Date   Clinician        

 

       NO KNOWN Drug   Active                                           [facility]



       ALLERGIE Class                                                   ity of



       S                                                              [facility]

 

       No Known No Known Active                                           [facility]

a



       Medicati Medicati                                                  l



       on     on                                                      Eduardo



       Allergie Allergie                                                  



       s      s                                                       







Social History







           Social Habit Start Date Stop Date  Quantity   Comments   Source

 

           Sex Assigned At                                             [facility]

y of



           Birth                                                  [facility]



                                                                  [place]

 

           Exposure to                       Yes                   [facility] 



           SARS-CoV-2                                             [facility]



           (event)                                                [facility]

 

           Alcohol intake 2015 Current               [facility]

 of



                      00:00:00   00:00:00   non-drinker of            [facility]

song



                                            alcohol               [facility]anch



                                            (finding)             









                Smoking Status  Start Date      Stop Date       Source

 

                Tobacco smoking status 2023 19:59:57                 Dariel Clark







Medications







       Ordered Filled Start  Stop   Current Ordering Indication Dosage Frequency

 Signature

                    Comments            Components          Source



     Medication Medication Date Date Medication? Clinician                (SIG) 

          



     Name Name                                                   

 

     aspirin 81            Yes                      81 mg = 1           Me

moria



     mg tablet,      3-22                               tab, PO,           l



     enteric      20:54:                               Daily, #           Romaine

n



     coated      00                                 90 tab, 3           



                                                  Refill(s)           

 

     lisinopril            Yes                      0              [facility]



     20 mg oral      3-22                               Refill(s)           l



     tablet      20:52:                                              Eduardo



               00                                                

 

     CRESTOR 20      2015      Yes            20mg      Take 20 mg           U

nivers



     mg tablet      0-26                               by mouth           ity of



               00:00:                               at             [place]



               00                                 bedtime.           [facility]



                                                                 Branch

 

     atorvastati            Yes  Mary                80 mg, 1          

 [facility]



     n 80 mg      9-16           [place]                tab, PO,           l



     oral tablet      18:23:           Brown                Daily, 30           

Eduardo



               46                                 tab,           



                                                  Substituti           



                                                  on             



                                                  Allowed,           



                                                  TAB            

 

     atorvastati            Yes  Mary                80 mg, 1          

 [facility]



     n 80 mg      9-16           [place]                tab, PO,           l



     oral tablet      18:23:           Brown                Daily, 30           

Eduardo



               46                                 tab,           



                                                  Substituti           



                                                  on             



                                                  Allowed,           



                                                  TAB            

 

     atorvastati            Yes  Mary                80 mg, 1          

 [facility]



     n 80 mg      9-16           [place]                tab, PO,           l



     oral tablet      18:23:           Brown                Daily, 30           

[place]



               46                                 tab,           



                                                  Substituti           



                                                  on             



                                                  Allowed,           



                                                  TAB            

 

     atorvastati            Yes  Mary                80 mg, 1          

 [facility]



     n 80 mg      9-16           [place]                tab, PO,           l



     oral tablet      18:23:           Brown                Daily, 30           

Edu[place]



               46                                 tab,           



                                                  Substituti           



                                                  on             



                                                  Allowed,           



                                                  TAB            

 

     aspirin 81            Yes  Mary                81 mg, 1           

[facility]



     mg tablet,      9-16           [place]                tab, PO,           l



     enteric      18:23:           Brown                Daily, 30           [facility]

marco antonio



     coated      38                                 tab,           



                                                  Substituti           



                                                  on             



                                                  Allowed,           



                                                  ECTAB           

 

     aspirin 81            Yes  Mary                81 mg, 1           

[facility]



     mg tablet,      9-16           [place]                tab, PO,           l



     enteric      18:23:           Brown                Daily, 30           H[facility]

marco antonio



     coated      38                                 tab,           



                                                  Substituti           



                                                  on             



                                                  Allowed,           



                                                  ECTAB           

 

     aspirin 81            Yes  Mary                81 mg, 1           

[facility]



     mg tablet,      9-16           [place]                tab, PO,           l



     enteric      18:23:           Brown                Daily, 30           Herm

marco antonio



     coated      38                                 tab,           



                                                  Substituti           



                                                  on             



                                                  Allowed,           



                                                  ECTAB           

 

     aspirin 81            Yes  Mary                81 mg, 1           

[facility]



     mg tablet,      9-16           [place]                tab, PO,           l



     enteric      18:23:           Brown                Daily, 30           Herm

marco antonio



     coated      38                                 tab,           



                                                  Substituti           



                                                  on             



                                                  Allowed,           



                                                  ECTAB           

 

     atorvastati      -0      No   Kimberly                80 mg, 1           M

emoria



     n         9-16           Deni                tab,           l



               14:00:           Jose Alberto                Route: PO,           Romaine

n



               00                                 Drug form:           



                                                  TAB,           



                                                  Daily,           



                                                  Dosing           



                                                  Weight           



                                                  72.727,           



                                                  kg, Start           



                                                  date:           



                                                  13           



                                                  9:00:00,           



                                                  Duration:           



                                                  30 day,           



                                                  Stop date:           



                                                  10/15/13           



                                                  9:00:00           

 

     aspirin      -0      No   Kimberly                81 mg, 1           Memor

ia



               9-16           Deni                tab,           l



               14:00:           Jose Alberto                Route: PO,           Romaine

n



               00                                 Drug form:           



                                                  ECTAB,           



                                                  Daily,           



                                                  Dosing           



                                                  Weight           



                                                  72.727,           



                                                  kg, Start           



                                                  date:           



                                                  13           



                                                  9:00:00,           



                                                  Duration:           



                                                  30 day,           



                                                  Stop date:           



                                                  10/15/13           



                                                  9:00:00           

 

     atorvastati      -0      No   Kimberly                80 mg, 1           M

emoria



     n         9-16           Deni                tab,           l



               14:00:           Jose Alberto                Route: PO,           Romaine

n



               00                                 Drug form:           



                                                  TAB,           



                                                  Daily,           



                                                  Dosing           



                                                  Weight           



                                                  72.727,           



                                                  kg, Start           



                                                  date:           



                                                  13           



                                                  9:00:00,           



                                                  Duration:           



                                                  30 day,           



                                                  Stop date:           



                                                  10/15/13           



                                                  9:00:00           

 

     aspirin      -0      No   Kimberly                81 mg, 1           Memor

ia



               9-16           Deni                tab,           l



               14:00:           Jose Alberto                Route: PO,           Romaine

n



               00                                 Drug form:           



                                                  ECTAB,           



                                                  Daily,           



                                                  Dosing           



                                                  Weight           



                                                  72.727,           



                                                  kg, Start           



                                                  date:           



                                                  13           



                                                  9:00:00,           



                                                  Duration:           



                                                  30 day,           



                                                  Stop date:           



                                                  10/15/13           



                                                  9:00:00           

 

     atorvastati      -0      No   Kimberly                80 mg, 1           M

emoria



     n         9-16           Deni                tab,           l



               14:00:           Jose Alberto                Route: PO,           Romaine

n



               00                                 Drug form:           



                                                  TAB,           



                                                  Daily,           



                                                  Dosing           



                                                  Weight           



                                                  72.727,           



                                                  kg, Start           



                                                  date:           



                                                  13           



                                                  9:00:00,           



                                                  Duration:           



                                                  30 day,           



                                                  Stop date:           



                                                  10/15/13           



                                                  9:00:00           

 

     aspirin      -0      No   [facility]                81 mg, 1           [facility]

ia



               9-16           Deni                tab,           l



               14:00:           Jose Alberto                Route: PO,           [place]

n



               00                                 Drug form:           



                                                  ECTAB,           



                                                  Daily,           



                                                  Dosing           



                                                  Weight           



                                                  72.727,           



                                                  kg, Start           



                                                  date:           



                                                  13           



                                                  9:00:00,           



                                                  Duration:           



                                                  30 day,           



                                                  Stop date:           



                                                  10/15/13           



                                                  9:00:00           

 

     atorvastati            No   [facility]                80 mg, 1           [facility]

[facility]



     n         9-16           Deni                tab,           l



               14:00:           Jose Alberto                Route: PO,           [place]

n



               00                                 Drug form:           



                                                  TAB,           



                                                  Daily,           



                                                  Dosing           



                                                  Weight           



                                                  72.727,           



                                                  kg, Start           



                                                  date:           



                                                  13           



                                                  9:00:00,           



                                                  Duration:           



                                                  30 day,           



                                                  Stop date:           



                                                  10/15/13           



                                                  9:00:00           

 

     aspirin      -      No   [facility]                81 mg, 1           [facility]

ia



               -16           Deni                tab,           l



               14:00:           Jose Alberto                Route: PO,           [place]

n



               00                                 Drug form:           



                                                  ECTAB,           



                                                  Daily,           



                                                  Dosing           



                                                  Weight           



                                                  72.727,           



                                                  kg, Start           



                                                  date:           



                                                  13           



                                                  9:00:00,           



                                                  Duration:           



                                                  30 day,           



                                                  Stop date:           



                                                  10/15/13           



                                                  9:00:00           

 

     heparin            No   [facility]                5,000           [facility]



               9-16           Deni                unit, 1           l



               13:00:           Jose Alberto                mL, Route:           [place]

n



               00                                 SUB-Q,           



                                                  Drug form:           



                                                  INJ, Q8H,           



                                                  Dosing           



                                                  Weight           



                                                  72.727,           



                                                  kg, Start           



                                                  date:           



                                                  13           



                                                  8:00:00,           



                                                  Duration:           



                                                  30 day,           



                                                  Stop date:           



                                                  10/16/13           



                                                  0:00:00           

 

     heparin            No   [facility]                5,000           [facility]



               9-16           Deni                unit, 1           l



               13:00:           Jose Alberto                mL, Route:           [facility]

n



               00                                 SUB-Q,           



                                                  Drug form:           



                                                  INJ, Q8H,           



                                                  Dosing           



                                                  Weight           



                                                  72.727,           



                                                  kg, Start           



                                                  date:           



                                                  13           



                                                  8:00:00,           



                                                  Duration:           



                                                  30 day,           



                                                  Stop date:           



                                                  10/16/13           



                                                  0:00:00           

 

     heparin      -0      No   [facility]                5,000           [facility]



               9-16           Deni                unit, 1           l



               13:00:           Jose Alberto                mL, Route:           [place]

n



               00                                 SUB-Q,           



                                                  Drug form:           



                                                  INJ, Q8H,           



                                                  Dosing           



                                                  Weight           



                                                  72.727,           



                                                  kg, Start           



                                                  date:           



                                                  13           



                                                  8:00:00,           



                                                  Duration:           



                                                  30 day,           



                                                  Stop date:           



                                                  10/16/13           



                                                  0:00:00           

 

     heparin      -0      No   [facility]                5,000           [facility]



               9-16           Deni                unit, 1           l



               13:00:           Jose Alberto                mL, Route:           [facility]

n



               00                                 SUB-Q,           



                                                  Drug form:           



                                                  INJ, Q8H,           



                                                  Dosing           



                                                  Weight           



                                                  72.727,           



                                                  kg, Start           



                                                  date:           



                                                  13           



                                                  8:00:00,           



                                                  Duration:           



                                                  30 day,           



                                                  Stop date:           



                                                  10/16/13           



                                                  0:00:00           

 

     Saline      -0      No                   5 ml,           [facility]



     Flush 0.9%      9-16           [facility]                Route:           l



               02:00:           treras                IVP, Drug           [place]

n



               00                                 Form: INJ,           



                                                  Dosing           



                                                  Weight           



                                                  72.727,           



                                                  kg, Q12H,           



                                                  Start           



                                                  date:           



                                                  09/15/13           



                                                  21:00:00,           



                                                  Duration:           



                                                  30 day,           



                                                  Stop date:           



                                                  10/15/13           



                                                  9:00:00           

 

     Saline      -0      No                   5 ml,           [facility]



     Flush 0.9%      9-16           [facility]                Route:           l



               02:00:           treras                IVP, Drug           [place]

n



               00                                 Form: INJ,           



                                                  Dosing           



                                                  Weight           



                                                  72.727,           



                                                  kg, Q12H,           



                                                  Start           



                                                  date:           



                                                  09/15/13           



                                                  21:00:00,           



                                                  Duration:           



                                                  30 day,           



                                                  Stop date:           



                                                  10/15/13           



                                                  9:00:00           

 

     Saline      -0      No                   5 ml,           [facility]



     Flush 0.9%      9-16           [facility]                Route:           l



               02:00:           treras                IVP, Drug           [place]

n



               00                                 Form: INJ,           



                                                  Dosing           



                                                  Weight           



                                                  72.727,           



                                                  kg, Q12H,           



                                                  Start           



                                                  date:           



                                                  09/15/13           



                                                  21:00:00,           



                                                  Duration:           



                                                  30 day,           



                                                  Stop date:           



                                                  10/15/13           



                                                  9:00:00           

 

     Saline      -0      No                   5 ml,           [facility]



     Flush 0.9%      9-16           [facility]                Route:           l



               02:00:           treras                IVP, Drug           [place]

n



               00                                 Form: INJ,           



                                                  Dosing           



                                                  Weight           



                                                  72.727,           



                                                  kg, Q12H,           



                                                  Start           



                                                  date:           



                                                  09/15/13           



                                                  21:00:00,           



                                                  Duration:           



                                                  30 day,           



                                                  Stop date:           



                                                  10/15/13           



                                                  9:00:00           

 

     Zantac      0      Yes                      Substituti           [facility]

ia



               9-15                               on Allowed           l



               22:49:                                              [place]



                                                               

 

     Zantac            Yes                      Substituti           [facility]

ia



               9-15                               on Allowed           l



               22:49:                                              [place]



                                                               

 

     Zantac            Yes                      Substituti           [facility]

ia



               9-15                               on Allowed           l



               22:49:                                              [place]



                                                               

 

     Zantac      2013-0      Yes                      Substituti           [facility]

ia



               9-15                               on Allowed           l



               22:49:                                              [place]



               00                                                

 

     Zantac            Yes                      Substituti           [facility]

ia



               9-15                               on Allowed           l



               22:49:                                              [place]



               00                                                

 

     Multiple            Yes                      1 cap, PO,           [facility]

[facility]



     Vitamins      9-15                               Daily, 30           l



     oral      22:48:                               cap,           [place]



     capsule      47                                 Substituti           



                                                  on             



                                                  Allowed,           



                                                  Maintenanc           



                                                  e, CAP           

 

     Multiple            Yes                      1 cap, PO,           [facility]

[facility]



     Vitamins      9-15                               Daily, 30           l



     oral      22:48:                               cap,           [place]



     capsule      47                                 Substituti           



                                                  on             



                                                  Allowed,           



                                                  Maintenanc           



                                                  e, CAP           

 

     Multiple            Yes                      1 cap, PO,           [facility]

[facility]



     Vitamins      9-15                               Daily, 30           l



     oral      22:48:                               cap,           [place]



     capsule      47                                 Substituti           



                                                  on             



                                                  Allowed,           



                                                  Maintenanc           



                                                  e, CAP           

 

     Multiple            Yes                      1 cap, PO,           [facility]

[facility]



     Vitamins      9-15                               Daily, 30           l



     oral      22:48:                               cap,           [place]



     capsule      47                                 Substituti           



                                                  on             



                                                  Allowed,           



                                                  Maintenanc           



                                                  e, CAP           

 

     Multiple            Yes                      1 cap, PO,           [facility]

[facility]



     Vitamins      9-15                               Daily, 30           l



     oral      22:48:                               cap,           [place]



     capsule      47                                 Substituti           



                                                  on             



                                                  Allowed,           



                                                  Maintenanc           



                                                  e, CAP           

 

     Saline            No                   5 ml,           [facility]



     Flush 0.9%      9-15           [facility]                Route:           l



               22:09:           treras                IVP, Drug           [place]

n



               00                                 Form: INJ,           



                                                  Dosing           



                                                  Weight           



                                                  72.727,           



                                                  kg, PRN,           



                                                  PRN Line           



                                                  Flush,           



                                                  Start           



                                                  date:           



                                                  09/15/13           



                                                  17:09:00,           



                                                  Duration:           



                                                  30 day,           



                                                  Stop date:           



                                                  10/15/13           



                                                  17:08:00           

 

     labetalol            No                   10 mg, 2           [facility]

[facility]



               -15           [facility]                mL, Route:           l



               22:09:           treras                IVP, Drug           [place]

n



               00                                 form: INJ,           



                                                  Q10Min,           



                                                  Dosing           



                                                  Weight           



                                                  72.727,           



                                                  kg, PRN           



                                                  Hypertensi           



                                                  on, Start           



                                                  date:           



                                                  09/15/13           



                                                  17:09:00,           



                                                  Duration:           



                                                  30 day,           



                                                  Stop date:           



                                                  10/15/13           



                                                  17:08:00,           



                                                  For SBP >           



                                                  180mmHg           



                                                  and/or DBP           



                                                  > 105mmHg           

 

     Sodium            No   Mary                1,000 mL,           [facility]

[facility]



     Chloride      -15           [place]                Rate: 100           l



     0.9% IV      22:09:           Brown                ml/hr,           [facility]



     1,000 mL      00                                 Infuse           



                                                  over: 10           



                                                  hr, Route:           



                                                  IV, Dosing           



                                                  Weight           



                                                  72.727 kg,           



                                                  Total           



                                                  Volume:           



                                                  1,000,           



                                                  Start           



                                                  date:           



                                                  09/15/13           



                                                  17:09:00,           



                                                  Stop date:           



                                                  10/15/13           



                                                  17:08:00           

 

     Saline      -0      No                   5 ml,           [facility]



     Flush 0.9%      -15           [facility]                Route:           l



               22:09:           treras                IVP, Drug           [place]

n



               00                                 Form: INJ,           



                                                  Dosing           



                                                  Weight           



                                                  72.727,           



                                                  kg, PRN,           



                                                  PRN Line           



                                                  Flush,           



                                                  Start           



                                                  date:           



                                                  09/15/13           



                                                  17:09:00,           



                                                  Duration:           



                                                  30 day,           



                                                  Stop date:           



                                                  10/15/13           



                                                  17:08:00           

 

     Saline      -      No                   5 ml,           [facility]



     Flush 0.9%      -15           [facility]                Route:           l



               22:09:           treras                IVP, Drug           [facility]

n



               00                                 Form: INJ,           



                                                  Dosing           



                                                  Weight           



                                                  72.727,           



                                                  kg, PRN,           



                                                  PRN Line           



                                                  Flush,           



                                                  Start           



                                                  date:           



                                                  09/15/13           



                                                  17:09:00,           



                                                  Duration:           



                                                  30 day,           



                                                  Stop date:           



                                                  10/15/13           



                                                  17:08:00           

 

     labetalol                      10 mg, 2           [facility]

[facility]



               -15           [facility]                mL, Route:           l



               22:09:           treras                IVP, Drug           [facility]

n



               00                                 form: INJ,           



                                                  Q10Min,           



                                                  Dosing           



                                                  Weight           



                                                  72.727,           



                                                  kg, PRN           



                                                  Hypertensi           



                                                  on, Start           



                                                  date:           



                                                  09/15/13           



                                                  17:09:00,           



                                                  Duration:           



                                                  30 day,           



                                                  Stop date:           



                                                  10/15/13           



                                                  17:08:00,           



                                                  For SBP >           



                                                  180mmHg           



                                                  and/or DBP           



                                                  > 105mmHg           

 

     Sodium      -0      No   [facility]                1,000 mL,           [facility]

[facility]



     Chloride      -15           [facility]                Rate: 100           l



     0.9% IV      22:09:           [facility]                ml/hr,           [place]



     1,000 mL      00                                 Infuse           



                                                  over: 10           



                                                  hr, Route:           



                                                  IV, Dosing           



                                                  Weight           



                                                  72.727 kg,           



                                                  Total           



                                                  Volume:           



                                                  1,000,           



                                                  Start           



                                                  date:           



                                                  09/15/13           



                                                  17:09:00,           



                                                  Stop date:           



                                                  10/15/13           



                                                  17:08:00           

 

     labetalol                      10 mg, 2           [facility]

[facility]



               -15           [facility]                mL, Route:           l



               22:09:           treras                IVP, Drug           [facility]

n



               00                                 form: INJ,           



                                                  Q10Min,           



                                                  Dosing           



                                                  Weight           



                                                  72.727,           



                                                  kg, PRN           



                                                  Hypertensi           



                                                  on, Start           



                                                  date:           



                                                  09/15/13           



                                                  17:09:00,           



                                                  Duration:           



                                                  30 day,           



                                                  Stop date:           



                                                  10/15/13           



                                                  17:08:00,           



                                                  For SBP >           



                                                  180mmHg           



                                                  and/or DBP           



                                                  > 105mmHg           

 

     Sodium      -0      No   [facility]                1,000 mL,           [facility]

[facility]



     Chloride      9-15           [facility]                Rate: 100           l



     0.9% IV      22:09:           [facility]                ml/hr,           [facility]



     1,000 mL      00                                 Infuse           



                                                  over: 10           



                                                  hr, Route:           



                                                  IV, Dosing           



                                                  Weight           



                                                  72.727 kg,           



                                                  Total           



                                                  Volume:           



                                                  1,000,           



                                                  Start           



                                                  date:           



                                                  09/15/13           



                                                  17:09:00,           



                                                  Stop date:           



                                                  10/15/13           



                                                  17:08:00           

 

     Saline      -0      No                   5 ml,           [facility]



     Flush 0.9%      9-15           [facility]                Route:           l



               22:09:           [facility]                IVP, Drug           [facility]

n



               00                                 Form: INJ,           



                                                  Dosing           



                                                  Weight           



                                                  72.727,           



                                                  kg, PRN,           



                                                  PRN Line           



                                                  Flush,           



                                                  Start           



                                                  date:           



                                                  09/15/13           



                                                  17:09:00,           



                                                  Duration:           



                                                  30 day,           



                                                  Stop date:           



                                                  10/15/13           



                                                  17:08:00           

 

     labetalol      -0      No                   10 mg, 2           [facility]

[facility]



               9-15           [facility]                mL, Route:           l



               22:09:           [facility]                IVP, Drug           [facility]

n



               00                                 form: INJ,           



                                                  Q10Min,           



                                                  Dosing           



                                                  Weight           



                                                  72.727,           



                                                  kg, PRN           



                                                  Hypertensi           



                                                  on, Start           



                                                  date:           



                                                  09/15/13           



                                                  17:09:00,           



                                                  Duration:           



                                                  30 day,           



                                                  Stop date:           



                                                  10/15/13           



                                                  17:08:00,           



                                                  For SBP >           



                                                  180mmHg           



                                                  and/or DBP           



                                                  > 105mmHg           

 

     Sodium      -0      No   [facility]                1,000 mL,           [facility]

[facility]



     Chloride      9-15           [facility]                Rate: 100           l



     0.9% IV      22:09:           [facility]                ml/hr,           [facility]



     1,000 mL      00                                 Infuse           



                                                  over: 10           



                                                  hr, Route:           



                                                  IV, Dosing           



                                                  Weight           



                                                  72.727 kg,           



                                                  Total           



                                                  Volume:           



                                                  1,000,           



                                                  Start           



                                                  date:           



                                                  09/15/13           



                                                  17:09:00,           



                                                  Stop date:           



                                                  10/15/13           



                                                  17:08:00           

 

     Omnipaque      -0      No   [facility]enn[facility]                85 mL,           [facility]

[facility]



     350mg/ml      9-15           [facility]                Route:           l



               19:37:                               IVP, Drug           [place]



                                                Form:           



                                                  SOLN,           



                                                  Dosing           



                                                  Weight           



                                                  72.727,           



                                                  kg,            



                                                  ONCALL,           



                                                  STAT,           



                                                  Start           



                                                  date:           



                                                  09/15/13           



                                                  14:37:00,           



                                                  Duration:           



                                                  1 doses or           



                                                  times,           



                                                  Dose =           



                                                  2.2ml/kg,           



                                                  Max dose =           



                                                  100ml --           



                                                  "To be           



                                                  infused by           



                                                  Radiology           



                                                  Staff           



                                                  ONLY"Dose           



                                                  =              



                                                  2.2ml/kg,           



                                                  Max dose =           



                                                  100ml --           



                                                  "To be           



                                                  infused by           



                                                  Radiology           



                                                  Staff           



                                                  ONLY"           

 

     Omnipaque      -0      No   Jennifer                85 mL,           [facility]

[facility]



     350mg/ml      9-15           [facility]                Route:           l



               19:37:                               IVP, Drug           [facility]



                                                Form:           



                                                  SOLN,           



                                                  Dosing           



                                                  Weight           



                                                  72.727,           



                                                  kg,            



                                                  ONCALL,           



                                                  STAT,           



                                                  Start           



                                                  date:           



                                                  09/15/13           



                                                  14:37:00,           



                                                  Duration:           



                                                  1 doses or           



                                                  times,           



                                                  Dose =           



                                                  2.2ml/kg,           



                                                  Max dose =           



                                                  100ml --           



                                                  "To be           



                                                  infused by           



                                                  Radiology           



                                                  Staff           



                                                  ONLY"Dose           



                                                  =              



                                                  2.2ml/kg,           



                                                  Max dose =           



                                                  100ml --           



                                                  "To be           



                                                  infused by           



                                                  Radiology           



                                                  Staff           



                                                  ONLY"           

 

     Omnipaque      -      No   Jennifer                85 mL,           [facility]

[facility]



     350mg/ml      9-15           [facility]                Route:           l



               19:37:                               IVP, Drug           [place]



                                                Form:           



                                                  SOLN,           



                                                  Dosing           



                                                  Weight           



                                                  72.727,           



                                                  kg,            



                                                  ONCALL,           



                                                  STAT,           



                                                  Start           



                                                  date:           



                                                  09/15/13           



                                                  14:37:00,           



                                                  Duration:           



                                                  1 doses or           



                                                  times,           



                                                  Dose =           



                                                  2.2ml/kg,           



                                                  Max dose =           



                                                  100ml --           



                                                  "To be           



                                                  infused by           



                                                  Radiology           



                                                  Staff           



                                                  ONLY"Dose           



                                                  =              



                                                  2.2ml/kg,           



                                                  Max dose =           



                                                  100ml --           



                                                  "To be           



                                                  infused by           



                                                  Radiology           



                                                  Staff           



                                                  ONLY"           

 

     Omnipaque      -0      No   [facility]ennifer                85 mL,           [facility]

[facility]



     350mg/ml      9-15           [facility]                Route:           l



               19:37:                               IVP, Drug           [place]



                                                Form:           



                                                  SOLN,           



                                                  Dosing           



                                                  Weight           



                                                  72.727,           



                                                  kg,            



                                                  ONCALL,           



                                                  STAT,           



                                                  Start           



                                                  date:           



                                                  09/15/13           



                                                  14:37:00,           



                                                  Duration:           



                                                  1 doses or           



                                                  times,           



                                                  Dose =           



                                                  2.2ml/kg,           



                                                  Max dose =           



                                                  100ml --           



                                                  "To be           



                                                  infused by           



                                                  Radiology           



                                                  Staff           



                                                  ONLY"Dose           



                                                  =              



                                                  2.2ml/kg,           



                                                  Max dose =           



                                                  100ml --           



                                                  "To be           



                                                  infused by           



                                                  Radiology           



                                                  Staff           



                                                  ONLY"           







Vital Signs







             Vital Name   Observation Time Observation Value Comments     Source

 

             Systolic (mm Hg) 2023 19:52:00                           [facility]

[facility] [place]

 

             Diastolic (mm Hg) 2023 19:52:00                           [facility]

[facility]

 

             Heart Rate   2023 19:52:00                           [facility]

 [facility]

 

             Height       2023 19:52:00 4 [ft_i]                  [facility]

 [facility]

 

             Weight       2023 19:52:00                           [facility]

 [place]

 

             BMI Calculated 2023 19:52:00                           [facility]

[facility]

 

             Diastolic (mm Hg) 2013 17:00:00                           [facility]

[facility] [place]

 

             Heart Rate   2013 17:00:00                           [facility]

 [place]

 

             Respitory Rate 2013 17:00:00                           [facility]

[facility] [place]

 

             Temperature Oral (F) 2013 17:00:00 97.0 F                    

[facility]

 

             Systolic (mm Hg) 2013 17:00:00                           [facility]

[facility]

 

             Systolic (mm Hg) 2013 12:00:00                           [facility]

[facility] [place]

 

             Diastolic (mm Hg) 2013 12:00:00                           [facility]

[facility]

 

             Respitory Rate 2013 12:00:00                           [facility]

al [place]

 

             Temperature Oral (F) 2013 12:00:00 96.3 F                    

[facility]

 

             Heart Rate   2013 12:00:00                           [facility]

 [facility]

 

             Heart Rate   2013 08:51:00                           [facility]

 [place]

 

             Temperature Oral (F) 2013 08:51:00 97.8 F                    

[facility]

 

             Systolic (mm Hg) 2013 08:51:00                           [facility]

rial [place]

 

             Diastolic (mm Hg) 2013 08:51:00                           [facility]

[facility] [place]

 

             Respitory Rate 2013 00:41:00                           [facility]

al [place]

 

             Weight       2013-09-15 17:49:00                           [facility]

 [facility]

 

             Height       2013-09-15 17:49:00 157.48 cm                 [facility]

 [place]







Procedures







                Procedure       Date / Time Performed Performing Clinician Anastasia PENALOZA                                           [facility]







Encounters







        Start   End     Encounter Admission Attending Care    Care    Encounter 

Source



        Date/Time Date/Time Type    Type    Clinicians Facility Department ID   

   

 

        2023 Inpatient E       KASEY BROWN    MED     [id number]

[facility]



        03:43:00 17:10:00                 SHIFA                   46      

 

        2023 Outpatient                 MHVERO    [facility]     [id number]

[facility]



        18:00:00 04:59:59                                 Neurology 02      l



                                                        Harika Stricklandann

 

        2023 Outpatient                 ABBEY    [facility]     [id number]

[facility]



        20:00:00 04:59:59                                 Neurology 01      l



                                                        [place]         [facility]

 

        2020 Laboratory         Lab, [facility]    [id number]

[id number] 



        09:23:20 09:43:20 Only            [facility]  [id number]         



                                                [place] [id number]         



                                                [employer] [phone]         



                                                [place] [address]             



                                                Office                  



                                                Building                 



                                                One                     

 

        2020 Laboratory         Lab, [facility]    1.2.

[id number] 

[facility]



        09:23:20 09:43:20 Only            [facility], [facility]  [id number]    

     ity of



                                                [place] [id number]         [place]

as



                                                [employer] [phone]         Me

dical



                                                [place] [address]             Branch



                                                Office                  



                                                Building                 



                                                One                     

 

        2020 Outpatient R               [facility]    [id number]

[address] [facility]



        09:00:00 09:00:00                                                 ity of



                                                                        [facility]

 

        2020 Ambulatory                 [facility]     [id number]

[id number] [facility]



        19:45:00 19:45:00 Pre-Reg                 r       Neurology 00      l



                                                        Harika Clark

 

        2013-09-15 2013 OU                      [facility] [id number]

[facility]



        17:09:00 15:00:00                         r       Medical 58      l



                                                        [facility]







Results







           Test Description Test Time  Test Comments Results    Result Comments 

Source









                    Thyroid Stimulating Hormone 2019 22:27:55 









                      Test Item  Value      Reference Range Interpretation Comme

nts









             TSH (test code = TSH) 0.445 mIU/mL 0.270-4.200               



Erythrocyte Sedimentation Rate [id number] 22:20:13





             Test Item    Value        Reference Range Interpretation Comments

 

             ESR STAT (test code = ESR STAT) 8 mm/hr      0-20                  

    



Comprehensive Metabolic [id number] 21:59:49





             Test Item    Value        Reference Range Interpretation Comments

 

             Sodium Level (test code = Sodium 139.0 mmol/L 135.0-145.0          

     



             Level)                                              

 

             Potassium Level (test code = 3.7 mmol/L   3.5-5.1                  

 



             Potassium Level)                                        

 

             Chloride Level (test code = 98 mmol/L                        



             Chloride Level)                                        

 

             CO2 (test code = CO2) 26 mmol/L    22-29                     

 

             Anion Gap (test code = Anion 15 mmol/L    7-16                     

 



             Gap)                                                

 

             BUN (test code = BUN) 14.70 mg/dL  8.00-23.00                

 

             Creatinine Level (test code = 0.70 mg/dL   0.50-0.90               

  



             Creatinine Level)                                        

 

             BUN/Creat Ratio (test code = 21                        N           

 



             BUN/Creat Ratio)                                        

 

             Glucose Level (test code = 121 mg/dL           H            



             Glucose Level)                                        

 

             Calcium Level (test code = 9.8 mg/dL    8.3-10.5                  



             Calcium Level)                                        

 

             Alk Phos (test code = Alk Phos) 98 U/L                       

    

 

             Bilirubin Total (test code = 0.4 mg/dL    0.1-0.9                  

 



             Bilirubin Total)                                        

 

             Albumin Level (test code = 4.6 g/dL     3.5-5.2                   



             Albumin Level)                                        

 

             Protein Total (test code = 7.4 g/dL     6.4-8.3                   



             Protein Total)                                        

 

             ALT (test code = ALT) 22 U/L       1-33                      

 

             AST (test code = AST) 24 U/L       1-32                      

 

             Globulin (test code = Globulin) 2.8 g/dL     2.9-3.1      L        

    

 

             A/G Ratio (test code = A/G 1.6 ratio                 N            



             Ratio)                                              



Comprehensive Metabolic [id number] 21:59:49





             Test Item    Value        Reference Range Interpretation Comments

 

             Sodium Level (test 139.0 mmol/L 135.0-145.0               



             code = Sodium Level)                                        

 

             Potassium Level 3.7 mmol/L   3.5-5.1                   



             (test code =                                        



             Potassium Level)                                        

 

             Chloride Level (test 98 mmol/L                        



             code = Chloride                                        



             Level)                                              

 

             CO2 (test code = 26 mmol/L    22-29                     



             CO2)                                                

 

             Anion Gap (test code 15 mmol/L    7-16                      



             = Anion Gap)                                        

 

             BUN (test code = 14.70 mg/dL  8.00-23.00                



             BUN)                                                

 

             Creatinine Level 0.70 mg/dL   0.50-0.90                 



             (test code =                                        



             Creatinine Level)                                        

 

             BUN/Creat Ratio 21                        N            



             (test code =                                        



             BUN/Creat Ratio)                                        

 

             Glucose Level (test 121 mg/dL           H            



             code = Glucose                                        



             Level)                                              

 

             Calcium Level (test 9.8 mg/dL    8.3-10.5                  



             code = Calcium                                        



             Level)                                              

 

             Alk Phos (test code 98 U/L                           



             = Alk Phos)                                         

 

             Bilirubin Total 0.4 mg/dL    0.1-0.9                   



             (test code =                                        



             Bilirubin Total)                                        

 

             Albumin Level (test 4.6 g/dL     3.5-5.2                   



             code = Albumin                                        



             Level)                                              

 

             Protein Total (test 7.4 g/dL     6.4-8.3                   



             code = Protein                                        



             Total)                                              

 

             ALT (test code = 22 U/L       1-33                      



             ALT)                                                

 

             AST (test code = 24 U/L       1-32                      



             AST)                                                

 

             Globulin (test code 2.8 g/dL     2.9-3.1      L            



             = Globulin)                                         

 

             A/G Ratio (test code 1.6 ratio                 N            



             = A/G Ratio)                                        

 

             eGFR AA (test code = >60                       N            eGFR (e

stimated



             eGFR AA)     mL/min/1.73 m2                           Glomerular



                                                                 Filtration Rate

) is



                                                                 an estimated va

lue,



                                                                 calculated from

 the



                                                                 patient's serum



                                                                 creatinine usin

g the



                                                                 MDRD equation. 

It is



                                                                 NOT the patient

's



                                                                 actual GFR. The

 eGFR



                                                                 provides a more



                                                                 clinically usef

ul



                                                                 measure of kidn

ey



                                                                 disease than se

rum



                                                                 creatinine



                                                                 alone.***This



                                                                 calculation neelima

es



                                                                 sex and race in

to



                                                                 account, if the



                                                                 information is



                                                                 provided. If th

e



                                                                 race is not



                                                                 provided, and t

he



                                                                 patient is



                                                                 -Carole

n,



                                                                 multiply by 1.2

12.



                                                                 If sex is not



                                                                 provided, and t

he



                                                                 patient is fema

le,



                                                                 multiply by 0.7

42.



                                                                 Results for pat

ients



                                                                 <18 years of ag

e



                                                                 have not been



                                                                 validated by th

e



                                                                 MDRD study and



                                                                 should be



                                                                 interpreted wit

h



                                                                 caution. eGFR R

esult



                                                                 Interpretation:

eGFR



                                                                 > or = 60 is in

 the



                                                                 Normal RangeeGF

R <



                                                                 60 may mean kid

neeru



                                                                 diseaseeGFR < 1

5 may



                                                                 mean kidney



                                                                 failure*** Rang

es



                                                                 recommended by 

the



                                                                 [employer]



                                                                 Foundation,



                                                                 http://nkdep.ni

h.gov



Lipid [id number] 21:59:49





             Test Item    Value        Reference Range Interpretation Comments

 

             Cholesterol Total 294 mg/dL    0-200        H            RISK OF HE

ART



             (test code =                                        DISEASEPublishe

d by



             Cholesterol Total)                                        American 

Heart



                                                                 Association Harriet

lyte



                                                                 Optimal Borderl

ine



                                                                 Increased RiskC

HOL



                                                                 <200 200-239 >2

40TRIG



                                                                 <150 150-199 >2

00HDL



                                                                 Male >60 <40HDL

 Female



                                                                 >60 <50LDL <100



                                                                 130-159 >160LDL

 Near



                                                                 optimal is 100-

129

 

             Triglycerides (test 616 mg/dL    9-200        H            



             code =                                              



             Triglycerides)                                        

 

             HDL (test code = 52 mg/dL     50-60                     



             HDL)                                                

 

             LDL (test code = N/A Trig     0-130        N            LDL calcula

tion is



             LDL)         >400 mg/dL                             unreliable when



                                                                 Triglyceride is



                                                                 greater than 40

0.

 

             VLDL (test code = N/A Trig     5-40         N            VLDL calcu

lation is



             VLDL)        >400 mg/dL                             unreliable when



                                                                 Triglyceride is



                                                                 greater than 40

0.

 

             Chol/HDL (test code 5.7 ratio    0.0-4.4      H            



             = Chol/HDL)                                         

 

             LDL/HDL Ratio (test N/A Trig                  N            LDL/HDL 

Ratio



             code = LDL/HDL >400                                   calculation i

s



             Ratio)                                              unreliable when



                                                                 Triglyceride is



                                                                 greater than 40

0.



Comprehensive Metabolic [id number] 21:59:49





             Test Item    Value        Reference Range Interpretation Comments

 

             Sodium Level (test 139.0 mmol/L 135.0-145.0               



             code = Sodium Level)                                        

 

             Potassium Level 3.7 mmol/L   3.5-5.1                   



             (test code =                                        



             Potassium Level)                                        

 

             Chloride Level (test 98 mmol/L                        



             code = Chloride                                        



             Level)                                              

 

             CO2 (test code = 26 mmol/L    22-29                     



             CO2)                                                

 

             Anion Gap (test code 15 mmol/L    7-16                      



             = Anion Gap)                                        

 

             BUN (test code = 14.70 mg/dL  8.00-23.00                



             BUN)                                                

 

             Creatinine Level 0.70 mg/dL   0.50-0.90                 



             (test code =                                        



             Creatinine Level)                                        

 

             BUN/Creat Ratio 21                        N            



             (test code =                                        



             BUN/Creat Ratio)                                        

 

             Glucose Level (test 121 mg/dL           H            



             code = Glucose                                        



             Level)                                              

 

             Calcium Level (test 9.8 mg/dL    8.3-10.5                  



             code = Calcium                                        



             Level)                                              

 

             Alk Phos (test code 98 U/L                           



             = Alk Phos)                                         

 

             Bilirubin Total 0.4 mg/dL    0.1-0.9                   



             (test code =                                        



             Bilirubin Total)                                        

 

             Albumin Level (test 4.6 g/dL     3.5-5.2                   



             code = Albumin                                        



             Level)                                              

 

             Protein Total (test 7.4 g/dL     6.4-8.3                   



             code = Protein                                        



             Total)                                              

 

             ALT (test code = 22 U/L       1-33                      



             ALT)                                                

 

             AST (test code = 24 U/L       1-32                      



             AST)                                                

 

             Globulin (test code 2.8 g/dL     2.9-3.1      L            



             = Globulin)                                         

 

             A/G Ratio (test code 1.6 ratio                 N            



             = A/G Ratio)                                        

 

             eGFR AA (test code = >60                       N            eGFR (e

stimated



             eGFR AA)     mL/min/1.73 m2                           Glomerular



                                                                 Filtration Rate

) is



                                                                 an estimated va

lue,



                                                                 calculated from

 the



                                                                 patient's serum



                                                                 creatinine usin

g the



                                                                 MDRD equation. 

It is



                                                                 NOT the patient

's



                                                                 actual GFR. The

 eGFR



                                                                 provides a more



                                                                 clinically usef

ul



                                                                 measure of kidn

ey



                                                                 disease than se

rum



                                                                 creatinine



                                                                 alone.***This



                                                                 calculation neelima

es



                                                                 sex and race in

to



                                                                 account, if the



                                                                 information is



                                                                 provided. If th

e



                                                                 race is not



                                                                 provided, and t

he



                                                                 patient is



                                                                 -Carole

n,



                                                                 multiply by 1.2

12.



                                                                 If sex is not



                                                                 provided, and t

he



                                                                 patient is fema

le,



                                                                 multiply by 0.7

42.



                                                                 Results for pat

ients



                                                                 <18 years of ag

e



                                                                 have not been



                                                                 validated by [facility]



                                                                 MDRD study and



                                                                 should be



                                                                 interpreted wit

h



                                                                 caution. eGFR R

esult



                                                                 Interpretation:

eGFR



                                                                 > or = 60 is in

 the



                                                                 Normal RangeeGF

R <



                                                                 60 may mean kid

neeru



                                                                 diseaseeGFR < 1

5 may



                                                                 mean kidney



                                                                 failure*** Rang

es



                                                                 recommended by 

the



                                                                 [employer]



                                                                 Foundation,



                                                                 http://nkdep.ni

h.gov

 

             eGFR Non-AA (test >60.00                    N            eGFR (hailey

mated



             code = eGFR Non-AA) mL/min/1.73 m2                           Glomer

ular



                                                                 Filtration Rate

) is



                                                                 an estimated va

lue,



                                                                 calculated from

 the



                                                                 patient's serum



                                                                 creatinine usin

g the



                                                                 MDRD equation. 

It is



                                                                 NOT the patient

's



                                                                 actual GFR. The

 eGFR



                                                                 provides a more



                                                                 clinically usef

ul



                                                                 measure of kidn

ey



                                                                 disease than se

rum



                                                                 creatinine



                                                                 alone.***This



                                                                 calculation neelima

es



                                                                 sex and race in

to



                                                                 account, if the



                                                                 information is



                                                                 provided. If th

e



                                                                 race is not



                                                                 provided, and t

he



                                                                 patient is



                                                                 -Carole

n,



                                                                 multiply by 1.2

12.



                                                                 If sex is not



                                                                 provided, and t

he



                                                                 patient is fema

le,



                                                                 multiply by 0.7

42.



                                                                 Results for pat

ients



                                                                 <18 years of ag

e



                                                                 have not been



                                                                 validated by [facility]



                                                                 MDRD study and



                                                                 should be



                                                                 interpreted wit

h



                                                                 caution. eGFR R

esult



                                                                 Interpretation:

eGFR



                                                                 > or = 60 is in

 the



                                                                 Normal RangeeGF

R <



                                                                 60 may mean kid

neeru



                                                                 diseaseeGFR < 1

5 may



                                                                 mean kidney



                                                                 failure*** Rang

es



                                                                 recommended by 

the



                                                                 [employer]



                                                                 Foundation,



                                                                 http://nkdep.ni

h.gov



Complete Blood Count with [id number] 21:52:58





             Test Item    Value        Reference Range Interpretation Comments

 

             WBC (test code = WBC) 9.0 x10      4.4-10.5                  

 

             RBC (test code = RBC) 4.76 x10     3.75-5.20                 

 

             Hgb (test code = Hgb) 14.4 g/dL    12.2-14.8                 

 

             Hct (test code = Hct) 42.4 %       36.5-44.4                 

 

             MCV (test code = MCV) 89.10 fL     80..00              

 

             MCHC (test code = 34.00 g/dL   32.00-37.50               



             MCHC)                                               

 

             MCH (test code = MCH) 30.3 pg      27.0-32.5                 

 

             RDW CV (test code = 12.6 %       11.5-14.5                 



             RDW CV)                                             

 

             Platelets (test code = 340.0 x10    140.0-440.0               



             Platelets)                                          

 

             MPV (test code = MPV) 10.1 fL                   N            

 

             Slide Review (test Auto         Auto                      Result cr

eated by



             code = Slide Review)                                        [email or web address]_

SLIDE_REV_AUTO

 

             nRBC (test code = 0                         N            



             nRBC)                                               

 

             NRBC Abs (test code = 0.00 x10                  N            



             NRBC Abs)                                           

 

             IPF (test code = IPF) 0 %                       N            



Automated Pcuvdktmebgg2587-55-33 21:52:58





             Test Item    Value        Reference Range Interpretation Comments

 

             Neutro Auto (test code = Neutro 62.7 %       36.0-70.0             

    



             Auto)                                               

 

             Lymph Auto (test code = Lymph Auto) 28.3 %       12.0-44.0         

        

 

             Mono Auto (test code = Mono Auto) 7.6 %        0.0-11.0            

      

 

             Eos, Auto (test code = Eos, Auto) 0.7 %        0.0-7.0             

      

 

             Basophil Auto (test code = Basophil 0.3 %        0.0-2.0           

        



             Auto)                                               

 

             Neutro Absolute (test code = Neutro 5.7 x10      1.6-7.4           

        



             Absolute)                                           

 

             Lymph Absolute (test code = Lymph 2.56 x10     .50-4.60            

      



             Absolute)                                           

 

             Mono Absolute (test code = Mono .69 x10      .00-1.20              

    



             Absolute)                                           

 

             Eos Absolute (test code = Eos 0.06 x10     0.00-0.74               

  



             Absolute)                                           

 

             Baso Absolute (test code = Baso 0.03 x10     0.00-0.21             

    



             Absolute)                                           



Pro B Natriuretic Mxtkadh1413-65-24 21:52:58





             Test Item    Value        Reference Range Interpretation Comments

 

             NT-proBNP (test code = NT-proBNP) 80 pg/mL     0-124               

      



IG Lnreo0459-84-99 21:52:58





             Test Item    Value        Reference Range Interpretation Comments

 

             IG (test code = IG) 0.4 %        0.0-5.0                   

 

             IG Abs (test code = IG Abs) 0 x10                     N            



BEALTQUNG4688-02-18 12:35:11





             Test Item    Value        Reference Range Interpretation Comments

 

             LDL Direct (test code = LDL Direct) 144                       H    

        



[facility] HospitalEqczhvaWASZCGPHA3607-10-[facility] 12:35:11





             Test Item    Value        Reference Range Interpretation Comments

 

             LDL Direct (test code = LDL Direct) 144                       H    

        



[facility] HospitalMxcsxujYTLIOWPGH1064-13-89 12:35:11





             Test Item    Value        Reference Range Interpretation Comments

 

             LDL Direct (test code = LDL Direct) 144                       H    

        



[facility]UqwahpfDRDYIFMZS5794-97-80 12:35:11





             Test Item    Value        Reference Range Interpretation Comments

 

             LDL Direct (test code = LDL Direct) 144                       H    

        



[facility] CenterZnrtbtdXHLUEGNAB7176-10-64 08:00:00





             Test Item    Value        Reference Range Interpretation Comments

 

             Potassium Lvl (test code = Potassium 4.1          3.5-5.1      N   

         



             Lvl)                                                



[facility]Jw[facility]adhRDEUICQUG3155[facility]61 [facility]:00





             Test Item    Value        Reference Range Interpretation Comments

 

             LDL (test code = LDL) See Note mg/dL                           



                          5*NA*(2013                           



                          03:00:00)                              



[facility] HospitalHmljjqzXMNWRXWPV0033-30-21 08:00:00





             Test Item    Value        Reference Range Interpretation Comments

 

             CHD Risk (test code = CHD Risk) 8.45         3.90-5.80    H        

    



[facility] HospitalAbabuznUSBRJWUEC4496-02-46 08:00:00





             Test Item    Value        Reference Range Interpretation Comments

 

             HDL (test code = HDL) 29                        L            



[facility] CenterUnwpkcqJHMXUEIGM8167-23-63 08:00:00





             Test Item    Value        Reference Range Interpretation Comments

 

             Trig (test code = Trig) 531                       H            



[facility] CenterHqocbilQYUIUDNBK8180-92-63 08:00:00





             Test Item    Value        Reference Range Interpretation Comments

 

             Chol (test code = Chol) 245                       H            



[facility]WcpemccPGGSNXXJSY4809-02-30 08:00:00





             Test Item    Value        Reference Range Interpretation Comments

 

             Basophils (test code = 0.7          See_Comment  N             [Aut

omated message] The



             Basophils)                                          system which ge

nerated



                                                                 this result tra

nsmitted



                                                                 reference range

: <=1.0.



                                                                 The reference r

erna was



                                                                 not used to int

erpret



                                                                 this result as



                                                                 normal/abnormal

.



[facility] DentonWwtuzvxCMAHZIKKJK6630-06-22 08:00:00





             Test Item    Value        Reference Range Interpretation Comments

 

             Segs-Bands # (test code = Segs-Bands #) 3.6          1.5-8.1      N

            



[facility] DentonCqcwrqaTIMMKOMAAR9562-56-00 08:00:00





             Test Item    Value        Reference Range Interpretation Comments

 

             Lymphocytes # (test code = Lymphocytes 3.1          1.0-5.5      N 

           



             #)                                                  



[facility] DentonSgytuquHGHJAELCXN8649-36-21 08:00:00





             Test Item    Value        Reference Range Interpretation Comments

 

             Monocytes # (test code 0.6          See_Comment  N             [Aut

omated message] The



             = Monocytes #)                                        system which 

generated



                                                                 this result tra

nsmitted



                                                                 reference range

: <=0.8.



                                                                 The reference r

erna was



                                                                 not used to int

erpret



                                                                 this result as



                                                                 normal/abnormal

.



[facility] DentonJkysocfPNNPJCVWEA6140-14-42 08:00:00





             Test Item    Value        Reference Range Interpretation Comments

 

             Eosinophils # (test code 0.1          See_Comment  N             [A

utomated message] The



             = Eosinophils #)                                        system ic

h generated



                                                                 this result tra

nsmitted



                                                                 reference range

: <=0.5.



                                                                 The reference r

erna was



                                                                 not used to int

erpret



                                                                 this result as



                                                                 normal/abnormal

.



[facility] DentonAaasnpkEDSEOIIDZR5745-26-96 08:00:00





             Test Item    Value        Reference Range Interpretation Comments

 

             Basophils # (test code 0.1          See_Comment  N             [Aut

omated message] The



             = Basophils #)                                        system which 

generated



                                                                 this result tra

nsmitted



                                                                 reference range

: <=0.2.



                                                                 The reference r

eran was



                                                                 not used to int

erpret



                                                                 this result as



                                                                 normal/abnormal

.



[facility] DentonJwnobrbDWBQWPPTEY6543-57-04 08:00:00





             Test Item    Value        Reference Range Interpretation Comments

 

             Eosinophils (test code = 1.7          See_Comment  N             [A

utomated message] The



             Eosinophils)                                        system which ge

nerated



                                                                 this result tra

nsmitted



                                                                 reference range

: <=4.0.



                                                                 The reference r

erna was



                                                                 not used to int

erpret



                                                                 this result as



                                                                 normal/abnormal

.



[facility] DentonSywwjhtYPTLMZBMRM6700-31-25 08:00:00





             Test Item    Value        Reference Range Interpretation Comments

 

             Monocytes (test code = Monocytes) 8.0          2.0-12.0     N      

      



[facility] DentonXbwcnjfFTNBDBFNIY2007-45-50 08:00:00





             Test Item    Value        Reference Range Interpretation Comments

 

             Segs (test code = Segs) 48.0         45.0-75.0    N            



[facility] DentonZtmirolDYLZFCQPQA0374-46-75 08:00:00





             Test Item    Value        Reference Range Interpretation Comments

 

             Lymphocytes (test code = Lymphocytes) 41.6         20.0-40.0    H  

          



[facility] DentonEoqhfouTXBOQUETZU3017-82-27 08:00:00





             Test Item    Value        Reference Range Interpretation Comments

 

             RBC (test code = RBC) 3.87         4.20-5.40    L            



[facility] DentonUloxuyzYKBVBXSKZB6210-62-85 08:00:00





             Test Item    Value        Reference Range Interpretation Comments

 

             WBC (test code = WBC) 7.4          3.7-10.4     N            



[facility] DentonVeejavoSFBBXXOQRM5733-85-70 08:00:00





             Test Item    Value        Reference Range Interpretation Comments

 

             Hgb (test code = Hgb) 11.9         12.0-16.0    L            



[facility] DentonVpqkvstHEWMKSWNSH1449-14-00 08:00:00





             Test Item    Value        Reference Range Interpretation Comments

 

             Hct (test code = Hct) 35.2         36.0-48.0    L            



[facility] DentonDxvkjshAJHROIEJHK4592-41-99 08:00:00





             Test Item    Value        Reference Range Interpretation Comments

 

             MCV (test code = MCV) 90.8         81.0-99.0    N            



[facility] DentonMmmthpkMBZHPSZDFE3228-80-42 08:00:00





             Test Item    Value        Reference Range Interpretation Comments

 

             MCH (test code = MCH) 30.7 pg      27.0-31.0    N            



[facility] DentonZfphbkoXHNJUHNCWC1627-15-80 08:00:00





             Test Item    Value        Reference Range Interpretation Comments

 

             RDW (test code = RDW) 13.3         11.5-14.5    N            



[facility] DentonHnhbinoBGBJIIHGBW8162-59-42 08:00:00





             Test Item    Value        Reference Range Interpretation Comments

 

             MCHC (test code = MCHC) 33.9         32.0-36.0    N            



[facility] DentonKhcxkonKLVZWKOSRQ8617-41-78 08:00:00





             Test Item    Value        Reference Range Interpretation Comments

 

             Platelet (test code = Platelet) 279          133-450      N        

    



[facility] DentonKtoraqsZEWRRQFNPW5591-64-11 08:00:00





             Test Item    Value        Reference Range Interpretation Comments

 

             MPV (test code = MPV) 7.5          7.4-10.4     N            



[facility] CenterYoepydxMPWAFSRKB3650-08-21 08:00:00





             Test Item    Value        Reference Range Interpretation Comments

 

             AGAP (test code = AGAP) 13.1         10.0-20.0    N            



[facility] CenterLanjohjURJVPRZSW0343-03-80 08:00:00





             Test Item    Value        Reference Range Interpretation Comments

 

             eGFR (test code = eGFR) 96                                     



[facility] CenterEjsuijoVSLSMCDMD2876-08-72 08:00:00





             Test Item    Value        Reference Range Interpretation Comments

 

             Creatinine Lvl (test code = Creatinine 0.7          0.5-1.4      N 

           



             Lvl)                                                



[facility] CenterNolpsvjOAKCVFRLS5776-55-40 08:00:00





             Test Item    Value        Reference Range Interpretation Comments

 

             BUN (test code = BUN) 18           7-22         N            



[facility] CenterPqmhuntCGEGROTIN6026-47-25 08:00:00





             Test Item    Value        Reference Range Interpretation Comments

 

             Glucose Lvl (test code = Glucose Lvl) 101          70-99        H  

          



[facility] CenterYybxvtjNZMTCNTIK2514-06-27 08:00:00





             Test Item    Value        Reference Range Interpretation Comments

 

             AGAP (test code = AGAP) 13.1         10.0-20.0    N            



[facility] CenterAnteiqoXAVHBBPYY0830-14-64 08:00:00





             Test Item    Value        Reference Range Interpretation Comments

 

             eGFR (test code = eGFR) 96                                     



[facility] CenterPlbsxyrTNFWYRRBB1473-32-63 08:00:00





             Test Item    Value        Reference Range Interpretation Comments

 

             Sodium Lvl (test code = Sodium Lvl) 142          135-145      N    

        



[facility] CenterYjbdcedGJVMFPPGD0566-03-01 08:00:00





             Test Item    Value        Reference Range Interpretation Comments

 

             Creatinine Lvl (test code = Creatinine 0.7          0.5-1.4      N 

           



             Lvl)                                                



[facility] CenterKutntwwPBWFGKISQ8054-11-11 08:00:00





             Test Item    Value        Reference Range Interpretation Comments

 

             BUN (test code = BUN) 18           7-22         N            



[facility] CenterFpnobxwSFBBOLYAA4800-51-23 08:00:00





             Test Item    Value        Reference Range Interpretation Comments

 

             Glucose Lvl (test code = Glucose Lvl) 101          70-99        H  

          



[facility] CenterIfypajcXVRPIDBXG0073-27-27 08:00:00





             Test Item    Value        Reference Range Interpretation Comments

 

             Sodium Lvl (test code = Sodium Lvl) 142          135-145      N    

        



[facility] CenterOqabudxGZLQOWPOZ9324-44-42 08:00:00





             Test Item    Value        Reference Range Interpretation Comments

 

             Calcium Lvl (test code = Calcium Lvl) 8.3          8.5-10.5     L  

          



[facility] CenterLeattpbFCBOTWLTW1004-95-62 08:00:00





             Test Item    Value        Reference Range Interpretation Comments

 

             CO2 (test code = CO2) 23           24-32        L            



[facility] CenterOvdwfsxDBBLKGOIH2802-79-29 08:00:00





             Test Item    Value        Reference Range Interpretation Comments

 

             Chloride Lvl (test code = Chloride Lvl) 110                 H

            



[facility] CenterTzeapqmJHWVHVWFG4273-54-91 08:00:00





             Test Item    Value        Reference Range Interpretation Comments

 

             Potassium Lvl (test code = Potassium 4.1          3.5-5.1      N   

         



             Lvl)                                                



[facility] CenterPvtxmheIXGMRHZBD2639-16-64 08:00:00





             Test Item    Value        Reference Range Interpretation Comments

 

             LDL (test code = LDL) See Note mg/dL                           



                          5*NA*(2013                           



                          03:00:00)                              



[facility] CenterUihbiklRPTUBOXWW7743-95-87 08:00:00





             Test Item    Value        Reference Range Interpretation Comments

 

             CHD Risk (test code = CHD Risk) 8.45         3.90-5.80    H        

    



[facility] CenterMyhpownEEMWDSUSW2279-09-11 08:00:00





             Test Item    Value        Reference Range Interpretation Comments

 

             Calcium Lvl (test code = Calcium Lvl) 8.3          8.5-10.5     L  

          



[facility] CenterUxhxuzuBCNIMHCDT2822-53-54 08:00:00





             Test Item    Value        Reference Range Interpretation Comments

 

             HDL (test code = HDL) 29                        L            



[facility] CenterLszymesEBYOZUGTO2002-96-52 08:00:00





             Test Item    Value        Reference Range Interpretation Comments

 

             Trig (test code = Trig) 531                       H            



[facility] CenterJfgqiotWFYEXFGKM7835-08-76 08:00:00





             Test Item    Value        Reference Range Interpretation Comments

 

             Chol (test code = Chol) 245                       H            



[facility]entonChgllkvSBVTYCILAU4294-83-68 08:00:00





             Test Item    Value        Reference Range Interpretation Comments

 

             Basophils (test code = 0.7          See_Comment  N             [Aut

omated message] The



             Basophils)                                          system which ge

nerated



                                                                 this result tra

nsmitted



                                                                 reference range

: <=1.0.



                                                                 The reference r

erna was



                                                                 not used to int

erpret



                                                                 this result as



                                                                 normal/abnormal

.



[facility] DentonDifjhgfZYJCJDRZEU1695-84-88 08:00:00





             Test Item    Value        Reference Range Interpretation Comments

 

             Segs-Bands # (test code = Segs-Bands #) 3.6          1.5-8.1      N

            



[facility] DentonEsuvdkyRUZSWDWIAF0105-50-56 08:00:00





             Test Item    Value        Reference Range Interpretation Comments

 

             Lymphocytes # (test code = Lymphocytes 3.1          1.0-5.5      N 

           



             #)                                                  



[facility] DentonXrtvrnoMAATFOKOVF9616-99-98 08:00:00





             Test Item    Value        Reference Range Interpretation Comments

 

             Monocytes # (test code 0.6          See_Comment  N             [Aut

omated message] The



             = Monocytes #)                                        system which 

generated



                                                                 this result tra

nsmitted



                                                                 reference range

: <=0.8.



                                                                 The reference r

erna was



                                                                 not used to int

erpret



                                                                 this result as



                                                                 normal/abnormal

.



[facility] DentonHkomwfhLIMTHVDCMI0272-74-78 08:00:00





             Test Item    Value        Reference Range Interpretation Comments

 

             Eosinophils # (test code 0.1          See_Comment  N             [A

utomated message] The



             = Eosinophils #)                                        system whic

h generated



                                                                 this result tra

nsmitted



                                                                 reference range

: <=0.5.



                                                                 The reference r

erna was



                                                                 not used to int

erpret



                                                                 this result as



                                                                 normal/abnormal

.



[facility] DentonLfkmjlfWQKKIHYTGL0064-38-14 08:00:00





             Test Item    Value        Reference Range Interpretation Comments

 

             Basophils # (test code 0.1          See_Comment  N             [Aut

omated message] The



             = Basophils #)                                        system which 

generated



                                                                 this result tra

nsmitted



                                                                 reference range

: <=0.2.



                                                                 The reference r

erna was



                                                                 not used to int

erpret



                                                                 this result as



                                                                 normal/abnormal

.



[facility] DentonKshwtfaLCRBTHZDSN7452-95-67 08:00:00





             Test Item    Value        Reference Range Interpretation Comments

 

             Eosinophils (test code = 1.7          See_Comment  N             [A

utomated message] The



             Eosinophils)                                        system which ge

nerated



                                                                 this result tra

nsmitted



                                                                 reference range

: <=4.0.



                                                                 The reference r

erna was



                                                                 not used to int

erpret



                                                                 this result as



                                                                 normal/abnormal

.



[facility] CenterGkyutliDAIJXELBA4314-14-13 08:00:00





             Test Item    Value        Reference Range Interpretation Comments

 

             CO2 (test code = CO2) 23           24-32        L            



[facility] DentonCtuacxqXERRABCGVB9251-85-46 08:00:00





             Test Item    Value        Reference Range Interpretation Comments

 

             Monocytes (test code = Monocytes) 8.0          2.0-12.0     N      

      



[facility] DentonEfbuetqKGJUZOJRUM4240-86-64 08:00:00





             Test Item    Value        Reference Range Interpretation Comments

 

             Segs (test code = Segs) 48.0         45.0-75.0    N            



[facility] DentonStuebzyHCMDHYBZOK8613-62-47 08:00:00





             Test Item    Value        Reference Range Interpretation Comments

 

             Lymphocytes (test code = Lymphocytes) 41.6         20.0-40.0    H  

          



[facility] DentonFhzydrsYJVAAVQCTI6035-40-01 08:00:00





             Test Item    Value        Reference Range Interpretation Comments

 

             RBC (test code = RBC) 3.87         4.20-5.40    L            



[facility] DentonCwpjyvzXCWSBYOHNU4941-56-54 08:00:00





             Test Item    Value        Reference Range Interpretation Comments

 

             WBC (test code = WBC) 7.4          3.7-10.4     N            



[facility] DentonHyesyqlDROVJBJEXR8025-40-63 08:00:00





             Test Item    Value        Reference Range Interpretation Comments

 

             Hgb (test code = Hgb) 11.9         12.0-16.0    L            



[facility] DentonXjlrwpwTNJVEYMPBQ3857-36-73 08:00:00





             Test Item    Value        Reference Range Interpretation Comments

 

             Hct (test code = Hct) 35.2         36.0-48.0    L            



[facility] DentonDxrpficGTTQHEKSOS2935-74-25 08:00:00





             Test Item    Value        Reference Range Interpretation Comments

 

             MCV (test code = MCV) 90.8         81.0-99.0    N            



[facility] DentonAwqdqhtTZAQGWLPSS4358-73-96 08:00:00





             Test Item    Value        Reference Range Interpretation Comments

 

             MCH (test code = MCH) 30.7 pg      27.0-31.0    N            



[facility] DentonTeektapMYAYLREZLU5776-79-95 08:00:00





             Test Item    Value        Reference Range Interpretation Comments

 

             RDW (test code = RDW) 13.3         11.5-14.5    N            



[facility] HospitalJwgtttsHUVFXDPAL5078-10-45 08:00:00





             Test Item    Value        Reference Range Interpretation Comments

 

             Chloride Lvl (test code = Chloride Lvl) 110                 H

            



[facility] DentonLjatkzkHVLGFLOROJ2652-61-24 08:00:00





             Test Item    Value        Reference Range Interpretation Comments

 

             MCHC (test code = MCHC) 33.9         32.0-36.0    N            



[facility] DentonVhwynfbLACRNRNLPI4137-76-81 08:00:00





             Test Item    Value        Reference Range Interpretation Comments

 

             Platelet (test code = Platelet) 279          133-450      N        

    



[facility] DentonUdsqdhlWLTYIMAZKD9197-86-00 08:00:00





             Test Item    Value        Reference Range Interpretation Comments

 

             MPV (test code = MPV) 7.5          7.4-10.4     N            



[facility] CenterVvgbafzBRVKDQOON1791-97-08 08:00:00





             Test Item    Value        Reference Range Interpretation Comments

 

             Potassium Lvl (test code = Potassium 4.1          3.5-5.1      N   

         



             Lvl)                                                



[facility] CenterYyawyeiCZGMBCPFV3278-55-28 08:00:00





             Test Item    Value        Reference Range Interpretation Comments

 

             LDL (test code = LDL) See Note mg/dL                           



                          5*NA*(2013                           



                          03:00:00)                              



[facility] CenterFhgbohgAGGMHTMJS1749-16-28 08:00:00





             Test Item    Value        Reference Range Interpretation Comments

 

             CHD Risk (test code = CHD Risk) 8.45         3.90-5.80    H        

    



[facility] CenterPeabyvjEPUWEBTXM4162-85-21 08:00:00





             Test Item    Value        Reference Range Interpretation Comments

 

             HDL (test code = HDL) 29                        L            



[facility] CenterXbghlbhYPXHYVIJQ3054-18-57 08:00:00





             Test Item    Value        Reference Range Interpretation Comments

 

             Trig (test code = Trig) 531                       H            



[facility] CenterDolwqcsHPFZCLQCS2876-56-18 08:00:00





             Test Item    Value        Reference Range Interpretation Comments

 

             Chol (test code = Chol) 245                       H            



[facility] DentonTtsqmbrEDBJHDIYTL8331-55-72 08:00:00





             Test Item    Value        Reference Range Interpretation Comments

 

             Basophils (test code = 0.7          See_Comment  N             [Aut

omated message] The



             Basophils)                                          system which ge

nerated



                                                                 this result tra

nsmitted



                                                                 reference range

: <=1.0.



                                                                 The reference r

erna was



                                                                 not used to int

erpret



                                                                 this result as



                                                                 normal/abnormal

.



[facility] DentonQyajgeoLNMHMMZOVI9570-64-56 08:00:00





             Test Item    Value        Reference Range Interpretation Comments

 

             Segs-Bands # (test code = Segs-Bands #) 3.6          1.5-8.1      N

            



[facility] DentonKhxefkoCVXYQZKXOM2463-04-46 08:00:00





             Test Item    Value        Reference Range Interpretation Comments

 

             Lymphocytes # (test code = Lymphocytes 3.1          1.0-5.5      N 

           



             #)                                                  



[facility] DentonOwbnnbbQUHTYTUCXM5083-67-23 08:00:00





             Test Item    Value        Reference Range Interpretation Comments

 

             Monocytes # (test code 0.6          See_Comment  N             [Aut

omated message] The



             = Monocytes #)                                        system which 

generated



                                                                 this result tra

nsmitted



                                                                 reference range

: <=0.8.



                                                                 The reference r

erna was



                                                                 not used to int

erpret



                                                                 this result as



                                                                 normal/abnormal

.



[facility] DentonCyyulsiHZPCPFIEIY6160-52-34 08:00:00





             Test Item    Value        Reference Range Interpretation Comments

 

             Eosinophils # (test code 0.1          See_Comment  N             [A

utomated message] The



             = Eosinophils #)                                        system whic

h generated



                                                                 this result tra

nsmitted



                                                                 reference range

: <=0.5.



                                                                 The reference r

erna was



                                                                 not used to int

erpret



                                                                 this result as



                                                                 normal/abnormal

.



[facility] DentonSgyqodnUTPBRESELI1798-70-87 08:00:00





             Test Item    Value        Reference Range Interpretation Comments

 

             Basophils # (test code 0.1          See_Comment  N             [Aut

omated message] The



             = Basophils #)                                        system which 

generated



                                                                 this result tra

nsmitted



                                                                 reference range

: <=0.2.



                                                                 The reference r

erna was



                                                                 not used to int

erpret



                                                                 this result as



                                                                 normal/abnormal

.



[facility] DentonFqnpblmJBSEPLSDBM0032-44-33 08:00:00





             Test Item    Value        Reference Range Interpretation Comments

 

             Eosinophils (test code = 1.7          See_Comment  N             [A

utomated message] The



             Eosinophils)                                        system which ge

nerated



                                                                 this result tra

nsmitted



                                                                 reference range

: <=4.0.



                                                                 The reference r

erna was



                                                                 not used to int

erpret



                                                                 this result as



                                                                 normal/abnormal

.



[facility] DentonGjeclvkQPMRLISEWJ3226-08-90 08:00:00





             Test Item    Value        Reference Range Interpretation Comments

 

             Monocytes (test code = Monocytes) 8.0          2.0-12.0     N      

      



[facility] DentonHywtpjmKIEPLVBXCE2503-23-56 08:00:00





             Test Item    Value        Reference Range Interpretation Comments

 

             Segs (test code = Segs) 48.0         45.0-75.0    N            



[facility] DentonHgnowewAAEHJYKTNM8478-46-28 08:00:00





             Test Item    Value        Reference Range Interpretation Comments

 

             Lymphocytes (test code = Lymphocytes) 41.6         20.0-40.0    H  

          



[facility] DentonLwnedbgOWFYTHXWVV4301-10-58 08:00:00





             Test Item    Value        Reference Range Interpretation Comments

 

             RBC (test code = RBC) 3.87         4.20-5.40    L            



[facility] DentonBtbhzqtYMCRDXZUFK4148-82-41 08:00:00





             Test Item    Value        Reference Range Interpretation Comments

 

             WBC (test code = WBC) 7.4          3.7-10.4     N            



[facility] DentonWkcrivgQPDAUOYASF4393-22-85 08:00:00





             Test Item    Value        Reference Range Interpretation Comments

 

             Hgb (test code = Hgb) 11.9         12.0-16.0    L            



[facility] DentonUcfzzvlYCRYZJYXDV5370-20-30 08:00:00





             Test Item    Value        Reference Range Interpretation Comments

 

             Hct (test code = Hct) 35.2         36.0-48.0    L            



[facility] DentonSkmovuiXNTUCRGNXE2775-98-75 08:00:00





             Test Item    Value        Reference Range Interpretation Comments

 

             MCV (test code = MCV) 90.8         81.0-99.0    N            



[facility] DentonPmiottaFYNPZVDGWD0232-78-14 08:00:00





             Test Item    Value        Reference Range Interpretation Comments

 

             MCH (test code = MCH) 30.7 pg      27.0-31.0    N            



[facility] DentonRgnjysiMEYTXOLMDX4995-09-49 08:00:00





             Test Item    Value        Reference Range Interpretation Comments

 

             RDW (test code = RDW) 13.3         11.5-14.5    N            



[facility] DentonTqdoqrjYRJIWAOLKQ6681-10-77 08:00:00





             Test Item    Value        Reference Range Interpretation Comments

 

             MCHC (test code = MCHC) 33.9         32.0-36.0    N            



[facility] DentonHqkbikzPUTOLSOVLC4651-47-65 08:00:00





             Test Item    Value        Reference Range Interpretation Comments

 

             Platelet (test code = Platelet) 279          133-450      N        

    



[facility] DentonCztikmkNUXNHJWFDI1163-89-53 08:00:00





             Test Item    Value        Reference Range Interpretation Comments

 

             MPV (test code = MPV) 7.5          7.4-10.4     N            



[facility] CenterPaqvywaMILTIIRKT0283-37-63 08:00:00





             Test Item    Value        Reference Range Interpretation Comments

 

             AGAP (test code = AGAP) 13.1         10.0-20.0    N            



[facility] CenterJvveczyQFMNMITJC1248-88-23 08:00:00





             Test Item    Value        Reference Range Interpretation Comments

 

             eGFR (test code = eGFR) 96                                     



[facility] CenterSzwhrzaLCSTLJYYD2520-96-97 08:00:00





             Test Item    Value        Reference Range Interpretation Comments

 

             Creatinine Lvl (test code = Creatinine 0.7          0.5-1.4      N 

           



             Lvl)                                                



[facility] CenterIdfvhwzMAQSMRLSF1689-53-60 08:00:00





             Test Item    Value        Reference Range Interpretation Comments

 

             BUN (test code = BUN) 18           7-22         N            



[facility] CenterVvdyxvrJIXPSVOMP6304-59-01 08:00:00





             Test Item    Value        Reference Range Interpretation Comments

 

             Glucose Lvl (test code = Glucose Lvl) 101          70-99        H  

          



[facility] CenterRrjmsarLWJWWANGH8048-30-22 08:00:00





             Test Item    Value        Reference Range Interpretation Comments

 

             Sodium Lvl (test code = Sodium Lvl) 142          135-145      N    

        



[facility] CenterQowdqvsUTPNPBQVJ4005-80-42 08:00:00





             Test Item    Value        Reference Range Interpretation Comments

 

             Calcium Lvl (test code = Calcium Lvl) 8.3          8.5-10.5     L  

          



[facility] CenterBpabwpgCOWAANRLJ7961-51-17 08:00:00





             Test Item    Value        Reference Range Interpretation Comments

 

             CO2 (test code = CO2) 23           24-32        L            



[facility] CenterBbxykwyGDUAPZXVP9876-23-69 08:00:00





             Test Item    Value        Reference Range Interpretation Comments

 

             Chloride Lvl (test code = Chloride Lvl) 110                 H

            



[facility] CenterCgdxffqWNESORXIO9266-33-38 08:00:00





             Test Item    Value        Reference Range Interpretation Comments

 

             Potassium Lvl (test code = Potassium 4.1          3.5-5.1      N   

         



             Lvl)                                                



[facility] CenterEelacpuYYHXZOSLN4394-25-36 08:00:00





             Test Item    Value        Reference Range Interpretation Comments

 

             LDL (test code = LDL) See Note mg/dL                           



                          5*NA*(2013                           



                          03:00:00)                              



[facility] CenterAiqbxjsMBZSERLVZ4217-83-12 08:00:00





             Test Item    Value        Reference Range Interpretation Comments

 

             CHD Risk (test code = CHD Risk) 8.45         3.90-5.80    H        

    



[facility] CenterNmiyrfyGDWVKTCKF7104-42-26 08:00:00





             Test Item    Value        Reference Range Interpretation Comments

 

             HDL (test code = HDL) 29                        L            



[facility] CenterCdajxvcWLBYWLWBO6538-39-89 08:00:00





             Test Item    Value        Reference Range Interpretation Comments

 

             Trig (test code = Trig) 531                       H            



[facility] CenterMtqfcjgUEEHPMKBG8089-34-09 08:00:00





             Test Item    Value        Reference Range Interpretation Comments

 

             Chol (test code = Chol) 245                       H            



[facility] DentonKyijkziWSPKJBQUWA7413-46-45 08:00:00





             Test Item    Value        Reference Range Interpretation Comments

 

             Basophils (test code = 0.7          See_Comment  N             [Aut

omated message] The



             Basophils)                                          system which ge

nerated



                                                                 this result tra

nsmitted



                                                                 reference range

: <=1.0.



                                                                 The reference r

erna was



                                                                 not used to int

erpret



                                                                 this result as



                                                                 normal/abnormal

.



[facility]entonRmepzheIGHQWOQAAV9035-95-31 08:00:00





             Test Item    Value        Reference Range Interpretation Comments

 

             Segs-Bands # (test code = Segs-Bands #) 3.6          1.5-8.1      N

            



[facility]entonDmfxhfpOVIWIAMCUT8084-70-71 08:00:00





             Test Item    Value        Reference Range Interpretation Comments

 

             Lymphocytes # (test code = Lymphocytes 3.1          1.0-5.5      N 

           



             #)                                                  



[facility] DentonDjlmdsbEYAYBXIYQX2187-55-77 08:00:00





             Test Item    Value        Reference Range Interpretation Comments

 

             Monocytes # (test code 0.6          See_Comment  N             [Aut

omated message] The



             = Monocytes #)                                        system which 

generated



                                                                 this result tra

nsmitted



                                                                 reference range

: <=0.8.



                                                                 The reference r

erna was



                                                                 not used to int

erpret



                                                                 this result as



                                                                 normal/abnormal

.



[facility] DentonHhvemubWREHJWPJCM1231-46-94 08:00:00





             Test Item    Value        Reference Range Interpretation Comments

 

             Eosinophils # (test code 0.1          See_Comment  N             [A

utomated message] The



             = Eosinophils #)                                        system wh

h generated



                                                                 this result tra

nsmitted



                                                                 reference range

: <=0.5.



                                                                 The reference r

erna was



                                                                 not used to int

erpret



                                                                 this result as



                                                                 normal/abnormal

.



[facility] DentonPpsbmymJZNFWZHTKK0124-79-70 08:00:00





             Test Item    Value        Reference Range Interpretation Comments

 

             Basophils # (test code 0.1          See_Comment  N             [Aut

omated message] The



             = Basophils #)                                        system which 

generated



                                                                 this result tra

nsmitted



                                                                 reference range

: <=0.2.



                                                                 The reference r

erna was



                                                                 not used to int

erpret



                                                                 this result as



                                                                 normal/abnormal

.



[facility] DentonOxfvtxpLIXLWWCKHP4531-24-05 08:00:00





             Test Item    Value        Reference Range Interpretation Comments

 

             Eosinophils (test code = 1.7          See_Comment  N             [A

utomated message] The



             Eosinophils)                                        system which ge

nerated



                                                                 this result tra

nsmitted



                                                                 reference range

: <=4.0.



                                                                 The reference r

erna was



                                                                 not used to int

erpret



                                                                 this result as



                                                                 normal/abnormal

.



[facility] DentonKcboemoETPZGCFQOZ8506-68-53 08:00:00





             Test Item    Value        Reference Range Interpretation Comments

 

             Monocytes (test code = Monocytes) 8.0          2.0-12.0     N      

      



[facility] DentonQkroimaRMQLQLWEXL7532-95-44 08:00:00





             Test Item    Value        Reference Range Interpretation Comments

 

             Segs (test code = Segs) 48.0         45.0-75.0    N            



[facility] DentonJqnqexmXQTOXVBLBY3714-72-10 08:00:00





             Test Item    Value        Reference Range Interpretation Comments

 

             Lymphocytes (test code = Lymphocytes) 41.6         20.0-40.0    H  

          



[facility] DentonWnleruxQJUNNMVBPL8547-93-98 08:00:00





             Test Item    Value        Reference Range Interpretation Comments

 

             RBC (test code = RBC) 3.87         4.20-5.40    L            



[facility] DentonUvdugwfAVYICGOGCX1342-91-07 08:00:00





             Test Item    Value        Reference Range Interpretation Comments

 

             WBC (test code = WBC) 7.4          3.7-10.4     N            



[facility] DentonFpfsumyTGQGHTGXJI1725-27-16 08:00:00





             Test Item    Value        Reference Range Interpretation Comments

 

             Hgb (test code = Hgb) 11.9         12.0-16.0    L            



[facility] DentonQceitjhQFYSSWUMUE4866-07-54 08:00:00





             Test Item    Value        Reference Range Interpretation Comments

 

             Hct (test code = Hct) 35.2         36.0-48.0    L            



[facility] DentonRcqxrzwJMTEVYHFDV5392-06-02 08:00:00





             Test Item    Value        Reference Range Interpretation Comments

 

             MCV (test code = MCV) 90.8         81.0-99.0    N            



[facility] DentonNapcxyzINSIAMVKEY6016-03-36 08:00:00





             Test Item    Value        Reference Range Interpretation Comments

 

             MCH (test code = MCH) 30.7 pg      27.0-31.0    N            



[facility] DentonBafnqbmGGEUWSZFJY3864-41-64 08:00:00





             Test Item    Value        Reference Range Interpretation Comments

 

             RDW (test code = RDW) 13.3         11.5-14.5    N            



[facility] DentonPeqclppVMKIJZIXNC5885-08-14 08:00:00





             Test Item    Value        Reference Range Interpretation Comments

 

             MCHC (test code = MCHC) 33.9         32.0-36.0    N            



[facility] DentonQsraepyXTXOHETQUA2665-05-38 08:00:00





             Test Item    Value        Reference Range Interpretation Comments

 

             Platelet (test code = Platelet) 279          133-450      N        

    



[facility] DentonVwrhgylFVVXYOXNJO0709-85-49 08:00:00





             Test Item    Value        Reference Range Interpretation Comments

 

             MPV (test code = MPV) 7.5          7.4-10.4     N            



[facility] CenterXksalqzSXKFIZATR3462-38-11 08:00:00





             Test Item    Value        Reference Range Interpretation Comments

 

             AGAP (test code = AGAP) 13.1         10.0-20.0    N            



[facility] CenterTakpzkxTXYXIAGEV1425-07-42 08:00:00





             Test Item    Value        Reference Range Interpretation Comments

 

             eGFR (test code = eGFR) 96                                     



[facility] CenterMpbdfrgWFEOQNQND6188-45-03 08:00:00





             Test Item    Value        Reference Range Interpretation Comments

 

             Creatinine Lvl (test code = Creatinine 0.7          0.5-1.4      N 

           



             Lvl)                                                



[facility] CenterLyttelhQRCGNRMSU6334-48-05 08:00:00





             Test Item    Value        Reference Range Interpretation Comments

 

             BUN (test code = BUN) 18           7-22         N            



[facility] CenterBcdfxhbCPZHGZWZQ8693-75-37 08:00:00





             Test Item    Value        Reference Range Interpretation Comments

 

             Glucose Lvl (test code = Glucose Lvl) 101          70-99        H  

          



[facility] CenterTbtqjuyAHSWEBPEE8455-44-93 08:00:00





             Test Item    Value        Reference Range Interpretation Comments

 

             Sodium Lvl (test code = Sodium Lvl) 142          135-145      N    

        



[facility] CenterKvyduxwKTNOUURHM7728-67-32 08:00:00





             Test Item    Value        Reference Range Interpretation Comments

 

             Calcium Lvl (test code = Calcium Lvl) 8.3          8.5-10.5     L  

          



[facility] CenterGqbvlvyXGYKATQPB9492-71-31 08:00:00





             Test Item    Value        Reference Range Interpretation Comments

 

             CO2 (test code = CO2) 23           24-32        L            



[facility] CenterMmftbwjOQIVYNHWX8833-96-23 08:00:00





             Test Item    Value        Reference Range Interpretation Comments

 

             Chloride Lvl (test code = Chloride Lvl) 110                 H

            



[facility] CenterYufnoddGPQUZLFVD9789-23-41 18:53:00





             Test Item    Value        Reference Range Interpretation Comments

 

             eGFR (test code = eGFR) 96                                     



[facility] CenterTvxcuilVFUAOMEGX0432-94-88 18:53:00





             Test Item    Value        Reference Range Interpretation Comments

 

             Sodium Lvl (test code = Sodium Lvl) 140          135-145      N    

        



[facility] CenterQsvgbwkNNFOPFPOZ0060-63-27 18:53:00





             Test Item    Value        Reference Range Interpretation Comments

 

             Potassium Lvl (test code = Potassium 3.9          3.5-5.1      N   

         



             Lvl)                                                



[facility] CenterOxohfnnEBMYFBDYP9298-82-79 18:53:00





             Test Item    Value        Reference Range Interpretation Comments

 

             Chloride Lvl (test code = Chloride Lvl) 105                 N

            



[facility] CenterVixhkajIMTQKLALD0116-69-39 18:53:00





             Test Item    Value        Reference Range Interpretation Comments

 

             CO2 (test code = CO2) 28           24-32        N            



[facility] CenterPkmjadpVMJGEYYND9152-78-13 18:53:00





             Test Item    Value        Reference Range Interpretation Comments

 

             Glucose Lvl (test code = Glucose Lvl) 92           70-99        N  

          



[facility] CenterHqnqmmnTXWPCBSHW2453-41-80 18:53:00





             Test Item    Value        Reference Range Interpretation Comments

 

             BUN (test code = BUN) 14           7-22         N            



[facility] CenterWdenpwvPCWEJTASL7069-51-68 18:53:00





             Test Item    Value        Reference Range Interpretation Comments

 

             Creatinine Lvl (test code = Creatinine 0.7          0.5-1.4      N 

           



             Lvl)                                                



[facility] CenterTyygapzTMAPRTVHX2833-39-89 18:53:00





             Test Item    Value        Reference Range Interpretation Comments

 

             Calcium Lvl (test code = Calcium Lvl) 9.1          8.5-10.5     N  

          



[facility] CenterPpmqtguKSTMEHNQQ5820-16-84 18:53:00





             Test Item    Value        Reference Range Interpretation Comments

 

             AGAP (test code = AGAP) 10.9         10.0-20.0    N            



[facility] DentonPmayivoINBIIGXYAA7637-05-70 18:53:00





             Test Item    Value        Reference Range Interpretation Comments

 

             Basophils # (test code 0.1          See_Comment  N             [Aut

omated message] The



             = Basophils #)                                        system which 

generated



                                                                 this result tra

nsmitted



                                                                 reference range

: <=0.2.



                                                                 The reference r

erna was



                                                                 not used to int

erpret



                                                                 this result as



                                                                 normal/abnormal

.



[facility] DentonWtmdfcbQYBQIOBWNA8888-49-80 18:53:00





             Test Item    Value        Reference Range Interpretation Comments

 

             Lymphocytes # (test code = Lymphocytes 2.8          1.0-5.5      N 

           



             #)                                                  



[facility] DentonCvkrjwyYXHTPDDUJD2449-53-56 18:53:00





             Test Item    Value        Reference Range Interpretation Comments

 

             Eosinophils # (test code 0.1          See_Comment  N             [A

utomated message] The



             = Eosinophils #)                                        system whic

h generated



                                                                 this result tra

nsmitted



                                                                 reference range

: <=0.5.



                                                                 The reference r

erna was



                                                                 not used to int

erpret



                                                                 this result as



                                                                 normal/abnormal

.



[facility] DentonYycsuntINFKIBHSEN7423-62-69 18:53:00





             Test Item    Value        Reference Range Interpretation Comments

 

             Monocytes # (test code 0.7          See_Comment  N             [Aut

omated message] The



             = Monocytes #)                                        system which 

generated



                                                                 this result tra

nsmitted



                                                                 reference range

: <=0.8.



                                                                 The reference r

erna was



                                                                 not used to int

erpret



                                                                 this result as



                                                                 normal/abnormal

.



[facility] DentonRumsiinCEKLXPQWIF5637-02-78 18:53:00





             Test Item    Value        Reference Range Interpretation Comments

 

             Segs-Bands # (test code = Segs-Bands #) 4.6          1.5-8.1      N

            



[facility] DentonAelbjdrAFWXARVOZB7793-61-56 18:53:00





             Test Item    Value        Reference Range Interpretation Comments

 

             Basophils (test code = 1.0          See_Comment  N             [Aut

omated message] The



             Basophils)                                          system which ge

nerated



                                                                 this result tra

nsmitted



                                                                 reference range

: <=1.0.



                                                                 The reference r

erna was



                                                                 not used to int

erpret



                                                                 this result as



                                                                 normal/abnormal

.



[facility] DentonVgxwgouDOSOWWWFVL3435-41-99 18:53:00





             Test Item    Value        Reference Range Interpretation Comments

 

             Lymphocytes (test code = Lymphocytes) 34.0         20.0-40.0    N  

          



[facility] DentonOdltqriIPFFZPTXRD7263-74-76 18:53:00





             Test Item    Value        Reference Range Interpretation Comments

 

             Eosinophils (test code = 1.3          See_Comment  N             [A

utomated message] The



             Eosinophils)                                        system which ge

nerated



                                                                 this result tra

nsmitted



                                                                 reference range

: <=4.0.



                                                                 The reference r

erna was



                                                                 not used to int

erpret



                                                                 this result as



                                                                 normal/abnormal

.



[facility] DentonQssmhusUWVCSKHEQE1717-94-11 18:53:00





             Test Item    Value        Reference Range Interpretation Comments

 

             Monocytes (test code = Monocytes) 8.4          2.0-12.0     N      

      



[facility] DentonJtlnznyBUGOYZOKFE5632-93-13 18:53:00





             Test Item    Value        Reference Range Interpretation Comments

 

             Segs (test code = Segs) 55.3         45.0-75.0    N            



[facility] DentonHejyhnxPSWPXQKGLI7782-60-62 18:53:00





             Test Item    Value        Reference Range Interpretation Comments

 

             PTT (test code = PTT) 27.3 s       22.9-35.8    N            



[facility] DentonPtbzwvcHIIOEWANVU1953-53-26 18:53:00





             Test Item    Value        Reference Range Interpretation Comments

 

             PT (test code = PT) 13.3 s       12.0-14.7    N            



[facility] DentonSdcvkmuIFXTNYNHBM5403-60-74 18:53:00





             Test Item    Value        Reference Range Interpretation Comments

 

             INR (test code = INR) 1.02         0.85-1.17    N            



[facility] DentonRniplxhZRMRRDJEKR2916-75-77 18:53:00





             Test Item    Value        Reference Range Interpretation Comments

 

             MPV (test code = MPV) 7.2          7.4-10.4     L            



[facility] DentonVqalbxcNCDZSGAZTT1119-41-84 18:53:00





             Test Item    Value        Reference Range Interpretation Comments

 

             MCHC (test code = MCHC) 33.5         32.0-36.0    N            



[facility] DentonBhildqwDUKYLNGERO8081-72-84 18:53:00





             Test Item    Value        Reference Range Interpretation Comments

 

             MCH (test code = MCH) 29.8 pg      27.0-31.0    N            



[facility] DentonLhrttfuWMVWFEAEIO5350-36-11 18:53:00





             Test Item    Value        Reference Range Interpretation Comments

 

             Platelet (test code = Platelet) 305          133-450      N        

    



[facility] DentonIaazdqkHHSUWFWLTW5847-91-71 18:53:00





             Test Item    Value        Reference Range Interpretation Comments

 

             RDW (test code = RDW) 12.4         11.5-14.5    N            



[facility] DentonKycnfxgAYLHXSNPHQ6092-49-21 18:53:00





             Test Item    Value        Reference Range Interpretation Comments

 

             MCV (test code = MCV) 89.0         81.0-99.0    N            



[facility] DentonPpfrhezPKCHZACYJM3917-04-41 18:53:00





             Test Item    Value        Reference Range Interpretation Comments

 

             Hgb (test code = Hgb) 13.3         12.0-16.0    N            



[facility] DentonOmlztfyHDEBQXXIRR6716-01-49 18:53:00





             Test Item    Value        Reference Range Interpretation Comments

 

             RBC (test code = RBC) 4.45         4.20-5.40    N            



[facility] DentonZzgwfmaNVLLMFWFGZ8020-54-08 18:53:00





             Test Item    Value        Reference Range Interpretation Comments

 

             Hct (test code = Hct) 39.6         36.0-48.0    N            



[facility] DentonLbjrufwBYAOKETQOC9325-36-95 18:53:00





             Test Item    Value        Reference Range Interpretation Comments

 

             WBC (test code = WBC) 8.3          3.7-10.4     N            



[facility] CenterNxoxbjzAAYXSOSIO3990-10-56 18:53:00





             Test Item    Value        Reference Range Interpretation Comments

 

             eGFR (test code = eGFR) 96                                     



[facility] CenterXjoaytiQGGFHPQMN4776-76-60 18:53:00





             Test Item    Value        Reference Range Interpretation Comments

 

             Sodium Lvl (test code = Sodium Lvl) 140          135-145      N    

        



[facility] CenterXxhsjskSROYAFAWP3229-87-23 18:53:00





             Test Item    Value        Reference Range Interpretation Comments

 

             Potassium Lvl (test code = Potassium 3.9          3.5-5.1      N   

         



             Lvl)                                                



[facility] CenterLikhbqtNZLUZWCFL1501-03-58 18:53:00





             Test Item    Value        Reference Range Interpretation Comments

 

             Chloride Lvl (test code = Chloride Lvl) 105                 N

            



[facility] CenterLsetjeeVXYTPBECO5661-59-52 18:53:00





             Test Item    Value        Reference Range Interpretation Comments

 

             CO2 (test code = CO2) 28           24-32        N            



[facility] CenterRtzmdplFUJAPZMPE5180-42-78 18:53:00





             Test Item    Value        Reference Range Interpretation Comments

 

             Glucose Lvl (test code = Glucose Lvl) 92           70-99        N  

          



[facility] CenterIhhfgpfZXJWCYQIU2431-35-03 18:53:00





             Test Item    Value        Reference Range Interpretation Comments

 

             BUN (test code = BUN) 14           7-22         N            



[facility] CenterTgrlsjbEFSTFPIMW1820-57-80 18:53:00





             Test Item    Value        Reference Range Interpretation Comments

 

             Creatinine Lvl (test code = Creatinine 0.7          0.5-1.4      N 

           



             Lvl)                                                



[facility] CenterNgbjikyHRDAQGDQK7642-11-09 18:53:00





             Test Item    Value        Reference Range Interpretation Comments

 

             Calcium Lvl (test code = Calcium Lvl) 9.1          8.5-10.5     N  

          



[facility]IkqcmirGMAMUFJXA4982-84-48 18:53:00





             Test Item    Value        Reference Range Interpretation Comments

 

             AGAP (test code = AGAP) 10.9         10.0-20.0    N            



[facility]entonSkcgdbyRPBGIRNWXC7251-70-27 18:53:00





             Test Item    Value        Reference Range Interpretation Comments

 

             Basophils # (test code 0.1          See_Comment  N             [Aut

omated message] The



             = Basophils #)                                        system which 

generated



                                                                 this result tra

nsmitted



                                                                 reference range

: <=0.2.



                                                                 The reference r

erna was



                                                                 not used to int

erpret



                                                                 this result as



                                                                 normal/abnormal

.



[facility] DentonKfamukcUDYBFUACLN6854-06-69 18:53:00





             Test Item    Value        Reference Range Interpretation Comments

 

             Lymphocytes # (test code = Lymphocytes 2.8          1.0-5.5      N 

           



             #)                                                  



[facility] DentonUpahgprESXOCVMALW4041-54-95 18:53:00





             Test Item    Value        Reference Range Interpretation Comments

 

             Eosinophils # (test code 0.1          See_Comment  N             [A

utomated message] The



             = Eosinophils #)                                        system whic

h generated



                                                                 this result tra

nsmitted



                                                                 reference range

: <=0.5.



                                                                 The reference r

erna was



                                                                 not used to int

erpret



                                                                 this result as



                                                                 normal/abnormal

.



[facility] DentonRtrntqiYJXDNGGVEQ5939-04-67 18:53:00





             Test Item    Value        Reference Range Interpretation Comments

 

             Monocytes # (test code 0.7          See_Comment  N             [Aut

omated message] The



             = Monocytes #)                                        system which 

generated



                                                                 this result tra

nsmitted



                                                                 reference range

: <=0.8.



                                                                 The reference r

erna was



                                                                 not used to int

erpret



                                                                 this result as



                                                                 normal/abnormal

.



[facility] DentonOjeaymfNRMTMYZJFN1977-83-78 18:53:00





             Test Item    Value        Reference Range Interpretation Comments

 

             Segs-Bands # (test code = Segs-Bands #) 4.6          1.5-8.1      N

            



[facility] DentonNnduojkNHELUISWAG6524-67-01 18:53:00





             Test Item    Value        Reference Range Interpretation Comments

 

             Basophils (test code = 1.0          See_Comment  N             [Aut

omated message] The



             Basophils)                                          system which ge

nerated



                                                                 this result tra

nsmitted



                                                                 reference range

: <=1.0.



                                                                 The reference r

erna was



                                                                 not used to int

erpret



                                                                 this result as



                                                                 normal/abnormal

.



[facility] DentonAvyzlfoUZSVPKMNWA5288-75-55 18:53:00





             Test Item    Value        Reference Range Interpretation Comments

 

             Lymphocytes (test code = Lymphocytes) 34.0         20.0-40.0    N  

          



[facility] DentonTvojclkYRXSARVYVR4973-08-13 18:53:00





             Test Item    Value        Reference Range Interpretation Comments

 

             Eosinophils (test code = 1.3          See_Comment  N             [A

utomated message] The



             Eosinophils)                                        system which ge

nerated



                                                                 this result tra

nsmitted



                                                                 reference range

: <=4.0.



                                                                 The reference r

erna was



                                                                 not used to int

erpret



                                                                 this result as



                                                                 normal/abnormal

.



[facility] DentonZqmopkbYPNQCBSTIT7681-18-84 18:53:00





             Test Item    Value        Reference Range Interpretation Comments

 

             Monocytes (test code = Monocytes) 8.4          2.0-12.0     N      

      



[facility] DentonMeovmnnHSODAVBITK6605-83-86 18:53:00





             Test Item    Value        Reference Range Interpretation Comments

 

             Segs (test code = Segs) 55.3         45.0-75.0    N            



[facility] DentonFkimbuyEACNPYTFST3182-85-65 18:53:00





             Test Item    Value        Reference Range Interpretation Comments

 

             PTT (test code = PTT) 27.3 s       22.9-35.8    N            



[facility] DentonTehkhxlHKNMJTRHQF9684-04-95 18:53:00





             Test Item    Value        Reference Range Interpretation Comments

 

             PT (test code = PT) 13.3 s       12.0-14.7    N            



[facility] DentonLgihfbxDECGPAJRDI7566-72-78 18:53:00





             Test Item    Value        Reference Range Interpretation Comments

 

             INR (test code = INR) 1.02         0.85-1.17    N            



[facility] DentonDeiiopuJKBHOEPPGF7961-08-51 18:53:00





             Test Item    Value        Reference Range Interpretation Comments

 

             MPV (test code = MPV) 7.2          7.4-10.4     L            



[facility] DentonRopctmiYYWJVSFCMB2910-69-29 18:53:00





             Test Item    Value        Reference Range Interpretation Comments

 

             MCHC (test code = MCHC) 33.5         32.0-36.0    N            



[facility] DentonHleloljRSILQXWTAZ1079-13-88 18:53:00





             Test Item    Value        Reference Range Interpretation Comments

 

             MCH (test code = MCH) 29.8 pg      27.0-31.0    N            



[facility] DentonFwjojdyNQXUOGXKNV9152-31-58 18:53:00





             Test Item    Value        Reference Range Interpretation Comments

 

             Platelet (test code = Platelet) 305          133-450      N        

    



[facility] DentonZwkuarkJWEYABHYXB1965-07-15 18:53:00





             Test Item    Value        Reference Range Interpretation Comments

 

             RDW (test code = RDW) 12.4         11.5-14.5    N            



[facility] DentonUzymvkcHJOKKWHADF2391-10-14 18:53:00





             Test Item    Value        Reference Range Interpretation Comments

 

             MCV (test code = MCV) 89.0         81.0-99.0    N            



[facility] DentonBpimhiiTMIFXQLTZU0257-66-21 18:53:00





             Test Item    Value        Reference Range Interpretation Comments

 

             Hgb (test code = Hgb) 13.3         12.0-16.0    N            



[facility] DentonZkigrehLXAGXJPVPI8731-78-55 18:53:00





             Test Item    Value        Reference Range Interpretation Comments

 

             RBC (test code = RBC) 4.45         4.20-5.40    N            



[facility] DentonVfszybvFXTVCWEGCY1473-83-98 18:53:00





             Test Item    Value        Reference Range Interpretation Comments

 

             Hct (test code = Hct) 39.6         36.0-48.0    N            



[facility] DentonKmmmtqdXIXXZHSQJG3097-26-96 18:53:00





             Test Item    Value        Reference Range Interpretation Comments

 

             WBC (test code = WBC) 8.3          3.7-10.4     N            



[facility] CenterKlqrvpjBFIOEVZIA2397-49-39 18:53:00





             Test Item    Value        Reference Range Interpretation Comments

 

             eGFR (test code = eGFR) 96                                     



[facility] CenterZchcghnZDPLJKADK9994-92-94 18:53:00





             Test Item    Value        Reference Range Interpretation Comments

 

             Sodium Lvl (test code = Sodium Lvl) 140          135-145      N    

        



[facility] CenterSyenjrdUYUDOKBEF7254-13-69 18:53:00





             Test Item    Value        Reference Range Interpretation Comments

 

             Potassium Lvl (test code = Potassium 3.9          3.5-5.1      N   

         



             Lvl)                                                



[facility] CenterYyzxzceZPUFZNAIJ9821-20-58 18:53:00





             Test Item    Value        Reference Range Interpretation Comments

 

             Chloride Lvl (test code = Chloride Lvl) 105                 N

            



[facility] CenterPkiqrtjXHUFBIDFG2581-40-39 18:53:00





             Test Item    Value        Reference Range Interpretation Comments

 

             CO2 (test code = CO2) 28           24-32        N            



[facility] CenterCiwysdhEBECADQSR6273-65-37 18:53:00





             Test Item    Value        Reference Range Interpretation Comments

 

             Glucose Lvl (test code = Glucose Lvl) 92           70-99        N  

          



[facility] CenterTizzantMSOHSBNGG7318-10-97 18:53:00





             Test Item    Value        Reference Range Interpretation Comments

 

             BUN (test code = BUN) 14           7-22         N            



[facility] CenterTrwrqtpUVANRRSLN5378-55-73 18:53:00





             Test Item    Value        Reference Range Interpretation Comments

 

             Creatinine Lvl (test code = Creatinine 0.7          0.5-1.4      N 

           



             Lvl)                                                



[facility] CenterFpeyvjbKDRZZUUSE9333-49-99 18:53:00





             Test Item    Value        Reference Range Interpretation Comments

 

             Calcium Lvl (test code = Calcium Lvl) 9.1          8.5-10.5     N  

          



[facility] CenterNdqzzvdWYKLJWSPB2832-82-80 18:53:00





             Test Item    Value        Reference Range Interpretation Comments

 

             AGAP (test code = AGAP) 10.9         10.0-20.0    N            



[facility] DentonPjgwneyLPHHTZEQJE2409-73-56 18:53:00





             Test Item    Value        Reference Range Interpretation Comments

 

             Basophils # (test code 0.1          See_Comment  N             [Aut

omated message] The



             = Basophils #)                                        system which 

generated



                                                                 this result tra

nsmitted



                                                                 reference range

: <=0.2.



                                                                 The reference r

erna was



                                                                 not used to int

erpret



                                                                 this result as



                                                                 normal/abnormal

.



[facility] DentonGjtccihPSVUOCTXTJ3331-30-71 18:53:00





             Test Item    Value        Reference Range Interpretation Comments

 

             Lymphocytes # (test code = Lymphocytes 2.8          1.0-5.5      N 

           



             #)                                                  



[facility] DentonAkvjinoVWVDFXXYYP0096-63-15 18:53:00





             Test Item    Value        Reference Range Interpretation Comments

 

             Eosinophils # (test code 0.1          See_Comment  N             [A

utomated message] The



             = Eosinophils #)                                        system whic

h generated



                                                                 this result tra

nsmitted



                                                                 reference range

: <=0.5.



                                                                 The reference r

erna was



                                                                 not used to int

erpret



                                                                 this result as



                                                                 normal/abnormal

.



[facility] DentonEdaspqtPSQSYSOFTR9301-85-66 18:53:00





             Test Item    Value        Reference Range Interpretation Comments

 

             Monocytes # (test code 0.7          See_Comment  N             [Aut

omated message] The



             = Monocytes #)                                        system which 

generated



                                                                 this result tra

nsmitted



                                                                 reference range

: <=0.8.



                                                                 The reference r

erna was



                                                                 not used to int

erpret



                                                                 this result as



                                                                 normal/abnormal

.



[facility] DentonMerpwbsDXZCQOXKXL7700-15-62 18:53:00





             Test Item    Value        Reference Range Interpretation Comments

 

             Segs-Bands # (test code = Segs-Bands #) 4.6          1.5-8.1      N

            



[facility] DentonUyptxjgRZZNDVDTLW7581-68-19 18:53:00





             Test Item    Value        Reference Range Interpretation Comments

 

             Basophils (test code = 1.0          See_Comment  N             [Aut

omated message] The



             Basophils)                                          system which ge

nerated



                                                                 this result tra

nsmitted



                                                                 reference range

: <=1.0.



                                                                 The reference r

erna was



                                                                 not used to int

erpret



                                                                 this result as



                                                                 normal/abnormal

.



[facility] DentonUzjowpxSIVVZJDHRS0255-87-29 18:53:00





             Test Item    Value        Reference Range Interpretation Comments

 

             Lymphocytes (test code = Lymphocytes) 34.0         20.0-40.0    N  

          



[facility] DentonDibzjvhBZVMJFDKOY9156-87-03 18:53:00





             Test Item    Value        Reference Range Interpretation Comments

 

             Eosinophils (test code = 1.3          See_Comment  N             [A

utomated message] The



             Eosinophils)                                        system which ge

nerated



                                                                 this result tra

nsmitted



                                                                 reference range

: <=4.0.



                                                                 The reference r

erna was



                                                                 not used to int

erpret



                                                                 this result as



                                                                 normal/abnormal

.



[facility] DentonTjegooyTERUDOPXOZ1429-00-92 18:53:00





             Test Item    Value        Reference Range Interpretation Comments

 

             Monocytes (test code = Monocytes) 8.4          2.0-12.0     N      

      



[facility] DentonNdrrdcrWEZYVVGXVR0960-34-13 18:53:00





             Test Item    Value        Reference Range Interpretation Comments

 

             Segs (test code = Segs) 55.3         45.0-75.0    N            



[facility] DentonAulqwngAEQECWVDNX0759-27-12 18:53:00





             Test Item    Value        Reference Range Interpretation Comments

 

             PTT (test code = PTT) 27.3 s       22.9-35.8    N            



[facility] DentonNzocwxyLSAZWHJRKQ1798-66-61 18:53:00





             Test Item    Value        Reference Range Interpretation Comments

 

             PT (test code = PT) 13.3 s       12.0-14.7    N            



[facility] DentonWsquvuyWQKJNZLIUM7387-68-46 18:53:00





             Test Item    Value        Reference Range Interpretation Comments

 

             INR (test code = INR) 1.02         0.85-1.17    N            



[facility] DentonOgxmxwtOHOMGMTOMR8547-82-83 18:53:00





             Test Item    Value        Reference Range Interpretation Comments

 

             MPV (test code = MPV) 7.2          7.4-10.4     L            



[facility] DentonJmfnmmdTKSXYDJHHW4978-69-31 18:53:00





             Test Item    Value        Reference Range Interpretation Comments

 

             MCHC (test code = MCHC) 33.5         32.0-36.0    N            



[facility] DentonTmnrzczBDQGMPCURJ2746-56-48 18:53:00





             Test Item    Value        Reference Range Interpretation Comments

 

             MCH (test code = MCH) 29.8 pg      27.0-31.0    N            



[facility] DentonIltedokAQJOTFKDMX0605-62-72 18:53:00





             Test Item    Value        Reference Range Interpretation Comments

 

             Platelet (test code = Platelet) 305          133-450      N        

    



[facility] DentonAakfgkgPPMHOSHPLL0524-07-71 18:53:00





             Test Item    Value        Reference Range Interpretation Comments

 

             RDW (test code = RDW) 12.4         11.5-14.5    N            



[facility] DentonEoyhppbSYYZYKJFLV3573-84-72 18:53:00





             Test Item    Value        Reference Range Interpretation Comments

 

             MCV (test code = MCV) 89.0         81.0-99.0    N            



[facility] DentonYxnajusVHGFVPNJAH6544-42-10 18:53:00





             Test Item    Value        Reference Range Interpretation Comments

 

             Hgb (test code = Hgb) 13.3         12.0-16.0    N            



[facility] DentonBhfmleaIFACDHJZJL2895-47-83 18:53:00





             Test Item    Value        Reference Range Interpretation Comments

 

             RBC (test code = RBC) 4.45         4.20-5.40    N            



[facility] DentonVefsbfjWJWRDWITSN8060-78-22 18:53:00





             Test Item    Value        Reference Range Interpretation Comments

 

             Hct (test code = Hct) 39.6         36.0-48.0    N            



[facility] DentonLhnfrrtNRQUWGPARO6480-78-74 18:53:00





             Test Item    Value        Reference Range Interpretation Comments

 

             WBC (test code = WBC) 8.3          3.7-10.4     N            



[facility] CenterVjhcpoeUKPRBXCFA1515-99-70 18:53:00





             Test Item    Value        Reference Range Interpretation Comments

 

             eGFR (test code = eGFR) 96                                     



[facility] CenterPyzomlrHIUSFTGUT8658-50-27 18:53:00





             Test Item    Value        Reference Range Interpretation Comments

 

             Sodium Lvl (test code = Sodium Lvl) 140          135-145      N    

        



[facility] CenterCbrmuigXLBMVTTVN6374-14-32 18:53:00





             Test Item    Value        Reference Range Interpretation Comments

 

             Potassium Lvl (test code = Potassium 3.9          3.5-5.1      N   

         



             Lvl)                                                



[facility] CenterSdrpwypQKRCODCWD5601-54-04 18:53:00





             Test Item    Value        Reference Range Interpretation Comments

 

             Chloride Lvl (test code = Chloride Lvl) 105                 N

            



[facility] CenterWfctfgqYSZALGTDF0155-20-49 18:53:00





             Test Item    Value        Reference Range Interpretation Comments

 

             CO2 (test code = CO2) 28           24-32        N            



[facility] CenterGiyurbeAPXSGGHIH2064-73-11 18:53:00





             Test Item    Value        Reference Range Interpretation Comments

 

             Glucose Lvl (test code = Glucose Lvl) 92           70-99        N  

          



[facility] CenterIdqmfoqQSCPOWWCU4978-31-59 18:53:00





             Test Item    Value        Reference Range Interpretation Comments

 

             BUN (test code = BUN) 14           7-22         N            



[facility] CenterPeszysvSESGBOXEV9265-14-25 18:53:00





             Test Item    Value        Reference Range Interpretation Comments

 

             Creatinine Lvl (test code = Creatinine 0.7          0.5-1.4      N 

           



             Lvl)                                                



[facility] CenterNnkkvitQKHXMMIDG2829-72-98 18:53:00





             Test Item    Value        Reference Range Interpretation Comments

 

             Calcium Lvl (test code = Calcium Lvl) 9.1          8.5-10.5     N  

          



[facility] CenterAntomipWGELBLEUL2915-57-83 18:53:00





             Test Item    Value        Reference Range Interpretation Comments

 

             AGAP (test code = AGAP) 10.9         10.0-20.0    N            



[facility] DentonXhlpzkhCILHFRVIAN5283-05-19 18:53:00





             Test Item    Value        Reference Range Interpretation Comments

 

             Basophils # (test code 0.1          See_Comment  N             [Aut

omated message] The



             = Basophils #)                                        system which 

generated



                                                                 this result tra

nsmitted



                                                                 reference range

: <=0.2.



                                                                 The reference r

erna was



                                                                 not used to int

erpret



                                                                 this result as



                                                                 normal/abnormal

.



[id number] 18:53:00





             Test Item    Value        Reference Range Interpretation Comments

 

             Lymphocytes # (test code = Lymphocytes 2.8          1.0-5.5      N 

           



             #)                                                  



[id number] 18:53:00





             Test Item    Value        Reference Range Interpretation Comments

 

             Eosinophils # (test code 0.1          See_Comment  N             [A

utomated message] The



             = Eosinophils #)                                        system whic

h generated



                                                                 this result tra

nsmitted



                                                                 reference range

: <=0.5.



                                                                 The reference r

erna was



                                                                 not used to int

erpret



                                                                 this result as



                                                                 normal/abnormal

.



[facility] [id number] 18:53:00





             Test Item    Value        Reference Range Interpretation Comments

 

             Monocytes # (test code 0.7          See_Comment  N             [Aut

omated message] The



             = Monocytes #)                                        system which 

generated



                                                                 this result tra

nsmitted



                                                                 reference range

: <=0.8.



                                                                 The reference r

erna was



                                                                 not used to int

erpret



                                                                 this result as



                                                                 normal/abnormal

.



[facility] [id number] 18:53:00





             Test Item    Value        Reference Range Interpretation Comments

 

             Segs-Bands # (test code = Segs-Bands #) 4.6          1.5-8.1      N

            



[facility] [id number] 18:53:00





             Test Item    Value        Reference Range Interpretation Comments

 

             Basophils (test code = 1.0          See_Comment  N             [Aut

omated message] The



             Basophils)                                          system which ge

nerated



                                                                 this result tra

nsmitted



                                                                 reference range

: <=1.0.



                                                                 The reference r

erna was



                                                                 not used to int

erpret



                                                                 this result as



                                                                 normal/abnormal

.



[facility] [id number] 18:53:00





             Test Item    Value        Reference Range Interpretation Comments

 

             Lymphocytes (test code = Lymphocytes) 34.0         20.0-40.0    N  

          



[facility] [id number] 18:53:00





             Test Item    Value        Reference Range Interpretation Comments

 

             Eosinophils (test code = 1.3          See_Comment  N             [A

utomated message] The



             Eosinophils)                                        system which ge

nerated



                                                                 this result tra

nsmitted



                                                                 reference range

: <=4.0.



                                                                 The reference r

erna was



                                                                 not used to int

erpret



                                                                 this result as



                                                                 normal/abnormal

.



[facility] [id number] 18:53:00





             Test Item    Value        Reference Range Interpretation Comments

 

             Monocytes (test code = Monocytes) 8.4          2.0-12.0     N      

      



[facility] [id number] 18:53:00





             Test Item    Value        Reference Range Interpretation Comments

 

             Segs (test code = Segs) 55.3         45.0-75.0    N            



[facility] [id number] 18:53:00





             Test Item    Value        Reference Range Interpretation Comments

 

             PTT (test code = PTT) 27.3 s       22.9-35.8    N            



[facility] [id number] 18:53:00





             Test Item    Value        Reference Range Interpretation Comments

 

             PT (test code = PT) 13.3 s       12.0-14.7    N            



[facility] [id number] 18:53:00





             Test Item    Value        Reference Range Interpretation Comments

 

             INR (test code = INR) 1.02         0.85-1.17    N            



[facility] [id number] 18:53:00





             Test Item    Value        Reference Range Interpretation Comments

 

             MPV (test code = MPV) 7.2          7.4-10.4     L            



[facility] [id number] 18:53:00





             Test Item    Value        Reference Range Interpretation Comments

 

             MCHC (test code = MCHC) 33.5         32.0-36.0    N            



[facility] [id number] 18:53:00





             Test Item    Value        Reference Range Interpretation Comments

 

             MCH (test code = MCH) 29.8 pg      27.0-31.0    N            



[facility] [id number] 18:53:00





             Test Item    Value        Reference Range Interpretation Comments

 

             Platelet (test code = Platelet) 305          133-450      N        

    



[facility] [id number] 18:53:00





             Test Item    Value        Reference Range Interpretation Comments

 

             RDW (test code = RDW) 12.4         11.5-14.5    N            



[facility] [id number] 18:53:00





             Test Item    Value        Reference Range Interpretation Comments

 

             MCV (test code = MCV) 89.0         81.0-99.0    N            



[facility] [id number] 18:53:00





             Test Item    Value        Reference Range Interpretation Comments

 

             Hgb (test code = Hgb) 13.3         12.0-16.0    N            



[facility] [id number] 18:53:00





             Test Item    Value        Reference Range Interpretation Comments

 

             RBC (test code = RBC) 4.45         4.20-5.40    N            



[facility] [id number] 18:53:00





             Test Item    Value        Reference Range Interpretation Comments

 

             Hct (test code = Hct) 39.6         36.0-48.0    N            



[facility] [id number] 18:53:00





             Test Item    Value        Reference Range Interpretation Comments

 

             WBC (test code = WBC) 8.3          3.7-10.4     N            



[facility]



Notes







                Date/Time       Note            Provider        Source

 

                2013      EXAM: MRI BRAIN WITH AND WITHOUT CONTRAST       

          [facility]



                09:47:00-00:00                                  Center



                                DATE: Sep 16, 2013 at 0940 hours                

 



                                                                



                                INDICATION: Hemiparesis                 



                                                                



                                COMPARISON: MRI brain without contrast dated                  



                                                                



                                TECHNIQUE:                      



                                                                



                                                    Multiplanar imaging of the b

rain was obtained before and after intravenous 

administration of 14 mL of MultiHance gadolinium contrast.                      

     



                                                                



                                FINDINGS:                       



                                                                



                                                    No abnormal enhancement is i

dentified. Redemonstration of the T2 hyperintensity

in the left frontal subcortical white matter without perilesional edema or 
marked mass effect (but absence of volume loss as well).                        

   



                                                                



                                                    An area of diffusion restric

tion along the left periventricular white matter is

once again seen without enhancement, compatible with acute ischemia.            

               



                                                                



                                                    Remote bilateral lacunar isc

hemic changes are present. Chronic white matter 

disease is once again noted.                           



                                                                



                                IMPRESSION:                     



                                                                



                                                    1. Nonenhancing T2 hyperinte

nse lesion in the left frontal subcortical white 

matter. Given the lack of enhancement this is not felt to be a consequence of 
demyelinating disease. Absence of volume loss i                           



                                                    s concerning and the lesion 

differs from others present. The differential 

remains a subacute versus more remote infarct or a low grade neoplasm. Recommend
followup MRI brain with and without contrast in                           



                                                     3 to 6 months to document s

tability. A change in size/morphology in the 

interim could help discriminate between a more recent infarct and neoplasm.     

                      



                                                    2. Periventricular lesion do

es not enhance and, therefore, is consistent with 

recent infarct rather than demyelinating disease.                           

 

                2013-09-15      EXAM: MRI BRAIN                 [facility]



                18:49:00-00:00                                  Center



                                DATE: Sep 15, 2013 dated 1839 hours             

    



                                                                



                                INDICATION: Right-sided weakness                

 



                                                                



                                COMPARISON: CT of the head dated 9/15/2013      

           



                                                                



                                                    TECHNIQUE: Multiplanar and m

ultisequence MRI of the brain was performed without

administration of intravenous contrast.                           



                                                                



                                                    FINDINGS: There is tiny area

 of diffusion restriction along the left 

periventricular white matter (series 302, image 120), likely related to recent 
ischemic change. In a patient where MS is suspected, a                          

 



                                 more acute lesion would possibly have this appe

arance as well.                 



                                                                



                                                    There is a T2 hyperintense, 

T1 isointense lesion in the left frontal 

subcortical white matter without volume loss or perilesional edema which may 
represent subacute infarct, multiple sclerosis plaque, or low grade neoplasm.   

                        



                                                                



                                                    Central cystic changes in th

e caudate heads are most likely lacunar infarcts. 

Scattered areas of T2 FLAIR abnormality is compatible with advanced for age 
chronic white matter disease.                           



                                                                



                                                    There is no evidence of intr

aparenchymal or extra-axial hemorrhage, mass, mass 

effect, or hydrocephalus. Appropriate flow-voids are present in the vessels at 
the base of the brain.                              



                                                                



                                                    Incidental imaging of the or

bits, paranasal sinuses, mastoid air cells, and 

skull base are unremarkable.                           



                                                                



                                IMPRESSION:                     



                                                    1. Tiny area of diffusion re

striction along the left periventricular white 

matter (series 302, image 120), likely related to an acute subcortical ischemic 
event and less likely secondary to demyelinating disease.                       

    



                                                                



                                                    2. Left frontal white matter

 T2 hyperintense, T1 isointense lesion may 

represent subacute ischemic change, multiple sclerosis plaque, or low grade 
neoplasm. Contrast study may be indicated for further evaluation.               

            

 

                2013-09-15      CT ANGIOGRAM OF THE HEAD AND NECK               

  [facility]



                14:45:00-00:00                                  Center



                                DATE: 12/15/2013 at 2:47 p.m.                 



                                                                



                                CLINICAL INFORMATION: Aphasia, dysarthria.      

           



                                                                



                                                    TECHNIQUE: Axial images were

 obtained from the vertex through the upper thorax 

at 1.5 mm intervals in the enhanced mode. 3-D maximum intensity projection, MIP 
images were also created.                           



                                                                



                                FINDINGS:                       



                                                    There is normal contrast-enh

ancement of the aortic arch and proximal great 

vessels. The right innominate, left common carotid, and left subclavian arteries
arise in normal anatomic configuration. There are no proximal stenoses.         

                  



                                                                



                                                    Minimal calcified atheroscle

rotic plaque is noted within the bilateral carotid 

bulbs without stenosis. There is normal contrast-enhancement of the bilateral 
common, internal, and external carotid arteries.                           



                                                                



                                There is normal contrast-enhancement of the bila

teral vertebral arteries.                 



                                                                



                                                    Intracranially there is norm

al contrast-enhancement of the bilateral 

intracranial internal carotid arteries, the bilateral distal vertebral arteries,
and the basilar artery. There is normal contrast enh                           



                                                    ancement of the the bilatera

l anterior, middle, and posterior cerebral 

arteries. There is normal contrast-enhancement of the superior cerebellar, 
anterior/inferior cerebellar, and posterior inferior cer                        

   



                                ebellar arteries bilaterally. There are no intra

cranial branch occlusions.                 



                                                                



                                There is normal contrast-enhancement of the veno

us structures.                 



                                                                



                                                                



                                                                



                                IMPRESSION:                     



                                                    1. Minimal atherosclerotic p

laque of the carotid bifurcations without stenosis.

                                                    



                                                    2. Normal intracranial CT an

giogram. There are no intracranial branch 

occlusions.

## 2023-09-13 NOTE — XMS REPORT
Continuity of Care Document

                          Created on:2023



Patient:SUKHI ZELAYA

Sex:Female

:1955

External Reference #:[id number]





Demographics







                          Address                   [address] [place] [address]

 

                          Home Phone                [phone]

 

                          Work Phone                [phone]

 

                          Mobile Phone              [phone]

 

                          Email Address             [email or web address]

 

                          Preferred Language        English

 

                          Marital Status            Unknown

 

                          M[employer]on Affiliation     Unknown

 

                          Race                      Unknown

 

                          Additional Race(s)        White



                                                    Unavailable



                                                    Unavailable

 

                          Ethnic Group              Unknown









Author







                          Organization              [facility]

t

 

                          Address                   [address]



                                                    [place] [address]

 

                          Phone                     [phone]









Support







                Name            Relationship    Address         Phone

 

                KEKE MEJIA  Friend          [phone]



                                                [place] [address] 









Care Team Providers







                    Name                Role                Phone

 

                    HARESH BROWN      Attending Clinician Unavailable

 

                    Lab, Adc Fam Pob I  Attending Clinician Unavailable

 

                    Nika Szymanski  Attending Clinician [phone]

 

                    HARESH BROWN      Admitting Clinician Unavailable









Payers







           Payer Name Policy Type Policy Number Effective Date Expiration Date S

ource







Problems







       Condition Condition Condition Status Onset  Resolution Last   Treating Co

mments 

Source



       Name   Details Category        Date   Date   Treatment Clinician        



                                                 Date                 

 

       SUB ACUTE  SUB ACUTE Diagnosis Active 2013-0        2013-09-15           

    [facility]



       MCA STROKE MCA STROKE               9-15          18:02:00               

l



              Active               00:00:                             Eduardo



              09/15/2013               00                                 



               [facility]                                                  

 

       TIA     TIA   Diagnosis Active 2013               Mem

oria



              Active               9-15          12:05:00               l



              09/15/2013               00:00:                             Romaine adhikari



              [address]                                                  

 

       TRANS   TRANS Diagnosis Active               2013               Mem

oria



       CEREB  CEREB                              12:05:00               l



       ISCHEMIA ISCHEMIA                                                  Romaine adhikari



       NEC    NEC Active                                                  



               [facility]                                                  

 

       Cerebrovas  Cerebrova Problem Active               2023            

   [facility]



       cular  scular                             11:39:54               l



       accident accident                                                  Romaine adhikari



       (disorder) (disorder)                                                  



              Active                                                  



              Problem                                                  



              2023                                                  



               May 2022                                                  



              [facility]                                                     



              Neurology                                                  



              [place]                                                  

 

       Hypertensi  Hypertens Problem Active               2023            

   [facility]



       ve     sameer                                11:39:54               l



       disorder, disorder,                                                  H[facility]

marco antonio



       systemic systemic                                                  



       arterial arterial                                                  



       (disorder) (disorder)                                                  



              Active                                                  



              Problem                                                  



              2023                                                  



              [facility]                                                     



              Neurology                                                  



              [place]                                                  

 

       Syncope  Syncope Problem Active               2023               Me

moria



       (disorder) (disorder)                             11:39:54               

l



              Active                                                  [place]



              Problem                                                  



              2023                                                  



              [facility]                                                     



              Neurology                                                  



              [place]                                                  

 

       Arthritis  Arthritis Problem Resolve               2020            

   [facility]



       (disorder) (disorder)        d                    22:45:28               

l



              Resolved                                                  Eduardo



              Problem                                                  



              2020                                                  



              [facility]                                                  



              Neuro                                                   

 

       Hyperlipid  Hyperlipi Problem Resolve               2020           

    [facility]



       emia   demia         d                    22:45:28               l



       (disorder) (disorder)                                                  He

rmann



              Resolved                                                  



              Problem                                                  



              2020                                                  



              [facility]                                                  



              Neuro                                                   

 

       Laser    Laser Problem Resolve               2020               Mem

oria



       assisted assisted        d                    22:45:28               l



       in situ in situ                                                  Eduardo



       keratomile keratomile                                                  



       usis   usis                                                    



       (procedure (procedure                                                  



       )      ) Resolved                                                  



              Problem                                                  



              2020                                                  



               [facility]                                                  



              Neuro                                                   

 

       Arthritis  Arthritis Problem Resolve               2013            

   [facility]



              Resolved        d                    21:14:51               l



              Problem                                                  [place]



              2013                                                  



              [facility]                                                  

 

       Hyperlipid  Hyperlipi Problem Resolve               2013           

    [facility]



       emia   demia         d                    21:14:51               l



              Resolved                                                  [facility]



              Problem                                                  



              2013                                                  



              [facility]                                                  

 

       LASIK    LASIK Problem Resolve               2013               Mem

oria



              Resolved        d                    21:14:51               l



              Problem                                                  [place]



              2013                                                  



              [facility]                                                  







Allergies, Adverse Reactions, Alerts







       Allergy Allergy Status Severity Reaction(s) Onset  Inactive Treating Comm

ents 

Source



       Name   Type                        Date   Date   Clinician        

 

       NO KNOWN Drug   Active                                           [facility]



       ALLERGIE Class                                                   ity of



       S                                                              [facility]

 

       No Known No Known Active                                           [facility]

a



       Medicati Medicati                                                  l



       on     on                                                      Eduardo



       Allergie Allergie                                                  



       s      s                                                       







Social History







           Social Habit Start Date Stop Date  Quantity   Comments   Source

 

           Sex Assigned At                                             [facility]

y of



           Birth                                                  [facility]



                                                                  [place]

 

           Exposure to                       Yes                   [facility] 



           SARS-CoV-2                                             [facility]



           (event)                                                [facility]

 

           Alcohol intake 2015 Current               [facility]

 of



                      00:00:00   00:00:00   non-drinker of            [facility]

song



                                            alcohol               [facility]



                                            (finding)             









                Smoking Status  Start Date      Stop Date       Source

 

                Tobacco smoking status 2023 19:59:57                 Dariel Clark







Medications







       Ordered Filled Start  Stop   Current Ordering Indication Dosage Frequency

 Signature

                    Comments            Components          Source



     Medication Medication Date Date Medication? Clinician                (SIG) 

          



     Name Name                                                   

 

     aspirin 81            Yes                      81 mg = 1           Me

moria



     mg tablet,      3-22                               tab, PO,           l



     enteric      20:54:                               Daily, #           Romaine

n



     coated      00                                 90 tab, 3           



                                                  Refill(s)           

 

     lisinopril            Yes                      0              [facility]



     20 mg oral      3-22                               Refill(s)           l



     tablet      20:52:                                              Eduardo



               00                                                

 

     CRESTOR 20      2015      Yes            20mg      Take 20 mg           U

nivers



     mg tablet      0-26                               by mouth           ity [facility]



               00:00:                               at             [place]



               00                                 bedtime.           [facility]



                                                                 Branch

 

     atorvastati            Yes  Mary                80 mg, 1          

 [facility]



     n 80 mg      9-16           [place]                tab, PO,           l



     oral tablet      18:23:           Brown                Daily, 30           

Eduardo



               46                                 tab,           



                                                  Substituti           



                                                  on             



                                                  Allowed,           



                                                  TAB            

 

     atorvastati            Yes  Mary                80 mg, 1          

 [facility]



     n 80 mg      9-16           [place]                tab, PO,           l



     oral tablet      18:23:           Brown                Daily, 30           

Eduardo



               46                                 tab,           



                                                  Substituti           



                                                  on             



                                                  Allowed,           



                                                  TAB            

 

     atorvastati            Yes  Mary                80 mg, 1          

 [facility]



     n 80 mg      9-16           [place]                tab, PO,           l



     oral tablet      18:23:           Brown                Daily, 30           

Eduardo



               46                                 tab,           



                                                  Substituti           



                                                  on             



                                                  Allowed,           



                                                  TAB            

 

     atorvastati            Yes  Mary                80 mg, 1          

 [facility]



     n 80 mg      9-16           [place]                tab, PO,           l



     oral tablet      18:23:           Brown                Daily, 30           

Eduardo



               46                                 tab,           



                                                  Substituti           



                                                  on             



                                                  Allowed,           



                                                  TAB            

 

     aspirin 81            Yes  Mary                81 mg, 1           

[facility]



     mg tablet,      9-16           [place]                tab, PO,           l



     enteric      18:23:           Brown                Daily, 30           [facility]

marco antonio



     coated      38                                 tab,           



                                                  Substituti           



                                                  on             



                                                  Allowed,           



                                                  ECTAB           

 

     aspirin 81            Yes  Mary                81 mg, 1           

[facility]



     mg tablet,      9-16           [place]                tab, PO,           l



     enteric      18:23:           Brown                Daily, 30           H[facility]

marco antonio



     coated      38                                 tab,           



                                                  Substituti           



                                                  on             



                                                  Allowed,           



                                                  ECTAB           

 

     aspirin 81            Yes  Mary                81 mg, 1           

[facility]



     mg tablet,      9-16           [place]                tab, PO,           l



     enteric      18:23:           Brown                Daily, 30           H[facility]

marco antonio



     coated      38                                 tab,           



                                                  Substituti           



                                                  on             



                                                  Allowed,           



                                                  ECTAB           

 

     aspirin 81            Yes  Mary                81 mg, 1           

[facility]



     mg tablet,      9-16           [place]                tab, PO,           l



     enteric      18:23:           Brown                Daily, 30           [facility]

marco antonio



     coated      38                                 tab,           



                                                  Substituti           



                                                  on             



                                                  Allowed,           



                                                  ECTAB           

 

     atorvastati      -0      No   Kimberly                80 mg, 1           M

emoria



     n         9-16           Deni                tab,           l



               14:00:           Jose Alberto                Route: PO,           Romaine

n



               00                                 Drug form:           



                                                  TAB,           



                                                  Daily,           



                                                  Dosing           



                                                  Weight           



                                                  72.727,           



                                                  kg, Start           



                                                  date:           



                                                  13           



                                                  9:00:00,           



                                                  Duration:           



                                                  30 day,           



                                                  Stop date:           



                                                  10/15/13           



                                                  9:00:00           

 

     aspirin      -0      No   Kimberly                81 mg, 1           [facility]

ia



               9-16           Deni                tab,           l



               14:00:           Jose Alberto                Route: PO,           Romaine

n



               00                                 Drug form:           



                                                  ECTAB,           



                                                  Daily,           



                                                  Dosing           



                                                  Weight           



                                                  72.727,           



                                                  kg, Start           



                                                  date:           



                                                  13           



                                                  9:00:00,           



                                                  Duration:           



                                                  30 day,           



                                                  Stop date:           



                                                  10/15/13           



                                                  9:00:00           

 

     atorvastati      -0      No   Kimberly                80 mg, 1           M

emoria



     n         9-16           Deni                tab,           l



               14:00:           Jose Alberto                Route: PO,           Romaine

n



               00                                 Drug form:           



                                                  TAB,           



                                                  Daily,           



                                                  Dosing           



                                                  Weight           



                                                  72.727,           



                                                  kg, Start           



                                                  date:           



                                                  13           



                                                  9:00:00,           



                                                  Duration:           



                                                  30 day,           



                                                  Stop date:           



                                                  10/15/13           



                                                  9:00:00           

 

     aspirin      -0      No   Kimberly                81 mg, 1           Mem[facility]

ia



               9-16           Deni                tab,           l



               14:00:           Jose Alberto                Route: PO,           Romaine

n



               00                                 Drug form:           



                                                  ECTAB,           



                                                  Daily,           



                                                  Dosing           



                                                  Weight           



                                                  72.727,           



                                                  kg, Start           



                                                  date:           



                                                  13           



                                                  9:00:00,           



                                                  Duration:           



                                                  30 day,           



                                                  Stop date:           



                                                  10/15/13           



                                                  9:00:00           

 

     atorvastati      -0      No   Kimberly                80 mg, 1           M

emoria



     n         9-16           Deni                tab,           l



               14:00:           Jose Alberto                Route: PO,           Romaine

n



               00                                 Drug form:           



                                                  TAB,           



                                                  Daily,           



                                                  Dosing           



                                                  Weight           



                                                  72.727,           



                                                  kg, Start           



                                                  date:           



                                                  13           



                                                  9:00:00,           



                                                  Duration:           



                                                  30 day,           



                                                  Stop date:           



                                                  10/15/13           



                                                  9:00:00           

 

     aspirin      -0      No   Kimberly                81 mg, 1           [facility]

ia



               9-16           Deni                tab,           l



               14:00:           Jose Alberto                Route: PO,           [facility]

n



               00                                 Drug form:           



                                                  ECTAB,           



                                                  Daily,           



                                                  Dosing           



                                                  Weight           



                                                  72.727,           



                                                  kg, Start           



                                                  date:           



                                                  13           



                                                  9:00:00,           



                                                  Duration:           



                                                  30 day,           



                                                  Stop date:           



                                                  10/15/13           



                                                  9:00:00           

 

     atorvastati            No   Kimberly                80 mg, 1           [facility]

[facility]



     n         9-16           Deni                tab,           l



               14:00:           Jose Alberto                Route: PO,           [facility]

n



               00                                 Drug form:           



                                                  TAB,           



                                                  Daily,           



                                                  Dosing           



                                                  Weight           



                                                  72.727,           



                                                  kg, Start           



                                                  date:           



                                                  13           



                                                  9:00:00,           



                                                  Duration:           



                                                  30 day,           



                                                  Stop date:           



                                                  10/15/13           



                                                  9:00:00           

 

     aspirin      -      No   Kimberly                81 mg, 1           [facility]

ia



               -16           Deni                tab,           l



               14:00:           Jose Alberto                Route: PO,           [facility]

n



               00                                 Drug form:           



                                                  ECTAB,           



                                                  Daily,           



                                                  Dosing           



                                                  Weight           



                                                  72.727,           



                                                  kg, Start           



                                                  date:           



                                                  13           



                                                  9:00:00,           



                                                  Duration:           



                                                  30 day,           



                                                  Stop date:           



                                                  10/15/13           



                                                  9:00:00           

 

     heparin            No   Kimberly                5,000           [facility]



               9-16           Deni                unit, 1           l



               13:00:           Jose Alberto                mL, Route:           [facility]

n



               00                                 SUB-Q,           



                                                  Drug form:           



                                                  INJ, Q8H,           



                                                  Dosing           



                                                  Weight           



                                                  72.727,           



                                                  kg, Start           



                                                  date:           



                                                  13           



                                                  8:00:00,           



                                                  Duration:           



                                                  30 day,           



                                                  Stop date:           



                                                  10/16/13           



                                                  0:00:00           

 

     heparin            No   [facility]                5,000           [facility]



               9-16           Deni                unit, 1           l



               13:00:           Jose Alberto                mL, Route:           [facility]

n



               00                                 SUB-Q,           



                                                  Drug form:           



                                                  INJ, Q8H,           



                                                  Dosing           



                                                  Weight           



                                                  72.727,           



                                                  kg, Start           



                                                  date:           



                                                  13           



                                                  8:00:00,           



                                                  Duration:           



                                                  30 day,           



                                                  Stop date:           



                                                  10/16/13           



                                                  0:00:00           

 

     heparin      -0      No   Kim[facility]ly                5,000           [facility]



               9-16           Deni                unit, 1           l



               13:00:           Jose Alberto                mL, Route:           [facility]

n



               00                                 SUB-Q,           



                                                  Drug form:           



                                                  INJ, Q8H,           



                                                  Dosing           



                                                  Weight           



                                                  72.727,           



                                                  kg, Start           



                                                  date:           



                                                  13           



                                                  8:00:00,           



                                                  Duration:           



                                                  30 day,           



                                                  Stop date:           



                                                  10/16/13           



                                                  0:00:00           

 

     heparin      -0      No   [facility]                5,000           [facility]



               9-16           Deni                unit, 1           l



               13:00:           Jose Alberto                mL, Route:           [facility]

n



               00                                 SUB-Q,           



                                                  Drug form:           



                                                  INJ, Q8H,           



                                                  Dosing           



                                                  Weight           



                                                  72.727,           



                                                  kg, Start           



                                                  date:           



                                                  13           



                                                  8:00:00,           



                                                  Duration:           



                                                  30 day,           



                                                  Stop date:           



                                                  10/16/13           



                                                  0:00:00           

 

     Saline      -0      No                   5 ml,           [facility]



     Flush 0.9%      9-16           [facility]                Route:           l



               02:00:           treras                IVP, Drug           [facility]

n



               00                                 Form: INJ,           



                                                  Dosing           



                                                  Weight           



                                                  72.727,           



                                                  kg, Q12H,           



                                                  Start           



                                                  date:           



                                                  09/15/13           



                                                  21:00:00,           



                                                  Duration:           



                                                  30 day,           



                                                  Stop date:           



                                                  10/15/13           



                                                  9:00:00           

 

     Saline      -0      No                   5 ml,           [facility]



     Flush 0.9%      9-16           [facility]                Route:           l



               02:00:           treras                IVP, Drug           [facility]

n



               00                                 Form: INJ,           



                                                  Dosing           



                                                  Weight           



                                                  72.727,           



                                                  kg, Q12H,           



                                                  Start           



                                                  date:           



                                                  09/15/13           



                                                  21:00:00,           



                                                  Duration:           



                                                  30 day,           



                                                  Stop date:           



                                                  10/15/13           



                                                  9:00:00           

 

     Saline      -0      No                   5 ml,           [facility]



     Flush 0.9%      9-16           [facility]                Route:           l



               02:00:           treras                IVP, Drug           [facility]

n



               00                                 Form: INJ,           



                                                  Dosing           



                                                  Weight           



                                                  72.727,           



                                                  kg, Q12H,           



                                                  Start           



                                                  date:           



                                                  09/15/13           



                                                  21:00:00,           



                                                  Duration:           



                                                  30 day,           



                                                  Stop date:           



                                                  10/15/13           



                                                  9:00:00           

 

     Saline      -0      No                   5 ml,           [facility]



     Flush 0.9%      9-16           [facility]                Route:           l



               02:00:           treras                IVP, Drug           [facility]

n



               00                                 Form: INJ,           



                                                  Dosing           



                                                  Weight           



                                                  72.727,           



                                                  kg, Q12H,           



                                                  Start           



                                                  date:           



                                                  09/15/13           



                                                  21:00:00,           



                                                  Duration:           



                                                  30 day,           



                                                  Stop date:           



                                                  10/15/13           



                                                  9:00:00           

 

     Zantac      0      Yes                      Substituti           [facility]

ia



               9-15                               on Allowed           l



               22:49:                                              [place]



                                                               

 

     Zantac            Yes                      Substituti           [facility]

ia



               9-15                               on Allowed           l



               22:49:                                              [place]



                                                               

 

     Zantac            Yes                      Substituti           [facility]

ia



               9-15                               on Allowed           l



               22:49:                                              [place]



                                                               

 

     Zantac      2013-0      Yes                      Substituti           [facility]

ia



               9-15                               on Allowed           l



               22:49:                                              [place]



               00                                                

 

     Zantac            Yes                      Substituti           [facility]

ia



               9-15                               on Allowed           l



               22:49:                                              [place]



               00                                                

 

     Multiple            Yes                      1 cap, PO,           [facility]

[facility]



     Vitamins      9-15                               Daily, 30           l



     oral      22:48:                               cap,           [facility]



     capsule      47                                 Substituti           



                                                  on             



                                                  Allowed,           



                                                  Maintenanc           



                                                  e, CAP           

 

     Multiple            Yes                      1 cap, PO,           [facility]

[facility]



     Vitamins      9-15                               Daily, 30           l



     oral      22:48:                               cap,           [facility]



     capsule      47                                 Substituti           



                                                  on             



                                                  Allowed,           



                                                  Maintenanc           



                                                  e, CAP           

 

     Multiple            Yes                      1 cap, PO,           [facility]

[facility]



     Vitamins      9-15                               Daily, 30           l



     oral      22:48:                               cap,           [place]



     capsule      47                                 Substituti           



                                                  on             



                                                  Allowed,           



                                                  Maintenanc           



                                                  e, CAP           

 

     Multiple            Yes                      1 cap, PO,           [facility]

[facility]



     Vitamins      9-15                               Daily, 30           l



     oral      22:48:                               cap,           [place]



     capsule      47                                 Substituti           



                                                  on             



                                                  Allowed,           



                                                  Maintenanc           



                                                  e, CAP           

 

     Multiple            Yes                      1 cap, PO,           [facility]

[facility]



     Vitamins      9-15                               Daily, 30           l



     oral      22:48:                               cap,           [place]



     capsule      47                                 Substituti           



                                                  on             



                                                  Allowed,           



                                                  Maintenanc           



                                                  e, CAP           

 

     Saline            No                   5 ml,           [facility]



     Flush 0.9%      9-15           [facility]                Route:           l



               22:09:           treras                IVP, Drug           [facility]

n



               00                                 Form: INJ,           



                                                  Dosing           



                                                  Weight           



                                                  72.727,           



                                                  kg, PRN,           



                                                  PRN Line           



                                                  Flush,           



                                                  Start           



                                                  date:           



                                                  09/15/13           



                                                  17:09:00,           



                                                  Duration:           



                                                  30 day,           



                                                  Stop date:           



                                                  10/15/13           



                                                  17:08:00           

 

     labetalol            No                   10 mg, 2           [facility]

[facility]



               -15           [facility]                mL, Route:           l



               22:09:           treras                IVP, Drug           [facility]

n



               00                                 form: INJ,           



                                                  Q10Min,           



                                                  Dosing           



                                                  Weight           



                                                  72.727,           



                                                  kg, PRN           



                                                  Hypertensi           



                                                  on, Start           



                                                  date:           



                                                  09/15/13           



                                                  17:09:00,           



                                                  Duration:           



                                                  30 day,           



                                                  Stop date:           



                                                  10/15/13           



                                                  17:08:00,           



                                                  For SBP >           



                                                  180mmHg           



                                                  and/or DBP           



                                                  > 105mmHg           

 

     Sodium            No   Mary                1,000 mL,           [facility]

[facility]



     Chloride      -15           [place]                Rate: 100           l



     0.9% IV      22:09:           Brown                ml/hr,           [facility]



     1,000 mL      00                                 Infuse           



                                                  over: 10           



                                                  hr, Route:           



                                                  IV, Dosing           



                                                  Weight           



                                                  72.727 kg,           



                                                  Total           



                                                  Volume:           



                                                  1,000,           



                                                  Start           



                                                  date:           



                                                  09/15/13           



                                                  17:09:00,           



                                                  Stop date:           



                                                  10/15/13           



                                                  17:08:00           

 

     Saline      -0      No                   5 ml,           [facility]



     Flush 0.9%      -15           [facility]                Route:           l



               22:09:           treras                IVP, Drug           [facility]

n



               00                                 Form: INJ,           



                                                  Dosing           



                                                  Weight           



                                                  72.727,           



                                                  kg, PRN,           



                                                  PRN Line           



                                                  Flush,           



                                                  Start           



                                                  date:           



                                                  09/15/13           



                                                  17:09:00,           



                                                  Duration:           



                                                  30 day,           



                                                  Stop date:           



                                                  10/15/13           



                                                  17:08:00           

 

     Saline      -      No                   5 ml,           [facility]



     Flush 0.9%      -15           [facility]-Con                Route:           l



               22:09:           treras                IVP, Drug           [facility]

n



               00                                 Form: INJ,           



                                                  Dosing           



                                                  Weight           



                                                  72.727,           



                                                  kg, PRN,           



                                                  PRN Line           



                                                  Flush,           



                                                  Start           



                                                  date:           



                                                  09/15/13           



                                                  17:09:00,           



                                                  Duration:           



                                                  30 day,           



                                                  Stop date:           



                                                  10/15/13           



                                                  17:08:00           

 

     labetalol                      10 mg, 2           [facility]

[facility]



               -15           [facility]ii-Con                mL, Route:           l



               22:09:           treras                IVP, Drug           [facility]

n



               00                                 form: INJ,           



                                                  Q10Min,           



                                                  Dosing           



                                                  Weight           



                                                  72.727,           



                                                  kg, PRN           



                                                  Hypertensi           



                                                  on, Start           



                                                  date:           



                                                  09/15/13           



                                                  17:09:00,           



                                                  Duration:           



                                                  30 day,           



                                                  Stop date:           



                                                  10/15/13           



                                                  17:08:00,           



                                                  For SBP >           



                                                  180mmHg           



                                                  and/or DBP           



                                                  > 105mmHg           

 

     Sodium      -0      No   [facility]                1,000 mL,           [facility]

[facility]



     Chloride      -15           [facility]                Rate: 100           l



     0.9% IV      22:09:           Brown                ml/hr,           [facility]



     1,000 mL      00                                 Infuse           



                                                  over: 10           



                                                  hr, Route:           



                                                  IV, Dosing           



                                                  Weight           



                                                  72.727 kg,           



                                                  Total           



                                                  Volume:           



                                                  1,000,           



                                                  Start           



                                                  date:           



                                                  09/15/13           



                                                  17:09:00,           



                                                  Stop date:           



                                                  10/15/13           



                                                  17:08:00           

 

     labetalol                      10 mg, 2           [facility]

[facility]



               -15           [facility]ii-Con                mL, Route:           l



               22:09:           treras                IVP, Drug           [facility]

n



               00                                 form: INJ,           



                                                  Q10Min,           



                                                  Dosing           



                                                  Weight           



                                                  72.727,           



                                                  kg, PRN           



                                                  Hypertensi           



                                                  on, Start           



                                                  date:           



                                                  09/15/13           



                                                  17:09:00,           



                                                  Duration:           



                                                  30 day,           



                                                  Stop date:           



                                                  10/15/13           



                                                  17:08:00,           



                                                  For SBP >           



                                                  180mmHg           



                                                  and/or DBP           



                                                  > 105mmHg           

 

     Sodium      -0      No   [facility]                1,000 mL,           [facility]

[facility]



     Chloride      9-15           [facility]                Rate: 100           l



     0.9% IV      22:09:           Brown                ml/hr,           [facility]



     1,000 mL      00                                 Infuse           



                                                  over: 10           



                                                  hr, Route:           



                                                  IV, Dosing           



                                                  Weight           



                                                  72.727 kg,           



                                                  Total           



                                                  Volume:           



                                                  1,000,           



                                                  Start           



                                                  date:           



                                                  09/15/13           



                                                  17:09:00,           



                                                  Stop date:           



                                                  10/15/13           



                                                  17:08:00           

 

     Saline      -0      No                   5 ml,           [facility]



     Flush 0.9%      9-15           [facility]                Route:           l



               22:09:           treras                IVP, Drug           [facility]

n



               00                                 Form: INJ,           



                                                  Dosing           



                                                  Weight           



                                                  72.727,           



                                                  kg, PRN,           



                                                  PRN Line           



                                                  Flush,           



                                                  Start           



                                                  date:           



                                                  09/15/13           



                                                  17:09:00,           



                                                  Duration:           



                                                  30 day,           



                                                  Stop date:           



                                                  10/15/13           



                                                  17:08:00           

 

     labetalol      -0      No                   10 mg, 2           [facility]

[facility]



               9-15           [facility]-Con                mL, Route:           l



               22:09:           treras                IVP, Drug           [facility]

n



               00                                 form: INJ,           



                                                  Q10Min,           



                                                  Dosing           



                                                  Weight           



                                                  72.727,           



                                                  kg, PRN           



                                                  Hypertensi           



                                                  on, Start           



                                                  date:           



                                                  09/15/13           



                                                  17:09:00,           



                                                  Duration:           



                                                  30 day,           



                                                  Stop date:           



                                                  10/15/13           



                                                  17:08:00,           



                                                  For SBP >           



                                                  180mmHg           



                                                  and/or DBP           



                                                  > 105mmHg           

 

     Sodium      -0      No   [facility]                1,000 mL,           [facility]

[facility]



     Chloride      9-15           [facility]                Rate: 100           l



     0.9% IV      22:09:           Brown                ml/hr,           [facility]



     1,000 mL      00                                 Infuse           



                                                  over: 10           



                                                  hr, Route:           



                                                  IV, Dosing           



                                                  Weight           



                                                  72.727 kg,           



                                                  Total           



                                                  Volume:           



                                                  1,000,           



                                                  Start           



                                                  date:           



                                                  09/15/13           



                                                  17:09:00,           



                                                  Stop date:           



                                                  10/15/13           



                                                  17:08:00           

 

     Omnipaque      -0      No   [facility]ennifer                85 mL,           [facility]

[facility]



     350mg/ml      9-15           [facility]                Route:           l



               19:37:                               IVP, Drug           [place]



                                                Form:           



                                                  SOLN,           



                                                  Dosing           



                                                  Weight           



                                                  72.727,           



                                                  kg,            



                                                  ONCALL,           



                                                  STAT,           



                                                  Start           



                                                  date:           



                                                  09/15/13           



                                                  14:37:00,           



                                                  Duration:           



                                                  1 doses or           



                                                  times,           



                                                  Dose =           



                                                  2.2ml/kg,           



                                                  Max dose =           



                                                  100ml --           



                                                  "To be           



                                                  infused by           



                                                  Radiology           



                                                  Staff           



                                                  ONLY"Dose           



                                                  =              



                                                  2.2ml/kg,           



                                                  Max dose =           



                                                  100ml --           



                                                  "To be           



                                                  infused by           



                                                  Radiology           



                                                  Staff           



                                                  ONLY"           

 

     Omnipaque      -0      No   Jennifer                85 mL,           [facility]

[facility]



     350mg/ml      9-15           [facility]                Route:           l



               19:37:                               IVP, Drug           [facility]



                                                Form:           



                                                  SOLN,           



                                                  Dosing           



                                                  Weight           



                                                  72.727,           



                                                  kg,            



                                                  ONCALL,           



                                                  STAT,           



                                                  Start           



                                                  date:           



                                                  09/15/13           



                                                  14:37:00,           



                                                  Duration:           



                                                  1 doses or           



                                                  times,           



                                                  Dose =           



                                                  2.2ml/kg,           



                                                  Max dose =           



                                                  100ml --           



                                                  "To be           



                                                  infused by           



                                                  Radiology           



                                                  Staff           



                                                  ONLY"Dose           



                                                  =              



                                                  2.2ml/kg,           



                                                  Max dose =           



                                                  100ml --           



                                                  "To be           



                                                  infused by           



                                                  Radiology           



                                                  Staff           



                                                  ONLY"           

 

     Omnipaque      -      No   Jennifer                85 mL,           [facility]

[facility]



     350mg/ml      9-15           [facility]                Route:           l



               19:37:                               IVP, Drug           [place]



                                                Form:           



                                                  SOLN,           



                                                  Dosing           



                                                  Weight           



                                                  72.727,           



                                                  kg,            



                                                  ONCALL,           



                                                  STAT,           



                                                  Start           



                                                  date:           



                                                  09/15/13           



                                                  14:37:00,           



                                                  Duration:           



                                                  1 doses or           



                                                  times,           



                                                  Dose =           



                                                  2.2ml/kg,           



                                                  Max dose =           



                                                  100ml --           



                                                  "To be           



                                                  infused by           



                                                  Radiology           



                                                  Staff           



                                                  ONLY"Dose           



                                                  =              



                                                  2.2ml/kg,           



                                                  Max dose =           



                                                  100ml --           



                                                  "To be           



                                                  infused by           



                                                  Radiology           



                                                  Staff           



                                                  ONLY"           

 

     Omnipaque      -0      No   Jennifer                85 mL,           [facility]

[facility]



     350mg/ml      9-15           [facility]                Route:           l



               19:37:                               IVP, Drug           [place]



                                                Form:           



                                                  SOLN,           



                                                  Dosing           



                                                  Weight           



                                                  72.727,           



                                                  kg,            



                                                  ONCALL,           



                                                  STAT,           



                                                  Start           



                                                  date:           



                                                  09/15/13           



                                                  14:37:00,           



                                                  Duration:           



                                                  1 doses or           



                                                  times,           



                                                  Dose =           



                                                  2.2ml/kg,           



                                                  Max dose =           



                                                  100ml --           



                                                  "To be           



                                                  infused by           



                                                  Radiology           



                                                  Staff           



                                                  ONLY"Dose           



                                                  =              



                                                  2.2ml/kg,           



                                                  Max dose =           



                                                  100ml --           



                                                  "To be           



                                                  infused by           



                                                  Radiology           



                                                  Staff           



                                                  ONLY"           







Vital Signs







             Vital Name   Observation Time Observation Value Comments     Source

 

             Systolic (mm Hg) 2023 19:52:00                           [facility]

[facility]

 

             Diastolic (mm Hg) 2023 19:52:00                           [facility]

[facility]

 

             Heart Rate   2023 19:52:00                           [facility]

 [facility]

 

             Height       2023 19:52:00 4 [ft_i]                  [facility]

 [place]

 

             Weight       2023 19:52:00                           [facility]

 [facility]

 

             BMI Calculated 2023 19:52:00                           [facility]

[facility] [place]

 

             Diastolic (mm Hg) 2013 17:00:00                           [facility]

[facility]

 

             Heart Rate   2013 17:00:00                           [facility]

 [facility]

 

             Respitory Rate 2013 17:00:00                           [facility]

[facility]

 

             Temperature Oral (F) 2013 17:00:00 97.0 F                    

[facility]

 

             Systolic (mm Hg) 2013 17:00:00                           [facility]

[facility] [place]

 

             Systolic (mm Hg) 2013 12:00:00                           [facility]

[facility] [place]

 

             Diastolic (mm Hg) 2013 12:00:00                           [facility]

[facility]

 

             Respitory Rate 2013 12:00:00                           [facility]

[facility]

 

             Temperature Oral (F) 2013 12:00:00 96.3 F                    

[facility]

 

             Heart Rate   2013 12:00:00                           [facility]

 [facility]

 

             Heart Rate   2013 08:51:00                           [facility]

 [place]

 

             Temperature Oral (F) 2013 08:51:00 97.8 F                    

[facility]

 

             Systolic (mm Hg) 2013 08:51:00                           [facility]

[facility] [place]

 

             Diastolic (mm Hg) 2013 08:51:00                           [facility]

[facility] [place]

 

             Respitory Rate 2013 00:41:00                           [facility]

[facility] [place]

 

             Weight       2013-09-15 17:49:00                           [facility]

 [place]

 

             Height       2013-09-15 17:49:00 157.48 cm                 [facility]

 [facility]







Procedures







                Procedure       Date / Time Performed Performing Clinician Anastasia PENALOZA                                           [facility] [place]







Encounters







        Start   End     Encounter Admission Attending Care    Care    Encounter 

Source



        Date/Time Date/Time Type    Type    Clinicians Facility Department ID   

   

 

        2023 Inpatient E       KASEY BROWN    MED     [id number]

[facility]



        03:43:00 17:10:00                 SHIFA                   46      

 

        2023 Outpatient                 MHVERO    [facility]     [id number]

[facility]



        18:00:00 04:59:59                                 Neurology 02      l



                                                        Harika Stricklandann

 

        2023 Outpatient                 ABBEY    [facility]     [id number]

[facility]



        20:00:00 04:59:59                                 Neurology 01      l



                                                        [place]         [place]

 

        2020 Laboratory         Lab, [facility]    [id number]

[id number] 



        09:23:20 09:43:20 Only            [facility]  [id number]         



                                                [place] [id number]         



                                                Professio [phone]         



                                                [place] [id number]             



                                                Office                  



                                                Building                 



                                                One                     

 

        2020 Laboratory         Lab, [facility]    1.2.

[id number] 

[facility]



        09:23:20 09:43:20 Only            Anene, [facility]  [id number]    

     ity of



                                                [place] [id number]         [place]

as



                                                Professio [phone]         Me

dical



                                                [place] [address]             Branch



                                                Office                  



                                                Building                 



                                                One                     

 

        2020 Outpatient R               [facility]    [id number]

[address] [facility]



        09:00:00 09:00:00                                                 ity of



                                                                        [facility]

 

        2020 Ambulatory                 [facility]     [id number]

[id number] [facility]



        19:45:00 19:45:00 Pre-Reg                 r       Neurology 00      l



                                                        Harika Clark

 

        2013-09-15 2013 OU                      nullFlavo [facility] [id number]

[facility]



        17:09:00 15:00:00                         r       Medical 58      l



                                                        [facility]







Results







           Test Description Test Time  Test Comments Results    Result Comments 

Source









                    Thyroid Stimulating Hormone 2019 22:27:55 









                      Test Item  Value      Reference Range Interpretation Comme

nts









             TSH (test code = TSH) 0.445 mIU/mL 0.270-4.200               



Erythrocyte Sedimentation Rate [id number] 22:20:13





             Test Item    Value        Reference Range Interpretation Comments

 

             ESR STAT (test code = ESR STAT) 8 mm/hr      0-20                  

    



Comprehensive Metabolic [id number] 21:59:49





             Test Item    Value        Reference Range Interpretation Comments

 

             Sodium Level (test code = Sodium 139.0 mmol/L 135.0-145.0          

     



             Level)                                              

 

             Potassium Level (test code = 3.7 mmol/L   3.5-5.1                  

 



             Potassium Level)                                        

 

             Chloride Level (test code = 98 mmol/L                        



             Chloride Level)                                        

 

             CO2 (test code = CO2) 26 mmol/L    22-29                     

 

             Anion Gap (test code = Anion 15 mmol/L    7-16                     

 



             Gap)                                                

 

             BUN (test code = BUN) 14.70 mg/dL  8.00-23.00                

 

             Creatinine Level (test code = 0.70 mg/dL   0.50-0.90               

  



             Creatinine Level)                                        

 

             BUN/Creat Ratio (test code = 21                        N           

 



             BUN/Creat Ratio)                                        

 

             Glucose Level (test code = 121 mg/dL           H            



             Glucose Level)                                        

 

             Calcium Level (test code = 9.8 mg/dL    8.3-10.5                  



             Calcium Level)                                        

 

             Alk Phos (test code = Alk Phos) 98 U/L                       

    

 

             Bilirubin Total (test code = 0.4 mg/dL    0.1-0.9                  

 



             Bilirubin Total)                                        

 

             Albumin Level (test code = 4.6 g/dL     3.5-5.2                   



             Albumin Level)                                        

 

             Protein Total (test code = 7.4 g/dL     6.4-8.3                   



             Protein Total)                                        

 

             ALT (test code = ALT) 22 U/L       1-33                      

 

             AST (test code = AST) 24 U/L       1-32                      

 

             Globulin (test code = Globulin) 2.8 g/dL     2.9-3.1      L        

    

 

             A/G Ratio (test code = A/G 1.6 ratio                 N            



             Ratio)                                              



Comprehensive Metabolic [id number] 21:59:49





             Test Item    Value        Reference Range Interpretation Comments

 

             Sodium Level (test 139.0 mmol/L 135.0-145.0               



             code = Sodium Level)                                        

 

             Potassium Level 3.7 mmol/L   3.5-5.1                   



             (test code =                                        



             Potassium Level)                                        

 

             Chloride Level (test 98 mmol/L                        



             code = Chloride                                        



             Level)                                              

 

             CO2 (test code = 26 mmol/L    22-29                     



             CO2)                                                

 

             Anion Gap (test code 15 mmol/L    7-16                      



             = Anion Gap)                                        

 

             BUN (test code = 14.70 mg/dL  8.00-23.00                



             BUN)                                                

 

             Creatinine Level 0.70 mg/dL   0.50-0.90                 



             (test code =                                        



             Creatinine Level)                                        

 

             BUN/Creat Ratio 21                        N            



             (test code =                                        



             BUN/Creat Ratio)                                        

 

             Glucose Level (test 121 mg/dL           H            



             code = Glucose                                        



             Level)                                              

 

             Calcium Level (test 9.8 mg/dL    8.3-10.5                  



             code = Calcium                                        



             Level)                                              

 

             Alk Phos (test code 98 U/L                           



             = Alk Phos)                                         

 

             Bilirubin Total 0.4 mg/dL    0.1-0.9                   



             (test code =                                        



             Bilirubin Total)                                        

 

             Albumin Level (test 4.6 g/dL     3.5-5.2                   



             code = Albumin                                        



             Level)                                              

 

             Protein Total (test 7.4 g/dL     6.4-8.3                   



             code = Protein                                        



             Total)                                              

 

             ALT (test code = 22 U/L       1-33                      



             ALT)                                                

 

             AST (test code = 24 U/L       1-32                      



             AST)                                                

 

             Globulin (test code 2.8 g/dL     2.9-3.1      L            



             = Globulin)                                         

 

             A/G Ratio (test code 1.6 ratio                 N            



             = A/G Ratio)                                        

 

             eGFR AA (test code = >60                       N            eGFR (e

stimated



             eGFR AA)     mL/min/1.73 m2                           Glomerular



                                                                 Filtration Rate

) is



                                                                 an estimated va

lue,



                                                                 calculated from

 the



                                                                 patient's serum



                                                                 creatinine usin

g the



                                                                 MDRD equation. 

It is



                                                                 NOT the patient

's



                                                                 actual GFR. The

 eGFR



                                                                 provides a more



                                                                 clinically usef

ul



                                                                 measure of kidn

ey



                                                                 disease than se

rum



                                                                 creatinine



                                                                 alone.***This



                                                                 calculation neelima

es



                                                                 sex and race in

to



                                                                 account, if the



                                                                 information is



                                                                 provided. If th

e



                                                                 race is not



                                                                 provided, and t

he



                                                                 patient is



                                                                 -Carole

n,



                                                                 multiply by 1.2

12.



                                                                 If sex is not



                                                                 provided, and t

he



                                                                 patient is fema

le,



                                                                 multiply by 0.7

42.



                                                                 Results for pat

ients



                                                                 <18 years of ag

e



                                                                 have not been



                                                                 validated by th

e



                                                                 MDRD study and



                                                                 should be



                                                                 interpreted wit

h



                                                                 caution. eGFR R

esult



                                                                 Interpretation:

eGFR



                                                                 > or = 60 is in

 the



                                                                 Normal RangeeGF

R <



                                                                 60 may mean kid

neeru



                                                                 diseaseeGFR < 1

5 may



                                                                 mean kidney



                                                                 failure*** Rang

es



                                                                 recommended by 

the



                                                                 [employer]



                                                                 Foundation,



                                                                 http://nkdep.ni

h.gov



Lipid [id number] 21:59:49





             Test Item    Value        Reference Range Interpretation Comments

 

             Cholesterol Total 294 mg/dL    0-200        H            RISK OF HE

ART



             (test code =                                        DISEASEPublishe

d by



             Cholesterol Total)                                        American 

Heart



                                                                 Association Harriet

lyte



                                                                 Optimal Borderl

ine



                                                                 Increased RiskC

HOL



                                                                 <200 200-239 >2

40TRIG



                                                                 <150 150-199 >2

00HDL



                                                                 Male >60 <40HDL

 Female



                                                                 >60 <50LDL <100



                                                                 130-159 >160LDL

 Near



                                                                 optimal is 100-

129

 

             Triglycerides (test 616 mg/dL    9-200        H            



             code =                                              



             Triglycerides)                                        

 

             HDL (test code = 52 mg/dL     50-60                     



             HDL)                                                

 

             LDL (test code = N/A Trig     0-130        N            LDL calcula

tion is



             LDL)         >400 mg/dL                             unreliable when



                                                                 Triglyceride is



                                                                 greater than 40

0.

 

             VLDL (test code = N/A Trig     5-40         N            VLDL calcu

lation is



             VLDL)        >400 mg/dL                             unreliable when



                                                                 Triglyceride is



                                                                 greater than 40

0.

 

             Chol/HDL (test code 5.7 ratio    0.0-4.4      H            



             = Chol/HDL)                                         

 

             LDL/HDL Ratio (test N/A Trig                  N            LDL/HDL 

Ratio



             code = LDL/HDL >400                                   calculation i

s



             Ratio)                                              unreliable when



                                                                 Triglyceride is



                                                                 greater than 40

0.



Comprehensive Metabolic [id number] 21:59:49





             Test Item    Value        Reference Range Interpretation Comments

 

             Sodium Level (test 139.0 mmol/L 135.0-145.0               



             code = Sodium Level)                                        

 

             Potassium Level 3.7 mmol/L   3.5-5.1                   



             (test code =                                        



             Potassium Level)                                        

 

             Chloride Level (test 98 mmol/L                        



             code = Chloride                                        



             Level)                                              

 

             CO2 (test code = 26 mmol/L    22-29                     



             CO2)                                                

 

             Anion Gap (test code 15 mmol/L    7-16                      



             = Anion Gap)                                        

 

             BUN (test code = 14.70 mg/dL  8.00-23.00                



             BUN)                                                

 

             Creatinine Level 0.70 mg/dL   0.50-0.90                 



             (test code =                                        



             Creatinine Level)                                        

 

             BUN/Creat Ratio 21                        N            



             (test code =                                        



             BUN/Creat Ratio)                                        

 

             Glucose Level (test 121 mg/dL           H            



             code = Glucose                                        



             Level)                                              

 

             Calcium Level (test 9.8 mg/dL    8.3-10.5                  



             code = Calcium                                        



             Level)                                              

 

             Alk Phos (test code 98 U/L                           



             = Alk Phos)                                         

 

             Bilirubin Total 0.4 mg/dL    0.1-0.9                   



             (test code =                                        



             Bilirubin Total)                                        

 

             Albumin Level (test 4.6 g/dL     3.5-5.2                   



             code = Albumin                                        



             Level)                                              

 

             Protein Total (test 7.4 g/dL     6.4-8.3                   



             code = Protein                                        



             Total)                                              

 

             ALT (test code = 22 U/L       1-33                      



             ALT)                                                

 

             AST (test code = 24 U/L       1-32                      



             AST)                                                

 

             Globulin (test code 2.8 g/dL     2.9-3.1      L            



             = Globulin)                                         

 

             A/G Ratio (test code 1.6 ratio                 N            



             = A/G Ratio)                                        

 

             eGFR AA (test code = >60                       N            eGFR (e

stimated



             eGFR AA)     mL/min/1.73 m2                           Glomerular



                                                                 Filtration Rate

) is



                                                                 an estimated va

lue,



                                                                 calculated from

 the



                                                                 patient's serum



                                                                 creatinine usin

g the



                                                                 MDRD equation. 

It is



                                                                 NOT the patient

's



                                                                 actual GFR. The

 eGFR



                                                                 provides a more



                                                                 clinically usef

ul



                                                                 measure of kidn

ey



                                                                 disease than se

rum



                                                                 creatinine



                                                                 alone.***This



                                                                 calculation neelima

es



                                                                 sex and race in

to



                                                                 account, if the



                                                                 information is



                                                                 provided. If th

e



                                                                 race is not



                                                                 provided, and t

he



                                                                 patient is



                                                                 -Carole

n,



                                                                 multiply by 1.2

12.



                                                                 If sex is not



                                                                 provided, and t

he



                                                                 patient is fema

le,



                                                                 multiply by 0.7

42.



                                                                 Results for pat

ients



                                                                 <18 years of ag

e



                                                                 have not been



                                                                 validated by [facility]



                                                                 MDRD study and



                                                                 should be



                                                                 interpreted wit

h



                                                                 caution. eGFR R

esult



                                                                 Interpretation:

eGFR



                                                                 > or = 60 is in

 the



                                                                 Normal RangeeGF

R <



                                                                 60 may mean kid

neeru



                                                                 diseaseeGFR < 1

5 may



                                                                 mean kidney



                                                                 failure*** Rang

es



                                                                 recommended by 

the



                                                                 [employer]



                                                                 Foundation,



                                                                 http://nkdep.ni

h.gov

 

             eGFR Non-AA (test >60.00                    N            eGFR (hailey

mated



             code = eGFR Non-AA) mL/min/1.73 m2                           Glomer

ular



                                                                 Filtration Rate

) is



                                                                 an estimated va

lue,



                                                                 calculated from

 the



                                                                 patient's serum



                                                                 creatinine usin

g the



                                                                 MDRD equation. 

It is



                                                                 NOT the patient

's



                                                                 actual GFR. The

 eGFR



                                                                 provides a more



                                                                 clinically usef

ul



                                                                 measure of kidn

ey



                                                                 disease than se

rum



                                                                 creatinine



                                                                 alone.***This



                                                                 calculation neelima

es



                                                                 sex and race in

to



                                                                 account, if the



                                                                 information is



                                                                 provided. If th

e



                                                                 race is not



                                                                 provided, and t

he



                                                                 patient is



                                                                 -Carole

n,



                                                                 multiply by 1.2

12.



                                                                 If sex is not



                                                                 provided, and t

he



                                                                 patient is fema

le,



                                                                 multiply by 0.7

42.



                                                                 Results for pat

ients



                                                                 <18 years of ag

e



                                                                 have not been



                                                                 validated by [facility]



                                                                 MDRD study and



                                                                 should be



                                                                 interpreted wit

h



                                                                 caution. eGFR R

esult



                                                                 Interpretation:

eGFR



                                                                 > or = 60 is in

 the



                                                                 Normal RangeeGF

R <



                                                                 60 may mean kid

neeru



                                                                 diseaseeGFR < 1

5 may



                                                                 mean kidney



                                                                 failure*** Rang

es



                                                                 recommended by 

the



                                                                 [employer]



                                                                 Foundation,



                                                                 http://nkdep.ni

h.gov



Complete Blood Count with Twnbzvqjdzgd1395-19-15 21:52:58





             Test Item    Value        Reference Range Interpretation Comments

 

             WBC (test code = WBC) 9.0 x10      4.4-10.5                  

 

             RBC (test code = RBC) 4.76 x10     3.75-5.20                 

 

             Hgb (test code = Hgb) 14.4 g/dL    12.2-14.8                 

 

             Hct (test code = Hct) 42.4 %       36.5-44.4                 

 

             MCV (test code = MCV) 89.10 fL     80..00              

 

             MCHC (test code = 34.00 g/dL   32.00-37.50               



             MCHC)                                               

 

             MCH (test code = MCH) 30.3 pg      27.0-32.5                 

 

             RDW CV (test code = 12.6 %       11.5-14.5                 



             RDW CV)                                             

 

             Platelets (test code = 340.0 x10    140.0-440.0               



             Platelets)                                          

 

             MPV (test code = MPV) 10.1 fL                   N            

 

             Slide Review (test Auto         Auto                      Result cr

eated by



             code = Slide Review)                                        GL_[id number]_

SLIDE_REV_AUTO

 

             nRBC (test code = 0                         N            



             nRBC)                                               

 

             NRBC Abs (test code = 0.00 x10                  N            



             NRBC Abs)                                           

 

             IPF (test code = IPF) 0 %                       N            



Automated Toyvlpvhvbbw6098-34-10 21:52:58





             Test Item    Value        Reference Range Interpretation Comments

 

             Neutro Auto (test code = Neutro 62.7 %       36.0-70.0             

    



             Auto)                                               

 

             Lymph Auto (test code = Lymph Auto) 28.3 %       12.0-44.0         

        

 

             Mono Auto (test code = Mono Auto) 7.6 %        0.0-11.0            

      

 

             Eos, Auto (test code = Eos, Auto) 0.7 %        0.0-7.0             

      

 

             Basophil Auto (test code = Basophil 0.3 %        0.0-2.0           

        



             Auto)                                               

 

             Neutro Absolute (test code = Neutro 5.7 x10      1.6-7.4           

        



             Absolute)                                           

 

             Lymph Absolute (test code = Lymph 2.56 x10     .50-4.60            

      



             Absolute)                                           

 

             Mono Absolute (test code = Mono .69 x10      .00-1.20              

    



             Absolute)                                           

 

             Eos Absolute (test code = Eos 0.06 x10     0.00-0.74               

  



             Absolute)                                           

 

             Baso Absolute (test code = Baso 0.03 x10     0.00-0.21             

    



             Absolute)                                           



Pro B Natriuretic Khnscgo5790-22-53 21:52:58





             Test Item    Value        Reference Range Interpretation Comments

 

             NT-proBNP (test code = NT-proBNP) 80 pg/mL     0-124               

      



IG Iwtdr7802-50-89 21:52:58





             Test Item    Value        Reference Range Interpretation Comments

 

             IG (test code = IG) 0.4 %        0.0-5.0                   

 

             IG Abs (test code = IG Abs) 0 x10                     N            



INNQIJEYQ0611-68-49 12:35:11





             Test Item    Value        Reference Range Interpretation Comments

 

             LDL Direct (test code = LDL Direct) 144                       H    

        



[facility]pel[facility]HGX[facility]WHDG5520[facility] 12:35:11





             Test Item    Value        Reference Range Interpretation Comments

 

             LDL Direct (test code = LDL Direct) 144                       H    

        



[facility] HeightsLmhcxvvBNLGAYEIW8359-43-78 12:35:11





             Test Item    Value        Reference Range Interpretation Comments

 

             LDL Direct (test code = LDL Direct) 144                       H    

        



[facility]zrsyttRGEFLOBGC4510[facility]78[facility]42 12:35:11





             Test Item    Value        Reference Range Interpretation Comments

 

             LDL Direct (test code = LDL Direct) 144                       H    

        



[facility] TylerPnvjalhUWBKTNCFK3572-14-81 08:00:00





             Test Item    Value        Reference Range Interpretation Comments

 

             Potassium Lvl (test code = Potassium 4.1          3.5-5.1      N   

         



             Lvl)                                                



[facility]UomrjpuFQLRKAUAK5266[facility]:00:00





             Test Item    Value        Reference Range Interpretation Comments

 

             LDL (test code = LDL) See Note mg/dL                           



                          5*NA*(2013                           



                          03:00:00)                              



[facility] JacksonNvaivanZYFBGDKTX2104-25-96 08:00:00





             Test Item    Value        Reference Range Interpretation Comments

 

             CHD Risk (test code = CHD Risk) 8.45         3.90-5.80    H        

    



[facility] HeightsHxxlvheRCPTGSSCN1880-60-22 08:00:00





             Test Item    Value        Reference Range Interpretation Comments

 

             HDL (test code = HDL) 29                        L            



[facility] TylerGgkflvsCBALCKASL8525-91-02 08:00:00





             Test Item    Value        Reference Range Interpretation Comments

 

             Trig (test code = Trig) 531                       H            



[facility]UIDK7695[facility]





             Test Item    Value        Reference Range Interpretation Comments

 

             Chol (test code = Chol) 245                       H            



[facility]bergRpobxhrZWRQUTBHEP2657-46-40 [facility]:00





             Test Item    Value        Reference Range Interpretation Comments

 

             Basophils (test code = 0.7          See_Comment  N             [Aut

omated message] The



             Basophils)                                          system which ge

nerated



                                                                 this result tra

nsmitted



                                                                 reference range

: <=1.0.



                                                                 The reference r

erna was



                                                                 not used to int

erpret



                                                                 this result as



                                                                 normal/abnormal

.



[facility] KlebergSyvariuBOOOLIWVKD3138-51-31 08:00:00





             Test Item    Value        Reference Range Interpretation Comments

 

             Segs-Bands # (test code = Segs-Bands #) 3.6          1.5-8.1      N

            



[facility] KlebergWoresgyDNJABAKHPO1496-72-47 08:00:00





             Test Item    Value        Reference Range Interpretation Comments

 

             Lymphocytes # (test code = Lymphocytes 3.1          1.0-5.5      N 

           



             #)                                                  



[facility]bergPpqvrwnIPHGBMFNYZ9231-67-22 08:00:00





             Test Item    Value        Reference Range Interpretation Comments

 

             Monocytes # (test code 0.6          See_Comment  N             [Aut

omated message] The



             = Monocytes #)                                        system which 

generated



                                                                 this result tra

nsmitted



                                                                 reference range

: <=0.8.



                                                                 The reference r

erna was



                                                                 not used to int

erpret



                                                                 this result as



                                                                 normal/abnormal

.



[facility] KlebergPurulwuZRSSPFFHWM5385-16-26 08:00:00





             Test Item    Value        Reference Range Interpretation Comments

 

             Eosinophils # (test code 0.1          See_Comment  N             [A

utomated message] The



             = Eosinophils #)                                        system ic

h generated



                                                                 this result tra

nsmitted



                                                                 reference range

: <=0.5.



                                                                 The reference r

erna was



                                                                 not used to int

erpret



                                                                 this result as



                                                                 normal/abnormal

.



[facility] KlebergLirruiaUGYIDFSBLS8389-66-17 08:00:00





             Test Item    Value        Reference Range Interpretation Comments

 

             Basophils # (test code 0.1          See_Comment  N             [Aut

omated message] The



             = Basophils #)                                        system which 

generated



                                                                 this result tra

nsmitted



                                                                 reference range

: <=0.2.



                                                                 The reference r

erna was



                                                                 not used to int

erpret



                                                                 this result as



                                                                 normal/abnormal

.



[facility] KlebergArphjvoMMHCXLLTSA8086-69-99 08:00:00





             Test Item    Value        Reference Range Interpretation Comments

 

             Eosinophils (test code = 1.7          See_Comment  N             [A

utomated message] The



             Eosinophils)                                        system which ge

nerated



                                                                 this result tra

nsmitted



                                                                 reference range

: <=4.0.



                                                                 The reference r

erna was



                                                                 not used to int

erpret



                                                                 this result as



                                                                 normal/abnormal

.



[facility] KlebergQonaftuIIJCYKVJXX9982-72-32 08:00:00





             Test Item    Value        Reference Range Interpretation Comments

 

             Monocytes (test code = Monocytes) 8.0          2.0-12.0     N      

      



[facility] KlebergZyprjmnZMJIWODXBR4940-57-71 08:00:00





             Test Item    Value        Reference Range Interpretation Comments

 

             Segs (test code = Segs) 48.0         45.0-75.0    N            



[facility] KlebergEqkrefdMVCRNFRNGN8131-08-80 08:00:00





             Test Item    Value        Reference Range Interpretation Comments

 

             Lymphocytes (test code = Lymphocytes) 41.6         20.0-40.0    H  

          



[facility] KlebergRhyuaoyPTCZTTKTGN5010-97-87 08:00:00





             Test Item    Value        Reference Range Interpretation Comments

 

             RBC (test code = RBC) 3.87         4.20-5.40    L            



[facility] KlebergFahglbiHXJIEIVETW3391-17-93 08:00:00





             Test Item    Value        Reference Range Interpretation Comments

 

             WBC (test code = WBC) 7.4          3.7-10.4     N            



[facility] KlebergKzdbefgDQJBRAZAGF1330-26-82 08:00:00





             Test Item    Value        Reference Range Interpretation Comments

 

             Hgb (test code = Hgb) 11.9         12.0-16.0    L            



[facility] KlebergSwauyliDAGIRHULVG7823-90-15 08:00:00





             Test Item    Value        Reference Range Interpretation Comments

 

             Hct (test code = Hct) 35.2         36.0-48.0    L            



[facility] KlebergCubnlkeSCPFFMVQFU4006-35-16 08:00:00





             Test Item    Value        Reference Range Interpretation Comments

 

             MCV (test code = MCV) 90.8         81.0-99.0    N            



[facility] KlebergQrakiahQUHHUBKLBV4782-72-68 08:00:00





             Test Item    Value        Reference Range Interpretation Comments

 

             MCH (test code = MCH) 30.7 pg      27.0-31.0    N            



[facility] KlebergJebuwewOHDEILSGSJ6615-58-50 08:00:00





             Test Item    Value        Reference Range Interpretation Comments

 

             RDW (test code = RDW) 13.3         11.5-14.5    N            



[facility] KlebergJpriqmhIRSKUXMFWK6907-62-47 08:00:00





             Test Item    Value        Reference Range Interpretation Comments

 

             MCHC (test code = MCHC) 33.9         32.0-36.0    N            



[facility] KlebergSbdkeojPJGOGHSTBX4019-58-79 08:00:00





             Test Item    Value        Reference Range Interpretation Comments

 

             Platelet (test code = Platelet) 279          133-450      N        

    



[facility] KlebergTkhxuqeABXXLBCHHV9793-10-05 08:00:00





             Test Item    Value        Reference Range Interpretation Comments

 

             MPV (test code = MPV) 7.5          7.4-10.4     N            



[facility] HospitalMmmrrcdQQPHXJXOV9242-58-81 08:00:00





             Test Item    Value        Reference Range Interpretation Comments

 

             AGAP (test code = AGAP) 13.1         10.0-20.0    N            



[facility] HospitalFrxyvpxHYTQYYLMF6421-38-88 08:00:00





             Test Item    Value        Reference Range Interpretation Comments

 

             eGFR (test code = eGFR) 96                                     



[facility] HospitalQbeygtmXIAPECEAX4734-06-90 08:00:00





             Test Item    Value        Reference Range Interpretation Comments

 

             Creatinine Lvl (test code = Creatinine 0.7          0.5-1.4      N 

           



             Lvl)                                                



[facility] HospitalChfaamuGDKWFIUMT5837-50-70 08:00:00





             Test Item    Value        Reference Range Interpretation Comments

 

             BUN (test code = BUN) 18           7-22         N            



[facility] HospitalHtdhfqpVXHUYEZIO3489-42-06 08:00:00





             Test Item    Value        Reference Range Interpretation Comments

 

             Glucose Lvl (test code = Glucose Lvl) 101          70-99        H  

          



[facility] HospitalNpzeuqvYQIROVXFU3345-04-11 08:00:00





             Test Item    Value        Reference Range Interpretation Comments

 

             AGAP (test code = AGAP) 13.1         10.0-20.0    N            



[facility] HospitalBiyizpoNTILAOMYL4592-58-87 08:00:00





             Test Item    Value        Reference Range Interpretation Comments

 

             eGFR (test code = eGFR) 96                                     



[facility] HospitalYvyfpcqQFXPJPOHZ6891-33-61 08:00:00





             Test Item    Value        Reference Range Interpretation Comments

 

             Sodium Lvl (test code = Sodium Lvl) 142          135-145      N    

        



[facility] HospitalBsxqcpyDOJQWZRYF4171-68-69 08:00:00





             Test Item    Value        Reference Range Interpretation Comments

 

             Creatinine Lvl (test code = Creatinine 0.7          0.5-1.4      N 

           



             Lvl)                                                



[facility] HospitalNfmuiccPPCPNRBON1927-41-65 08:00:00





             Test Item    Value        Reference Range Interpretation Comments

 

             BUN (test code = BUN) 18           7-22         N            



[facility] HospitalErdbqthITBAKISOT5979-98-04 08:00:00





             Test Item    Value        Reference Range Interpretation Comments

 

             Glucose Lvl (test code = Glucose Lvl) 101          70-99        H  

          



[facility] HospitalJbtjyvwIJHNMBGSH1298-53-88 08:00:00





             Test Item    Value        Reference Range Interpretation Comments

 

             Sodium Lvl (test code = Sodium Lvl) 142          135-145      N    

        



[facility] HospitalHjyrjagZITPVPRRF4817-86-91 08:00:00





             Test Item    Value        Reference Range Interpretation Comments

 

             Calcium Lvl (test code = Calcium Lvl) 8.3          8.5-10.5     L  

          



[facility] HospitalBcighlzXVBTWVXUC0172-32-47 08:00:00





             Test Item    Value        Reference Range Interpretation Comments

 

             CO2 (test code = CO2) 23           24-32        L            



[facility] HospitalGrqpeoyTNSDUZKAM2902-76-94 08:00:00





             Test Item    Value        Reference Range Interpretation Comments

 

             Chloride Lvl (test code = Chloride Lvl) 110                 H

            



[facility] HospitalBfpvacdFEVZPONRQ0264-49-28 08:00:00





             Test Item    Value        Reference Range Interpretation Comments

 

             Potassium Lvl (test code = Potassium 4.1          3.5-5.1      N   

         



             Lvl)                                                



[facility] HospitalVrephjbZWGCRMRUX4740-51-60 08:00:00





             Test Item    Value        Reference Range Interpretation Comments

 

             LDL (test code = LDL) See Note mg/dL                           



                          5*NA*(2013                           



                          03:00:00)                              



[facility] HospitalYaagthdLBPJUAVSL4614-66-22 08:00:00





             Test Item    Value        Reference Range Interpretation Comments

 

             CHD Risk (test code = CHD Risk) 8.45         3.90-5.80    H        

    



[facility] HospitalMcqakiwZRFOSXAML1036-77-00 08:00:00





             Test Item    Value        Reference Range Interpretation Comments

 

             Calcium Lvl (test code = Calcium Lvl) 8.3          8.5-10.5     L  

          



[facility] HospitalRoeijifKHUCWEJKM6335-22-60 08:00:00





             Test Item    Value        Reference Range Interpretation Comments

 

             HDL (test code = HDL) 29                        L            



[facility]rFKYKIPYPK4840[facility]





             Test Item    Value        Reference Range Interpretation Comments

 

             Trig (test code = Trig) 531                       H            



[facility] HospitalZltmcwmUQLTMHEBR8749-81-56 [facility]:00





             Test Item    Value        Reference Range Interpretation Comments

 

             Chol (test code = Chol) 245                       H            



[facility]bergWkzglbkMVTKZDDSYR3695-27-43 08:00:00





             Test Item    Value        Reference Range Interpretation Comments

 

             Basophils (test code = 0.7          See_Comment  N             [Aut

omated message] The



             Basophils)                                          system which ge

nerated



                                                                 this result tra

nsmitted



                                                                 reference range

: <=1.0.



                                                                 The reference r

erna was



                                                                 not used to int

erpret



                                                                 this result as



                                                                 normal/abnormal

.



[facility]bergKpxqkdeLZCLKPFNXS1274-55-56 08:00:00





             Test Item    Value        Reference Range Interpretation Comments

 

             Segs-Bands # (test code = Segs-Bands #) 3.6          1.5-8.1      N

            



[facility] KlebergDghncmdOCOZYADYGR3072-17-20 08:00:00





             Test Item    Value        Reference Range Interpretation Comments

 

             Lymphocytes # (test code = Lymphocytes 3.1          1.0-5.5      N 

           



             #)                                                  



[facility]bergEipijgwYBBMJNQOQJ1502-80-57 08:00:00





             Test Item    Value        Reference Range Interpretation Comments

 

             Monocytes # (test code 0.6          See_Comment  N             [Aut

omated message] The



             = Monocytes #)                                        system which 

generated



                                                                 this result tra

nsmitted



                                                                 reference range

: <=0.8.



                                                                 The reference r

erna was



                                                                 not used to int

erpret



                                                                 this result as



                                                                 normal/abnormal

.



[facility] KlebergVoyjjduDUQUKSIDAW1057-77-12 08:00:00





             Test Item    Value        Reference Range Interpretation Comments

 

             Eosinophils # (test code 0.1          See_Comment  N             [A

utomated message] The



             = Eosinophils #)                                        system whic

h generated



                                                                 this result tra

nsmitted



                                                                 reference range

: <=0.5.



                                                                 The reference r

erna was



                                                                 not used to int

erpret



                                                                 this result as



                                                                 normal/abnormal

.



[facility]bergErwdarlUOOSNZVPYC7897-28-22 08:00:00





             Test Item    Value        Reference Range Interpretation Comments

 

             Basophils # (test code 0.1          See_Comment  N             [Aut

omated message] The



             = Basophils #)                                        system which 

generated



                                                                 this result tra

nsmitted



                                                                 reference range

: <=0.2.



                                                                 The reference r

erna was



                                                                 not used to int

erpret



                                                                 this result as



                                                                 normal/abnormal

.



[facility] KlebergLhlfloaMPXZWARCJC9278-24-57 08:00:00





             Test Item    Value        Reference Range Interpretation Comments

 

             Eosinophils (test code = 1.7          See_Comment  N             [A

utomated message] The



             Eosinophils)                                        system which ge

nerated



                                                                 this result tra

nsmitted



                                                                 reference range

: <=4.0.



                                                                 The reference r

erna was



                                                                 not used to int

erpret



                                                                 this result as



                                                                 normal/abnormal

.



[facility] HospitalIimyccvZAPRTCYYD5296[facility]





             Test Item    Value        Reference Range Interpretation Comments

 

             CO2 (test code = CO2) 23           24-32        L            



[facility]bergVvhjhwqZOZUCXIWCY9979-24-13 08:00:00





             Test Item    Value        Reference Range Interpretation Comments

 

             Monocytes (test code = Monocytes) 8.0          2.0-12.0     N      

      



[facility]bergGqossemPPBHJCZZHY9542-00-81 08:00:00





             Test Item    Value        Reference Range Interpretation Comments

 

             Segs (test code = Segs) 48.0         45.0-75.0    N            



[facility]bergCigpgikKOZDGYTWYR6178-98-63 08:00:00





             Test Item    Value        Reference Range Interpretation Comments

 

             Lymphocytes (test code = Lymphocytes) 41.6         20.0-40.0    H  

          



[facility] KlebergQqnyixdOAUSBNPMMD8019-57-30 08:00:00





             Test Item    Value        Reference Range Interpretation Comments

 

             RBC (test code = RBC) 3.87         4.20-5.40    L            



[facility]bergWoxfqrfMVZVFAHPIK3086-40-77 08:00:00





             Test Item    Value        Reference Range Interpretation Comments

 

             WBC (test code = WBC) 7.4          3.7-10.4     N            



[facility]bergWsxxwbfBJDHCUPGCF8159-10-53 08:00:00





             Test Item    Value        Reference Range Interpretation Comments

 

             Hgb (test code = Hgb) 11.9         12.0-16.0    L            



[facility] KlebergZlibqjtUIERCUSPGF8810-97-44 08:00:00





             Test Item    Value        Reference Range Interpretation Comments

 

             Hct (test code = Hct) 35.2         36.0-48.0    L            



[facility] KlebergJizshoyQDJDEQHJER9246-97-62 08:00:00





             Test Item    Value        Reference Range Interpretation Comments

 

             MCV (test code = MCV) 90.8         81.0-99.0    N            



[facility] KlebergPrpcaebSLKFZPWLEY1019-29-88 08:00:00





             Test Item    Value        Reference Range Interpretation Comments

 

             MCH (test code = MCH) 30.7 pg      27.0-31.0    N            



[facility]bergEirpwvpFGEBGQBXFI4167-73-18 08:00:00





             Test Item    Value        Reference Range Interpretation Comments

 

             RDW (test code = RDW) 13.3         11.5-14.5    N            



[facility]XgakhtcYHLRPMSVE4941[facility]29-84 [facility]





             Test Item    Value        Reference Range Interpretation Comments

 

             Chloride Lvl (test code = Chloride Lvl) 110                 H

            



[facility] KlebergRbzfkmzWZTPBVWALK1999-42-07 08:00:00





             Test Item    Value        Reference Range Interpretation Comments

 

             MCHC (test code = MCHC) 33.9         32.0-36.0    N            



[facility] KlebergNpfooelKYARAZSFJX6326-75-89 08:00:00





             Test Item    Value        Reference Range Interpretation Comments

 

             Platelet (test code = Platelet) 279          133-450      N        

    



[facility] KlebergCbqphusPJIFAOPGLC1718-95-04 08:00:00





             Test Item    Value        Reference Range Interpretation Comments

 

             MPV (test code = MPV) 7.5          7.4-10.4     N            



[facility] HospitalSvcggebYXEOLRXAR9563-81-27 08:00:00





             Test Item    Value        Reference Range Interpretation Comments

 

             Potassium Lvl (test code = Potassium 4.1          3.5-5.1      N   

         



             Lvl)                                                



[facility] HospitalJcrytuwFYRUMDKQJ8003-56-04 08:00:00





             Test Item    Value        Reference Range Interpretation Comments

 

             LDL (test code = LDL) See Note mg/dL                           



                          5*NA*(2013                           



                          03:00:00)                              



[facility] HospitalPbtjonpVVHVBWJUM2225-50-79 08:00:00





             Test Item    Value        Reference Range Interpretation Comments

 

             CHD Risk (test code = CHD Risk) 8.45         3.90-5.80    H        

    



[facility] HospitalRbndnqpOYBHQRMDF0927-38-04 08:00:00





             Test Item    Value        Reference Range Interpretation Comments

 

             HDL (test code = HDL) 29                        L            



[facility] HospitalOlzujudAAKHKNBCC6095-65-21 08:00:00





             Test Item    Value        Reference Range Interpretation Comments

 

             Trig (test code = Trig) 531                       H            



[facility] HospitalZelynazWNTGTPERH7401-62-98 08:00:00





             Test Item    Value        Reference Range Interpretation Comments

 

             Chol (test code = Chol) 245                       H            



[facility] KlebergSptleizKKGSKAPNRO0752-50-84 08:00:00





             Test Item    Value        Reference Range Interpretation Comments

 

             Basophils (test code = 0.7          See_Comment  N             [Aut

omated message] The



             Basophils)                                          system which ge

nerated



                                                                 this result tra

nsmitted



                                                                 reference range

: <=1.0.



                                                                 The reference r

erna was



                                                                 not used to int

erpret



                                                                 this result as



                                                                 normal/abnormal

.



[facility] KlebergCebaexdXRGXKFPICT2680-42-78 08:00:00





             Test Item    Value        Reference Range Interpretation Comments

 

             Segs-Bands # (test code = Segs-Bands #) 3.6          1.5-8.1      N

            



[facility] KlebergLradmpwITFLUOCJTL2571-24-71 08:00:00





             Test Item    Value        Reference Range Interpretation Comments

 

             Lymphocytes # (test code = Lymphocytes 3.1          1.0-5.5      N 

           



             #)                                                  



[facility] KlebergXlbdceoVGPHJWVSLP8712-67-52 08:00:00





             Test Item    Value        Reference Range Interpretation Comments

 

             Monocytes # (test code 0.6          See_Comment  N             [Aut

omated message] The



             = Monocytes #)                                        system which 

generated



                                                                 this result tra

nsmitted



                                                                 reference range

: <=0.8.



                                                                 The reference r

erna was



                                                                 not used to int

erpret



                                                                 this result as



                                                                 normal/abnormal

.



[facility] KlebergPttebevWPTGEMXAJB3221-25-55 08:00:00





             Test Item    Value        Reference Range Interpretation Comments

 

             Eosinophils # (test code 0.1          See_Comment  N             [A

utomated message] The



             = Eosinophils #)                                        system whic

h generated



                                                                 this result tra

nsmitted



                                                                 reference range

: <=0.5.



                                                                 The reference r

erna was



                                                                 not used to int

erpret



                                                                 this result as



                                                                 normal/abnormal

.



[facility] KlebergTjaabaxICJPLPDUJZ6524-40-62 08:00:00





             Test Item    Value        Reference Range Interpretation Comments

 

             Basophils # (test code 0.1          See_Comment  N             [Aut

omated message] The



             = Basophils #)                                        system which 

generated



                                                                 this result tra

nsmitted



                                                                 reference range

: <=0.2.



                                                                 The reference r

erna was



                                                                 not used to int

erpret



                                                                 this result as



                                                                 normal/abnormal

.



[facility] KlebergIxrlvxvAABNXZSQYG3166-66-14 08:00:00





             Test Item    Value        Reference Range Interpretation Comments

 

             Eosinophils (test code = 1.7          See_Comment  N             [A

utomated message] The



             Eosinophils)                                        system which ge

nerated



                                                                 this result tra

nsmitted



                                                                 reference range

: <=4.0.



                                                                 The reference r

erna was



                                                                 not used to int

erpret



                                                                 this result as



                                                                 normal/abnormal

.



[facility] KlebergLhhwnwpNGDODCDYVI0977-42-36 08:00:00





             Test Item    Value        Reference Range Interpretation Comments

 

             Monocytes (test code = Monocytes) 8.0          2.0-12.0     N      

      



[facility] KlebergFyuivbwHOCBDNLXSU1250-49-77 08:00:00





             Test Item    Value        Reference Range Interpretation Comments

 

             Segs (test code = Segs) 48.0         45.0-75.0    N            



[facility] KlebergYfevnzqVIRKLORWME7935-13-29 08:00:00





             Test Item    Value        Reference Range Interpretation Comments

 

             Lymphocytes (test code = Lymphocytes) 41.6         20.0-40.0    H  

          



[facility] KlebergVgnfretUJXWYVVQUK5866-39-25 08:00:00





             Test Item    Value        Reference Range Interpretation Comments

 

             RBC (test code = RBC) 3.87         4.20-5.40    L            



[facility] KlebergGzqjmikPJVACTDESY5054-60-61 08:00:00





             Test Item    Value        Reference Range Interpretation Comments

 

             WBC (test code = WBC) 7.4          3.7-10.4     N            



[facility] KlebergMwbhzesYYYNKQJMJU8313-32-96 08:00:00





             Test Item    Value        Reference Range Interpretation Comments

 

             Hgb (test code = Hgb) 11.9         12.0-16.0    L            



[facility] KlebergIxnmdyyOQZOQSXGVX6387-25-53 08:00:00





             Test Item    Value        Reference Range Interpretation Comments

 

             Hct (test code = Hct) 35.2         36.0-48.0    L            



[facility] KlebergUmmfayoCHVMMOKIVD2473-99-37 08:00:00





             Test Item    Value        Reference Range Interpretation Comments

 

             MCV (test code = MCV) 90.8         81.0-99.0    N            



[facility] KlebergLcrpamoIMHWZAYRXU6660-86-48 08:00:00





             Test Item    Value        Reference Range Interpretation Comments

 

             MCH (test code = MCH) 30.7 pg      27.0-31.0    N            



[facility] KlebergEzdpctsUGEODCNJWE6648-32-24 08:00:00





             Test Item    Value        Reference Range Interpretation Comments

 

             RDW (test code = RDW) 13.3         11.5-14.5    N            



[facility] KlebergGowjiepXOWXLGYQWI1540-01-46 08:00:00





             Test Item    Value        Reference Range Interpretation Comments

 

             MCHC (test code = MCHC) 33.9         32.0-36.0    N            



[facility] KlebergVvnsgadMPOKHXCBDE3490-31-69 08:00:00





             Test Item    Value        Reference Range Interpretation Comments

 

             Platelet (test code = Platelet) 279          133-450      N        

    



[facility]bergXwzubseSBBULBQPAR9945-11-57 08:00:00





             Test Item    Value        Reference Range Interpretation Comments

 

             MPV (test code = MPV) 7.5          7.4-10.4     N            



[facility] HospitalGgpgjwzCLUAOESAO8246-32-01 08:00:00





             Test Item    Value        Reference Range Interpretation Comments

 

             AGAP (test code = AGAP) 13.1         10.0-20.0    N            



[facility] HospitalZrjbcsyIJVMHOAIX1011-91-40 08:00:00





             Test Item    Value        Reference Range Interpretation Comments

 

             eGFR (test code = eGFR) 96                                     



[facility] HospitalNkzqxvbSEWHRKZNN3774-77-77 08:00:00





             Test Item    Value        Reference Range Interpretation Comments

 

             Creatinine Lvl (test code = Creatinine 0.7          0.5-1.4      N 

           



             Lvl)                                                



[facility] HospitalIbjhhnrQNUPHDWVO7865-65-15 08:00:00





             Test Item    Value        Reference Range Interpretation Comments

 

             BUN (test code = BUN) 18           7-22         N            



[facility] HospitalUnfzysbHADMNRVCQ3118-70-09 08:00:00





             Test Item    Value        Reference Range Interpretation Comments

 

             Glucose Lvl (test code = Glucose Lvl) 101          70-99        H  

          



[facility] HospitalMupqagbJHUCYRLPP3830-82-87 08:00:00





             Test Item    Value        Reference Range Interpretation Comments

 

             Sodium Lvl (test code = Sodium Lvl) 142          135-145      N    

        



[facility] HospitalKjgaryrSZQGMLGSV1952-66-76 08:00:00





             Test Item    Value        Reference Range Interpretation Comments

 

             Calcium Lvl (test code = Calcium Lvl) 8.3          8.5-10.5     L  

          



[facility] HospitalCuqemnzWRQYGXEOA1586-31-12 08:00:00





             Test Item    Value        Reference Range Interpretation Comments

 

             CO2 (test code = CO2) 23           24-32        L            



[facility] HospitalHphinbzFRRXDTVXV2035-13-10 08:00:00





             Test Item    Value        Reference Range Interpretation Comments

 

             Chloride Lvl (test code = Chloride Lvl) 110                 H

            



[facility] HospitalYbvtlllUKRPRUECY2044-08-18 08:00:00





             Test Item    Value        Reference Range Interpretation Comments

 

             Potassium Lvl (test code = Potassium 4.1          3.5-5.1      N   

         



             Lvl)                                                



[facility] HospitalPuvcgacGTDERWNQC6326-45-28 08:00:00





             Test Item    Value        Reference Range Interpretation Comments

 

             LDL (test code = LDL) See Note mg/dL                           



                          5*NA*(2013                           



                          03:00:00)                              



[facility] HospitalVgzzbyvQGBIXWAHV5831-30-15 08:00:00





             Test Item    Value        Reference Range Interpretation Comments

 

             CHD Risk (test code = CHD Risk) 8.45         3.90-5.80    H        

    



[facility] HospitalHuotordGJAONIUNQ4160-60-91 08:00:00





             Test Item    Value        Reference Range Interpretation Comments

 

             HDL (test code = HDL) 29                        L            



[facility] HospitalOxuxdtcWRRJGFMJB2617-32-32 08:00:00





             Test Item    Value        Reference Range Interpretation Comments

 

             Trig (test code = Trig) 531                       H            



[facility] HospitalPlrauqbMBNYSNQEO7557-16-42 08:00:00





             Test Item    Value        Reference Range Interpretation Comments

 

             Chol (test code = Chol) 245                       H            



[facility] KlebergJqcbpouUVGWEPDNZK8462-60-69 08:00:00





             Test Item    Value        Reference Range Interpretation Comments

 

             Basophils (test code = 0.7          See_Comment  N             [Aut

omated message] The



             Basophils)                                          system which ge

nerated



                                                                 this result tra

nsmitted



                                                                 reference range

: <=1.0.



                                                                 The reference r

erna was



                                                                 not used to int

erpret



                                                                 this result as



                                                                 normal/abnormal

.



[facility] KlebergVnxlbseJXJDTNQIRX7767-17-90 08:00:00





             Test Item    Value        Reference Range Interpretation Comments

 

             Segs-Bands # (test code = Segs-Bands #) 3.6          1.5-8.1      N

            



[facility] KlebergJmhofiiEJSMFMHRVY3393-72-49 08:00:00





             Test Item    Value        Reference Range Interpretation Comments

 

             Lymphocytes # (test code = Lymphocytes 3.1          1.0-5.5      N 

           



             #)                                                  



[facility] KlebergYtqawmlVXLIFYGQWS2377-87-81 08:00:00





             Test Item    Value        Reference Range Interpretation Comments

 

             Monocytes # (test code 0.6          See_Comment  N             [Aut

omated message] The



             = Monocytes #)                                        system which 

generated



                                                                 this result tra

nsmitted



                                                                 reference range

: <=0.8.



                                                                 The reference r

erna was



                                                                 not used to int

erpret



                                                                 this result as



                                                                 normal/abnormal

.



[facility]TlxrfprUYRGMJNCDQ9266-92-98 [facility]:00





             Test Item    Value        Reference Range Interpretation Comments

 

             Eosinophils # (test code 0.1          See_Comment  N             [A

utomated message] The



             = Eosinophils #)                                        system wh

h generated



                                                                 this result tra

nsmitted



                                                                 reference range

: <=0.5.



                                                                 The reference r

erna was



                                                                 not used to int

erpret



                                                                 this result as



                                                                 normal/abnormal

.



[facility]bergWzaqzoeNISZBCNPFV0314-63-63 08:00:00





             Test Item    Value        Reference Range Interpretation Comments

 

             Basophils # (test code 0.1          See_Comment  N             [Aut

omated message] The



             = Basophils #)                                        system which 

generated



                                                                 this result tra

nsmitted



                                                                 reference range

: <=0.2.



                                                                 The reference r

erna was



                                                                 not used to int

erpret



                                                                 this result as



                                                                 normal/abnormal

.



[facility]bergHoulawqWUPDYGJBPE7966-89-08 08:00:00





             Test Item    Value        Reference Range Interpretation Comments

 

             Eosinophils (test code = 1.7          See_Comment  N             [A

utomated message] The



             Eosinophils)                                        system which ge

nerated



                                                                 this result tra

nsmitted



                                                                 reference range

: <=4.0.



                                                                 The reference r

erna was



                                                                 not used to int

erpret



                                                                 this result as



                                                                 normal/abnormal

.



[facility]bergJumjypsGGKZXIRNVV1105-56-48 08:00:00





             Test Item    Value        Reference Range Interpretation Comments

 

             Monocytes (test code = Monocytes) 8.0          2.0-12.0     N      

      



[facility]bergDlhjhkoDTJVUWVPZJ7225-98-20 08:00:00





             Test Item    Value        Reference Range Interpretation Comments

 

             Segs (test code = Segs) 48.0         45.0-75.0    N            



[facility]bergUxihrwdKYODMBPJRS0098-89-90 08:00:00





             Test Item    Value        Reference Range Interpretation Comments

 

             Lymphocytes (test code = Lymphocytes) 41.6         20.0-40.0    H  

          



[facility]bergNhkxnqfDQGERAWYIJ8849-97-04 08:00:00





             Test Item    Value        Reference Range Interpretation Comments

 

             RBC (test code = RBC) 3.87         4.20-5.40    L            



[facility]bergEdurisiGNDGIJBKZR1174-71-24 08:00:00





             Test Item    Value        Reference Range Interpretation Comments

 

             WBC (test code = WBC) 7.4          3.7-10.4     N            



[facility] KlebergGolejzdPILLBCDQTR9580-40-38 08:00:00





             Test Item    Value        Reference Range Interpretation Comments

 

             Hgb (test code = Hgb) 11.9         12.0-16.0    L            



[facility]bergPngzmimGYTBHDBYVJ6306-67-30 08:00:00





             Test Item    Value        Reference Range Interpretation Comments

 

             Hct (test code = Hct) 35.2         36.0-48.0    L            



[facility]bergNgprxykANZQIUMTSH0532-24-61 08:00:00





             Test Item    Value        Reference Range Interpretation Comments

 

             MCV (test code = MCV) 90.8         81.0-99.0    N            



[facility]bergUdisxsvHBPTQPPAED0205-01-78 08:00:00





             Test Item    Value        Reference Range Interpretation Comments

 

             MCH (test code = MCH) 30.7 pg      27.0-31.0    N            



[facility]bergYujwbuiMIGMLHHCMW9101-68-64 08:00:00





             Test Item    Value        Reference Range Interpretation Comments

 

             RDW (test code = RDW) 13.3         11.5-14.5    N            



[facility]bergWxzctvaSDUVEWLPMZ1135-35-08 08:00:00





             Test Item    Value        Reference Range Interpretation Comments

 

             MCHC (test code = MCHC) 33.9         32.0-36.0    N            



[facility] KlebergSlkjekiAIJEWKYLOE3757-35-98 08:00:00





             Test Item    Value        Reference Range Interpretation Comments

 

             Platelet (test code = Platelet) 279          133-450      N        

    



[facility]bergIgjwozmYDLXXKVJJC2214-03-86 08:00:00





             Test Item    Value        Reference Range Interpretation Comments

 

             MPV (test code = MPV) 7.5          7.4-10.4     N            



[facility]cscwjdSKEWZHAWC2650[facility]





             Test Item    Value        Reference Range Interpretation Comments

 

             AGAP (test code = AGAP) 13.1         10.0-20.0    N            



[facility] HospitalYstaehgYEOMAWYBA1695-66-25 08:00:00





             Test Item    Value        Reference Range Interpretation Comments

 

             eGFR (test code = eGFR) 96                                     



[facility] HospitalHkblzqmYMBMOYLCO7810-72-83 08:00:00





             Test Item    Value        Reference Range Interpretation Comments

 

             Creatinine Lvl (test code = Creatinine 0.7          0.5-1.4      N 

           



             Lvl)                                                



[facility] HospitalKwdzmmwRRJKCFAWS3924-46-69 08:00:00





             Test Item    Value        Reference Range Interpretation Comments

 

             BUN (test code = BUN) 18           7-22         N            



[facility] HospitalQlqrqsfOAFCYVTXI7868-72-73 08:00:00





             Test Item    Value        Reference Range Interpretation Comments

 

             Glucose Lvl (test code = Glucose Lvl) 101          70-99        H  

          



[facility] HospitalVuqglmaGSIFSBFDY2439-20-99 08:00:00





             Test Item    Value        Reference Range Interpretation Comments

 

             Sodium Lvl (test code = Sodium Lvl) 142          135-145      N    

        



[facility] HospitalDzhlhtvAZYMLYJOI8499-67-52 08:00:00





             Test Item    Value        Reference Range Interpretation Comments

 

             Calcium Lvl (test code = Calcium Lvl) 8.3          8.5-10.5     L  

          



[facility] HospitalSklzrvbEJWKMODLZ8229-82-43 08:00:00





             Test Item    Value        Reference Range Interpretation Comments

 

             CO2 (test code = CO2) 23           24-32        L            



[facility] HospitalCqdvuasKNIJNTJUO9000-70-01 08:00:00





             Test Item    Value        Reference Range Interpretation Comments

 

             Chloride Lvl (test code = Chloride Lvl) 110                 H

            



[facility] HospitalOmhrrpwUSQTTSOFJ0630-17-53 18:53:00





             Test Item    Value        Reference Range Interpretation Comments

 

             eGFR (test code = eGFR) 96                                     



[facility] HospitalQzowseqODMXFMWIW6131-30-79 18:53:00





             Test Item    Value        Reference Range Interpretation Comments

 

             Sodium Lvl (test code = Sodium Lvl) 140          135-145      N    

        



[facility] HospitalUqxxozrIOULUIWME9787-44-58 18:53:00





             Test Item    Value        Reference Range Interpretation Comments

 

             Potassium Lvl (test code = Potassium 3.9          3.5-5.1      N   

         



             Lvl)                                                



[facility] HospitalVsqidrdSLEBYGSCJ5058-35-01 18:53:00





             Test Item    Value        Reference Range Interpretation Comments

 

             Chloride Lvl (test code = Chloride Lvl) 105                 N

            



[facility] HospitalWwmvecmCICZEWSAF4118-02-48 18:53:00





             Test Item    Value        Reference Range Interpretation Comments

 

             CO2 (test code = CO2) 28           24-32        N            



[facility] HospitalCzzznwfVALILKUVN5856-22-56 18:53:00





             Test Item    Value        Reference Range Interpretation Comments

 

             Glucose Lvl (test code = Glucose Lvl) 92           70-99        N  

          



[facility] HospitalEydpcqoKHUAMMBPP8848-82-73 18:53:00





             Test Item    Value        Reference Range Interpretation Comments

 

             BUN (test code = BUN) 14           7-22         N            



[facility] HospitalLembyrgZIRGXSRBT0121-87-47 18:53:00





             Test Item    Value        Reference Range Interpretation Comments

 

             Creatinine Lvl (test code = Creatinine 0.7          0.5-1.4      N 

           



             Lvl)                                                



[facility] HospitalXgyydfnXHZKAVCRH6658-92-63 18:53:00





             Test Item    Value        Reference Range Interpretation Comments

 

             Calcium Lvl (test code = Calcium Lvl) 9.1          8.5-10.5     N  

          



[facility] HospitalMldhbmcKGLEBIORK4712-31-23 18:53:00





             Test Item    Value        Reference Range Interpretation Comments

 

             AGAP (test code = AGAP) 10.9         10.0-20.0    N            



[facility]bergYwukkuoIKZSJUGAPW4347-57-53 18:53:00





             Test Item    Value        Reference Range Interpretation Comments

 

             Basophils # (test code 0.1          See_Comment  N             [Aut

omated message] The



             = Basophils #)                                        system which 

generated



                                                                 this result tra

nsmitted



                                                                 reference range

: <=0.2.



                                                                 The reference r

erna was



                                                                 not used to int

erpret



                                                                 this result as



                                                                 normal/abnormal

.



[facility]bergQzkssrsYLRBFDQIHE2981-66-72 18:53:00





             Test Item    Value        Reference Range Interpretation Comments

 

             Lymphocytes # (test code = Lymphocytes 2.8          1.0-5.5      N 

           



             #)                                                  



[facility]bergUwmphliDUMDJMKRGR2116-67-50 18:53:00





             Test Item    Value        Reference Range Interpretation Comments

 

             Eosinophils # (test code 0.1          See_Comment  N             [A

utomated message] The



             = Eosinophils #)                                        system whic

h generated



                                                                 this result tra

nsmitted



                                                                 reference range

: <=0.5.



                                                                 The reference r

erna was



                                                                 not used to int

erpret



                                                                 this result as



                                                                 normal/abnormal

.



[facility]bergWpovmplAXKBAOREHH4106-69-59 18:53:00





             Test Item    Value        Reference Range Interpretation Comments

 

             Monocytes # (test code 0.7          See_Comment  N             [Aut

omated message] The



             = Monocytes #)                                        system which 

generated



                                                                 this result tra

nsmitted



                                                                 reference range

: <=0.8.



                                                                 The reference r

erna was



                                                                 not used to int

erpret



                                                                 this result as



                                                                 normal/abnormal

.



[facility] KlebergFgyrozlUVZJRRHKXC3111-30-10 18:53:00





             Test Item    Value        Reference Range Interpretation Comments

 

             Segs-Bands # (test code = Segs-Bands #) 4.6          1.5-8.1      N

            



[facility]bergKxsecanGMKHCWXDUU5575-64-60 18:53:00





             Test Item    Value        Reference Range Interpretation Comments

 

             Basophils (test code = 1.0          See_Comment  N             [Aut

omated message] The



             Basophils)                                          system which ge

nerated



                                                                 this result tra

nsmitted



                                                                 reference range

: <=1.0.



                                                                 The reference r

erna was



                                                                 not used to int

erpret



                                                                 this result as



                                                                 normal/abnormal

.



[facility] KlebergGrnyeaaHYJFCCHBXO2506-54-12 18:53:00





             Test Item    Value        Reference Range Interpretation Comments

 

             Lymphocytes (test code = Lymphocytes) 34.0         20.0-40.0    N  

          



[facility] KlebergDkmsnrwLYOOHKJPAO6050-74-75 18:53:00





             Test Item    Value        Reference Range Interpretation Comments

 

             Eosinophils (test code = 1.3          See_Comment  N             [A

utomated message] The



             Eosinophils)                                        system which ge

nerated



                                                                 this result tra

nsmitted



                                                                 reference range

: <=4.0.



                                                                 The reference r

erna was



                                                                 not used to int

erpret



                                                                 this result as



                                                                 normal/abnormal

.



[facility] KlebergCtmjudjMDZBMYKSIU8164-96-32 18:53:00





             Test Item    Value        Reference Range Interpretation Comments

 

             Monocytes (test code = Monocytes) 8.4          2.0-12.0     N      

      



[facility] KlebergXgeunvlUKLSBKIPID2533-65-48 18:53:00





             Test Item    Value        Reference Range Interpretation Comments

 

             Segs (test code = Segs) 55.3         45.0-75.0    N            



[facility] KlebergGkaxdyhDOZGCMUKQK7522-13-27 18:53:00





             Test Item    Value        Reference Range Interpretation Comments

 

             PTT (test code = PTT) 27.3 s       22.9-35.8    N            



[facility]bergMgpfpwdAPBXHWIMSG5692-95-72 18:53:00





             Test Item    Value        Reference Range Interpretation Comments

 

             PT (test code = PT) 13.3 s       12.0-14.7    N            



[facility] KlebergGjlghkhECIZWQPJIY0477-61-36 18:53:00





             Test Item    Value        Reference Range Interpretation Comments

 

             INR (test code = INR) 1.02         0.85-1.17    N            



[facility]bergGnraeehAMZYUSMDIB2603-40-26 18:53:00





             Test Item    Value        Reference Range Interpretation Comments

 

             MPV (test code = MPV) 7.2          7.4-10.4     L            



[facility]bergCpjczegAJKBHOFGZI3103-09-67 18:53:00





             Test Item    Value        Reference Range Interpretation Comments

 

             MCHC (test code = MCHC) 33.5         32.0-36.0    N            



[facility] KlebergAjmlbxhZOPFTLXZBX6289-68-57 18:53:00





             Test Item    Value        Reference Range Interpretation Comments

 

             MCH (test code = MCH) 29.8 pg      27.0-31.0    N            



[facility] KlebergHquwahiZMZZCTBHQJ5052-04-10 18:53:00





             Test Item    Value        Reference Range Interpretation Comments

 

             Platelet (test code = Platelet) 305          133-450      N        

    



[facility] KlebergNssqepfUAYODWFXIK8636-25-20 18:53:00





             Test Item    Value        Reference Range Interpretation Comments

 

             RDW (test code = RDW) 12.4         11.5-14.5    N            



[facility] KlebergKwkyxrxYEGGTLLEML7563-56-64 18:53:00





             Test Item    Value        Reference Range Interpretation Comments

 

             MCV (test code = MCV) 89.0         81.0-99.0    N            



[facility] KlebergFlrvmbgPHYYLOCNXU6421-22-18 18:53:00





             Test Item    Value        Reference Range Interpretation Comments

 

             Hgb (test code = Hgb) 13.3         12.0-16.0    N            



[facility] KlebergDehurerUZMIMNCKJW9120-04-32 18:53:00





             Test Item    Value        Reference Range Interpretation Comments

 

             RBC (test code = RBC) 4.45         4.20-5.40    N            



[facility] KlebergJujmzjsOHKGMDQYML2934-48-39 18:53:00





             Test Item    Value        Reference Range Interpretation Comments

 

             Hct (test code = Hct) 39.6         36.0-48.0    N            



[facility] KlebergLsntoqiHOVPBLFVSU3730-98-33 18:53:00





             Test Item    Value        Reference Range Interpretation Comments

 

             WBC (test code = WBC) 8.3          3.7-10.4     N            



[facility] HospitalYrjihnkIEQHFQOBK1559-19-58 18:53:00





             Test Item    Value        Reference Range Interpretation Comments

 

             eGFR (test code = eGFR) 96                                     



[facility] HospitalAiwtuakUIWFXWXEH9425-23-10 18:53:00





             Test Item    Value        Reference Range Interpretation Comments

 

             Sodium Lvl (test code = Sodium Lvl) 140          135-145      N    

        



[facility] HospitalOqammxuANNMBLELY5616-47-77 18:53:00





             Test Item    Value        Reference Range Interpretation Comments

 

             Potassium Lvl (test code = Potassium 3.9          3.5-5.1      N   

         



             Lvl)                                                



[facility] HospitalMazsbowKCDVWAGTQ2681-45-46 18:53:00





             Test Item    Value        Reference Range Interpretation Comments

 

             Chloride Lvl (test code = Chloride Lvl) 105                 N

            



[facility] HospitalOfyniobUDURDVWLX2764-22-19 18:53:00





             Test Item    Value        Reference Range Interpretation Comments

 

             CO2 (test code = CO2) 28           24-32        N            



[facility] HospitalPimdpjmEMQBKDUST5543-91-07 18:53:00





             Test Item    Value        Reference Range Interpretation Comments

 

             Glucose Lvl (test code = Glucose Lvl) 92           70-99        N  

          



[facility] HospitalYyrkiqpFVLFLFNTI8124-27-47 18:53:00





             Test Item    Value        Reference Range Interpretation Comments

 

             BUN (test code = BUN) 14           7-22         N            



[facility] HospitalVwllgvuKGTDVYEIR7506-72-73 18:53:00





             Test Item    Value        Reference Range Interpretation Comments

 

             Creatinine Lvl (test code = Creatinine 0.7          0.5-1.4      N 

           



             Lvl)                                                



[facility] HospitalIuniioiIBXDJZSXB9300-80-25 18:53:00





             Test Item    Value        Reference Range Interpretation Comments

 

             Calcium Lvl (test code = Calcium Lvl) 9.1          8.5-10.5     N  

          



[facility] HospitalNeazsrwJECOJKXSJ5195-08-62 18:53:00





             Test Item    Value        Reference Range Interpretation Comments

 

             AGAP (test code = AGAP) 10.9         10.0-20.0    N            



[facility]bergGchtqsqVWDOWLZKQU0980-26-75 18:53:00





             Test Item    Value        Reference Range Interpretation Comments

 

             Basophils # (test code 0.1          See_Comment  N             [Aut

omated message] The



             = Basophils #)                                        system which 

generated



                                                                 this result tra

nsmitted



                                                                 reference range

: <=0.2.



                                                                 The reference r

erna was



                                                                 not used to int

erpret



                                                                 this result as



                                                                 normal/abnormal

.



[facility] KlebergUlmgodcPECXZJIBNH5151-52-66 18:53:00





             Test Item    Value        Reference Range Interpretation Comments

 

             Lymphocytes # (test code = Lymphocytes 2.8          1.0-5.5      N 

           



             #)                                                  



[facility]bergMouqywvATVZOQQWRU1472-80-40 18:53:00





             Test Item    Value        Reference Range Interpretation Comments

 

             Eosinophils # (test code 0.1          See_Comment  N             [A

utomated message] The



             = Eosinophils #)                                        system whic

h generated



                                                                 this result tra

nsmitted



                                                                 reference range

: <=0.5.



                                                                 The reference r

erna was



                                                                 not used to int

erpret



                                                                 this result as



                                                                 normal/abnormal

.



[facility]bergSrmsizaSWRGZNMLUU0268-04-65 18:53:00





             Test Item    Value        Reference Range Interpretation Comments

 

             Monocytes # (test code 0.7          See_Comment  N             [Aut

omated message] The



             = Monocytes #)                                        system which 

generated



                                                                 this result tra

nsmitted



                                                                 reference range

: <=0.8.



                                                                 The reference r

erna was



                                                                 not used to int

erpret



                                                                 this result as



                                                                 normal/abnormal

.



[facility]bergQhiugeiDWYVLSYEMO1724-25-25 18:53:00





             Test Item    Value        Reference Range Interpretation Comments

 

             Segs-Bands # (test code = Segs-Bands #) 4.6          1.5-8.1      N

            



[facility]bergDndhoehERJIUMGJTS0329-82-91 18:53:00





             Test Item    Value        Reference Range Interpretation Comments

 

             Basophils (test code = 1.0          See_Comment  N             [Aut

omated message] The



             Basophils)                                          system which ge

nerated



                                                                 this result tra

nsmitted



                                                                 reference range

: <=1.0.



                                                                 The reference r

erna was



                                                                 not used to int

erpret



                                                                 this result as



                                                                 normal/abnormal

.



[facility]bergSzqisytMQQFPUOXRF0482-64-28 18:53:00





             Test Item    Value        Reference Range Interpretation Comments

 

             Lymphocytes (test code = Lymphocytes) 34.0         20.0-40.0    N  

          



[facility]bergWhdxhejRPPXZBMDTG9739-38-28 18:53:00





             Test Item    Value        Reference Range Interpretation Comments

 

             Eosinophils (test code = 1.3          See_Comment  N             [A

utomated message] The



             Eosinophils)                                        system which ge

nerated



                                                                 this result tra

nsmitted



                                                                 reference range

: <=4.0.



                                                                 The reference r

erna was



                                                                 not used to int

erpret



                                                                 this result as



                                                                 normal/abnormal

.



[facility]bergGpwqyysXBTVTYMIES4073-29-87 18:53:00





             Test Item    Value        Reference Range Interpretation Comments

 

             Monocytes (test code = Monocytes) 8.4          2.0-12.0     N      

      



[facility]bergRicasecPIEVPBVBLN2073-86-46 18:53:00





             Test Item    Value        Reference Range Interpretation Comments

 

             Segs (test code = Segs) 55.3         45.0-75.0    N            



[facility]bergKuzhrblPFYHHWUOBC5672-55-36 18:53:00





             Test Item    Value        Reference Range Interpretation Comments

 

             PTT (test code = PTT) 27.3 s       22.9-35.8    N            



[facility]bergWvweylgVDOOPHJFNZ0602-14-66 18:53:00





             Test Item    Value        Reference Range Interpretation Comments

 

             PT (test code = PT) 13.3 s       12.0-14.7    N            



[facility] KlebergMveakynVNWIGMDVEL4770-35-36 18:53:00





             Test Item    Value        Reference Range Interpretation Comments

 

             INR (test code = INR) 1.02         0.85-1.17    N            



[facility]bergHmvpwpwQKUHJFXQHS9663-53-40 18:53:00





             Test Item    Value        Reference Range Interpretation Comments

 

             MPV (test code = MPV) 7.2          7.4-10.4     L            



[facility] KlebergFrrocxvIRQAFWHAFY9659-49-37 18:53:00





             Test Item    Value        Reference Range Interpretation Comments

 

             MCHC (test code = MCHC) 33.5         32.0-36.0    N            



[facility]bergFvyjzbfNZWFAPHNEE1134-24-34 18:53:00





             Test Item    Value        Reference Range Interpretation Comments

 

             MCH (test code = MCH) 29.8 pg      27.0-31.0    N            



[facility] KlebergZxfoqvlKPJTAWORVF6032-65-45 18:53:00





             Test Item    Value        Reference Range Interpretation Comments

 

             Platelet (test code = Platelet) 305          133-450      N        

    



[facility] KlebergTgikuvzGRRECUQYVJ6932-15-90 18:53:00





             Test Item    Value        Reference Range Interpretation Comments

 

             RDW (test code = RDW) 12.4         11.5-14.5    N            



[facility]bergZyggxfjGUOJOGEFQI8997-89-61 18:53:00





             Test Item    Value        Reference Range Interpretation Comments

 

             MCV (test code = MCV) 89.0         81.0-99.0    N            



[facility]bergWqidrpqZDRTRRIJBK7965-43-86 18:53:00





             Test Item    Value        Reference Range Interpretation Comments

 

             Hgb (test code = Hgb) 13.3         12.0-16.0    N            



[facility]bergJeqxhuwBKZYGGGJBE3921-50-18 18:53:00





             Test Item    Value        Reference Range Interpretation Comments

 

             RBC (test code = RBC) 4.45         4.20-5.40    N            



[facility]bergDcweawgQAIUOQCIJW1508-22-94 18:53:00





             Test Item    Value        Reference Range Interpretation Comments

 

             Hct (test code = Hct) 39.6         36.0-48.0    N            



[facility] KlebergIjwatjoWASUNJTTEP3192-78-78 18:53:00





             Test Item    Value        Reference Range Interpretation Comments

 

             WBC (test code = WBC) 8.3          3.7-10.4     N            



[facility]PmcuhunTNZQPRHBZ5376[facility]67-39 [facility]00





             Test Item    Value        Reference Range Interpretation Comments

 

             eGFR (test code = eGFR) 96                                     



[facility] HospitalBlxjxijOPLILHYDH0988-13-99 18:53:00





             Test Item    Value        Reference Range Interpretation Comments

 

             Sodium Lvl (test code = Sodium Lvl) 140          135-145      N    

        



[facility] HospitalHsurjgiJUVTNNPYF2343-29-65 18:53:00





             Test Item    Value        Reference Range Interpretation Comments

 

             Potassium Lvl (test code = Potassium 3.9          3.5-5.1      N   

         



             Lvl)                                                



[facility] HospitalRuljkvdKTFLUWAEY0162-71-84 18:53:00





             Test Item    Value        Reference Range Interpretation Comments

 

             Chloride Lvl (test code = Chloride Lvl) 105                 N

            



[facility] HospitalGnfwtxcIMVDCZVBO2191-66-98 18:53:00





             Test Item    Value        Reference Range Interpretation Comments

 

             CO2 (test code = CO2) 28           24-32        N            



[facility] HospitalSnruxbgMENJRCSBC1624-58-64 18:53:00





             Test Item    Value        Reference Range Interpretation Comments

 

             Glucose Lvl (test code = Glucose Lvl) 92           70-99        N  

          



[facility] HospitalJeaneqbMVMUJGRWJ1806-47-01 18:53:00





             Test Item    Value        Reference Range Interpretation Comments

 

             BUN (test code = BUN) 14           7-22         N            



[facility] HospitalSoamifkUZZJXOWQC7805-89-01 18:53:00





             Test Item    Value        Reference Range Interpretation Comments

 

             Creatinine Lvl (test code = Creatinine 0.7          0.5-1.4      N 

           



             Lvl)                                                



[facility] HospitalEavvlghTIRGPOMIA6802-15-89 18:53:00





             Test Item    Value        Reference Range Interpretation Comments

 

             Calcium Lvl (test code = Calcium Lvl) 9.1          8.5-10.5     N  

          



[facility] HospitalPljrfodORBHNSPNV0050-96-04 18:53:00





             Test Item    Value        Reference Range Interpretation Comments

 

             AGAP (test code = AGAP) 10.9         10.0-20.0    N            



[facility]bergLrvgtbmZXQRIWXOED5641-43-67 18:53:00





             Test Item    Value        Reference Range Interpretation Comments

 

             Basophils # (test code 0.1          See_Comment  N             [Aut

omated message] The



             = Basophils #)                                        system which 

generated



                                                                 this result tra

nsmitted



                                                                 reference range

: <=0.2.



                                                                 The reference r

erna was



                                                                 not used to int

erpret



                                                                 this result as



                                                                 normal/abnormal

.



[facility] KlebergUottsgtFMXDFXTRNI1716-48-95 18:53:00





             Test Item    Value        Reference Range Interpretation Comments

 

             Lymphocytes # (test code = Lymphocytes 2.8          1.0-5.5      N 

           



             #)                                                  



[facility] KlebergFbtooreAEKSPCIUYV3499-36-40 18:53:00





             Test Item    Value        Reference Range Interpretation Comments

 

             Eosinophils # (test code 0.1          See_Comment  N             [A

utomated message] The



             = Eosinophils #)                                        system whic

h generated



                                                                 this result tra

nsmitted



                                                                 reference range

: <=0.5.



                                                                 The reference r

erna was



                                                                 not used to int

erpret



                                                                 this result as



                                                                 normal/abnormal

.



[facility]bergPdxaivzHVWLNQZZTU0936-60-63 18:53:00





             Test Item    Value        Reference Range Interpretation Comments

 

             Monocytes # (test code 0.7          See_Comment  N             [Aut

omated message] The



             = Monocytes #)                                        system which 

generated



                                                                 this result tra

nsmitted



                                                                 reference range

: <=0.8.



                                                                 The reference r

erna was



                                                                 not used to int

erpret



                                                                 this result as



                                                                 normal/abnormal

.



[facility]bergKoxqqdtIUNOPZPAMZ1388-12-25 18:53:00





             Test Item    Value        Reference Range Interpretation Comments

 

             Segs-Bands # (test code = Segs-Bands #) 4.6          1.5-8.1      N

            



[facility] KlebergLblsoejUCXOVOMGID7358-79-32 18:53:00





             Test Item    Value        Reference Range Interpretation Comments

 

             Basophils (test code = 1.0          See_Comment  N             [Aut

omated message] The



             Basophils)                                          system which ge

nerated



                                                                 this result tra

nsmitted



                                                                 reference range

: <=1.0.



                                                                 The reference r

erna was



                                                                 not used to int

erpret



                                                                 this result as



                                                                 normal/abnormal

.



[facility] KlebergIhibpamUTYCYQWQRL7990-67-84 18:53:00





             Test Item    Value        Reference Range Interpretation Comments

 

             Lymphocytes (test code = Lymphocytes) 34.0         20.0-40.0    N  

          



[facility] KlebergRvtwclbORMBMQLJWF6636-61-65 18:53:00





             Test Item    Value        Reference Range Interpretation Comments

 

             Eosinophils (test code = 1.3          See_Comment  N             [A

utomated message] The



             Eosinophils)                                        system which ge

nerated



                                                                 this result tra

nsmitted



                                                                 reference range

: <=4.0.



                                                                 The reference r

erna was



                                                                 not used to int

erpret



                                                                 this result as



                                                                 normal/abnormal

.



[facility] KlebergZtfcxfgJDNKKWQDRI0161-84-39 18:53:00





             Test Item    Value        Reference Range Interpretation Comments

 

             Monocytes (test code = Monocytes) 8.4          2.0-12.0     N      

      



[facility]bergCcqwplzBNEIOPPXJR2030-95-88 18:53:00





             Test Item    Value        Reference Range Interpretation Comments

 

             Segs (test code = Segs) 55.3         45.0-75.0    N            



[facility] KlebergFftkvccGENRDQSVBY2788-22-58 18:53:00





             Test Item    Value        Reference Range Interpretation Comments

 

             PTT (test code = PTT) 27.3 s       22.9-35.8    N            



[facility] KlebergHkrrrilHAIMOXHESE7257-61-22 18:53:00





             Test Item    Value        Reference Range Interpretation Comments

 

             PT (test code = PT) 13.3 s       12.0-14.7    N            



[facility]bergPyeuhcbOEQVUOXTAW1874-41-65 18:53:00





             Test Item    Value        Reference Range Interpretation Comments

 

             INR (test code = INR) 1.02         0.85-1.17    N            



[facility] KlebergZrmodlhQJYXKKKOIY0131-95-12 18:53:00





             Test Item    Value        Reference Range Interpretation Comments

 

             MPV (test code = MPV) 7.2          7.4-10.4     L            



[facility] KlebergGzbxrqlCEHHTJSDYK4944-60-10 18:53:00





             Test Item    Value        Reference Range Interpretation Comments

 

             MCHC (test code = MCHC) 33.5         32.0-36.0    N            



[facility]bergEhxefbpAEGPNVIOYU2280-68-16 18:53:00





             Test Item    Value        Reference Range Interpretation Comments

 

             MCH (test code = MCH) 29.8 pg      27.0-31.0    N            



[facility]bergHmzjmluICHMHPFMXZ6070-72-34 18:53:00





             Test Item    Value        Reference Range Interpretation Comments

 

             Platelet (test code = Platelet) 305          133-450      N        

    



[facility]bergAmrktwxWPSFLRZKKE3379-30-22 18:53:00





             Test Item    Value        Reference Range Interpretation Comments

 

             RDW (test code = RDW) 12.4         11.5-14.5    N            



[facility]bergKobwsgwVPZXGUNSUK9943-34-91 18:53:00





             Test Item    Value        Reference Range Interpretation Comments

 

             MCV (test code = MCV) 89.0         81.0-99.0    N            



[facility] KlebergZbyvordCMUMVEWJKC7416-08-83 18:53:00





             Test Item    Value        Reference Range Interpretation Comments

 

             Hgb (test code = Hgb) 13.3         12.0-16.0    N            



[facility]bergKbqspsyDNUYZQIHZF9669-98-03 18:53:00





             Test Item    Value        Reference Range Interpretation Comments

 

             RBC (test code = RBC) 4.45         4.20-5.40    N            



[facility] KlebergEkwmiboJOGOKBWCUF6944-06-84 18:53:00





             Test Item    Value        Reference Range Interpretation Comments

 

             Hct (test code = Hct) 39.6         36.0-48.0    N            



[facility] KlebergDaoxxheMLSMTKMMPG5013-17-98 18:53:00





             Test Item    Value        Reference Range Interpretation Comments

 

             WBC (test code = WBC) 8.3          3.7-10.4     N            



[facility] HospitalIsjjzcvWYKNEDKHQ9653-99-52 18:53:00





             Test Item    Value        Reference Range Interpretation Comments

 

             eGFR (test code = eGFR) 96                                     



[facility] HospitalAyjsawvMJICCIQCS8831-76-59 18:53:00





             Test Item    Value        Reference Range Interpretation Comments

 

             Sodium Lvl (test code = Sodium Lvl) 140          135-145      N    

        



[facility] HospitalYvrjjxaXUGGGKUXL7084-07-49 18:53:00





             Test Item    Value        Reference Range Interpretation Comments

 

             Potassium Lvl (test code = Potassium 3.9          3.5-5.1      N   

         



             Lvl)                                                



[facility] HospitalIatgfdrTUGFSXEGU5047-46-59 18:53:00





             Test Item    Value        Reference Range Interpretation Comments

 

             Chloride Lvl (test code = Chloride Lvl) 105                 N

            



[facility] HospitalYeaiytrGSJFDQPUD3043-88-90 18:53:00





             Test Item    Value        Reference Range Interpretation Comments

 

             CO2 (test code = CO2) 28           24-32        N            



[facility] HospitalWktatgvRLWMLXPNG3765-91-94 18:53:00





             Test Item    Value        Reference Range Interpretation Comments

 

             Glucose Lvl (test code = Glucose Lvl) 92           70-99        N  

          



[facility] HospitalTbqaxomQXSMYPOFW2078-55-28 18:53:00





             Test Item    Value        Reference Range Interpretation Comments

 

             BUN (test code = BUN) 14           7-22         N            



[facility] HospitalZgspjzrREOXBBRSH5364-53-62 18:53:00





             Test Item    Value        Reference Range Interpretation Comments

 

             Creatinine Lvl (test code = Creatinine 0.7          0.5-1.4      N 

           



             Lvl)                                                



[facility] HospitalGpxyzbnWEQAKWJMA0762-96-62 18:53:00





             Test Item    Value        Reference Range Interpretation Comments

 

             Calcium Lvl (test code = Calcium Lvl) 9.1          8.5-10.5     N  

          



[facility] HospitalZjwltjzMVMLWGWBX4622-04-27 18:53:00





             Test Item    Value        Reference Range Interpretation Comments

 

             AGAP (test code = AGAP) 10.9         10.0-20.0    N            



[facility]bergPvsnyzmDSUWOQNLSH5832-90-05 18:53:00





             Test Item    Value        Reference Range Interpretation Comments

 

             Basophils # (test code 0.1          See_Comment  N             [Aut

omated message] The



             = Basophils #)                                        system which 

generated



                                                                 this result tra

nsmitted



                                                                 reference range

: <=0.2.



                                                                 The reference r

erna was



                                                                 not used to int

erpret



                                                                 this result as



                                                                 normal/abnormal

.



[facility] KlebergYfszqjfUNPSPWUJQB8630-22-92 18:53:00





             Test Item    Value        Reference Range Interpretation Comments

 

             Lymphocytes # (test code = Lymphocytes 2.8          1.0-5.5      N 

           



             #)                                                  



[facility]bergPdphmmwQORJYJDEWQ8731-39-79 18:53:00





             Test Item    Value        Reference Range Interpretation Comments

 

             Eosinophils # (test code 0.1          See_Comment  N             [A

utomated message] The



             = Eosinophils #)                                        system whic

h generated



                                                                 this result tra

nsmitted



                                                                 reference range

: <=0.5.



                                                                 The reference r

erna was



                                                                 not used to int

erpret



                                                                 this result as



                                                                 normal/abnormal

.



[facility] KlebergAdhmdzyXZCTLCTEHT8710-67-58 18:53:00





             Test Item    Value        Reference Range Interpretation Comments

 

             Monocytes # (test code 0.7          See_Comment  N             [Aut

omated message] The



             = Monocytes #)                                        system which 

generated



                                                                 this result tra

nsmitted



                                                                 reference range

: <=0.8.



                                                                 The reference r

erna was



                                                                 not used to int

erpret



                                                                 this result as



                                                                 normal/abnormal

.



[facility] KlebergSgazueoRCJENFRBKD6667-40-13 18:53:00





             Test Item    Value        Reference Range Interpretation Comments

 

             Segs-Bands # (test code = Segs-Bands #) 4.6          1.5-8.1      N

            



[facility] [id number] 18:53:00





             Test Item    Value        Reference Range Interpretation Comments

 

             Basophils (test code = 1.0          See_Comment  N             [Aut

omated message] The



             Basophils)                                          system which ge

nerated



                                                                 this result tra

nsmitted



                                                                 reference range

: <=1.0.



                                                                 The reference r

erna was



                                                                 not used to int

erpret



                                                                 this result as



                                                                 normal/abnormal

.



[facility] [id number] 18:53:00





             Test Item    Value        Reference Range Interpretation Comments

 

             Lymphocytes (test code = Lymphocytes) 34.0         20.0-40.0    N  

          



[facility] [id number] 18:53:00





             Test Item    Value        Reference Range Interpretation Comments

 

             Eosinophils (test code = 1.3          See_Comment  N             [A

utomated message] The



             Eosinophils)                                        system which ge

nerated



                                                                 this result tra

nsmitted



                                                                 reference range

: <=4.0.



                                                                 The reference r

erna was



                                                                 not used to int

erpret



                                                                 this result as



                                                                 normal/abnormal

.



[facility] [id number] 18:53:00





             Test Item    Value        Reference Range Interpretation Comments

 

             Monocytes (test code = Monocytes) 8.4          2.0-12.0     N      

      



[facility] [id number] 18:53:00





             Test Item    Value        Reference Range Interpretation Comments

 

             Segs (test code = Segs) 55.3         45.0-75.0    N            



[facility] Klebe[id number] 18:53:00





             Test Item    Value        Reference Range Interpretation Comments

 

             PTT (test code = PTT) 27.3 s       22.9-35.8    N            



[facility] Klebe[id number] 18:53:00





             Test Item    Value        Reference Range Interpretation Comments

 

             PT (test code = PT) 13.3 s       12.0-14.7    N            



[facility] KlebergPryntvsELKFMOTJPM2475-22-22 18:53:00





             Test Item    Value        Reference Range Interpretation Comments

 

             INR (test code = INR) 1.02         0.85-1.17    N            



[facility] KlebergKmcedyyEGXDGDVVRT0929-40-09 18:53:00





             Test Item    Value        Reference Range Interpretation Comments

 

             MPV (test code = MPV) 7.2          7.4-10.4     L            



[facility] [id number] 18:53:00





             Test Item    Value        Reference Range Interpretation Comments

 

             MCHC (test code = MCHC) 33.5         32.0-36.0    N            



[facility] KlebergVyddujtHCXPYLKSVV0503-11-59 18:53:00





             Test Item    Value        Reference Range Interpretation Comments

 

             MCH (test code = MCH) 29.8 pg      27.0-31.0    N            



[facility] Klebe[id number] 18:53:00





             Test Item    Value        Reference Range Interpretation Comments

 

             Platelet (test code = Platelet) 305          133-450      N        

    



[facility] Klebe[id number] 18:53:00





             Test Item    Value        Reference Range Interpretation Comments

 

             RDW (test code = RDW) 12.4         11.5-14.5    N            



[facility] KlebergRgcpmmvLEGYWWLICV1605-76-45 18:53:00





             Test Item    Value        Reference Range Interpretation Comments

 

             MCV (test code = MCV) 89.0         81.0-99.0    N            



[facility] [id number] 18:53:00





             Test Item    Value        Reference Range Interpretation Comments

 

             Hgb (test code = Hgb) 13.3         12.0-16.0    N            



[facility] KlebergGnheeniSLPRLJEEFP1227-52-56 18:53:00





             Test Item    Value        Reference Range Interpretation Comments

 

             RBC (test code = RBC) 4.45         4.20-5.40    N            



[facility] KlebergDkbgugsBETGBXNSCP1911-17-30 18:53:00





             Test Item    Value        Reference Range Interpretation Comments

 

             Hct (test code = Hct) 39.6         36.0-48.0    N            



[facility] KlebergAzhmwcwXJNKRZBZJO5601-22-28 18:53:00





             Test Item    Value        Reference Range Interpretation Comments

 

             WBC (test code = WBC) 8.3          3.7-10.4     N            



[facility]



Notes







                Date/Time       Note            Provider        Source

 

                2013      EXAM: MRI BRAIN WITH AND WITHOUT CONTRAST       

          [facility]



                09:47:00-00:00                                  Center



                                DATE: Sep 16, 2013 at 0940 hours                

 



                                                                



                                INDICATION: Hemiparesis                 



                                                                



                                COMPARISON: MRI brain without contrast dated                  



                                                                



                                TECHNIQUE:                      



                                                                



                                                    Multiplanar imaging of the b

rain was obtained before and after intravenous 

administration of 14 mL of MultiHance gadolinium contrast.                      

     



                                                                



                                FINDINGS:                       



                                                                



                                                    No abnormal enhancement is i

dentified. Redemonstration of the T2 hyperintensity

in the left frontal subcortical white matter without perilesional edema or 
marked mass effect (but absence of volume loss as well).                        

   



                                                                



                                                    An area of diffusion restric

tion along the left periventricular white matter is

once again seen without enhancement, compatible with acute ischemia.            

               



                                                                



                                                    Remote bilateral lacunar isc

hemic changes are present. Chronic white matter 

disease is once again noted.                           



                                                                



                                IMPRESSION:                     



                                                                



                                                    1. Nonenhancing T2 hyperinte

nse lesion in the left frontal subcortical white 

matter. Given the lack of enhancement this is not felt to be a consequence of 
demyelinating disease. Absence of volume loss i                           



                                                    s concerning and the lesion 

differs from others present. The differential 

remains a subacute versus more remote infarct or a low grade neoplasm. Recommend
followup MRI brain with and without contrast in                           



                                                     3 to 6 months to document s

tability. A change in size/morphology in the 

interim could help discriminate between a more recent infarct and neoplasm.     

                      



                                                    2. Periventricular lesion do

es not enhance and, therefore, is consistent with 

recent infarct rather than demyelinating disease.                           

 

                2013-09-15      EXAM: MRI BRAIN                 [facility]



                18:49:00-00:00                                  Center



                                DATE: Sep 15, 2013 dated 1839 hours             

    



                                                                



                                INDICATION: Right-sided weakness                

 



                                                                



                                COMPARISON: CT of the head dated 9/15/2013      

           



                                                                



                                                    TECHNIQUE: Multiplanar and m

ultisequence MRI of the brain was performed without

administration of intravenous contrast.                           



                                                                



                                                    FINDINGS: There is tiny area

 of diffusion restriction along the left 

periventricular white matter (series 302, image 120), likely related to recent 
ischemic change. In a patient where MS is suspected, a                          

 



                                 more acute lesion would possibly have this appe

arance as well.                 



                                                                



                                                    There is a T2 hyperintense, 

T1 isointense lesion in the left frontal 

subcortical white matter without volume loss or perilesional edema which may 
represent subacute infarct, multiple sclerosis plaque, or low grade neoplasm.   

                        



                                                                



                                                    Central cystic changes in th

e caudate heads are most likely lacunar infarcts. 

Scattered areas of T2 FLAIR abnormality is compatible with advanced for age 
chronic white matter disease.                           



                                                                



                                                    There is no evidence of intr

aparenchymal or extra-axial hemorrhage, mass, mass 

effect, or hydrocephalus. Appropriate flow-voids are present in the vessels at 
the base of the brain.                              



                                                                



                                                    Incidental imaging of the or

bits, paranasal sinuses, mastoid air cells, and 

skull base are unremarkable.                           



                                                                



                                IMPRESSION:                     



                                                    1. Tiny area of diffusion re

striction along the left periventricular white 

matter (series 302, image 120), likely related to an acute subcortical ischemic 
event and less likely secondary to demyelinating disease.                       

    



                                                                



                                                    2. Left frontal white matter

 T2 hyperintense, T1 isointense lesion may 

represent subacute ischemic change, multiple sclerosis plaque, or low grade 
neoplasm. Contrast study may be indicated for further evaluation.               

            

 

                2013-09-15      CT ANGIOGRAM OF THE HEAD AND NECK               

  [facility]



                14:45:00-00:00                                  Center



                                DATE: 12/15/2013 at 2:47 p.m.                 



                                                                



                                CLINICAL INFORMATION: Aphasia, dysarthria.      

           



                                                                



                                                    TECHNIQUE: Axial images were

 obtained from the vertex through the upper thorax 

at 1.5 mm intervals in the enhanced mode. 3-D maximum intensity projection, MIP 
images were also created.                           



                                                                



                                FINDINGS:                       



                                                    There is normal contrast-enh

ancement of the aortic arch and proximal great 

vessels. The right innominate, left common carotid, and left subclavian arteries
arise in normal anatomic configuration. There are no proximal stenoses.         

                  



                                                                



                                                    Minimal calcified atheroscle

rotic plaque is noted within the bilateral carotid 

bulbs without stenosis. There is normal contrast-enhancement of the bilateral 
common, internal, and external carotid arteries.                           



                                                                



                                There is normal contrast-enhancement of the bila

teral vertebral arteries.                 



                                                                



                                                    Intracranially there is norm

al contrast-enhancement of the bilateral 

intracranial internal carotid arteries, the bilateral distal vertebral arteries,
and the basilar artery. There is normal contrast enh                           



                                                    ancement of the the bilatera

l anterior, middle, and posterior cerebral 

arteries. There is normal contrast-enhancement of the superior cerebellar, 
anterior/inferior cerebellar, and posterior inferior cer                        

   



                                ebellar arteries bilaterally. There are no intra

cranial branch occlusions.                 



                                                                



                                There is normal contrast-enhancement of the veno

us structures.                 



                                                                



                                                                



                                                                



                                IMPRESSION:                     



                                                    1. Minimal atherosclerotic p

laque of the carotid bifurcations without stenosis.

                                                    



                                                    2. Normal intracranial CT an

giogram. There are no intracranial branch 

occlusions.

## 2023-09-13 NOTE — ER
Nurse's Notes                                                                                     

 [facility]                                                                 

Name: Meaghan Tavares                                                                            

Age: 67 yrs                                                                                       

Sex: Female                                                                                       

: 1955                                                                                   

MRN: [id number]                                                                                   

Arrival Date: 2023                                                                          

Time: 10:47                                                                                       

Account#: [id number]                                                                            

Bed 2                                                                                             

Private MD:                                                                                       

Diagnosis: Anemia, unspecified;Essential (primary) hypertension                                   

                                                                                                  

Presentation:                                                                                     

                                                                                             

10:56 Chief complaint: Patient states: she had a fall with a right hip fracture approx 2      ap3 

      weeks ago. patient is complaining of being light headed and weak. The nursing home she      

      currently resides at reports the patient is anemic at this time. Coronavirus screen: At     

      this time, the client does not indicate any symptoms associated with coronavirus-19.        

      Ebola Screen: No symptoms or risks identified at this time. Initial Sepsis Screen: Does     

      the patient meet any 2 criteria? No. Patient's initial sepsis screen is negative. Does      

      the patient have a suspected source of infection? No. Patient's initial sepsis screen       

      is negative. Risk Assessment: Do you want to hurt yourself or someone else? Patient         

      reports no desire to harm self or others. Onset of symptoms is unknown.                     

10:56 Method Of Arrival: Wheelchair                                                           ap3 

10:56 Acuity: ALVIN 3                                                                           ap3 

                                                                                                  

Triage Assessment:                                                                                

11:00 General: Appears uncomfortable, Behavior is calm, cooperative. Pain: Complains of pain  ap3 

      in right hip and right leg. Neuro: Level of Consciousness is awake, alert, obeys            

      commands, Oriented to person, place, time, situation.                                       

                                                                                                  

Historical:                                                                                       

- Allergies:                                                                                      

10:58 No Known Allergies;                                                                     ap3 

- PMHx:                                                                                           

10:58 H Pyloi; Hypertension; Hypercholesterolemia; Arthritis; Depressive disorder;            ap3 

      Cerebrovascular accident; Anemia;                                                           

- PSHx:                                                                                           

10:58 spinal fusion L4-L5; right hip;                                                         ap3 

                                                                                                  

- Immunization history:: Client reports receiving the 2nd dose of the Covid vaccine.              

- Social history:: Smoking status: Patient reports the use of cigarette tobacco                   

  products, smokes one-half pack cigarettes per day.                                              

                                                                                                  

                                                                                                  

Screenin:01 Abuse screen: Denies threats or abuse. Nutritional screening: No deficits noted.        ap3 

      Tuberculosis screening: No symptoms or risk factors identified.                             

11:14 [facility] ED Fall Risk Assessment (Adult) History of falling in the last 3 months,       ld1 

      including since admission Yes- single mechanical fall (1 pt).                               

                                                                                                  

Assessment:                                                                                       

11:14 General: Appears in no apparent distress. comfortable, Behavior is calm, cooperative,   ld1 

      appropriate for age. Pain: Denies pain. Neuro: Level of Consciousness is awake, alert,      

      obeys commands, Oriented to person, place, time, situation. Cardiovascular: Capillary       

      refill < 3 seconds Patient's skin is warm and dry. Respiratory: Airway is patent            

      Respiratory effort is even, unlabored. GI: Abdomen is flat, non-distended. : No signs     

      and/or symptoms were reported regarding the genitourinary system. EENT: No signs and/or     

      symptoms were reported regarding the EENT system. Derm: No signs and/or symptoms            

      reported regarding the dermatologic system. Musculoskeletal: No signs and/or symptoms       

      reported regarding the musculoskeletal system.                                              

                                                                                                  

Vital Signs:                                                                                      

10:56  / 78; Pulse 76; Resp 18; Temp 98.7; Pulse Ox 100% ; Weight 62.6 kg;              ap3 

11:14  / 63; Pulse 70; Resp 18; Pulse Ox 100% on R/A;                                   ld1 

                                                                                                  

ED Course:                                                                                        

10:49 Patient arrived in ED.                                                                  ts1 

10:51 Jonathan Davis DO is Attending Physician.                                                ms3 

10:58 Triage completed.                                                                       ap3 

11:01 Arm band placed on right wrist.                                                         ap3 

11:14 Merry Davis, RN is Primary Nurse.                                                      ld1 

11:14 Patient has correct armband on for positive identification. Placed in gown. Bed in low  ld1 

      position. Call light in reach. Side rails up X2. Cardiac monitor on. Pulse ox on. NIBP      

      on. Door closed. Noise minimized. Warm blanket given.                                       

11:14 BMP Sent.                                                                               ld1 

11:14 CBC with Diff Sent.                                                                     ld1 

11:14 Inserted saline lock: 20 gauge in right antecubital area, using aseptic technique.      ld1 

      Blood collected.                                                                            

11:14 No provider procedures requiring assistance completed.                                  ld1 

11:48 Provided Education on: na.                                                              ko1 

11:48 IV discontinued, intact, bleeding controlled, No redness/swelling at site. Pressure     ko1 

      dressing applied.                                                                           

                                                                                                  

Administered Medications:                                                                         

No medications were administered                                                                  

                                                                                                  

                                                                                                  

Medication:                                                                                       

11:14 VIS not applicable for this client.                                                     ld1 

                                                                                                  

Outcome:                                                                                          

11:44 Discharge ordered by MD.                                                                ms3 

11:48 Discharged to home via wheelchair, with family.                                         ko1 

11:48 Condition: stable                                                                           

11:48 Discharge instructions given to patient, family, Instructed on discharge instructions,      

      follow up and referral plans. Demonstrated understanding of instructions, follow-up         

      care.                                                                                       

11:53 Patient left the ED.                                                                    ko1 

                                                                                                  

Signatures:                                                                                       

Angela Galvan RN                    RN   ap3                                                  

Jonathan Davis DO DO   ms3                                                  

Merry Davis RN                        RN   ld1                                                  

Yvonne Mota RN                       RN   ko1                                                  

Apoorva Arnett PAS                     PAS  ts1                                                  

                                                                                                  

**************************************************************************************************

## 2023-09-13 NOTE — HP
Date of Admission:  09/13/2023



Time Of Service:  1:30 p.m.



Chief Complaint:  Fell and broke the right hip and a lot weaker.



History Of Present Illness:  Ms. Tavares is a 67-year-old, right-handed,  patient with hypert
ension, dyslipidemia, daily smoker, depression, arthritis, who was previously living independently wi
th a roommate when on the September 3, she slipped and fell.  She stepped on the grass, but fell and 
hit her right side on concrete.  She initially came to [facility], was x-rayed and found to 
have a right femur intertrochanteric fracture.  She was transferred to [facility] for 
higher level of care where she underwent right femur CMN and was discharged to skilled nursing facili
.  While there, she did have elevated white count around 16.6, hemoglobin slightly low at 9.3, and 
she began physical therapy.  However, she did not thrive, had significant weakness, and was only able
 to ambulate around 25 feet and would have dizziness.  She did have a drop of hemoglobin and hematocr
it over 8.  However, she did not have any repeat check of her hemoglobin and hematocrit.  She also wa
s malnourished and has lost some weight after her hip surgery and required careful management of her 
nutritional status.  She continued vilazodone for depression and she was put on ferrous sulfate for t
he anemia.  As a result of her failure to thrive and improve to return to a formally independent leve
l of functioning, she was determined to require more aggressive therapy to help her recover.  She als
o requires monitoring for chronic anemia and risk of falling and to address her risk of deep vein thr
ombosis and manage hypertension and dyslipidemia.  She was determined, therefore, to be a candidate f
or inpatient rehabilitation was admitted to the inpatient rehabilitation unit for physical and occupa
tional therapy along with skilled nursing and daily physician evaluation and management.



Past Medical History:  Hypertension, dyslipidemia, daily smoker, depression, arthritis.



Past Surgical History:  LASIK; L4-5 laminectomy done for spinal stenosis, and on the September 3, rig
ht femur intertrochanteric fracture surgery.



Allergies:  NO KNOWN DRUG ALLERGIES.



X-ray And Imaging:  On 09/03, she had an x-ray of the right femur showing an intertrochanteric fractu
re.



Medications:  Tylenol 500 mg every 4 hours as needed, Eliquis 2.5 mg twice daily, Coreg 3.125 mg twic
e daily, ferrous sulfate 325 mg daily, gabapentin 100 mg twice daily.  She has a lidocaine patch on t
he right thigh, lisinopril 5 mg daily, magnesium oxide 400 mg twice daily, melatonin 3 mg at bedtime,
 Robaxin 500 mg every 6 hours as needed, Hemocyte Plus 1 tablet daily, Senokot S 2 at bedtime.



Family History:  Noncontributory.



Social History:  No alcohol, tobacco, or IV drug use.  Lives in a single-family home.



Laboratory Studies:  White blood cell count currently 9, hemoglobin 8.9, hematocrit 25.8, platelets 5
50.  Sodium 138, potassium 3.8, glucose 103, BUN 20, creatinine 0.71, calcium 9.0.  Albumin 2.6.



Review of Systems:

Ms. Tavares reports moderate pain around 6/10 in the right hip, especially with weightbearing.  Some 
mild muscle spasms and myalgias.  Otherwise, no rash.  No other complaints, such as psychiatric issue
s.  No gastrointestinal or genitourinary complaints.  No fevers or chills.  Mood is stable despite he
r diagnosis of depression and she is on antidepressants.



Physical Examination:

Vital Signs:  Blood pressure 153/65, pulse of 82, respiratory rate 16, temperature 97.3, oxygen satur
ation 95% room air.  Weight 117 pounds, height 5 feet 1 inch, BMI 22.1. 

General:  Ms. Tavares is resting comfortably in bed. 

HEENT:  She is normocephalic, atraumatic.  Sclerae anicteric.  Oropharynx is pink and moist. 

Neck:  Supple. 

Chest:  Clear. 

Heart:  Regular. 

Extremities:  Show no significant clubbing, cyanosis, or edema. 

Neurological:  Alert, oriented to person, place, situation.  Follows commands appropriately.  The rig
ht lower extremity and giveaway weakness given the patient's recent fracture and surgery.  Otherwise,
 no focal neurological deficits in terms of sensation, coordination, balance, gait.



Current Level Of Functioning:  Currently, for eating, standby assistance as well for oral hygiene, to
ilet hygiene, showering moderate assistance.  Upper body dressing, contact guard assistance.  Lower b
swati dressing, moderate assistance.  Donning and doffing footwear, moderate assistance.  For sit-to-st
and, moderate assistance, transfer from bed to chair and chair to bed, moderate assistance.  Toilet t
ransfer, moderate assistance.  Ambulation, moderate assistance, only 25 feet.  Able to ambulate.  Whe
elchair mobilization, moderate assistance covering 50 feet.  Steps, she has a total assistance for 1 
step.



Rehabilitation And Medical Assessment And Plan:  Ms. Tavares was admitted to the inpatient rehabilita
on unit with impairment category 07 orthopedic lower extremity fracture.  Her impairment group code
 is 08.11 status post unilateral hip fracture.  Her etiologic diagnosis is right intertrochanteric fr
acture.  Her other comorbidities, decreased physical functioning, depression, hypertension, postopera
tive anemia, elevated platelets, and low white blood cell count.



Plan:  

1.She will have physical and occupational therapy for 3 hours a day, 5/7 days.

2.For hypertension, Coreg 3.125 mg twice daily along with Prinivil 5 mg daily.

3.For constipation, Senokot S 2 at night.

4.For anemia, Hemocyte Plus and ferrous sulfate.

5.For muscle spasms, magnesium oxide 400 mg twice daily and Robaxin 500 mg every 6 hours as needed.

6.Lidocaine patch as needed.

7.Eliquis 2.5 mg twice daily for DVT prophylaxis.

8.Tylenol also will be used for pain and if need be tramadol in addition to gabapentin 100 mg twice 
daily.



Impact Of Comorbidities:  She did have a significant drop in her hemoglobin and hematocrit after surg
aaliyah.  She will have a repeat hemoglobin hematocrit and if need be, transfusions will be done as appro
priate if the hemoglobin drops below 7.  She also has a fall risk, fall precautions adhered to all ti
mes, gait belt and walker when ambulating.  If need be, chair alarm or bed alarm will be applied.  Ot
herwise, she does have depression.  She is on antidepressants and there is a risk of constipation and
 risk of deep vein thrombosis that mitigated with medications as noted.



Rehab Specific Plan:  

1.Ms. Tavares will have physical and occupational therapy 3 hours a day, 5/7 days.  If need be, [facility] will be involved for half an hour a day, 5 of 7 days.

2.She will have skilled nursing on continuous daily basis.

3.Physician evaluation daily.

4.Ms. Tavares and family have a good understanding of the benefits of interdisciplinary inpatient re
habilitation and she has a potential to make good improvement requiring at least 2 disciplines includ
ing the physical and occupational therapy and potentially speech therapy.  If need be services from [facility] Care from Nutrition will be consulted.  Given her complex medical condition and risk of further 
complications, rehabilitation cannot be safely or affectively carried out as a lower level facility s
uch as skilled nursing, which she already demonstrated as she failed to thrive while in skilled nursi
ng.



Barriers To Discharge:  

1.Currently, with history of anemia, may require further evaluation such as a GI workup if bleeding 
continues.  Currently, bleeding is stabilized.

2.Length of stay around 11-12 days.



Disposition:  Home with family.



Prognosis:  Good.



Rehabilitation Goals:  

1.Become independent with upper and lower body dressing.

2.Independent with transferring from bed to chair to toilet to wheelchair.

3.Independent with showering and performing all activities of daily living.

4.Independent mobilizing with a rolling walker 500 feet.

5.Independent going up and down 15 steps.

6.Independent with all cognitive functioning.

7.The above goals were reviewed with Ms. Tavares and she is in agreement. 



I acknowledge I personally performed a full physical examination on Ms. Tavares no later than 24 hour
s after admission to the inpatient rehabilitation facility and determined that she is able to tolerat
e the above course of treatment at an intensive level for reasonable period of time.  A detailed brielle
vidualized plan of care for her will be completed by hospital day 4 based on the preadmission screen,
 history and physical, and therapy evaluations.





HAMMAD/KACEY

DD:  09/13/2023 17:41:18Voice ID:  [id number]

## 2023-09-13 NOTE — EDPHYS
Physician Documentation                                                                           

 [facility]                                                                 

Name: Meaghan Tavares                                                                            

Age: 67 yrs                                                                                       

Sex: Female                                                                                       

: 1955                                                                                   

MRN: [id number]                                                                                   

Arrival Date: 2023                                                                          

Time: 10:47                                                                                       

Account#: [id number]                                                                            

Bed 2                                                                                             

Private MD:                                                                                       

ED Physician Jonathan Davis                                                                         

HPI:                                                                                              

                                                                                             

11:04 This 67 yrs old  Female presents to ER via Wheelchair with complaints of        ms3 

      Abnormal Lab Results.                                                                       

11:04 67-year-old female with past medical history of H. pylori, hypertension,                ms3 

      hyperlipidemia, arthritis, depression presents for lightheadedness, generalized             

      weakness, and anemia. Patient denies pain. Patient denies any alleviating or inciting       

      factors..                                                                                   

                                                                                                  

Historical:                                                                                       

- Allergies:                                                                                      

10:58 No Known Allergies;                                                                     ap3 

- PMHx:                                                                                           

10:58 H Pyloi; Hypertension; Hypercholesterolemia; Arthritis; Depressive disorder;            ap3 

      Cerebrovascular accident; Anemia;                                                           

- PSHx:                                                                                           

10:58 spinal fusion L4-L5; right hip;                                                         ap3 

                                                                                                  

- Immunization history:: Client reports receiving the 2nd dose of the Covid vaccine.              

- Social history:: Smoking status: Patient reports the use of cigarette tobacco                   

  products, smokes one-half pack cigarettes per day.                                              

                                                                                                  

                                                                                                  

ROS:                                                                                              

11:04 Constitutional: Negative for fever, and chills. Neck: Negative for injury, pain, and    ms3 

      swelling, Cardiovascular: Negative for chest pain, and palpitations. Respiratory:           

      Negative for shortness of breath, cough, wheezing, and pleuritic chest pain,                

      Abdomen/GI: Negative for abdominal pain, nausea, vomiting, diarrhea, and constipation,      

      MS/Extremity: Negative for injury and deformity, Skin: Negative for injury, rash, and       

      discoloration.                                                                              

11:04 All other systems are negative.                                                             

                                                                                                  

Exam:                                                                                             

11:04 Constitutional:  This is a well developed, well nourished patient who is awake, alert,  ms3 

      and in no acute distress. Head/Face:  Normocephalic, atraumatic. Neck:  Trachea             

      midline, no cervical lymphadenopathy.  Supple, full range of motion without nuchal          

      rigidity, or vertebral point tenderness.  No Meningismus. Chest/axilla:  Normal chest       

      wall appearance and motion.  Nontender with no deformity.   Cardiovascular:  Regular        

      rate and rhythm with a normal S1 and S2.  No gallops, murmurs, or rubs.  Normal PMI, no     

      JVD.  No pulse deficits. Respiratory:  Lungs have equal breath sounds bilaterally,          

      clear to auscultation and percussion.  No rales, rhonchi or wheezes noted.  No              

      increased work of breathing, no retractions or nasal flaring. Abdomen/GI:  Soft,            

      non-tender, with normal bowel sounds.  No distension or tympany.  No guarding or            

      rebound.  No evidence of tenderness throughout. MS/ Extremity:  Pulses equal, no            

      cyanosis.  Neurovascular intact.  Full, normal range of motion.                             

11:04 Skin: Ecchymosis right lateral hip.                                                         

                                                                                                  

Vital Signs:                                                                                      

10:56  / 78; Pulse 76; Resp 18; Temp 98.7; Pulse Ox 100% ; Weight 62.6 kg;              ap3 

11:14  / 63; Pulse 70; Resp 18; Pulse Ox 100% on R/A;                                   ld1 

                                                                                                  

MDM:                                                                                              

11:02 Patient medically screened.                                                             ms3 

11:04 Differential Diagnosis Anemia vs Electrolyte abnormality vs Deconditioning .            ms3 

11:44 Data reviewed: vital signs, nurses notes, and as a result, I will discharge patient.    ms3 

      Consideration of Admission/Observation Patient will go to inpatient rehab. Care             

      significantly affected by the following chronic conditions: Hypertension. Counseling: I     

      had a detailed discussion with the patient and/or guardian regarding the historical         

      points, exam findings, and any diagnostic results supporting the discharge/admit            

      diagnosis, lab results, Need for rehab. Special discussion: I discussed with the            

      patient/guardian in detail that at this point there is no indication for admission to       

      the hospital. It is understood, however, that if the symptoms persist or worsen the         

      patient needs to return immediately for re-evaluation. ED course: Discussed patient         

      with Bobbi with inpatient rehab. Patient will go to inpatient rehab from the emergency     

      department. Patient was aware of this. All questions answered.                              

                                                                                                  

                                                                                             

11:01 Order name: CBC with Diff; Complete Time: 11:39                                         ms3 

                                                                                             

11:01 Order name: BMP; Complete Time: 11:39                                                   ms3 

                                                                                                  

Administered Medications:                                                                         

No medications were administered                                                                  

                                                                                                  

                                                                                                  

Disposition Summary:                                                                              

23 11:44                                                                                    

Discharge Ordered                                                                                 

      Location: Home                                                                          ms3 

      Condition: Stable                                                                       ms3 

      Diagnosis                                                                                   

        - Anemia, unspecified                                                                 ms3 

        - Essential (primary) hypertension                                                    ms3 

      Followup:                                                                               ms3 

        - With: Private Physician                                                                  

        - When: Today                                                                              

        - Reason:                                                                                  

      Discharge Instructions:                                                                     

        - Discharge Summary Sheet                                                             ms3 

        - Anemia                                                                              ms3 

        - Hypertension, Adult                                                                 ms3 

      Forms:                                                                                      

        - Medication Reconciliation Form                                                      ms3 

        - Thank You Letter                                                                    ms3 

        - Antibiotic Education                                                                ms3 

        - Prescription Opioid Use                                                             ms3 

        - Patient Portal Instructions                                                         ms3 

        - Leadership Thank You Letter                                                         ms3 

Signatures:                                                                                       

Dispatcher [employer]                           Angela Ivan, RN                    RN   ap3                                                  

Jonathan Davis DO                        DO   ms3                                                  

                                                                                                  

**************************************************************************************************

## 2023-09-14 LAB
ALBUMIN SERPL BCP-MCNC: 2.2 G/DL (ref 3.4–5)
BUN BLD-MCNC: 15 MG/DL (ref 7–18)
GLUCOSE SERPLBLD-MCNC: 100 MG/DL (ref 74–106)
HCT VFR BLD CALC: 22.2 % (ref 36–45)
LYMPHOCYTES # SPEC AUTO: 1.8 K/UL (ref 0.7–4.9)
MAGNESIUM SERPL-MCNC: 2.3 MG/DL (ref 1.6–2.4)
MCV RBC: 89.3 FL (ref 80–100)
PMV BLD: 6.4 FL (ref 7.6–11.3)
POTASSIUM SERPL-SCNC: 3.7 MEQ/L (ref 3.5–5.1)
PREALB SERPL-MCNC: 15.6 MG/DL (ref 20–40)
RBC # BLD: 2.48 M/UL (ref 3.86–4.86)
WBC # BLD AUTO: 7.9 THOU/UL (ref 4.3–10.9)

## 2023-09-14 RX ADMIN — GABAPENTIN SCH MG: 100 CAPSULE ORAL at 19:21

## 2023-09-14 RX ADMIN — MAGNESIUM OXIDE TAB 400 MG (241.3 MG ELEMENTAL MG) SCH MG: 400 (241.3 MG) TAB at 08:13

## 2023-09-14 RX ADMIN — Medication SCH TAB: at 08:12

## 2023-09-14 RX ADMIN — ACETAMINOPHEN PRN MG: 500 TABLET, FILM COATED ORAL at 08:13

## 2023-09-14 RX ADMIN — IRON SUPPLEMENT SCH MG: 325 TABLET ORAL at 08:12

## 2023-09-14 RX ADMIN — MAGNESIUM OXIDE TAB 400 MG (241.3 MG ELEMENTAL MG) SCH MG: 400 (241.3 MG) TAB at 19:21

## 2023-09-14 RX ADMIN — APIXABAN SCH MG: 2.5 TABLET, FILM COATED ORAL at 19:21

## 2023-09-14 RX ADMIN — GABAPENTIN SCH MG: 100 CAPSULE ORAL at 08:12

## 2023-09-14 RX ADMIN — Medication SCH PATCH: at 08:12

## 2023-09-14 RX ADMIN — APIXABAN SCH MG: 2.5 TABLET, FILM COATED ORAL at 08:13

## 2023-09-14 NOTE — PN
Date of Progress Note:  09/14/2023



Time Of Service:  1:55 p.m.



Subjective:  Ms. Tavares is doing well.  She has no new complaints.  Pain while in bed 2/10, while am
bulating is 7-8/10, but that is mitigated by her pain medications.  Mild muscle spasms.  She says she
 slept well.  No issues with bowel movements.  No other issues on subjective.



Review of Systems:

No fevers, chills, nausea, vomiting.  No significant myalgias or arthralgias.  No rash.  No headache.




Physical Examination:

Vital Signs:  Blood pressure 132/68, pulse 74, respiratory rate 16, temperature 97.0, oxygen saturati
on 97%. 

General:  Ms. Tavares is resting comfortably in bed.  She is in no acute distress. 

HEENT:  She is normocephalic, atraumatic.  Sclerae anicteric.  Oropharynx is moist and pink. 

Neck:  Supple. 

Chest:  Clear. 

Heart:  Regular. 

Extremities:  No significant clubbing, cyanosis, or edema.  The right hip intertrochanteric fracture 
surgical site has good hemostasis.



Laboratory Studies:  Complete blood count differential shows a slightly low hemoglobin of 7.9, white 
blood cell count is also 7.9, platelets 482.  Sodium 139, potassium 3.7, chloride 109 carbon dioxide 
25, BUN 15, creatinine 0.64.  Prealbumin 15.6, albumin 2.2.  Urinalysis shows esterase 25, trace prot
ein, otherwise normal.



X-rays/imaging:  No new x-rays or imaging.



Medications:  Tylenol Extra Strength 500 mg every 4 hours as needed, Eliquis 2.5 mg twice daily, Core
g 3.125 mg twice daily, ferrous sulfate 325 mg daily, gabapentin 100 mg twice daily, lidocaine patch 
apply topically daily, Prinivil 5 mg daily, magnesium oxide 400 mg twice daily, melatonin 3 at bedtim
e, Robaxin 500 mg every 6 hours, Hemocyte Plus 1 tablet  daily, Senokot S 2 at bedtime.



Current Functional Status:  Today, she was able to ambulate 50 feet with minimum assistance using a r
olling walker.  In the afternoon, she ambulated 50 feet twice and another 30 feet with minimal assist
ance using a rolling walker.  She self propels wheelchair 100 feet with minimum assistance.  Supine-t
o-sit transfers done with minimum assistance.  Sit-to-stand transfers also with minimum assistance.  
With her occupational therapy, bathing supervision, upper body dressing requires supervision, lower b
swati dressing, supervision.



Progress Towards Rehabilitation Goals:  Ms. Tavares is making good progress towards her goals of mobi
lizing 250 feet with a wheelchair and beginning to do more than household distances with a rolling wa
lker with modified independence.  Also, she is doing well with cognitive functioning and no issues an
d is at modified independence.



Assessment:  Ms. Tavares is a 67-year-old patient in the rehabilitation unit with a right femur inter
trochanteric fracture status post surgical repair.  She has comorbid depression, hypertension, postop
erative anemia, elevated platelets, decrease in physical functioning.



Plan:  

1.Continue with physical and occupational therapy 3 hours a day, 5/7 days.

2.For her hypertension, Coreg and Prinivil.

3.For her constipation, Senokot.

4.For her anemia, Hemocyte Plus, ferrous sulfate.  She will be followed to see if the transfusion is
 required.

5.For muscle spasms, Robaxin and magnesium and will continue lidocaine patch as appropriate.  For DV
T prophylaxis, Eliquis 2.5 mg twice daily.  Again, Tylenol, tramadol, and gabapentin for pain.



Comorbidities That Continue To Impact Rehabilitation Process:  Currently, she has significant anemia.
  However, she has Hemocyte Plus and iron and ferrous sulfate.  She will have as appropriate repeat h
emoglobin, hematocrit, and may receive a unit or 2 of blood if need 

be.  Otherwise, she has antidepressants for depression and bowel movements are doing well.





LB/MODL

DD:  09/14/2023 19:26:54Voice ID:  [id number]

DT:  09/14/2023 20:28:43Report ID:  [id number]

## 2023-09-15 RX ADMIN — GABAPENTIN SCH MG: 100 CAPSULE ORAL at 19:38

## 2023-09-15 RX ADMIN — GABAPENTIN SCH MG: 100 CAPSULE ORAL at 07:46

## 2023-09-15 RX ADMIN — MELATONIN PRN MG: 3 TAB ORAL at 19:38

## 2023-09-15 RX ADMIN — IRON SUPPLEMENT SCH MG: 325 TABLET ORAL at 07:46

## 2023-09-15 RX ADMIN — Medication SCH TAB: at 07:45

## 2023-09-15 RX ADMIN — Medication SCH PATCH: at 07:50

## 2023-09-15 RX ADMIN — MAGNESIUM OXIDE TAB 400 MG (241.3 MG ELEMENTAL MG) SCH MG: 400 (241.3 MG) TAB at 07:46

## 2023-09-15 RX ADMIN — Medication SCH ML: at 19:38

## 2023-09-15 RX ADMIN — MAGNESIUM OXIDE TAB 400 MG (241.3 MG ELEMENTAL MG) SCH MG: 400 (241.3 MG) TAB at 19:38

## 2023-09-15 RX ADMIN — APIXABAN SCH MG: 2.5 TABLET, FILM COATED ORAL at 07:46

## 2023-09-15 RX ADMIN — SENNOSIDES AND DOCUSATE SODIUM PRN TAB: 8.6; 5 TABLET ORAL at 19:38

## 2023-09-15 RX ADMIN — APIXABAN SCH MG: 2.5 TABLET, FILM COATED ORAL at 19:37

## 2023-09-15 RX ADMIN — TRAMADOL HYDROCHLORIDE PRN MG: 50 TABLET, COATED ORAL at 14:31

## 2023-09-15 RX ADMIN — ACETAMINOPHEN PRN MG: 500 TABLET, FILM COATED ORAL at 07:52

## 2023-09-15 NOTE — P.RH.PN
Estimated Length of Stay: 13


Expected Discharge Date: 09/28/23


Discharge Disposition Plan: Home


Family Support: Yes


Long Term Goal: Mobility, Transfers, Self Care


Vital Signs: 


                                Last Vital Signs











Temp  97.2 F   09/15/23 07:30


 


Pulse  77   09/15/23 07:46


 


Resp  17   09/15/23 11:58


 


BP  133/69   09/15/23 07:46


 


Pulse Ox  97   09/15/23 11:58











Laboratory: 


                             Laboratory Last Values











WBC  7.90 thou/uL (4.3-10.9)   09/14/23  03:18    


 


RBC  2.48 M/uL (3.86-4.86)  L  09/14/23  03:18    


 


Hgb  7.9 g/dL (12.0-15.0)  L D 09/14/23  03:18    


 


Hct  22.2 % (36.0-45.0)  L  09/14/23  03:18    


 


MCV  89.3 fL ()   09/14/23  03:18    


 


MCH  32.0 pg (27.0-35.0)   09/14/23  03:18    


 


MCHC  35.8 g/dL (32.0-36.0)   09/14/23  03:18    


 


RDW  15.1 % (12.1-15.2)   09/14/23  03:18    


 


Plt Count  482 thou/uL (152-406)  H  09/14/23  03:18    


 


MPV  6.4 fL (7.6-11.3)  L  09/14/23  03:18    


 


Neutrophils %  64.3 % (41.7-73.7)   09/14/23  03:18    


 


Lymphocytes %  23.4 % (15.3-44.8)   09/14/23  03:18    


 


Monocytes %  10.3 % (3.3-12.3)   09/14/23  03:18    


 


Eosinophils %  1.5 % (0-4.4)   09/14/23  03:18    


 


Basophils %  0.5 % (0-1.3)   09/14/23  03:18    


 


Absolute Neutrophils  5.1 K/uL (1.8-8.0)   09/14/23  03:18    


 


Absolute Lymphocytes  1.8 K/uL (0.7-4.9)   09/14/23  03:18    


 


Absolute Monocytes  0.8 K/uL (0.1-1.3)   09/14/23  03:18    


 


Absolute Eosinophils  0.1 K/uL (0-0.5)   09/14/23  03:18    


 


Absolute Basophils  0.0 K/uL (0-0.5)   09/14/23  03:18    


 


Sodium  139 mEq/L (136-145)   09/14/23  03:18    


 


Potassium  3.7 mEq/L (3.5-5.1)   09/14/23  03:18    


 


Chloride  109 mEq/L ()  H  09/14/23  03:18    


 


Carbon Dioxide  25 mEq/L (21-32)   09/14/23  03:18    


 


Anion Gap  8.7 mEq/L (5.0-15.0)   09/14/23  03:18    


 


BUN  15 mg/dL (7-18)   09/14/23  03:18    


 


Creatinine  0.64 mg/dL (0.55-1.02)   09/14/23  03:18    


 


Est GFR (CKD-EPI)  97 ml/min (=/>90)   09/14/23  03:18    


 


Glucose  100 mg/dL ()   09/14/23  03:18    


 


Calcium  8.1 mg/dL (8.5-10.1)  L D 09/14/23  03:18    


 


Magnesium  2.3 mg/dL (1.6-2.4)   09/14/23  03:18    


 


Albumin  2.2 g/dL (3.4-5.0)  L  09/14/23  03:18    


 


Prealbumin  15.6 mg/dL (20-40)  L  09/14/23  03:18    


 


Urine Color  Yellow  (Yellow)   09/13/23  21:00    


 


Urine Clarity  Clear  (Clear)   09/13/23  21:00    


 


Urine pH  5.5  (5.0-7.0)   09/13/23  21:00    


 


Ur Specific Gravity  1.028  (1.005-1.030)   09/13/23  21:00    


 


Glucose (UA)(Auto)  Negative  (Negative)   09/13/23  21:00    


 


Urine Ketones  Negative  (Negative)   09/13/23  21:00    


 


Urine Blood  Negative  (Negative)   09/13/23  21:00    


 


Urine Nitrite  Negative  (Negative)   09/13/23  21:00    


 


Urine Bilirubin  Negative  (Negative)   09/13/23  21:00    


 


Urine Urobilinogen  Normal  (Normal)   09/13/23  21:00    


 


Ur Leukocyte Esterase  25 Sanjay/uL (Negative)  H  09/13/23  21:00    


 


Urine RBC  None seen /HPF (None Seen)   09/13/23  21:00    


 


Urine WBC  <5 /HPF (<5)   09/13/23  21:00    


 


Ur Squamous Epith Cells  <5 /HPF (None Seen)   09/13/23  21:00    


 


Calcium Oxalate Crystal  Few /HPF (None Seen)   09/13/23  21:00    


 


Amorphous Crystals  Trace /HPF (None Seen)   09/13/23  21:00    


 


Urine Bacteria  None seen /HPF (<20)   09/13/23  21:00    


 


Urine Mucus  1+ /HPF (None Seen)   09/13/23  21:00    


 


Urine Culture Reflexed  Not needed   09/13/23  21:00    


 


Urine Total Protein  Trace  (Negative)  H  09/13/23  21:00    











Weight: 117 lb 1.6 oz


Wound Present: No


Closed Surgical Incision Present: No


Negative Pressure Wound Therapy Present: No


Physician Update: Labs were reviewed and are stable. Mild malnutrition, will add

ensure plus. BIMS 13, SLUMS 17. She may require and ENT evaluation. Incentive 

spirometry. Met 3 ot 4 goals, walked 100' with min assistance using a walker. Up

and down 10 steps.  Fatigue easily. Max assistance with foot wear.


Summary: Patient's care plan and long term goals have been reviewed and revised 

as necessary. Please see the Rehabilitation Signature page for all necessary 

signatures.

## 2023-09-16 RX ADMIN — Medication SCH TAB: at 08:06

## 2023-09-16 RX ADMIN — APIXABAN SCH MG: 2.5 TABLET, FILM COATED ORAL at 08:05

## 2023-09-16 RX ADMIN — SENNOSIDES AND DOCUSATE SODIUM PRN TAB: 8.6; 5 TABLET ORAL at 20:21

## 2023-09-16 RX ADMIN — MELATONIN PRN MG: 3 TAB ORAL at 20:22

## 2023-09-16 RX ADMIN — Medication SCH ML: at 08:06

## 2023-09-16 RX ADMIN — Medication SCH PATCH: at 08:05

## 2023-09-16 RX ADMIN — APIXABAN SCH MG: 2.5 TABLET, FILM COATED ORAL at 20:21

## 2023-09-16 RX ADMIN — TRAMADOL HYDROCHLORIDE PRN MG: 50 TABLET, COATED ORAL at 18:27

## 2023-09-16 RX ADMIN — GABAPENTIN SCH MG: 100 CAPSULE ORAL at 20:22

## 2023-09-16 RX ADMIN — Medication SCH ML: at 20:22

## 2023-09-16 RX ADMIN — MAGNESIUM OXIDE TAB 400 MG (241.3 MG ELEMENTAL MG) SCH MG: 400 (241.3 MG) TAB at 08:06

## 2023-09-16 RX ADMIN — MAGNESIUM OXIDE TAB 400 MG (241.3 MG ELEMENTAL MG) SCH MG: 400 (241.3 MG) TAB at 20:21

## 2023-09-16 RX ADMIN — GABAPENTIN SCH MG: 100 CAPSULE ORAL at 08:06

## 2023-09-16 RX ADMIN — IRON SUPPLEMENT SCH MG: 325 TABLET ORAL at 08:06

## 2023-09-16 RX ADMIN — TRAMADOL HYDROCHLORIDE PRN MG: 50 TABLET, COATED ORAL at 08:24

## 2023-09-17 RX ADMIN — APIXABAN SCH MG: 2.5 TABLET, FILM COATED ORAL at 19:27

## 2023-09-17 RX ADMIN — Medication SCH ML: at 19:27

## 2023-09-17 RX ADMIN — MELATONIN PRN MG: 3 TAB ORAL at 19:27

## 2023-09-17 RX ADMIN — TRAMADOL HYDROCHLORIDE PRN MG: 50 TABLET, COATED ORAL at 16:59

## 2023-09-17 RX ADMIN — MAGNESIUM OXIDE TAB 400 MG (241.3 MG ELEMENTAL MG) SCH MG: 400 (241.3 MG) TAB at 19:26

## 2023-09-17 RX ADMIN — Medication SCH TAB: at 07:29

## 2023-09-17 RX ADMIN — MAGNESIUM OXIDE TAB 400 MG (241.3 MG ELEMENTAL MG) SCH MG: 400 (241.3 MG) TAB at 07:29

## 2023-09-17 RX ADMIN — Medication SCH PATCH: at 07:30

## 2023-09-17 RX ADMIN — Medication SCH ML: at 07:30

## 2023-09-17 RX ADMIN — MAGNESIUM HYDROXIDE PRN ML: 400 SUSPENSION ORAL at 14:07

## 2023-09-17 RX ADMIN — APIXABAN SCH MG: 2.5 TABLET, FILM COATED ORAL at 07:29

## 2023-09-17 RX ADMIN — GABAPENTIN SCH MG: 100 CAPSULE ORAL at 07:29

## 2023-09-17 RX ADMIN — IRON SUPPLEMENT SCH MG: 325 TABLET ORAL at 07:28

## 2023-09-17 RX ADMIN — GABAPENTIN SCH MG: 100 CAPSULE ORAL at 19:26

## 2023-09-17 RX ADMIN — TRAMADOL HYDROCHLORIDE PRN MG: 50 TABLET, COATED ORAL at 07:27

## 2023-09-17 NOTE — PN
Date of Progress Note:  09/17/2023



Time Of Service:  2 p.m.



Subjective:  Ms. Tavares is resting comfortably in bed.  She is in no acute distress.  She is happy w
ith therapy so far and beginning to make good progress.



Review of Systems:

No fevers or chills.  No nausea or vomiting.  No myalgias, arthralgias, or rash.  The right hip surgi
song site has good hemostasis.  No significant or worsening edema in the right lower extremity.



Physical Examination:

Vital Signs:  Blood pressure 130/65, pulse 67, respiratory rate 16, temperature 97, oxygen saturation
 99%. 

General:  Ms. Tavares is resting comfortably, in no acute distress. 

HEENT:  She is normocephalic, atraumatic.  Sclerae anicteric.  Oropharynx moist. 

Neck:  Supple. 

Chest:  Clear. 

Heart:  Regular. 

Extremities:  Showed no significant edema or cyanosis except some mild expected postoperative slight 
swelling in the right lower extremity.



Laboratory Studies:  No new laboratory studies.



X-rays/imaging:  No new x-rays or imaging.



Medications:  Have been reviewed and remained unchanged.  She does have medications for her significa
nt anemia which is ferrous sulfate and Hemocyte Plus.  She still has Eliquis 2.5 mg twice daily for h
er DVT prophylaxis.  Has gabapentin for neuropathic pain.  Prinivil for hypertension.  Senokot S for 
constipation.  Ultram for pain.  Robaxin for muscle spasms, and Ensure High protein for mild malnutri
tion.  She does take Coreg 3.125 mg twice daily for hypertension.



Current Functional Status:  Ms. Tavares was able to ambulate 100 feet, 200 feet, another 60 feet twic
e, and 65 feet twice with contact guard assistance.  She required frequent cuing.  Stand pivot transf
ers from bed to wheelchair done with contact guard assistance.  From wheelchair to toilet with contac
t guard assistance.  Supine to sit from edge of bed with minimum assistance.  Today, slipping-on shoe
s with supervision and supervision for use of a towel to catch crumbs when feeding herself.  Complete
d ball balancing and tossing while seated on the mat 2 sets of 30 with rest breaks.



Progress Towards Rehabilitation Goals:  Ms. Tavares is making great progress towards her goals of bec
oming independent with upper and lower body dressing, transferring, toileting, showering, ambulating 
250 feet with modified independence, up and down 10 steps with modified independence and to continue 
performing cognitive functioning independently.



Assessment And Plan:  Ms. Tavares is a 67-year-old patient in the rehabilitation unit with a right fe
mur fracture that is an intertrochanteric fracture, status post open reduction and internal fixation.
  She is doing very well with physical and occupational therapy.  She has comorbid depression, hypert
ension, postoperative anemia, malnutrition, elevated platelets, decrease in physical functioning. 



Plan:

1.Continue with physical and occupational therapy 3 hours a day, 5 of 7 days.

2.Medications that are listed above, she will continue for hypertension, for constipation, for malnu
trition, for anemia, for muscle spasms, for neuropathic pain, for insomnia and constipation.  She jason
l also continue Eliquis for DVT prophylaxis.



Comorbidities That Continue To Impact Rehabilitation:  Currently, the biggest issue is her anemia.  S
he did receive blood prior to coming in.  Her hemoglobin on 09/14 is 7.9.  It will be checked again t
omorrow and as needed she will receive blood transfusion.





HAMMAD/KACEY

DD:  09/17/2023 15:31:25Voice ID:  [id number]

DT:  09/17/2023 20:10:51Report ID:  [id number]

## 2023-09-18 RX ADMIN — Medication SCH ML: at 19:44

## 2023-09-18 RX ADMIN — GABAPENTIN SCH MG: 100 CAPSULE ORAL at 07:38

## 2023-09-18 RX ADMIN — Medication SCH ML: at 07:39

## 2023-09-18 RX ADMIN — APIXABAN SCH MG: 2.5 TABLET, FILM COATED ORAL at 07:38

## 2023-09-18 RX ADMIN — APIXABAN SCH MG: 2.5 TABLET, FILM COATED ORAL at 19:44

## 2023-09-18 RX ADMIN — TRAMADOL HYDROCHLORIDE PRN MG: 50 TABLET, COATED ORAL at 08:00

## 2023-09-18 RX ADMIN — Medication SCH PATCH: at 07:38

## 2023-09-18 RX ADMIN — Medication SCH TAB: at 07:38

## 2023-09-18 RX ADMIN — MAGNESIUM OXIDE TAB 400 MG (241.3 MG ELEMENTAL MG) SCH MG: 400 (241.3 MG) TAB at 19:43

## 2023-09-18 RX ADMIN — MAGNESIUM OXIDE TAB 400 MG (241.3 MG ELEMENTAL MG) SCH MG: 400 (241.3 MG) TAB at 07:39

## 2023-09-18 RX ADMIN — ONDANSETRON PRN MG: 4 TABLET, ORALLY DISINTEGRATING ORAL at 12:05

## 2023-09-18 RX ADMIN — GABAPENTIN SCH MG: 100 CAPSULE ORAL at 19:44

## 2023-09-18 RX ADMIN — HYDROCODONE BITARTRATE AND ACETAMINOPHEN PRN TAB: 5; 325 TABLET ORAL at 14:50

## 2023-09-18 RX ADMIN — IRON SUPPLEMENT SCH MG: 325 TABLET ORAL at 07:38

## 2023-09-18 RX ADMIN — ACETAMINOPHEN PRN MG: 500 TABLET, FILM COATED ORAL at 11:11

## 2023-09-18 RX ADMIN — MELATONIN PRN MG: 3 TAB ORAL at 19:46

## 2023-09-19 RX ADMIN — Medication SCH ML: at 19:24

## 2023-09-19 RX ADMIN — APIXABAN SCH MG: 2.5 TABLET, FILM COATED ORAL at 07:24

## 2023-09-19 RX ADMIN — MAGNESIUM OXIDE TAB 400 MG (241.3 MG ELEMENTAL MG) SCH MG: 400 (241.3 MG) TAB at 08:05

## 2023-09-19 RX ADMIN — ONDANSETRON PRN MG: 4 TABLET, ORALLY DISINTEGRATING ORAL at 09:59

## 2023-09-19 RX ADMIN — HYDROCODONE BITARTRATE AND ACETAMINOPHEN PRN TAB: 5; 325 TABLET ORAL at 12:37

## 2023-09-19 RX ADMIN — APIXABAN SCH MG: 2.5 TABLET, FILM COATED ORAL at 19:23

## 2023-09-19 RX ADMIN — HYDROCODONE BITARTRATE AND ACETAMINOPHEN PRN TAB: 5; 325 TABLET ORAL at 07:24

## 2023-09-19 RX ADMIN — GABAPENTIN SCH MG: 100 CAPSULE ORAL at 19:23

## 2023-09-19 RX ADMIN — MAGNESIUM OXIDE TAB 400 MG (241.3 MG ELEMENTAL MG) SCH MG: 400 (241.3 MG) TAB at 19:24

## 2023-09-19 RX ADMIN — Medication SCH ML: at 08:05

## 2023-09-19 RX ADMIN — Medication SCH TAB: at 07:24

## 2023-09-19 RX ADMIN — IRON SUPPLEMENT SCH MG: 325 TABLET ORAL at 07:24

## 2023-09-19 RX ADMIN — GABAPENTIN SCH MG: 100 CAPSULE ORAL at 07:24

## 2023-09-19 RX ADMIN — Medication SCH PATCH: at 07:22

## 2023-09-19 NOTE — PN
Date of Progress Note:  09/18/2023



Face-to-Face Progress Note Visit



Time Of Service:  1:50 p.m.



Subjective:  Ms. Tavares is resting comfortably in a chair beside the bed.  She has no complaints.  S
he did say the pain did go when ambulating up to 7/10 and she did have a patch on the right knee and 
will have pain medications adjusted accordingly.  Otherwise, no complaints.  She did say aside from t
hat, that there were some muscle spasms at night in the legs.



Review of Systems:

Has some myalgias, arthralgias, and muscle spasms.  No rash.  No psychiatric issues.  No genitourinar
y or gastrointestinal active issues.



Physical Examination:

Vital Signs:  Blood pressure 140/71, pulse of 89, respiratory rate 16, temperature 97.3. 

General:  Ms. Tavares is sitting in a chair beside bed, watching and looking outside the window.  She
 is in no significant distress. 

HEENT:  She appears normocephalic and atraumatic.  Sclerae anicteric.  Oropharynx moist. 

Neck:  Supple. 

Chest:  Clear. 

Extremities:  She does have good hemostasis at the right hip surgical site and no significant edema n
oted in that extremity.



Laboratory Studies:  White blood cell count 7.9, hemoglobin 7.9, platelets 482.  Sodium 139, potassiu
m 3.7, BUN 15, creatinine 0.64, prealbumin 15.6, albumin 2.2.



X-rays/imaging:  No new x-rays or imaging.



Medications:  Medications have been reviewed and she has Ultram 50 mg every 6 hours and Norco added 5
/325 every 6 hours for pain.  She has also gabapentin 100 mg twice daily.  Other medications have rem
ain unchanged.



Current Functional Status:  Today, she did show complete improvement with her transfers and placement
 with supine to sit, bed mobility as well as supervision for toileting and sit-to-stand transfers.  T
campbell, she ambulated 75 feet 5 times and 50 feet 3 times with contact guard assistance.  She ascended 
and descended 10 steps with bilateral handrails and contact guard assistance.  She mobilized wheelcha
ir 350 feet with multiple rest breaks.



Progress Towards Rehabilitation Goals:  Ms. Tavares is making great progress, although she was somewh
at limited by pain in that will be mitigated by adjusting pain medications.  She is ambulating well, 
moving wheelchair well, transferring better.



Assessment:  Ms. Tavares is a 67-year-old patient in the rehabilitation unit with a right intertrocha
nteric fracture, status post surgical repair.  She has comorbid depression, hypertension, postoperati
ve anemia, malnutrition, and decrease in physical functioning with insomnia.



Plan:  

1.Continue with physical and occupational therapy 3 hours a day, 5 of 7 days.

2.Continue with Tylenol Extra Strength 500 mg every 4 hours along with Norco 5/325 and tramadol as n
eeded for pain related to her hip fracture; ferrous sulfate and Hemocyte Plus for anemia; Coreg and l
isinopril for hypertension; methocarbamol for muscle spasms along with magnesium oxide; and Zofran as
 needed for nausea.



Comorbidities That Continue To Impact The Rehabilitation:  Her pain level initially is somewhat of a 
mild hindrance, but again medications have been adjusted including the Norco along with gabapentin an
d the goal is to get her pain level of 3 out of 10, so that she can participate without difficulties.
  Also, she had some muscle spasms and she has Robaxin and magnesium and those are helpful.





HAMMAD/MODL

DD:  09/18/2023 20:04:37Voice ID:  [id number]

DT:  09/19/2023 01:29:18Report ID:  [id number]

## 2023-09-19 NOTE — PN
Date of Progress Note:  09/19/2023



Time Of Service:  1:45 p.m.



Subjective:  Ms. Tavares did report some muscle spasms and pain at night.  She did have some pain as 
she mobilized.  She has a right intertrochanteric fracture and has some pain again in the right knee.




Review of Systems:

As noted, she has myalgias and arthralgias on the right and some muscle spasms at night.  No other po
sitives on the systems review.



Physical Examination:

Vital Signs:  Blood pressure 132/68, pulse 68, respiratory rate 16, temperature 97.8, oxygen saturati
on 94%. 

General:  Ms. Tavares is sitting in a chair in her room.  She is in no acute distress.  She appears n
ormocephalic, atraumatic. 

HEENT:  Sclerae anicteric.  Oropharynx is moist. 

Neck:  Supple. 

EXTREMITIES:  Right intertrochanteric surgical site has good hemostasis.  No significant edema or cya
nosis noted in the extremities.



Laboratory Studies:  No new laboratory studies. 



X-ray imaging; no new x-rays or imaging.



Medications:  Today, Zofran given because of nausea.  Her medications also include Norco 5/325 as she
 did have some significant pain as noted.  She is on gabapentin 100 mg twice daily.  For her anemia, 
ferrous sulfate and Hemocyte-Plus.  For malnutrition, Ensure Enlive.  Magnesium added for muscle spas
ms.  Hypertension treated with Coreg.  She does have Eliquis 2.5 mg twice daily for DVT risk reductio
n.



Current Functional Status:  Today, with occupational therapy, she was independent with sit to stand t
ransfers completed shower transfer independently with a grab bar.  Ambulated from room to toilet with
 a rolling walker with standby assistance.  She was educated on home safety.  With physical therapy, 
she ambulated 75 feet twice, 100 feet once independently.  She did need verbal cues to increase step 
length.  She ascended and descended 15 steps and another 10 steps with bilateral handrails independen
tly.  With speech, she demonstrated naming skills by naming 5 items for concrete category in 1 minute
.  Working memory used with 50% accuracy and moderate assistance for 4 units of information.



Progress Towards Rehabilitation Goals:  Ms. Tavares is making excellent progress toward her goals of 
becoming independent with upper and lower body dressing, transferring, toileting, and showering, and 
ambulating 500 feet independently and up and down 15-20 steps independently.  She is also doing very 
well to get toward independence with her cognitive functioning.



Assessment:  Ms. Tavares is a 67-year-old patient in the rehabilitation unit with a right intertrocha
nteric hip fracture, status post open reduction and internal fixation.  She has depression, hypertens
ion, postoperative anemia, malnutrition, and insomnia along with surgical site pain.



Plan:  

1.Continue with physical, occupational, and speech therapy for 3.5 hours 5-7 days.

2.She will continue due to all of her comorbid condition medications which are listed above includin
g Norco, tramadol, Tylenol, ferrous sulfate, Hemocyte-Plus, Coreg, and lisinopril.  She has methocarb
juliane and magnesium along with Zofran as needed.



Comorbidities That Continue To Impact Rehabilitation:  Currently, the pain is comorbid and she now ha
s the Norco and gabapentin on board and that is very helpful.  Muscle spasm is also an issue.  She ha
s magnesium and Robaxin and that is helpful, especially at nighttime to decrease muscle spasms.





LB/MODL

DD:  09/19/2023 20:38:08Voice ID:  [id number]

DT:  09/19/2023 22:42:21Report ID:  [id number]

## 2023-09-20 RX ADMIN — Medication SCH ML: at 08:05

## 2023-09-20 RX ADMIN — MAGNESIUM OXIDE TAB 400 MG (241.3 MG ELEMENTAL MG) SCH MG: 400 (241.3 MG) TAB at 08:05

## 2023-09-20 RX ADMIN — MAGNESIUM OXIDE TAB 400 MG (241.3 MG ELEMENTAL MG) SCH MG: 400 (241.3 MG) TAB at 19:57

## 2023-09-20 RX ADMIN — GABAPENTIN SCH MG: 100 CAPSULE ORAL at 19:57

## 2023-09-20 RX ADMIN — GABAPENTIN SCH MG: 100 CAPSULE ORAL at 08:05

## 2023-09-20 RX ADMIN — HYDROCODONE BITARTRATE AND ACETAMINOPHEN PRN TAB: 5; 325 TABLET ORAL at 08:06

## 2023-09-20 RX ADMIN — APIXABAN SCH MG: 2.5 TABLET, FILM COATED ORAL at 19:57

## 2023-09-20 RX ADMIN — Medication SCH PATCH: at 08:06

## 2023-09-20 RX ADMIN — Medication SCH TAB: at 08:05

## 2023-09-20 RX ADMIN — IRON SUPPLEMENT SCH MG: 325 TABLET ORAL at 08:05

## 2023-09-20 RX ADMIN — APIXABAN SCH MG: 2.5 TABLET, FILM COATED ORAL at 08:05

## 2023-09-20 RX ADMIN — Medication SCH ML: at 19:59

## 2023-09-20 NOTE — RAD REPORT
EXAM DESCRIPTION:  RAD - Knee Right 2 View - 9/20/2023 3:02 pm

 

CLINICAL HISTORY:  pain

 

COMPARISON:  No comparisons

 

TECHNIQUE:  Right knee, 3 views.

 

FINDINGS:  No fracture, dislocation or periosteal reaction.Small joint effusion seen. Mild tricompart
mental osteoarthritic changes. Small rounded corticated osseous density along the posterior aspect of
 the intertrochanteric region, indeterminate and could relate to a meniscal ossicle. Vascular calcifi
cations.

 

Clinical concerns for internal derangement or occult bony injury could be further assessed with MR im
aging.

 

IMPRESSION:  Small joint effusion. Degenerative changes as above. No acute osseous abnormality.

## 2023-09-20 NOTE — PN
Date of Progress Note:  09/20/2023



Face-to-Face Progress Note Visit.



Time Of Service:  1:49 p.m.



Subjective:  Ms. Tavares is doing very well today.  She did have some improvement in her muscle spasm
s in the right lower extremity and knee pain with pain patch, is doing fairly well, although she does
 have some pain there as she ambulates.



Review of Systems:

Mild myalgias, arthralgias, and spasms in the lower extremities, especially at nighttime, and again p
ain in the right knee.  She actually had an x-ray today of the right knee, the study report is jesica vargas.



Physical Examination:

Vital Signs:  Blood pressure 118/64, pulse 68, respiratory rate 16, temperature 97, ox saturation 98%
. 

General:  Ms. Tavares is resting in bed in between therapy sessions. 

HEENT:  She is normocephalic, atraumatic.  Sclerae anicteric.  Oropharynx pink, moist. 

Neck:  Supple. 

Chest:  Clear. 

Heart:  Regular. 

Extremities:  Right hip surgical site has good hemostasis.  There is a pain patch over the right knee
.  She actually did better when the right knee was wrapped with Ace bandage and she often times use a
 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
int, she has to have surgery on the right knee.



Laboratory Studies:  No new laboratory studies.



X-rays:  The right knee x-ray results are pending.



Medications:  Gabapentin is increased to 100 mg twice daily as well as Robaxin 500 mg 6 times daily f
or muscle spasms.  She continues the other medications as noted including Eliquis 2.5 mg twice daily 
for DVT prophylaxis and Norco 5/325 every 6 hours for pain.



Current Functional Status:  Today, she self pulled herself from the room to the toilet with independe
nce and the toilet transfer twice with grab bars, so independently.  She is actually ready to go home
 and will continue with aggressive therapy via Home Health.  She is independent with toilet hygiene. 
 With physical therapy, supine to sit transfers done independently, stand to pivot transfers with a r
olling walker done independently.  She ambulated 125 feet twice, another 150 feet once, another 100 f
eet twice, and 100 feet independently with a rolling walker.  She ascended and descended 15 steps wit
h bilateral handrails independently.  With speech, she demonstrated ability to sequence 4 units of in
formation using working memory with 80% accuracy and minimum assistance.



Progress Towards Rehabilitation Goals:  Ms. Tavares has made excellent progress towards her rehabilit
ation goals of becoming independent with upper and lower body dressing, transferring, toileting, show
ering, and performing activities of daily living.  She is ambulating now independently with a rolling
 walker at 250 feet and up and down 15 steps independently.  She was performing cognitive functioning
 with modified independence.



Assessment:  Ms. Tavares is a 67-year-old patient in the rehabilitation unit with a right intertrocha
nteric fracture, status post surgical repair.  She has stable comorbidities, depression, hypertension
, postoperative anemia, malnutrition, insomnia, right knee pain.  She has a pending right knee x-ray.




Plan:  

1.For now, she will continue with physical, occupational, and speech therapy for 3.5 hours, 5 of 7 d
ays.

2.Continue her comorbid condition medications as noted previously and she will have the right knee x
-ray followed up.  She is also going home tomorrow, will have home health to continue with physical a
nd occupational therapy.



Comorbidities That Continue To Impact Rehabilitation Process:  Currently, the right knee pain is miti
gated with pain patch.  She also has muscle spasms, medicated by muscle relaxants and she will have D
VT 

prophylaxis even going home.  Otherwise, comorbidities do not negatively impact her rehabilitation pr
ocess.





LB/MODL

DD:  09/20/2023 20:18:15Voice ID:  [id number]

DT:  09/20/2023 23:21:18Report ID:  [id number]

## 2023-09-21 VITALS — DIASTOLIC BLOOD PRESSURE: 65 MMHG | TEMPERATURE: 97.2 F | SYSTOLIC BLOOD PRESSURE: 136 MMHG

## 2023-09-21 LAB
ALBUMIN SERPL BCP-MCNC: 2.6 G/DL (ref 3.4–5)
BUN BLD-MCNC: 24 MG/DL (ref 7–18)
GLUCOSE SERPLBLD-MCNC: 100 MG/DL (ref 74–106)
HCT VFR BLD CALC: 26 % (ref 36–45)
LYMPHOCYTES # SPEC AUTO: 1.8 K/UL (ref 0.7–4.9)
MAGNESIUM SERPL-MCNC: 2.7 MG/DL (ref 1.6–2.4)
MCV RBC: 90.8 FL (ref 80–100)
PMV BLD: 6.6 FL (ref 7.6–11.3)
POTASSIUM SERPL-SCNC: 4.2 MEQ/L (ref 3.5–5.1)
PREALB SERPL-MCNC: 19.8 MG/DL (ref 20–40)
RBC # BLD: 2.86 M/UL (ref 3.86–4.86)
WBC # BLD AUTO: 5.9 THOU/UL (ref 4.3–10.9)

## 2023-09-21 RX ADMIN — Medication SCH ML: at 07:59

## 2023-09-21 RX ADMIN — Medication SCH PATCH: at 07:58

## 2023-09-21 RX ADMIN — MAGNESIUM OXIDE TAB 400 MG (241.3 MG ELEMENTAL MG) SCH MG: 400 (241.3 MG) TAB at 07:59

## 2023-09-21 RX ADMIN — IRON SUPPLEMENT SCH MG: 325 TABLET ORAL at 07:59

## 2023-09-21 RX ADMIN — GABAPENTIN SCH MG: 100 CAPSULE ORAL at 07:59

## 2023-09-21 RX ADMIN — APIXABAN SCH MG: 2.5 TABLET, FILM COATED ORAL at 07:59

## 2023-09-21 RX ADMIN — MAGNESIUM HYDROXIDE PRN ML: 400 SUSPENSION ORAL at 07:03

## 2023-09-21 RX ADMIN — Medication SCH TAB: at 07:59
